# Patient Record
Sex: MALE | Race: BLACK OR AFRICAN AMERICAN | NOT HISPANIC OR LATINO | URBAN - METROPOLITAN AREA
[De-identification: names, ages, dates, MRNs, and addresses within clinical notes are randomized per-mention and may not be internally consistent; named-entity substitution may affect disease eponyms.]

---

## 2017-04-28 ENCOUNTER — INPATIENT (INPATIENT)
Facility: HOSPITAL | Age: 52
LOS: 7 days | Discharge: HOME | End: 2017-05-06
Attending: INTERNAL MEDICINE | Admitting: INTERNAL MEDICINE

## 2017-06-28 DIAGNOSIS — H40.9 UNSPECIFIED GLAUCOMA: ICD-10-CM

## 2017-06-28 DIAGNOSIS — I10 ESSENTIAL (PRIMARY) HYPERTENSION: ICD-10-CM

## 2017-06-28 DIAGNOSIS — E87.8 OTHER DISORDERS OF ELECTROLYTE AND FLUID BALANCE, NOT ELSEWHERE CLASSIFIED: ICD-10-CM

## 2017-06-28 DIAGNOSIS — F02.81 DEMENTIA IN OTHER DISEASES CLASSIFIED ELSEWHERE, UNSPECIFIED SEVERITY, WITH BEHAVIORAL DISTURBANCE: ICD-10-CM

## 2017-06-28 DIAGNOSIS — E87.1 HYPO-OSMOLALITY AND HYPONATREMIA: ICD-10-CM

## 2017-06-28 DIAGNOSIS — G93.41 METABOLIC ENCEPHALOPATHY: ICD-10-CM

## 2017-06-28 DIAGNOSIS — E87.0 HYPEROSMOLALITY AND HYPERNATREMIA: ICD-10-CM

## 2017-06-28 DIAGNOSIS — G40.909 EPILEPSY, UNSPECIFIED, NOT INTRACTABLE, WITHOUT STATUS EPILEPTICUS: ICD-10-CM

## 2017-06-28 DIAGNOSIS — E86.0 DEHYDRATION: ICD-10-CM

## 2017-06-28 DIAGNOSIS — I25.10 ATHEROSCLEROTIC HEART DISEASE OF NATIVE CORONARY ARTERY WITHOUT ANGINA PECTORIS: ICD-10-CM

## 2017-06-28 DIAGNOSIS — I69.354 HEMIPLEGIA AND HEMIPARESIS FOLLOWING CEREBRAL INFARCTION AFFECTING LEFT NON-DOMINANT SIDE: ICD-10-CM

## 2017-06-28 DIAGNOSIS — E87.6 HYPOKALEMIA: ICD-10-CM

## 2017-06-28 DIAGNOSIS — R53.81 OTHER MALAISE: ICD-10-CM

## 2017-06-28 DIAGNOSIS — T73.0XXA STARVATION, INITIAL ENCOUNTER: ICD-10-CM

## 2017-06-28 DIAGNOSIS — E11.65 TYPE 2 DIABETES MELLITUS WITH HYPERGLYCEMIA: ICD-10-CM

## 2017-06-28 DIAGNOSIS — L89.152 PRESSURE ULCER OF SACRAL REGION, STAGE 2: ICD-10-CM

## 2017-06-28 DIAGNOSIS — Z94.7 CORNEAL TRANSPLANT STATUS: ICD-10-CM

## 2017-06-28 DIAGNOSIS — E88.89 OTHER SPECIFIED METABOLIC DISORDERS: ICD-10-CM
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
## 2017-06-28 DIAGNOSIS — R62.7 ADULT FAILURE TO THRIVE: ICD-10-CM

## 2017-06-28 DIAGNOSIS — K59.09 OTHER CONSTIPATION: ICD-10-CM

## 2017-06-28 DIAGNOSIS — Z78.1 PHYSICAL RESTRAINT STATUS: ICD-10-CM

## 2017-06-28 DIAGNOSIS — G30.9 ALZHEIMER'S DISEASE, UNSPECIFIED: ICD-10-CM

## 2017-06-28 DIAGNOSIS — E78.5 HYPERLIPIDEMIA, UNSPECIFIED: ICD-10-CM
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
## 2017-09-08 ENCOUNTER — OUTPATIENT (OUTPATIENT)
Dept: OUTPATIENT SERVICES | Facility: HOSPITAL | Age: 52
LOS: 1 days | Discharge: HOME | End: 2017-09-08

## 2017-09-08 DIAGNOSIS — E87.0 HYPEROSMOLALITY AND HYPERNATREMIA: ICD-10-CM

## 2017-10-27 ENCOUNTER — OUTPATIENT (OUTPATIENT)
Dept: OUTPATIENT SERVICES | Facility: HOSPITAL | Age: 52
LOS: 1 days | Discharge: HOME | End: 2017-10-27

## 2017-10-27 DIAGNOSIS — K02.62 DENTAL CARIES ON SMOOTH SURFACE PENETRATING INTO DENTIN: ICD-10-CM

## 2017-10-27 DIAGNOSIS — E87.0 HYPEROSMOLALITY AND HYPERNATREMIA: ICD-10-CM

## 2017-11-03 ENCOUNTER — OUTPATIENT (OUTPATIENT)
Dept: OUTPATIENT SERVICES | Facility: HOSPITAL | Age: 52
LOS: 1 days | Discharge: HOME | End: 2017-11-03

## 2017-11-03 DIAGNOSIS — E87.0 HYPEROSMOLALITY AND HYPERNATREMIA: ICD-10-CM

## 2017-11-06 ENCOUNTER — OUTPATIENT (OUTPATIENT)
Dept: OUTPATIENT SERVICES | Facility: HOSPITAL | Age: 52
LOS: 1 days | Discharge: HOME | End: 2017-11-06

## 2017-11-06 DIAGNOSIS — E87.0 HYPEROSMOLALITY AND HYPERNATREMIA: ICD-10-CM

## 2017-11-22 ENCOUNTER — EMERGENCY (EMERGENCY)
Facility: HOSPITAL | Age: 52
LOS: 0 days | Discharge: HOME | End: 2017-11-22

## 2017-11-22 DIAGNOSIS — I10 ESSENTIAL (PRIMARY) HYPERTENSION: ICD-10-CM

## 2017-11-22 DIAGNOSIS — Z94.7 CORNEAL TRANSPLANT STATUS: ICD-10-CM

## 2017-11-22 DIAGNOSIS — Z79.82 LONG TERM (CURRENT) USE OF ASPIRIN: ICD-10-CM

## 2017-11-22 DIAGNOSIS — K59.00 CONSTIPATION, UNSPECIFIED: ICD-10-CM

## 2017-11-22 DIAGNOSIS — M62.81 MUSCLE WEAKNESS (GENERALIZED): ICD-10-CM

## 2017-11-22 DIAGNOSIS — E78.00 PURE HYPERCHOLESTEROLEMIA, UNSPECIFIED: ICD-10-CM

## 2017-11-22 DIAGNOSIS — E87.0 HYPEROSMOLALITY AND HYPERNATREMIA: ICD-10-CM

## 2017-11-22 DIAGNOSIS — G82.20 PARAPLEGIA, UNSPECIFIED: ICD-10-CM

## 2017-11-22 DIAGNOSIS — Z79.899 OTHER LONG TERM (CURRENT) DRUG THERAPY: ICD-10-CM

## 2017-12-07 ENCOUNTER — OUTPATIENT (OUTPATIENT)
Dept: OUTPATIENT SERVICES | Facility: HOSPITAL | Age: 52
LOS: 1 days | Discharge: HOME | End: 2017-12-07

## 2017-12-07 ENCOUNTER — APPOINTMENT (OUTPATIENT)
Dept: INTERNAL MEDICINE | Facility: CLINIC | Age: 52
End: 2017-12-07

## 2017-12-07 VITALS
SYSTOLIC BLOOD PRESSURE: 155 MMHG | TEMPERATURE: 98.1 F | HEIGHT: 73 IN | DIASTOLIC BLOOD PRESSURE: 54 MMHG | HEART RATE: 88 BPM

## 2017-12-07 DIAGNOSIS — E87.0 HYPEROSMOLALITY AND HYPERNATREMIA: ICD-10-CM

## 2017-12-07 DIAGNOSIS — Z87.891 PERSONAL HISTORY OF NICOTINE DEPENDENCE: ICD-10-CM

## 2017-12-07 DIAGNOSIS — L89.309 PRESSURE ULCER OF UNSPECIFIED BUTTOCK, UNSPECIFIED STAGE: ICD-10-CM

## 2017-12-08 DIAGNOSIS — E10.51 TYPE 1 DIABETES MELLITUS WITH DIABETIC PERIPHERAL ANGIOPATHY WITHOUT GANGRENE: ICD-10-CM

## 2017-12-08 DIAGNOSIS — R56.9 UNSPECIFIED CONVULSIONS: ICD-10-CM

## 2017-12-08 DIAGNOSIS — I63.9 CEREBRAL INFARCTION, UNSPECIFIED: ICD-10-CM

## 2017-12-08 DIAGNOSIS — K59.00 CONSTIPATION, UNSPECIFIED: ICD-10-CM

## 2017-12-08 LAB
ALBUMIN SERPL-MCNC: 4.3 G/DL
ALBUMIN/GLOB SERPL: 1.34
ALP SERPL-CCNC: 83 IU/L
ALT SERPL-CCNC: 16 IU/L
ANION GAP SERPL CALC-SCNC: 8 MEQ/L
AST SERPL-CCNC: 17 IU/L
BILIRUB SERPL-MCNC: 0.5 MG/DL
BUN SERPL-MCNC: 9 MG/DL
BUN/CREAT SERPL: 9.4 %
CALCIUM SERPL-MCNC: 9.8 MG/DL
CHLORIDE SERPL-SCNC: 107 MEQ/L
CHOLEST SERPL-MCNC: 138 MG/DL
CO2 SERPL-SCNC: 23 MEQ/L
CREAT SERPL-MCNC: 0.96 MG/DL
ESTIMATED AVERGAGE GLUCOSE (NORTH): 137 MG/DL
GFR SERPL CREATININE-BSD FRML MDRD: 100
GLUCOSE SERPL-MCNC: 103 MG/DL
HBA1C MFR BLD: 6.4 %
HDLC SERPL-MCNC: 38 MG/DL
HDLC SERPL: 3.63
LDLC SERPL DIRECT ASSAY-MCNC: 75 MG/DL
POTASSIUM SERPL-SCNC: 4 MMOL/L
PROT SERPL-MCNC: 7.5 G/DL
SODIUM SERPL-SCNC: 138 MEQ/L
TRIGL SERPL-MCNC: 103 MG/DL
VLDLC SERPL-MCNC: 20 MG/DL

## 2017-12-12 ENCOUNTER — OUTPATIENT (OUTPATIENT)
Dept: OUTPATIENT SERVICES | Facility: HOSPITAL | Age: 52
LOS: 1 days | Discharge: HOME | End: 2017-12-12

## 2017-12-12 ENCOUNTER — APPOINTMENT (OUTPATIENT)
Dept: NEUROLOGY | Facility: CLINIC | Age: 52
End: 2017-12-12

## 2017-12-12 ENCOUNTER — RX RENEWAL (OUTPATIENT)
Age: 52
End: 2017-12-12

## 2017-12-12 VITALS — SYSTOLIC BLOOD PRESSURE: 156 MMHG | HEIGHT: 73 IN | HEART RATE: 81 BPM | DIASTOLIC BLOOD PRESSURE: 92 MMHG

## 2017-12-12 DIAGNOSIS — E87.0 HYPEROSMOLALITY AND HYPERNATREMIA: ICD-10-CM

## 2017-12-12 RX ORDER — CLONAZEPAM 0.5 MG/1
0.5 TABLET ORAL
Refills: 0 | Status: DISCONTINUED | COMMUNITY
End: 2017-12-12

## 2017-12-12 RX ORDER — HALOPERIDOL 1 MG/1
1 TABLET ORAL
Refills: 0 | Status: DISCONTINUED | COMMUNITY
End: 2017-12-12

## 2017-12-12 RX ORDER — INSULIN GLARGINE 100 [IU]/ML
100 INJECTION, SOLUTION SUBCUTANEOUS
Qty: 1 | Refills: 3 | Status: DISCONTINUED | COMMUNITY
Start: 2017-12-07 | End: 2017-12-12

## 2017-12-20 RX ORDER — FENOFIBRATE 160 MG/1
160 TABLET ORAL DAILY
Qty: 30 | Refills: 11 | Status: DISCONTINUED | COMMUNITY
End: 2017-12-20

## 2017-12-22 ENCOUNTER — OUTPATIENT (OUTPATIENT)
Dept: OUTPATIENT SERVICES | Facility: HOSPITAL | Age: 52
LOS: 1 days | Discharge: HOME | End: 2017-12-22

## 2017-12-22 ENCOUNTER — APPOINTMENT (OUTPATIENT)
Dept: ENDOCRINOLOGY | Facility: CLINIC | Age: 52
End: 2017-12-22

## 2017-12-22 VITALS — DIASTOLIC BLOOD PRESSURE: 90 MMHG | HEIGHT: 73 IN | SYSTOLIC BLOOD PRESSURE: 150 MMHG

## 2017-12-22 DIAGNOSIS — E87.0 HYPEROSMOLALITY AND HYPERNATREMIA: ICD-10-CM

## 2017-12-26 ENCOUNTER — RX RENEWAL (OUTPATIENT)
Age: 52
End: 2017-12-26

## 2017-12-28 ENCOUNTER — RX RENEWAL (OUTPATIENT)
Age: 52
End: 2017-12-28

## 2017-12-30 ENCOUNTER — EMERGENCY (EMERGENCY)
Facility: HOSPITAL | Age: 52
LOS: 0 days | Discharge: HOME | End: 2017-12-30
Admitting: INTERNAL MEDICINE

## 2017-12-30 DIAGNOSIS — I10 ESSENTIAL (PRIMARY) HYPERTENSION: ICD-10-CM

## 2017-12-30 DIAGNOSIS — R09.89 OTHER SPECIFIED SYMPTOMS AND SIGNS INVOLVING THE CIRCULATORY AND RESPIRATORY SYSTEMS: ICD-10-CM

## 2017-12-30 DIAGNOSIS — E87.0 HYPEROSMOLALITY AND HYPERNATREMIA: ICD-10-CM

## 2017-12-30 DIAGNOSIS — Z79.2 LONG TERM (CURRENT) USE OF ANTIBIOTICS: ICD-10-CM

## 2017-12-30 DIAGNOSIS — Z94.7 CORNEAL TRANSPLANT STATUS: ICD-10-CM

## 2017-12-30 DIAGNOSIS — E78.00 PURE HYPERCHOLESTEROLEMIA, UNSPECIFIED: ICD-10-CM

## 2017-12-30 DIAGNOSIS — Z79.01 LONG TERM (CURRENT) USE OF ANTICOAGULANTS: ICD-10-CM

## 2017-12-30 DIAGNOSIS — Z87.891 PERSONAL HISTORY OF NICOTINE DEPENDENCE: ICD-10-CM

## 2017-12-30 DIAGNOSIS — F03.90 UNSPECIFIED DEMENTIA WITHOUT BEHAVIORAL DISTURBANCE: ICD-10-CM

## 2017-12-30 DIAGNOSIS — R47.01 APHASIA: ICD-10-CM

## 2017-12-30 DIAGNOSIS — Z79.82 LONG TERM (CURRENT) USE OF ASPIRIN: ICD-10-CM

## 2017-12-30 DIAGNOSIS — Z79.899 OTHER LONG TERM (CURRENT) DRUG THERAPY: ICD-10-CM

## 2017-12-30 DIAGNOSIS — I25.10 ATHEROSCLEROTIC HEART DISEASE OF NATIVE CORONARY ARTERY WITHOUT ANGINA PECTORIS: ICD-10-CM

## 2018-02-13 ENCOUNTER — OUTPATIENT (OUTPATIENT)
Dept: OUTPATIENT SERVICES | Facility: HOSPITAL | Age: 53
LOS: 1 days | Discharge: HOME | End: 2018-02-13

## 2018-02-13 ENCOUNTER — APPOINTMENT (OUTPATIENT)
Age: 53
End: 2018-02-13

## 2018-02-13 DIAGNOSIS — L89.302 PRESSURE ULCER OF UNSPECIFIED BUTTOCK, STAGE 2: ICD-10-CM

## 2018-02-20 DIAGNOSIS — L89.152 PRESSURE ULCER OF SACRAL REGION, STAGE 2: ICD-10-CM

## 2018-02-20 DIAGNOSIS — L89.322 PRESSURE ULCER OF LEFT BUTTOCK, STAGE 2: ICD-10-CM

## 2018-02-20 DIAGNOSIS — L89.312 PRESSURE ULCER OF RIGHT BUTTOCK, STAGE 2: ICD-10-CM

## 2018-03-02 ENCOUNTER — APPOINTMENT (OUTPATIENT)
Dept: GASTROENTEROLOGY | Facility: CLINIC | Age: 53
End: 2018-03-02

## 2018-03-02 ENCOUNTER — OUTPATIENT (OUTPATIENT)
Dept: OUTPATIENT SERVICES | Facility: HOSPITAL | Age: 53
LOS: 1 days | Discharge: HOME | End: 2018-03-02

## 2018-03-02 VITALS — HEART RATE: 81 BPM | DIASTOLIC BLOOD PRESSURE: 87 MMHG | SYSTOLIC BLOOD PRESSURE: 138 MMHG

## 2018-03-02 DIAGNOSIS — R19.7 DIARRHEA, UNSPECIFIED: ICD-10-CM

## 2018-03-05 ENCOUNTER — RX RENEWAL (OUTPATIENT)
Age: 53
End: 2018-03-05

## 2018-03-06 ENCOUNTER — RX RENEWAL (OUTPATIENT)
Age: 53
End: 2018-03-06

## 2018-03-07 ENCOUNTER — RX RENEWAL (OUTPATIENT)
Age: 53
End: 2018-03-07

## 2018-04-02 ENCOUNTER — RX RENEWAL (OUTPATIENT)
Age: 53
End: 2018-04-02

## 2018-04-06 ENCOUNTER — APPOINTMENT (OUTPATIENT)
Dept: INTERNAL MEDICINE | Facility: CLINIC | Age: 53
End: 2018-04-06

## 2018-04-06 ENCOUNTER — OUTPATIENT (OUTPATIENT)
Dept: OUTPATIENT SERVICES | Facility: HOSPITAL | Age: 53
LOS: 1 days | Discharge: HOME | End: 2018-04-06

## 2018-04-06 VITALS — SYSTOLIC BLOOD PRESSURE: 146 MMHG | HEART RATE: 87 BPM | DIASTOLIC BLOOD PRESSURE: 94 MMHG

## 2018-04-06 DIAGNOSIS — L30.9 DERMATITIS, UNSPECIFIED: ICD-10-CM

## 2018-04-07 LAB
C DIFF TOX GENS STL QL NAA+PROBE: NORMAL
CDIFF BY PCR: NEGATIVE

## 2018-04-09 DIAGNOSIS — R56.9 UNSPECIFIED CONVULSIONS: ICD-10-CM

## 2018-04-09 DIAGNOSIS — F41.9 ANXIETY DISORDER, UNSPECIFIED: ICD-10-CM

## 2018-04-09 DIAGNOSIS — I63.9 CEREBRAL INFARCTION, UNSPECIFIED: ICD-10-CM

## 2018-04-09 LAB — DEPRECATED O AND P PREP STL: NORMAL

## 2018-04-30 ENCOUNTER — APPOINTMENT (OUTPATIENT)
Dept: NUTRITION | Facility: CLINIC | Age: 53
End: 2018-04-30

## 2018-05-14 ENCOUNTER — RX RENEWAL (OUTPATIENT)
Age: 53
End: 2018-05-14

## 2018-05-21 ENCOUNTER — RX RENEWAL (OUTPATIENT)
Age: 53
End: 2018-05-21

## 2018-06-13 ENCOUNTER — RX RENEWAL (OUTPATIENT)
Age: 53
End: 2018-06-13

## 2018-06-20 ENCOUNTER — RX RENEWAL (OUTPATIENT)
Age: 53
End: 2018-06-20

## 2018-06-26 ENCOUNTER — OUTPATIENT (OUTPATIENT)
Dept: OUTPATIENT SERVICES | Facility: HOSPITAL | Age: 53
LOS: 1 days | Discharge: HOME | End: 2018-06-26

## 2018-06-26 ENCOUNTER — APPOINTMENT (OUTPATIENT)
Dept: DERMATOLOGY | Facility: CLINIC | Age: 53
End: 2018-06-26

## 2018-06-26 DIAGNOSIS — L21.9 SEBORRHEIC DERMATITIS, UNSPECIFIED: ICD-10-CM

## 2018-07-10 ENCOUNTER — RX RENEWAL (OUTPATIENT)
Age: 53
End: 2018-07-10

## 2018-08-13 ENCOUNTER — RX RENEWAL (OUTPATIENT)
Age: 53
End: 2018-08-13

## 2018-10-03 ENCOUNTER — LABORATORY RESULT (OUTPATIENT)
Age: 53
End: 2018-10-03

## 2018-10-03 LAB
CHOLEST SERPL-MCNC: 120 MG/DL
CHOLEST/HDLC SERPL: 3 RATIO
HDLC SERPL-MCNC: 40 MG/DL
LDLC SERPL CALC-MCNC: 67 MG/DL
TRIGL SERPL-MCNC: 170 MG/DL

## 2018-10-05 ENCOUNTER — APPOINTMENT (OUTPATIENT)
Dept: INTERNAL MEDICINE | Facility: CLINIC | Age: 53
End: 2018-10-05

## 2018-10-05 ENCOUNTER — OUTPATIENT (OUTPATIENT)
Dept: OUTPATIENT SERVICES | Facility: HOSPITAL | Age: 53
LOS: 1 days | Discharge: HOME | End: 2018-10-05

## 2018-10-05 VITALS — TEMPERATURE: 97 F | HEART RATE: 79 BPM | SYSTOLIC BLOOD PRESSURE: 170 MMHG | DIASTOLIC BLOOD PRESSURE: 94 MMHG

## 2018-10-05 LAB
BASOPHILS # BLD AUTO: 0.04 K/UL
BASOPHILS NFR BLD AUTO: 0.4 %
EOSINOPHIL # BLD AUTO: 0.26 K/UL
EOSINOPHIL NFR BLD AUTO: 2.4 %
HCT VFR BLD CALC: 41.4 %
HGB BLD-MCNC: 13.1 G/DL
IMM GRANULOCYTES NFR BLD AUTO: 0.4 %
LYMPHOCYTES # BLD AUTO: 2.46 K/UL
LYMPHOCYTES NFR BLD AUTO: 22.4 %
MAN DIFF?: NORMAL
MCHC RBC-ENTMCNC: 30.3 PG
MCHC RBC-ENTMCNC: 31.6 G/DL
MCV RBC AUTO: 95.6 FL
MONOCYTES # BLD AUTO: 0.47 K/UL
MONOCYTES NFR BLD AUTO: 4.3 %
NEUTROPHILS # BLD AUTO: 7.73 K/UL
NEUTROPHILS NFR BLD AUTO: 70.1 %
PLATELET # BLD AUTO: 303 K/UL
RBC # BLD: 4.33 M/UL
RBC # FLD: 12.7 %
TSH SERPL-ACNC: 2.29 UIU/ML
WBC # FLD AUTO: 11 K/UL

## 2018-10-05 NOTE — HISTORY OF PRESENT ILLNESS
[FreeTextEntry1] : 54 yo M with PMHx of CVA (1980s with residual left sided weakness and aphasia), CAD, Right retinal detachment s/p right corneal transplant secondary to perforation in 04/13/17, Seizure disorder (no seizures in 20 ysr year), HTN, HLD, DMII on Insulin and sacral excoriation . Pt is mute and does not follow command at baseline and is wheel chair bound. Pt's sister was present and assisted w/ the interview. As per sister, no active medical issues at this time. \par Today pt is here for follow up ,need all of his medications and supplies .\par No other new medical  issues.\par Has not followed up with GI,Neuro ,podiatry and ophthalmology.\par as per sister,she is happy to take care of him but it takes some time to follow up with all appointments .\par

## 2018-10-05 NOTE — DISCUSSION/SUMMARY
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 c/w CBZ and buspar\par - will give referral to psych\par \par #Hx of Seizure D/O and CVA w/ residual left sided weakness\par - as per neuro, c/w CBZ and buspar\par - f/u neuro\par \par #T2DM w/ a1c of 6.4 on (12/17)\par - c/w insulin\par - f/u endo\par - will reorder labs\par - will give referral to Nutriton[facility]\par \par #HTN/HLD - stable\par -Continue medications\par \par #Loose stools 2/2 laxative use for constipation - resolved and unlikely infectious etiology\par - hold laxative for now \par - C. diff rule out\par - f/u GI\par \par #HCM\par - F/u GI for routine colonoscopy\par - refused flu shot\par - will order routine labs\par .. rto in6 months

## 2018-10-05 NOTE — ASSESSMENT
[FreeTextEntry1] : 52 yo M with PMHx of CVA (1980s with residual left sided weakness and aphasia), CAD, Right retinal detachment s/p right corneal transplant secondary to perforation in 04/13/17, Seizure disorder (no seizures in 20 ysr year), HTN, HLD, DMII on Insulin and sacral excoriation . Pt is mute and does not follow command at baseline and is wheel chair bound. Pt's sister was present and assisted w/ the interview. As per sister, no active medical issues at this time. \par \par #D.M;\par hgA1c 5.2 \par will decrease Lantus to  20 \par follow up with Endocrinology\par need to follow up with pod,ophthalmology\par \par #HTN/CAD/DLD;\par will cw current care\par \par #CVA,dyskinesia of tounge and mouth and hx of seizures;\par cw carbamazepine and BuSpar\par cw aspirin and statin\par have to follow up with Neuro\par not on haloperidol any more \par \par #diarrhoea; \par resolved\par \par #sacral excoriation;\par will prescribe Silver sulphazidine \par will refer to Burn too\par \par #HMC;\par need to go to GI for colonoscopy\par will give flu shot today\par will give PNA vaccine in next visit\par  on board for all needed supplies \par folwo up in 6 mnths or PRN.\par

## 2018-10-05 NOTE — PHYSICAL EXAM
[No Acute Distress] : no acute distress [Well-Appearing] : well-appearing [Normal Sclera/Conjunctiva] : normal sclera/conjunctiva [No JVD] : no jugular venous distention [No Respiratory Distress] : no respiratory distress  [Clear to Auscultation] : lungs were clear to auscultation bilaterally [No Accessory Muscle Use] : no accessory muscle use [Normal Rate] : normal rate  [Regular Rhythm] : with a regular rhythm [Normal S1, S2] : normal S1 and S2 [No Edema] : there was no peripheral edema [Soft] : abdomen soft [Non Tender] : non-tender [No HSM] : no HSM [Normal Bowel Sounds] : normal bowel sounds [Declined Rectal Exam] : declined rectal exam [No CVA Tenderness] : no CVA  tenderness [de-identified] : residual weakness on Left side ,aphasic

## 2018-10-08 ENCOUNTER — RX CHANGE (OUTPATIENT)
Age: 53
End: 2018-10-08

## 2018-10-08 RX ORDER — INSULIN GLARGINE 100 [IU]/ML
100 INJECTION, SOLUTION SUBCUTANEOUS
Qty: 1 | Refills: 3 | Status: DISCONTINUED | COMMUNITY
Start: 2017-12-12 | End: 2018-10-08

## 2018-10-08 RX ORDER — CALCIUM CARB/VITAMIN D3/VIT K1 500-100-40
31G X 5/16" TABLET,CHEWABLE ORAL
Qty: 1 | Refills: 2 | Status: DISCONTINUED | COMMUNITY
Start: 2017-12-12 | End: 2018-10-08

## 2018-10-22 ENCOUNTER — RX RENEWAL (OUTPATIENT)
Age: 53
End: 2018-10-22

## 2018-10-30 ENCOUNTER — OUTPATIENT (OUTPATIENT)
Dept: OUTPATIENT SERVICES | Facility: HOSPITAL | Age: 53
LOS: 1 days | Discharge: HOME | End: 2018-10-30

## 2018-10-30 ENCOUNTER — APPOINTMENT (OUTPATIENT)
Dept: DERMATOLOGY | Facility: CLINIC | Age: 53
End: 2018-10-30

## 2018-11-13 ENCOUNTER — OUTPATIENT (OUTPATIENT)
Dept: OUTPATIENT SERVICES | Facility: HOSPITAL | Age: 53
LOS: 1 days | Discharge: HOME | End: 2018-11-13

## 2018-11-13 DIAGNOSIS — I63.9 CEREBRAL INFARCTION, UNSPECIFIED: ICD-10-CM

## 2019-01-04 ENCOUNTER — APPOINTMENT (OUTPATIENT)
Dept: ENDOCRINOLOGY | Facility: CLINIC | Age: 54
End: 2019-01-04

## 2019-01-16 ENCOUNTER — OUTPATIENT (OUTPATIENT)
Dept: OUTPATIENT SERVICES | Facility: HOSPITAL | Age: 54
LOS: 1 days | Discharge: HOME | End: 2019-01-16

## 2019-01-17 ENCOUNTER — APPOINTMENT (OUTPATIENT)
Dept: INTERNAL MEDICINE | Facility: CLINIC | Age: 54
End: 2019-01-17

## 2019-01-17 DIAGNOSIS — F41.9 ANXIETY DISORDER, UNSPECIFIED: ICD-10-CM

## 2019-03-08 ENCOUNTER — OUTPATIENT (OUTPATIENT)
Dept: OUTPATIENT SERVICES | Facility: HOSPITAL | Age: 54
LOS: 1 days | Discharge: HOME | End: 2019-03-08

## 2019-03-08 ENCOUNTER — APPOINTMENT (OUTPATIENT)
Dept: GASTROENTEROLOGY | Facility: CLINIC | Age: 54
End: 2019-03-08

## 2019-03-08 VITALS — HEIGHT: 73 IN

## 2019-03-08 VITALS — SYSTOLIC BLOOD PRESSURE: 142 MMHG | DIASTOLIC BLOOD PRESSURE: 88 MMHG

## 2019-03-08 NOTE — HISTORY OF PRESENT ILLNESS
[None] : no changes [de-identified] : 51 yo M with PMHx of CVA (1980s with residual left sided weakness and aphasia), CAD, Right retinal detachment s/p right corneal transplant secondary to perforation in 04/13/17, Seizure disorder (last known seizure over 20 years ago as per sister, Nicole), HTN, HLD, DMII presents from home living with sister (Nicole). His sister gives the history as the patient is non commutative. Patient was last seen in 03/18 for diarrhea, stool O/P, C Diff were sent and were negative.  He was to follow up two weeks after that appointment did was unable to do so.  He presents today for screening colonoscopy. He is unable to produce complaints as he is relatively aphasic, though he follows commands minimally. He currently does not have diarrhea, constipation, and has not been vomiting.  He has has bowel movements 3 times a week and the stool is not hard.

## 2019-03-08 NOTE — PHYSICAL EXAM
[General Appearance - Alert] : alert [General Appearance - In No Acute Distress] : in no acute distress [Sclera] : the sclera and conjunctiva were normal [PERRL With Normal Accommodation] : pupils were equal in size, round, and reactive to light [Extraocular Movements] : extraocular movements were intact [Outer Ear] : the ears and nose were normal in appearance [Oropharynx] : the oropharynx was normal [Neck Appearance] : the appearance of the neck was normal [Neck Cervical Mass (___cm)] : no neck mass was observed [Jugular Venous Distention Increased] : there was no jugular-venous distention [Thyroid Diffuse Enlargement] : the thyroid was not enlarged [Thyroid Nodule] : there were no palpable thyroid nodules [Auscultation Breath Sounds / Voice Sounds] : lungs were clear to auscultation bilaterally [Heart Rate And Rhythm] : heart rate was normal and rhythm regular [Heart Sounds] : normal S1 and S2 [Heart Sounds Gallop] : no gallops [Murmurs] : no murmurs [Heart Sounds Pericardial Friction Rub] : no pericardial rub [Bowel Sounds] : normal bowel sounds [Abdomen Soft] : soft [Abdomen Tenderness] : non-tender [Abdomen Mass (___ Cm)] : no abdominal mass palpated [Nail Clubbing] : no clubbing  or cyanosis of the fingernails [Musculoskeletal - Swelling] : no joint swelling seen [Skin Color & Pigmentation] : normal skin color and pigmentation [Skin Turgor] : normal skin turgor [] : no rash [Deep Tendon Reflexes (DTR)] : deep tendon reflexes were 2+ and symmetric [Sensation] : the sensory exam was normal to light touch and pinprick [FreeTextEntry1] : Left Sided weakness from previous CVA (Upper and Lower extremities), Expressive Aphasia

## 2019-03-08 NOTE — ASSESSMENT
[FreeTextEntry1] : 54 yo M with PMHx of CVA (1980s with residual left sided weakness and aphasia), CAD, Right retinal detachment s/p right corneal transplant secondary to perforation in 04/13/17, Seizure disorder (last known seizure over 20 years ago as per sister, Nicole), HTN, HLD, DMII  presenting for screening colonoscopy.\par \par #) Screening Colonoscopy \par -Stool studies were negative after last visit \par -No longer has diarrhea \par -GoLYTELY, Dulcolax\par  \par #) Dysphagia 2/2 to CVA \par c/w nectar thick diet.

## 2019-03-19 ENCOUNTER — OUTPATIENT (OUTPATIENT)
Dept: OUTPATIENT SERVICES | Facility: HOSPITAL | Age: 54
LOS: 1 days | Discharge: HOME | End: 2019-03-19

## 2019-03-19 ENCOUNTER — APPOINTMENT (OUTPATIENT)
Dept: DERMATOLOGY | Facility: CLINIC | Age: 54
End: 2019-03-19

## 2019-03-19 NOTE — ASSESSMENT
[FreeTextEntry1] : #Seborrheic Dermatitis\par - continue with triamcinolone cream 0.25% \par - will add ketoconazole shampoo to wash beard\par - Follow up in 4 months

## 2019-03-19 NOTE — PHYSICAL EXAM
[Alert] : alert [Well Nourished] : well nourished [FreeTextEntry3] : Scaling on nasolabial folds and over nasal bridge, associated with mild hypopigmentation \par \par No scaling over scalp or forehead

## 2019-03-19 NOTE — HISTORY OF PRESENT ILLNESS
[FreeTextEntry1] : Seborrheic Dermatitis  [de-identified] : 54 y/o M with severe MR, wheelchair bound, non-verbal presents for follow-up of seborrheic dermatitis. Patient was seen in July, 2018 for initial evaluation. He had hypopigmentation, itchiness, and redness affecting the entire head, including scalp, eyebrows, nasal folds, ears, and beard. Was initially treated with hydrocortisone 1% cream and clotrimazole cream, and then improved after adding triamcinolone 0.25%. On last visit, hydrocortisone and clotrimazole cream were discontinued and he was just maintained on triamcinolone. \stephanie Continues to have scaling around nasolabial fold, nasal bridge, and she reports scaling on his beard

## 2019-03-28 ENCOUNTER — APPOINTMENT (OUTPATIENT)
Dept: INTERNAL MEDICINE | Facility: CLINIC | Age: 54
End: 2019-03-28

## 2019-03-28 ENCOUNTER — OUTPATIENT (OUTPATIENT)
Dept: OUTPATIENT SERVICES | Facility: HOSPITAL | Age: 54
LOS: 1 days | Discharge: HOME | End: 2019-03-28

## 2019-03-28 VITALS
SYSTOLIC BLOOD PRESSURE: 154 MMHG | HEART RATE: 80 BPM | HEIGHT: 73 IN | TEMPERATURE: 96.7 F | DIASTOLIC BLOOD PRESSURE: 86 MMHG | BODY MASS INDEX: 25.18 KG/M2 | WEIGHT: 190 LBS

## 2019-03-28 DIAGNOSIS — L21.9 SEBORRHEIC DERMATITIS, UNSPECIFIED: ICD-10-CM

## 2019-03-28 DIAGNOSIS — M26.32: ICD-10-CM

## 2019-03-28 DIAGNOSIS — K59.09 OTHER CONSTIPATION: ICD-10-CM

## 2019-03-28 DIAGNOSIS — Z86.59 PERSONAL HISTORY OF OTHER MENTAL AND BEHAVIORAL DISORDERS: ICD-10-CM

## 2019-03-28 DIAGNOSIS — Z87.898 PERSONAL HISTORY OF OTHER SPECIFIED CONDITIONS: ICD-10-CM

## 2019-03-28 DIAGNOSIS — Z94.7 CORNEAL TRANSPLANT STATUS: ICD-10-CM

## 2019-03-28 DIAGNOSIS — I63.9 CEREBRAL INFARCTION, UNSPECIFIED: ICD-10-CM

## 2019-03-28 DIAGNOSIS — E11.9 TYPE 2 DIABETES MELLITUS WITHOUT COMPLICATIONS: ICD-10-CM

## 2019-03-28 RX ORDER — CARBAMAZEPINE 100 MG/1
100 TABLET, CHEWABLE ORAL
Qty: 120 | Refills: 5 | Status: DISCONTINUED | COMMUNITY
Start: 2018-01-02 | End: 2019-03-28

## 2019-03-29 NOTE — HISTORY OF PRESENT ILLNESS
[FreeTextEntry1] : 53 year old male came here for follow up. [de-identified] : 55 yo M with PMHx of CVA (1980s with residual left sided weakness and aphasia), CAD, Right retinal detachment s/p right corneal transplant secondary to perforation in 04/13/17, Seizure disorder (no seizures in 20 ysr year), HTN, HLD, DMII on Insulin and sacral excoriation. Last seen in Oct 2018. accompanied by sister care giver she c/o involuntary movements of tongue, mouth newly started on hands and legs. Denies any fever, chills, N/v/Abd pain.

## 2019-03-29 NOTE — ASSESSMENT
[FreeTextEntry1] : 54 yo M with PMHx of CVA (1980s with residual left sided weakness and aphasia), CAD, Right retinal detachment s/p right corneal transplant secondary to perforation in 04/13/17, Seizure disorder (no seizures in 20 ysr year), HTN, HLD, DMII on Insulin and sacral excoriation. \par \par #)DM controlled\par HbA1c 5.2 in 2017\par c/w insulin as needed\par Sister doesn't want to take him for podiatry, opthal, endo because of controlled sugar.\par will check HbA1c\par \par #)HTN/CAD/DLD;\par c/w Lisinopril, aspirin, simvastatin\par \par #)h/o CVA,dyskinesia of tounge and mouth and hx of seizures;\par c/w carbamazepine and BuSpar\par c/w aspirin and statin\par Has an appointment with neurology next month\par haloperidol has been stopped\par \par #)sacral excoriation;\par c/w Silver sulphazidine \par wound care referral\par \par #)Chronic constipation\par c/w stool softeners as needed\par \par #)HCM\par Colonoscopy scheduled followed up with GI in June 5th.\par Flu received in oct 2018\par PPSV 23 vaccination administered today\par \par #)Need hospital bed: An extended hospital bed is recommended as pt's height exceeded the length of his current standard bed. This causes his legs to hang over the foot board portion, Pt has been referred to wound care due to a decubitus ulcer sustained as a result of pt's inability to move with adequate space.\par \par Follow up in 6 months and PRN

## 2019-03-29 NOTE — PHYSICAL EXAM
[No Acute Distress] : no acute distress [Normal Sclera/Conjunctiva] : normal sclera/conjunctiva [Normal Outer Ear/Nose] : the outer ears and nose were normal in appearance [Supple] : supple [No Respiratory Distress] : no respiratory distress  [Clear to Auscultation] : lungs were clear to auscultation bilaterally [No Accessory Muscle Use] : no accessory muscle use [Normal Rate] : normal rate  [Normal S1, S2] : normal S1 and S2 [No Edema] : there was no peripheral edema [Soft] : abdomen soft [Non Tender] : non-tender [Non-distended] : non-distended [No CVA Tenderness] : no CVA  tenderness [de-identified] : wheel chair bound [de-identified] : unable to perform complete neurologic examination, he can able to follow simple commands

## 2019-04-30 ENCOUNTER — OUTPATIENT (OUTPATIENT)
Dept: OUTPATIENT SERVICES | Facility: HOSPITAL | Age: 54
LOS: 1 days | Discharge: HOME | End: 2019-04-30

## 2019-04-30 ENCOUNTER — APPOINTMENT (OUTPATIENT)
Dept: NEUROLOGY | Facility: CLINIC | Age: 54
End: 2019-04-30

## 2019-04-30 ENCOUNTER — RX RENEWAL (OUTPATIENT)
Age: 54
End: 2019-04-30

## 2019-04-30 VITALS — DIASTOLIC BLOOD PRESSURE: 97 MMHG | SYSTOLIC BLOOD PRESSURE: 151 MMHG | TEMPERATURE: 97.3 F | HEART RATE: 86 BPM

## 2019-04-30 DIAGNOSIS — G40.909 EPILEPSY, UNSPECIFIED, NOT INTRACTABLE, WITHOUT STATUS EPILEPTICUS: ICD-10-CM

## 2019-04-30 DIAGNOSIS — G24.9 DYSTONIA, UNSPECIFIED: ICD-10-CM

## 2019-04-30 DIAGNOSIS — I63.9 CEREBRAL INFARCTION, UNSPECIFIED: ICD-10-CM

## 2019-04-30 NOTE — ASSESSMENT
[FreeTextEntry1] : #Dyskinesia\par R/o extrapyramidal symptoms ( less likely as risperidone stopped 6 months ago with no improvement) \par R/o structural abnormality: repeat head CT\par R/o metabolic abnormalities: CMP, TSH ordered\par Rehab medicine referral \par \par #Seizure disorder\par Seizure free\par Check carbamazepine level and renew for now

## 2019-04-30 NOTE — END OF VISIT
[] : Resident [FreeTextEntry3] : Patient may have Tardive dyskinesia from antipsychotics.  According to sister hasn't been on them for 6 months,\par yet symptoms persist.  They do not appear to be disabling.  F/u imaging is suggested with head CT and labs.\par Physical therapy is recommended.  Return in 3 months.\par

## 2019-04-30 NOTE — HISTORY OF PRESENT ILLNESS
[FreeTextEntry1] : 55 yo M with PMHx of CVA (1980s with residual left sided weakness and aphasia), CAD, Right retinal detachment s/p right corneal transplant secondary to perforation in 04/13/17, Seizure disorder (no seizures in 20  years), Behavior disorder? ( on risperidone and Buspirone), HTN, HLD, DMII on Insulin.\par \par Patient is having involuntary movements of tongue, mouth, hands and legs since around 6 months. He is smacking his mouth with licking tongue continuously. His arms are legs are stiff and sometimes he goes into locked positions and holding into things very tightly.\par Patient was on haldol while in nursing home and was switched to buspiron and risperidone after discharge one year ago. Sister however stopped both drugs 6 months ago when these symptoms started. He has no behavioral issues as per sister.\par Patient has been on carbamazepine and seizure free since years.  \par Latest Head CT ( 2017)showing  Mild periventricular and subcortical white matter chronic small vessel\par ischemic changes and Heterogeneous density in the right globe with preseptal soft tissue\par swelling.

## 2019-04-30 NOTE — PHYSICAL EXAM
[General Appearance - In No Acute Distress] : in no acute distress [General Appearance - Well Developed] : well developed [Cranial Nerves Facial (VII)] : face symmetrical [Cranial Nerves Vestibulocochlear (VIII)] : hearing was intact bilaterally [Cranial Nerves Glossopharyngeal (IX)] : tongue and palate midline [Cranial Nerves Accessory (XI - Cranial And Spinal)] : head turning and shoulder shrug symmetric [Cranial Nerves Hypoglossal (XII)] : there was no tongue deviation with protrusion [FreeTextEntry1] : Patient avoiding eye contact oriented to place and person but not time [FreeTextEntry6] : Stiffness appreciated in left upper extremity and bilateral lower extremity

## 2019-05-01 LAB
ALBUMIN SERPL ELPH-MCNC: 4.2 G/DL
ALP BLD-CCNC: 129 U/L
ALT SERPL-CCNC: 13 U/L
ANION GAP SERPL CALC-SCNC: 11 MMOL/L
AST SERPL-CCNC: 14 U/L
BASOPHILS # BLD AUTO: 0.04 K/UL
BASOPHILS NFR BLD AUTO: 0.4 %
BILIRUB SERPL-MCNC: 0.3 MG/DL
BUN SERPL-MCNC: 19 MG/DL
CALCIUM SERPL-MCNC: 9.4 MG/DL
CHLORIDE SERPL-SCNC: 105 MMOL/L
CHOLEST SERPL-MCNC: 131 MG/DL
CHOLEST/HDLC SERPL: 2.7 RATIO
CO2 SERPL-SCNC: 27 MMOL/L
CREAT SERPL-MCNC: 0.9 MG/DL
EOSINOPHIL # BLD AUTO: 0.06 K/UL
EOSINOPHIL NFR BLD AUTO: 0.7 %
ESTIMATED AVERAGE GLUCOSE: 91 MG/DL
GLUCOSE SERPL-MCNC: 85 MG/DL
HBA1C MFR BLD HPLC: 4.8 %
HCT VFR BLD CALC: 39.8 %
HDLC SERPL-MCNC: 49 MG/DL
HGB BLD-MCNC: 12.1 G/DL
IMM GRANULOCYTES NFR BLD AUTO: 0.2 %
LDLC SERPL CALC-MCNC: 72 MG/DL
LYMPHOCYTES # BLD AUTO: 2.35 K/UL
LYMPHOCYTES NFR BLD AUTO: 26 %
MAN DIFF?: NORMAL
MCHC RBC-ENTMCNC: 27.7 PG
MCHC RBC-ENTMCNC: 30.4 G/DL
MCV RBC AUTO: 91.1 FL
MONOCYTES # BLD AUTO: 0.38 K/UL
MONOCYTES NFR BLD AUTO: 4.2 %
NEUTROPHILS # BLD AUTO: 6.19 K/UL
NEUTROPHILS NFR BLD AUTO: 68.5 %
PLATELET # BLD AUTO: 249 K/UL
POTASSIUM SERPL-SCNC: 4.5 MMOL/L
PROT SERPL-MCNC: 7.4 G/DL
RBC # BLD: 4.37 M/UL
RBC # FLD: 16.4 %
SODIUM SERPL-SCNC: 143 MMOL/L
TRIGL SERPL-MCNC: 55 MG/DL
TSH SERPL-ACNC: 1.81 UIU/ML
WBC # FLD AUTO: 9.04 K/UL

## 2019-05-10 ENCOUNTER — APPOINTMENT (OUTPATIENT)
Dept: INTERNAL MEDICINE | Facility: CLINIC | Age: 54
End: 2019-05-10

## 2019-05-10 ENCOUNTER — OUTPATIENT (OUTPATIENT)
Dept: OUTPATIENT SERVICES | Facility: HOSPITAL | Age: 54
LOS: 1 days | Discharge: HOME | End: 2019-05-10

## 2019-05-10 VITALS
DIASTOLIC BLOOD PRESSURE: 98 MMHG | HEIGHT: 73 IN | WEIGHT: 195 LBS | BODY MASS INDEX: 25.84 KG/M2 | SYSTOLIC BLOOD PRESSURE: 155 MMHG | HEART RATE: 80 BPM | TEMPERATURE: 96.9 F

## 2019-05-10 NOTE — HISTORY OF PRESENT ILLNESS
[FreeTextEntry1] : 53 year old male came here for follow up. [de-identified] : 55 yo M with PMHx of CVA (1980s with residual left sided weakness and aphasia), CAD, Right retinal detachment s/p right corneal transplant secondary to perforation in 04/13/17, Seizure disorder (no seizures in 20 ysr year), tardive diakinesis, HTN, HLD, DMII on Insulin and sacral excoriation. Last seen in March 2019 for tardive dyskinesia. accompanied by sister care giver she c/o swelling in the right groin which comes and goes. She denied any hx of constipation, diarrhea, changes in skin color and tenderness. He also has increased salivation after his medications were changed recently. He was seen by neuro for tardive dyskinesia and they d/cinthia Respirdone and recommended CT scan of the brain, he is waiting for the insurance to approve CT scan.

## 2019-05-10 NOTE — PHYSICAL EXAM
[No Acute Distress] : no acute distress [Normal Outer Ear/Nose] : the outer ears and nose were normal in appearance [Normal Oropharynx] : the oropharynx was normal [No JVD] : no jugular venous distention [Supple] : supple [No Respiratory Distress] : no respiratory distress  [Clear to Auscultation] : lungs were clear to auscultation bilaterally [No Accessory Muscle Use] : no accessory muscle use [Normal Rate] : normal rate  [Regular Rhythm] : with a regular rhythm [No Murmur] : no murmur heard [Normal S1, S2] : normal S1 and S2 [No CVA Tenderness] : no CVA  tenderness [No Spinal Tenderness] : no spinal tenderness [No Joint Swelling] : no joint swelling [de-identified] : unable to follow with right eye. [de-identified] : mass in the groin, soft nonternder, increases in size with straining. [de-identified] : aphasia with left sided weakness, lip smaking and abnormal movements of face

## 2019-05-10 NOTE — ASSESSMENT
[FreeTextEntry1] : 55 yo M with PMHx of CVA (1980s with residual left sided weakness and aphasia), CAD, Right retinal detachment s/p right corneal transplant secondary to perforation in 04/13/17, Seizure disorder (no seizures in 20 ysr year), tardive diakinesis, HTN, HLD, DMII on Insulin and sacral excoriation. Last seen in March 2019 for tardive dyskinesia. accompanied by sister care giver she c/o swelling in the right groin which comes and goes\par \par #) Inguinal hernia right sided\par - refer to surgery.\par \par #) prediabetes \par latest HbA1c  4.5 in March 2019 \par d/c insulin\par \par #)HTN - uncontrolled\par increase lisinopril to 40 mg q 24\par \par CAD/DLD;\par c/w aspirin, simvastatin\par \par #)h/o CVA,dyskinesia of tounge and mouth and hx of seizures;\par c/w carbamazepine and BuSpar\par c/w aspirin and statin\par f/u with neuro after CT scan of head\par haloperidol has been stopped\par \par #)sacral excoriation;\par c/w Silver sulphazidine \par wound care referral\par \par #)Chronic constipation\par c/w stool softeners as needed\par \par #)HCM\par Colonoscopy scheduled followed up with GI in June 5th.\par PPSV 23 vaccination administered march 28, 2019 second shot due in sep 2019.\par Follow up in 3 months and PRN.

## 2019-05-14 DIAGNOSIS — E11.9 TYPE 2 DIABETES MELLITUS WITHOUT COMPLICATIONS: ICD-10-CM

## 2019-05-14 DIAGNOSIS — I63.9 CEREBRAL INFARCTION, UNSPECIFIED: ICD-10-CM

## 2019-05-14 DIAGNOSIS — K46.9 UNSPECIFIED ABDOMINAL HERNIA WITHOUT OBSTRUCTION OR GANGRENE: ICD-10-CM

## 2019-06-12 ENCOUNTER — APPOINTMENT (OUTPATIENT)
Dept: SURGERY | Facility: CLINIC | Age: 54
End: 2019-06-12
Payer: MEDICAID

## 2019-06-12 VITALS
WEIGHT: 195 LBS | SYSTOLIC BLOOD PRESSURE: 140 MMHG | BODY MASS INDEX: 25.84 KG/M2 | DIASTOLIC BLOOD PRESSURE: 82 MMHG | HEIGHT: 73 IN

## 2019-06-12 DIAGNOSIS — K46.9 UNSPECIFIED ABDOMINAL HERNIA W/OUT OBSTRUCTION OR GANGRENE: ICD-10-CM

## 2019-06-12 PROBLEM — I25.10 ATHEROSCLEROTIC HEART DISEASE OF NATIVE CORONARY ARTERY WITHOUT ANGINA PECTORIS: Chronic | Status: ACTIVE | Noted: 2019-06-05

## 2019-06-12 PROBLEM — E78.00 PURE HYPERCHOLESTEROLEMIA, UNSPECIFIED: Chronic | Status: ACTIVE | Noted: 2019-06-05

## 2019-06-12 PROBLEM — F41.9 ANXIETY DISORDER, UNSPECIFIED: Chronic | Status: ACTIVE | Noted: 2019-06-05

## 2019-06-12 PROBLEM — I10 ESSENTIAL (PRIMARY) HYPERTENSION: Chronic | Status: ACTIVE | Noted: 2019-06-05

## 2019-06-12 PROBLEM — H33.20 SEROUS RETINAL DETACHMENT, UNSPECIFIED EYE: Chronic | Status: ACTIVE | Noted: 2019-06-05

## 2019-06-12 PROBLEM — R56.9 UNSPECIFIED CONVULSIONS: Chronic | Status: ACTIVE | Noted: 2019-06-05

## 2019-06-12 PROBLEM — E11.9 TYPE 2 DIABETES MELLITUS WITHOUT COMPLICATIONS: Chronic | Status: ACTIVE | Noted: 2019-06-05

## 2019-06-12 PROCEDURE — 99203 OFFICE O/P NEW LOW 30 MIN: CPT

## 2019-06-12 NOTE — PHYSICAL EXAM
[JVD] : no jugular venous distention  [No Rash or Lesion] : No rash or lesion [Respiratory Effort] : normal respiratory effort [Alert] : alert [Oriented to Person] : disoriented to person [Oriented to Place] : disoriented to place [Oriented to Time] : disoriented to time [de-identified] : alina in chair [Calm] : calm [de-identified] : normal with glasses on  [de-identified] : Soft non tender , no palpable masses

## 2019-06-12 NOTE — ASSESSMENT
[FreeTextEntry1] : Felipe is a 54  year old man who presents to the office. He had a stroke when he was 22 . Since then his sister is taking care of him. He has had multiple medical problems.\par His sister recently noted a swelling in the right groin. It comes out and it goes back in. \par There is no change in bladder or bowel habits. \par He does not have any symptoms from it. \par He is averbal so symptoms are difficult to elicit. \par \par Inspersion : Asymptomatic inguinal hernia. \par \par We explained in great detail the pathophysiology of the disease process. We discussed the workup for diagnosis and management. The Options explained to the patient. The Risk , benefit and alternatives were discussed. We discussed recovery and possible complications. The Post operative care was explained to the patient. He was counselled on diet , exercise and wound care.\par \par We discussed the  surgery with him and her . \par \par We explained in great detail the pathophysiology of the disease process. We discussed the workup for diagnosis and management. The Post operative care was explained to the patient. He was counselled on diet , exercise and wound care. The Procedure was explained to the patient. The Risk , benefit and alternatives were discussed. We discussed recovery and possible complications. We discussed recurrence rate and complications including chronic abdominal pain. We also discussed hernia, surgery and  pain in relation to his Job.\par \par We discussed about anesthesia and its complications\par We also discussed about Dr. Murguia who would be able to do it , without general anesthesia. \par \par She will follow the hernia for now. \par She will come back to see me in a few weeks. \par \par \par

## 2019-06-12 NOTE — CONSULT LETTER
[Dear  ___] : Dear  [unfilled], [Please see my note below.] : Please see my note below. [Referral Letter:] : I am referring [unfilled] to you for further evaluation.  My most recent evaluation follows. [Referral Closing:] : Thank you very much for seeing this patient.  If you have any questions, please do not hesitate to contact me. [Sincerely,] : Sincerely, [FreeTextEntry1] : Felipe is 54 year old man, who has multiple comorbidities had a stroke at a young age. His sister noticed a right inguinal hernia, which spontaneously reduced. \par I discussed various aspects of surgery. The sister decided on watchful waiting. She is unsure about anesthesia and wanted to know if this could be done without general anesthesia. \par  [FreeTextEntry3] : Rama Cooney MD\par Minimally Invasive Surgery\par Foregut and Hhlhtn-Fqtonazjd-Pqymtra Surgery\par Associate  of [facility]par [facility]\par [address] [place] [address] , [address] [place] [address]\par Phone: [phone] Fax: [phone]\par

## 2019-06-12 NOTE — HISTORY OF PRESENT ILLNESS
[de-identified] : Felipe is a 54  year old man who presents to the office. He had a stroke when he was 22 . Since then his sister is taking care of him. He has had multiple medical problems.\par His sister recently noted a swelling in the right groin. It comes out and it goes back in. \par There is no change in bladder or bowel habits. \par He does not have any symptoms from it. \par He is averbal so symptoms are difficult to elicit.

## 2019-07-30 ENCOUNTER — APPOINTMENT (OUTPATIENT)
Dept: DERMATOLOGY | Facility: CLINIC | Age: 54
End: 2019-07-30

## 2019-07-31 ENCOUNTER — FORM ENCOUNTER (OUTPATIENT)
Age: 54
End: 2019-07-31

## 2019-08-01 ENCOUNTER — OUTPATIENT (OUTPATIENT)
Dept: OUTPATIENT SERVICES | Facility: HOSPITAL | Age: 54
LOS: 1 days | Discharge: HOME | End: 2019-08-01
Payer: MEDICAID

## 2019-08-01 DIAGNOSIS — G24.9 DYSTONIA, UNSPECIFIED: ICD-10-CM

## 2019-08-01 DIAGNOSIS — Z94.7 CORNEAL TRANSPLANT STATUS: Chronic | ICD-10-CM

## 2019-08-01 PROCEDURE — 70450 CT HEAD/BRAIN W/O DYE: CPT | Mod: 26

## 2019-08-21 ENCOUNTER — APPOINTMENT (OUTPATIENT)
Dept: INTERNAL MEDICINE | Facility: CLINIC | Age: 54
End: 2019-08-21

## 2019-08-21 ENCOUNTER — OUTPATIENT (OUTPATIENT)
Dept: OUTPATIENT SERVICES | Facility: HOSPITAL | Age: 54
LOS: 1 days | Discharge: HOME | End: 2019-08-21

## 2019-08-21 VITALS — HEART RATE: 64 BPM | DIASTOLIC BLOOD PRESSURE: 84 MMHG | SYSTOLIC BLOOD PRESSURE: 135 MMHG

## 2019-08-21 DIAGNOSIS — Z94.7 CORNEAL TRANSPLANT STATUS: Chronic | ICD-10-CM

## 2019-08-21 RX ORDER — POLYETHYLENE GLYCOL 3350 17 G/17G
17 POWDER, FOR SOLUTION ORAL TWICE DAILY
Qty: 1 | Refills: 3 | Status: DISCONTINUED | COMMUNITY
Start: 2017-12-20 | End: 2019-08-21

## 2019-08-21 RX ORDER — POLYETHYLENE GLYCOL 3350 AND ELECTROLYTES WITH LEMON FLAVOR 236; 22.74; 6.74; 5.86; 2.97 G/4L; G/4L; G/4L; G/4L; G/4L
236 POWDER, FOR SOLUTION ORAL
Qty: 1 | Refills: 0 | Status: DISCONTINUED | COMMUNITY
Start: 2018-03-02 | End: 2019-08-21

## 2019-08-21 RX ORDER — HYDROCORTISONE 10 MG/G
1 CREAM TOPICAL TWICE DAILY
Qty: 45 | Refills: 5 | Status: DISCONTINUED | COMMUNITY
Start: 2018-06-26 | End: 2019-08-21

## 2019-08-21 RX ORDER — DOCUSATE SODIUM 100 MG
100 TABLET ORAL DAILY
Qty: 60 | Refills: 5 | Status: DISCONTINUED | COMMUNITY
Start: 2017-12-20 | End: 2019-08-21

## 2019-08-21 RX ORDER — POLYETHYLENE GLYCOL 3350, SODIUM SULFATE ANHYDROUS, SODIUM BICARBONATE, SODIUM CHLORIDE, POTASSIUM CHLORIDE 227.1; 21.5; 6.36; 5.53; .754 G/L; G/L; G/L; G/L; G/L
227.1 POWDER, FOR SOLUTION ORAL
Qty: 1 | Refills: 0 | Status: DISCONTINUED | COMMUNITY
Start: 2019-03-08 | End: 2019-08-21

## 2019-08-21 NOTE — PHYSICAL EXAM
[Normal Sclera/Conjunctiva] : normal sclera/conjunctiva [PERRL] : pupils equal round and reactive to light [Normal Outer Ear/Nose] : the outer ears and nose were normal in appearance [No JVD] : no jugular venous distention [No Edema] : there was no peripheral edema [Normal] : no rash [de-identified] : LE with some limited range of motion on passive extension [de-identified] : Non-verbal, unable to follow commands [de-identified] : Sacral ulcer healing, not open (per sister) [de-identified] : N

## 2019-08-21 NOTE — ASSESSMENT
[FreeTextEntry1] : 53 yo M with PMHx of CVA (1980s with residual left sided weakness and aphasia), CAD, Right retinal detachment s/p right corneal transplant secondary to perforation in 04/13/17, Seizure disorder (no seizures in 20 ysr year), tardive diakinesis, HTN, HLD, DMII on Insulin and sacral excoriation here today for follow-up\par \par # Inguinal hernia right sided\par - Seen and evaluated by surgery 06/12/2019\par - Not candidate for surgery at this time per surgery. Discussed monitoring for emergency symptoms\par \par # Pre-diabetes \par - Last HbA1c 4.5% in March 2019 \par - Taking Lantus 1x/week per sister advised to f/u with endocrine \par - Home BG readings ~90 in AM and ~120 in PM usually\par \par # HTN - uncontrolled\par - BP today 135/84\par - Lisinopril was increased to 40 mg qD last visit\par \par # CAD/DLD;\par - C/w aspirin\par - C/w simvastatin 10 mg\par \par # H/o CVA, dyskinesia of tongue and mouth and hx of seizures;\par - c/w carbamazepine and BuSpar\par - c/w aspirin and statin\par - CTH 08/2019 revealed no acute intracranial abnormality and stable findings in relation to prior CTH in 2017\par - F/u with neuro now that CT head is complete \par - Haloperidol has been stopped per neuro\par \par # Sacral excoriation\par - c/w Silver sulphazidine \par - Wound care consult not yet completed. To be done\par - Sacral ulcer healing per sister\par \par # Chronic constipation\par - Not taking stool softeners at this time\par - Not a problem at this time per sister fit test\par \par # HCM\par - Colonoscopy scheduled followed up with GI in June 5th. Not diagnostic as noted in HPI\par - PPSV 23 vaccination administered March 28\par - Follow up in 3 months and PRN.

## 2019-08-21 NOTE — HISTORY OF PRESENT ILLNESS
[de-identified] : 55 yo M with PMHx of CVA (1980s with residual left sided weakness and aphasia), CAD, Right retinal detachment s/p right corneal transplant secondary to perforation in 04/13/17, Seizure disorder (no seizures in 20 years), tardive diskinesia, HTN, HLD, T2DM no longer on Insulin, and sacral excoriation. Last seen in May 2019 for tardive dyskinesia follow-up as well as inguinal groin pain. Accompanied by sister who is care-giver. Groin hernia still noticeable but not painful.  He also still has increased salivation after his medications were changed recently. He was seen by neuro for tardive dyskinesia and they d/cinthia Haldol and recommended CT scan of the brain, which he recently had done. Colonoscopy that was planned three months ago was non-diagnostic as bowel prep was not adequate.

## 2019-08-22 DIAGNOSIS — I10 ESSENTIAL (PRIMARY) HYPERTENSION: ICD-10-CM

## 2019-08-22 DIAGNOSIS — K40.90 UNILATERAL INGUINAL HERNIA, WITHOUT OBSTRUCTION OR GANGRENE, NOT SPECIFIED AS RECURRENT: ICD-10-CM

## 2019-08-22 DIAGNOSIS — I63.9 CEREBRAL INFARCTION, UNSPECIFIED: ICD-10-CM

## 2019-08-22 DIAGNOSIS — G40.909 EPILEPSY, UNSPECIFIED, NOT INTRACTABLE, WITHOUT STATUS EPILEPTICUS: ICD-10-CM

## 2019-08-22 DIAGNOSIS — E78.5 HYPERLIPIDEMIA, UNSPECIFIED: ICD-10-CM

## 2019-08-22 DIAGNOSIS — G24.9 DYSTONIA, UNSPECIFIED: ICD-10-CM

## 2019-08-22 DIAGNOSIS — L89.152 PRESSURE ULCER OF SACRAL REGION, STAGE 2: ICD-10-CM

## 2019-08-22 DIAGNOSIS — E11.9 TYPE 2 DIABETES MELLITUS WITHOUT COMPLICATIONS: ICD-10-CM

## 2019-08-22 DIAGNOSIS — I25.10 ATHEROSCLEROTIC HEART DISEASE OF NATIVE CORONARY ARTERY WITHOUT ANGINA PECTORIS: ICD-10-CM

## 2019-09-13 ENCOUNTER — APPOINTMENT (OUTPATIENT)
Dept: ENDOCRINOLOGY | Facility: CLINIC | Age: 54
End: 2019-09-13

## 2019-09-18 ENCOUNTER — APPOINTMENT (OUTPATIENT)
Dept: SURGERY | Facility: CLINIC | Age: 54
End: 2019-09-18
Payer: MEDICAID

## 2019-09-18 VITALS
HEIGHT: 73 IN | DIASTOLIC BLOOD PRESSURE: 76 MMHG | WEIGHT: 195 LBS | BODY MASS INDEX: 25.84 KG/M2 | SYSTOLIC BLOOD PRESSURE: 120 MMHG

## 2019-09-18 PROCEDURE — 99214 OFFICE O/P EST MOD 30 MIN: CPT

## 2019-09-18 NOTE — PHYSICAL EXAM
[JVD] : no jugular venous distention  [Respiratory Effort] : normal respiratory effort [No Rash or Lesion] : No rash or lesion [Oriented to Person] : disoriented to person [Alert] : alert [Calm] : calm [Oriented to Place] : disoriented to place [Oriented to Time] : disoriented to time [de-identified] : alina in chair [de-identified] : normal with glasses on  [de-identified] : Soft non tender , no palpable masses

## 2019-09-18 NOTE — ASSESSMENT
[FreeTextEntry1] : Felipe is a 54  year old man who presents to the office. He had a stroke when he was 22 . Since then his sister is taking care of him. He has had multiple medical problems.\par His sister recently noted a swelling in the right groin. It comes out and it goes back in. \par There is no change in bladder or bowel habits. \par He does not have any symptoms from it. \par He is averbal so symptoms are difficult to elicit. \par 9/18: He comes for a follow up visit. Nothing has changed since.\par \par Inspersion : Asymptomatic inguinal hernia. \par \par We explained in great detail the pathophysiology of the disease process. We discussed the workup for diagnosis and management. The Options explained to the patient. The Risk , benefit and alternatives were discussed. We discussed recovery and possible complications. The Post operative care was explained to the patient. He was counselled on diet , exercise and wound care.\par \par We discussed the  surgery with him and her . \par \par We explained in great detail the pathophysiology of the disease process. We discussed the workup for diagnosis and management. The Post operative care was explained to the patient. He was counselled on diet , exercise and wound care. The Procedure was explained to the patient. The Risk , benefit and alternatives were discussed. We discussed recovery and possible complications. We discussed recurrence rate and complications including chronic abdominal pain. We also discussed hernia, surgery and  pain in relation to his Job.\par \par We discussed about anesthesia and its complications.\par \par She will follow the hernia for now. \par She will come back to see me in a 6- 12 months. \par \par \par

## 2019-09-18 NOTE — CONSULT LETTER
[Referral Letter:] : I am referring [unfilled] to you for further evaluation.  My most recent evaluation follows. [Dear  ___] : Dear  [unfilled], [Please see my note below.] : Please see my note below. [Referral Closing:] : Thank you very much for seeing this patient.  If you have any questions, please do not hesitate to contact me. [Sincerely,] : Sincerely, [FreeTextEntry3] : Rama Cooney MD\par Minimally Invasive Surgery\par Foregut and Lsugzs-Uecwqeddc-Xjxkowd Surgery\par Associate  of [facility].\par [facility]\par [address] [place] [address] , [address] [place] [address]\par Phone: [phone] Fax: [phone]\par

## 2019-09-18 NOTE — HISTORY OF PRESENT ILLNESS
[de-identified] : Felipe is a 54  year old man who presents to the office. He had a stroke when he was 22 . Since then his sister is taking care of him. He has had multiple medical problems.\par His sister recently noted a swelling in the right groin. It comes out and it goes back in. \par There is no change in bladder or bowel habits. \par He does not have any symptoms from it. \par He is averbal so symptoms are difficult to elicit. \par 9/18: He comes for a follow up visit. Nothing has changed since.

## 2019-10-21 LAB — BACTERIA STL CULT: NORMAL

## 2019-12-18 ENCOUNTER — OUTPATIENT (OUTPATIENT)
Dept: OUTPATIENT SERVICES | Facility: HOSPITAL | Age: 54
LOS: 1 days | Discharge: HOME | End: 2019-12-18

## 2019-12-18 DIAGNOSIS — I63.9 CEREBRAL INFARCTION, UNSPECIFIED: ICD-10-CM

## 2019-12-18 DIAGNOSIS — Z94.7 CORNEAL TRANSPLANT STATUS: Chronic | ICD-10-CM

## 2020-01-01 PROCEDURE — G9001: CPT

## 2020-01-22 ENCOUNTER — OUTPATIENT (OUTPATIENT)
Dept: OUTPATIENT SERVICES | Facility: HOSPITAL | Age: 55
LOS: 1 days | Discharge: HOME | End: 2020-01-22

## 2020-01-22 ENCOUNTER — INPATIENT (INPATIENT)
Facility: HOSPITAL | Age: 55
LOS: 2 days | Discharge: HOME | End: 2020-01-25
Attending: HOSPITALIST | Admitting: HOSPITALIST
Payer: MEDICAID

## 2020-01-22 ENCOUNTER — APPOINTMENT (OUTPATIENT)
Dept: INTERNAL MEDICINE | Facility: CLINIC | Age: 55
End: 2020-01-22
Payer: MEDICAID

## 2020-01-22 VITALS
RESPIRATION RATE: 20 BRPM | HEART RATE: 86 BPM | OXYGEN SATURATION: 100 % | TEMPERATURE: 99 F | SYSTOLIC BLOOD PRESSURE: 126 MMHG | DIASTOLIC BLOOD PRESSURE: 73 MMHG

## 2020-01-22 DIAGNOSIS — Z94.7 CORNEAL TRANSPLANT STATUS: Chronic | ICD-10-CM

## 2020-01-22 LAB
ALBUMIN SERPL ELPH-MCNC: 4 G/DL — SIGNIFICANT CHANGE UP (ref 3.5–5.2)
ALP SERPL-CCNC: 118 U/L — HIGH (ref 30–115)
ALT FLD-CCNC: 17 U/L — SIGNIFICANT CHANGE UP (ref 0–41)
ANION GAP SERPL CALC-SCNC: 11 MMOL/L — SIGNIFICANT CHANGE UP (ref 7–14)
APTT BLD: 30.4 SEC — SIGNIFICANT CHANGE UP (ref 27–39.2)
AST SERPL-CCNC: 15 U/L — SIGNIFICANT CHANGE UP (ref 0–41)
BASOPHILS # BLD AUTO: 0.03 K/UL — SIGNIFICANT CHANGE UP (ref 0–0.2)
BASOPHILS NFR BLD AUTO: 0.3 % — SIGNIFICANT CHANGE UP (ref 0–1)
BILIRUB SERPL-MCNC: 0.2 MG/DL — SIGNIFICANT CHANGE UP (ref 0.2–1.2)
BUN SERPL-MCNC: 18 MG/DL — SIGNIFICANT CHANGE UP (ref 10–20)
CALCIUM SERPL-MCNC: 9.4 MG/DL — SIGNIFICANT CHANGE UP (ref 8.5–10.1)
CARBAMAZEPINE SERPL-MCNC: 6.9 UG/ML — SIGNIFICANT CHANGE UP (ref 4–12)
CHLORIDE SERPL-SCNC: 100 MMOL/L — SIGNIFICANT CHANGE UP (ref 98–110)
CO2 SERPL-SCNC: 25 MMOL/L — SIGNIFICANT CHANGE UP (ref 17–32)
CREAT SERPL-MCNC: 0.7 MG/DL — SIGNIFICANT CHANGE UP (ref 0.7–1.5)
EOSINOPHIL # BLD AUTO: 0.04 K/UL — SIGNIFICANT CHANGE UP (ref 0–0.7)
EOSINOPHIL NFR BLD AUTO: 0.4 % — SIGNIFICANT CHANGE UP (ref 0–8)
GLUCOSE BLDC GLUCOMTR-MCNC: 128 MG/DL — HIGH (ref 70–99)
GLUCOSE SERPL-MCNC: 110 MG/DL — HIGH (ref 70–99)
HCT VFR BLD CALC: 34.1 % — LOW (ref 42–52)
HGB BLD-MCNC: 11.3 G/DL — LOW (ref 14–18)
IMM GRANULOCYTES NFR BLD AUTO: 0.4 % — HIGH (ref 0.1–0.3)
INR BLD: 1.2 RATIO — SIGNIFICANT CHANGE UP (ref 0.65–1.3)
LYMPHOCYTES # BLD AUTO: 0.89 K/UL — LOW (ref 1.2–3.4)
LYMPHOCYTES # BLD AUTO: 9.4 % — LOW (ref 20.5–51.1)
MAGNESIUM SERPL-MCNC: 2.2 MG/DL — SIGNIFICANT CHANGE UP (ref 1.8–2.4)
MCHC RBC-ENTMCNC: 32.3 PG — HIGH (ref 27–31)
MCHC RBC-ENTMCNC: 33.1 G/DL — SIGNIFICANT CHANGE UP (ref 32–37)
MCV RBC AUTO: 97.4 FL — HIGH (ref 80–94)
MONOCYTES # BLD AUTO: 0.39 K/UL — SIGNIFICANT CHANGE UP (ref 0.1–0.6)
MONOCYTES NFR BLD AUTO: 4.1 % — SIGNIFICANT CHANGE UP (ref 1.7–9.3)
NEUTROPHILS # BLD AUTO: 8.04 K/UL — HIGH (ref 1.4–6.5)
NEUTROPHILS NFR BLD AUTO: 85.4 % — HIGH (ref 42.2–75.2)
NRBC # BLD: 0 /100 WBCS — SIGNIFICANT CHANGE UP (ref 0–0)
PLATELET # BLD AUTO: 212 K/UL — SIGNIFICANT CHANGE UP (ref 130–400)
POTASSIUM SERPL-MCNC: 4.8 MMOL/L — SIGNIFICANT CHANGE UP (ref 3.5–5)
POTASSIUM SERPL-SCNC: 4.8 MMOL/L — SIGNIFICANT CHANGE UP (ref 3.5–5)
PROT SERPL-MCNC: 7.1 G/DL — SIGNIFICANT CHANGE UP (ref 6–8)
PROTHROM AB SERPL-ACNC: 13.8 SEC — HIGH (ref 9.95–12.87)
RBC # BLD: 3.5 M/UL — LOW (ref 4.7–6.1)
RBC # FLD: 12.9 % — SIGNIFICANT CHANGE UP (ref 11.5–14.5)
SODIUM SERPL-SCNC: 136 MMOL/L — SIGNIFICANT CHANGE UP (ref 135–146)
TROPONIN T SERPL-MCNC: <0.01 NG/ML — SIGNIFICANT CHANGE UP
TROPONIN T SERPL-MCNC: <0.01 NG/ML — SIGNIFICANT CHANGE UP
WBC # BLD: 9.43 K/UL — SIGNIFICANT CHANGE UP (ref 4.8–10.8)
WBC # FLD AUTO: 9.43 K/UL — SIGNIFICANT CHANGE UP (ref 4.8–10.8)

## 2020-01-22 PROCEDURE — 99213 OFFICE O/P EST LOW 20 MIN: CPT | Mod: GC

## 2020-01-22 PROCEDURE — 99285 EMERGENCY DEPT VISIT HI MDM: CPT

## 2020-01-22 PROCEDURE — 70450 CT HEAD/BRAIN W/O DYE: CPT | Mod: 26

## 2020-01-22 PROCEDURE — 71045 X-RAY EXAM CHEST 1 VIEW: CPT | Mod: 26

## 2020-01-22 PROCEDURE — 93010 ELECTROCARDIOGRAM REPORT: CPT

## 2020-01-22 RX ORDER — ASPIRIN/CALCIUM CARB/MAGNESIUM 324 MG
81 TABLET ORAL DAILY
Refills: 0 | Status: DISCONTINUED | OUTPATIENT
Start: 2020-01-22 | End: 2020-01-25

## 2020-01-22 RX ORDER — ENOXAPARIN SODIUM 100 MG/ML
40 INJECTION SUBCUTANEOUS DAILY
Refills: 0 | Status: DISCONTINUED | OUTPATIENT
Start: 2020-01-22 | End: 2020-01-25

## 2020-01-22 RX ORDER — RISPERIDONE 4 MG/1
1 TABLET ORAL
Refills: 0 | Status: DISCONTINUED | OUTPATIENT
Start: 2020-01-22 | End: 2020-01-25

## 2020-01-22 RX ORDER — CARBAMAZEPINE 200 MG
200 TABLET ORAL
Refills: 0 | Status: DISCONTINUED | OUTPATIENT
Start: 2020-01-22 | End: 2020-01-22

## 2020-01-22 RX ORDER — MAGNESIUM SULFATE 500 MG/ML
1 VIAL (ML) INJECTION ONCE
Refills: 0 | Status: COMPLETED | OUTPATIENT
Start: 2020-01-22 | End: 2020-01-22

## 2020-01-22 RX ORDER — SIMVASTATIN 20 MG/1
10 TABLET, FILM COATED ORAL AT BEDTIME
Refills: 0 | Status: DISCONTINUED | OUTPATIENT
Start: 2020-01-22 | End: 2020-01-25

## 2020-01-22 RX ORDER — CARBAMAZEPINE 200 MG
100 TABLET ORAL EVERY 6 HOURS
Refills: 0 | Status: DISCONTINUED | OUTPATIENT
Start: 2020-01-22 | End: 2020-01-23

## 2020-01-22 RX ADMIN — SIMVASTATIN 10 MILLIGRAM(S): 20 TABLET, FILM COATED ORAL at 23:02

## 2020-01-22 RX ADMIN — Medication 50 GRAM(S): at 23:18

## 2020-01-22 NOTE — ED ADULT TRIAGE NOTE - SPO2 (%)
Aranza Grimes DO, saw and evaluated the patient  I have discussed the patient with the resident/non-physician practitioner and agree with the resident's/non-physician practitioner's findings, Plan of Care, and MDM as documented in the resident's/non-physician practitioner's note, except where noted  All available labs and Radiology studies were reviewed  At this point I agree with the current assessment done in the Emergency Department  I have conducted an independent evaluation of this patient a history and physical is as follows:    Patient complains of sudden left elbow pain, like something snapped, which occurred when he was reaching with his right arm this evening  His left own elbow was in a sling and was not under any exertion  He has had multiple surgeries on that elbow for fractures and nonunion plus broken hardware  He is scheduled for removal of all of the hardware and refixation next week at [facility]  He was told by his [facility] surgeon to come here for XRays so he can review them tomorrow  He takes 15 mg of OxyContin t i d  without relief of the pain since this started  No recent travel or sick contacts  ROS: No associated fever, LH/dizziness, CP, SOB, n/v/d  Denies other injury or complaint  PE: NAD, appears mildly uncomfortable, alert; PERRL, EOMI; MMM; HRR, no murmur; lungs CTA w/o w/r/r, POx 98% on RA (nl); significant LUE edema (patient states this is chronic and unchanged), limited ROM of LUE due to unstable fracture/hardware and swelling, intact distal sensation and finger movement; skin p/w/d  DDx:  Left elbow pain - tendon rupture, hardware failure, movement of fracture fragments, doubt DVT  A/P: Will check XRays, treat symptoms, reevaluate for further work up and disposition      Critical Care Time  CritCare Time    Procedures
100

## 2020-01-22 NOTE — ED ADULT NURSE NOTE - NSIMPLEMENTINTERV_GEN_ALL_ED
Implemented All Fall with Harm Risk Interventions:  [place] to call system. Call bell, personal items and telephone within reach. Instruct patient to call for assistance. Room bathroom lighting operational. Non-slip footwear when patient is off stretcher. Physically safe environment: no spills, clutter or unnecessary equipment. Stretcher in lowest position, wheels locked, appropriate side rails in place. Provide visual cue, wrist band, yellow gown, etc. Monitor gait and stability. Monitor for mental status changes and reorient to person, place, and time. Review medications for side effects contributing to fall risk. Reinforce activity limits and safety measures with patient and family. Provide visual clues: red socks.

## 2020-01-22 NOTE — H&P ADULT - ASSESSMENT
55 Y/O M with PMHx of CVA (1980s with residual left sided weakness and aphasia), hx of seizure disorder (no seizure in last 20 yrs), CAD, Right retinal detachment s/p corneal transplant, tardive diskinesia, HTN, HLD, DM type II no on insulin, sacral excoriations was sent to ER from [facility] clinic today s/p episode of unresponsiveness.    # Episode of unresponsiveness syncope vs seizure  - likely seizure as it lasted for 8 minutes, f/u REEG  - CT Head negative, f/u neuro consult  - f/u carbamazepine levels possible VEEG to adjust medications if pt has another episode  - c/w telemonitoring, pt has sinus arrythmia on EKG,  - monitor electrolytes keep Mag >2.5, f/u TSH, B12 levels  - f/u 2d echo, will get carotid duplex.   - f/u UA and urine Cx.     # Hx of HTN  - hold meds as pt is currently hypotensive    # Hx of DM type II  - off insulin, f/u A1c    # Hx of CVA  - c/w aspirin and statins    # Hx of CAD  - no ischemic EKG changes, f/u repeat EKG and CE  - doubt cardiac etiology for the episode  - c/w aspirin and statins     DVT PPx: Lovenox 40 mg s/c  GI PPX: not indicated  Diet: DASH/Carb consistent mechanical soft diet   Activity: Increase as tolerated  Dispo: from home, sister is health aide, they don't want Rehab. 55 Y/O M with PMHx of CVA (1980s with residual left sided weakness and aphasia), hx of seizure disorder (no seizure in last 20 yrs), CAD, Right retinal detachment s/p corneal transplant, tardive diskinesia, HTN, HLD, DM type II no on insulin, sacral excoriations was sent to ER from [facility] clinic today s/p episode of unresponsiveness.    # Episode of unresponsiveness syncope vs seizure  - likely seizure as it lasted for 8 minutes, f/u REEG  - CT Head negative, f/u neuro consult  - f/u carbamazepine levels possible VEEG to adjust medications if pt has another episode  - c/w telemonitoring, pt has sinus arrythmia on EKG,  - monitor electrolytes keep Mag >2.5, f/u TSH, B12 levels  - f/u 2d echo, will get carotid duplex.   - f/u UA and urine Cx.     # Hx of HTN  - hold meds as pt is currently hypotensive    # Hx of DM type II  - off insulin, f/u A1c    # Hx of CVA  - c/w aspirin and statins    # Hx of CAD  - no ischemic EKG changes, f/u repeat EKG and CE  - doubt cardiac etiology for the episode  - c/w aspirin and statins     # Hx of mood disorder/anxiety  - c/w resperidone 1mg BID, buspirone 5mg qhs, monitor QTc  - psych consult to adjust meds if w/up negative for unresponsiveness.     DVT PPx: Lovenox 40 mg s/c  GI PPX: not indicated  Diet: DASH/Carb consistent mechanical soft diet   Activity: Increase as tolerated  Dispo: from home, sister is health aide, they don't want Rehab.

## 2020-01-22 NOTE — PATIENT PROFILE ADULT - NSPROPTRIGHTNOTIFY_GEN_A_NUR
declines Alar Island Pedicle Flap Text: The defect edges were debeveled with a #15 scalpel blade.  Given the location of the defect, shape of the defect and the proximity to the alar rim an island pedicle advancement flap was deemed most appropriate.  Using a sterile surgical marker, an appropriate advancement flap was drawn incorporating the defect, outlining the appropriate donor tissue and placing the expected incisions within the nasal ala running parallel to the alar rim. The area thus outlined was incised with a #15 scalpel blade.  The skin margins were undermined minimally to an appropriate distance in all directions around the primary defect and laterally outward around the island pedicle utilizing iris scissors.  There was minimal undermining beneath the pedicle flap.

## 2020-01-22 NOTE — H&P ADULT - NSHPPHYSICALEXAM_GEN_ALL_CORE
PHYSICAL EXAM:  GENERAL: NAD, lying in bed comfortably  HEAD:  Atraumatic, Normocephalic  EYES: s/p right eye surgery, left eye reactive to light   ENT: Moist mucous membranes  NECK: Supple, No JVD  CHEST/LUNG: Clear to auscultation bilaterally; No rales, rhonchi, wheezing, or rubs. Unlabored respirations  HEART: Regular rate and rhythm; No murmurs, rubs, or gallops  ABDOMEN: Bowel sounds present; Soft, Nontender, Nondistended. No hepatomegaly  EXTREMITIES:  positive capillary refill. No clubbing, cyanosis, or edema. left upper and lower extremity contracted.   NERVOUS SYSTEM:  Alert & Oriented X1, slurred speech clear. Left sided weakness with UE and LE power 2/5, right sided UE and LE power 3/5. Left sided slight facial droop, weak shoulder shrug left side.   SKIN: sacral excoriations

## 2020-01-22 NOTE — H&P ADULT - HISTORY OF PRESENT ILLNESS
53 Y/O M with PMHx of CVA (1980s with residual left sided weakness and aphasia), hx of seizure disorder (no seizure in last 20 yrs), CAD, Right retinal detatchment s/p corneal transplant, tardive diskinesia, HTN, HLD, DM type II no on insulin, sacral excoriations was sent to ER from [facility] today s/p episode of unresponsiveness. As per sister at bedside who takes care of him at home, pt was in his usual state of health and they came to see his PMD for routine visit, pt suddenly became unresponsive and began to drool from left side of face, [facility] clinic doctors were called right away and on the assessment pt had low SBP in 60s, HR in 70s, and pt was unresponsive. His fingerstick was 118. 911 called and pt was sent to ER. Per sister his episode lasted for about 5-8 mins. He became to his baseline after that, no hx of urinary/fecal incontinence, no jerky movements, no hx of fever. Pt has aphasia but tries to communicate at baseline, while interviewing he has slurred speech and knows that he is in hospital but not oriented in time or person. Per sister the last time he had a seizure was 20 yrs ago and that was generalized tonic clonic. Per sister pt never had similar episodes and when she asked the pt he does not remember anything, he says that he was sleeping. Denied hx of pain. Could not get ROS in detail.     On arrival to ED pt was already back  to baseline with  mm/73 mm Hg, HR 86 /min, RR 20/min,  T max 99.1 Degrees F, SPO2 100 % on room air. His stat CT Head did not show any new lesions.

## 2020-01-22 NOTE — ED ADULT NURSE NOTE - PMH
Anxiety    Aphasia    CAD (coronary artery disease)    CVA (cerebral vascular accident)  1980's  Diabetes    Diarrhea    Eczema    High cholesterol    Hypertension    Hypertension    Retinal detachment    Seizure    Weakness  left side

## 2020-01-22 NOTE — HISTORY OF PRESENT ILLNESS
[de-identified] : 55 yo M with PMHx of CVA (1980s with residual left sided weakness and aphasia), CAD, Right retinal detachment s/p right corneal transplant secondary to perforation in 04/13/17, Seizure disorder (no seizures in 20 years), tardive diskinesia, HTN, HLD, T2DM no longer on Insulin, and sacral excoriation. Last seen in May 2019 for tardive dyskinesia follow-up. Was minimally responsive here, Blood pressure SBP 60s. Fingerstick 120. 911 called to send to him to hospital

## 2020-01-22 NOTE — ASSESSMENT
[FreeTextEntry1] : 53 yo M with PMHx of CVA (1980s with residual left sided weakness and aphasia), CAD, Right retinal detachment s/p right corneal transplant secondary to perforation in 04/13/17, Seizure disorder (no seizures in 20 years), tardive diskinesia, HTN, HLD, T2DM no longer on Insulin, and sacral excoriation. Last seen in May 2019 for tardive dyskinesia follow-up. \par Was minimally responsive here, Blood pressure SBP 60s, radial pulse not palpable, Carotid pulse 70s. Fingerstick 120. 911 called stat to send to ED. Will need fluids and rule out cause of hypotension

## 2020-01-22 NOTE — ED PROVIDER NOTE - PHYSICAL EXAMINATION
CONST: chronically ill appearing, contracted.   EYES: Sclera and conjunctiva clear.  NECK: Non-tender, no meningeal signs, supple  CARD: Normal S1 S2; Normal rate and rhythm  RESP: Equal BS B/L, No wheezes, rhonchi or rales. No distress  GI: Soft, soft right inguinal hernia which is easily reducible, non-tender, non-distended.  MS: contracted LUE, no edema of lower extremities.   SKIN: Warm, dry  NEURO: non verbal, nods to name, acting at baseline per family

## 2020-01-22 NOTE — ED ADULT NURSE NOTE - CHIEF COMPLAINT QUOTE
pt was at clinic. started to drool and was not responding. pt is currently at baseline. hx of weakness and aphasia and weakness from prior cva. cleared from crit by dr negrete.

## 2020-01-22 NOTE — H&P ADULT - NSICDXPASTMEDICALHX_GEN_ALL_CORE_FT
PAST MEDICAL HISTORY:  Anxiety     Aphasia     CAD (coronary artery disease)     CVA (cerebral vascular accident) 1980's    Diabetes     Diarrhea     Eczema     High cholesterol     Hypertension     Hypertension     Retinal detachment     Seizure     Weakness left side

## 2020-01-22 NOTE — ED ADULT NURSE NOTE - OBJECTIVE STATEMENT
Pt presents to the ED with sister who states pt was at his pmd office pta and went unresponsive and drooling in the chair for about 7 minutes. Sister states pt is at his baseline now.

## 2020-01-22 NOTE — H&P ADULT - NSHPLABSRESULTS_GEN_ALL_CORE
11.3   9.43  )-----------( 212      ( 22 Jan 2020 13:50 )             34.1       01-22    136  |  100  |  18  ----------------------------<  110<H>  4.8   |  25  |  0.7    Ca    9.4      22 Jan 2020 13:50  Mg     2.2     01-22    TPro  7.1  /  Alb  4.0  /  TBili  0.2  /  DBili  x   /  AST  15  /  ALT  17  /  AlkPhos  118<H>  01-22                  PT/INR - ( 22 Jan 2020 13:50 )   PT: 13.80 sec;   INR: 1.20 ratio         PTT - ( 22 Jan 2020 13:50 )  PTT:30.4 sec    Lactate Trend      CARDIAC MARKERS ( 22 Jan 2020 13:50 )  x     / <0.01 ng/mL / x     / x     / x            CAPILLARY BLOOD GLUCOSE      POCT Blood Glucose.: 128 mg/dL (22 Jan 2020 11:03)      < from: CT Head No Cont (01.22.20 @ 16:17) >      IMPRESSION:     1. No CT evidence for acute intracranial pathology.    2. Stable chronic findings as above.      < end of copied text >    < from: Xray Chest 1 View AP/PA (01.22.20 @ 14:42) >    IMPRESSION:     No radiographic evidence of acute cardiopulmonary disease.    < end of copied text >

## 2020-01-22 NOTE — ED PROVIDER NOTE - OBJECTIVE STATEMENT
54 y.o male w/ hx of ESRD on HD, CVA s/p L hemiparesis/ aphasia, CAD, DM, HTN, HLD presents to the ED for evaluation of weakness.  Per family was at medicine clinic, had episode that lasted < 1 minute in which pt became unresponsive, began drooling and then quickly returned to baseline.  No  tonic/clonic movements, preceding sxs. acting at baseline per family at this time.  NOn verbal at baseline.  I am unable to obtain a comprehensive history, review of systems, past medical history, and/or physical exam due to constraints imposed by the urgency of the patient's clinical condition and/or mental status.

## 2020-01-22 NOTE — ED ADULT TRIAGE NOTE - CHIEF COMPLAINT QUOTE
pt was at clinic. started to drool and was not responding. hx of weakness and aphasia and weakness from prior cva pt was at clinic. started to drool and was not responding. pt is currently at baseline. hx of weakness and aphasia and weakness from prior cva. cleared from crit by dr negrete.

## 2020-01-22 NOTE — ED PROVIDER NOTE - CLINICAL SUMMARY MEDICAL DECISION MAKING FREE TEXT BOX
Patient presented s/p episode of unresponsiveness of unclear etiology - possibly seizure vs TIA vs syncope. Otherwise on arrival afebrile, HD stable, at baseline mental status, neurovascularly intact. Obtained EKG which was non-ischemic without evidence of STEMI. Also with normal intervals, no brugada morphology or delta waves. Chest xray negative for pneumothorax, pneumonia, widened mediastinum, evidence of rib fractures, enlarged cardiac silhouette or any other emergent pathologies. CT head negative for hemorrhage, evidence of CVA, or mass. Obtained labs which were grossly unremarkable including no significant leukocytosis, anemia, signs of dehydration/JAVY, transaminitis or significant electrolyte abnormalities. Troponin negative. Patient monitored in ED without recurrence. Unclear etiology but given the above, will require admission for further monitoring or work up. HD stable at time of admission.

## 2020-01-22 NOTE — ED PROVIDER NOTE - ATTENDING CONTRIBUTION TO CARE
54 year old male, pmhx as documented above, residual left arm contraction from prior CVA, presenting s/p episode of unresponsiveness. Episode was witnessed by sister at bedside who said patient was sitting, then leaned his head forward and was difficult to arouse. (+) drooling was present. Episode lasted approximately 1 minute and then resolved. No known seizure activity witnessed, no incontinence, no tongue biting, no period of confusion afterward per sister. Patient currently at his baseline but is poor historian and cannot verbalize what happened. At this time denies active complaints including fevers, chest pain, dyspnea, palpitations, abdominal pain, headache, worsening subjective neuro symptoms from baseline or any other complaints.    Vital Signs: I have reviewed the initial vital signs.  Constitutional: NAD, well-nourished, appears stated age, no acute distress.  HEENT: Airway patent, moist MM, no erythema/swelling/deformity of oral structures. EOMI, PERRLA.  CV: regular rate, regular rhythm, well-perfused extremities, 2+ b/l DP and radial pulses equal.  Lungs: BCTA, no increased WOB.  ABD: NTND, no guarding or rebound, no pulsatile mass, no hernias.   MSK: Neck supple, nontender, nl ROM, no stepoff. Chest nontender. Back nontender in TLS spine or to b/l bony structures or flanks. Ext nontender, nl rom, no deformity.   INTEG: Skin warm, dry, no rash.  NEURO: A&Ox3 (at baseline mental status per sister), 4/5 strength in the LUE and LLE (baseline), 5/5 in RUE and RLE, nl sensation throughout, (+) slurred speech (baseline per sister)  PSYCH: Calm, cooperative, normal affect and interaction.    Questionable syncope vs seizure vs TIA. Will obtain labs, EKG/trop, CT head, re-eval. 54 year old male, pmhx as documented above, residual left arm contraction from prior CVA, presenting s/p episode of unresponsiveness. Episode was witnessed by sister at bedside who said patient was sitting, then leaned his head forward and was difficult to arouse. (+) drooling was present. Episode lasted approximately 1 minute and then resolved. No known seizure activity witnessed, no incontinence, no tongue biting, no period of confusion afterward per sister. Patient currently at his baseline but is poor historian and cannot verbalize what happened. At this time denies active complaints including fevers, chest pain, dyspnea, palpitations, abdominal pain, headache, worsening subjective neuro symptoms from baseline or any other complaints.    Vital Signs: I have reviewed the initial vital signs.  Constitutional: NAD, well-nourished, appears stated age, no acute distress.  HEENT: Airway patent, moist MM, no erythema/swelling/deformity of oral structures. EOMI, Blind in right eye, left pupil round and reactive  CV: regular rate, regular rhythm, well-perfused extremities, 2+ b/l DP and radial pulses equal.  Lungs: BCTA, no increased WOB.  ABD: NTND, no guarding or rebound, no pulsatile mass, no hernias.   MSK: Neck supple, nontender, nl ROM, no stepoff. Chest nontender. Back nontender in TLS spine or to b/l bony structures or flanks. Ext nontender, nl rom, no deformity.   INTEG: Skin warm, dry, no rash.  NEURO: A&Ox3 (at baseline mental status per sister), 4/5 strength in the LUE and LLE (baseline), 5/5 in RUE and RLE, nl sensation throughout, (+) slurred speech (baseline per sister)  PSYCH: Calm, cooperative, normal affect and interaction.    Questionable syncope vs seizure vs TIA. Will obtain labs, EKG/trop, CT head, re-eval.

## 2020-01-23 DIAGNOSIS — Z02.9 ENCOUNTER FOR ADMINISTRATIVE EXAMINATIONS, UNSPECIFIED: ICD-10-CM

## 2020-01-23 LAB
ALBUMIN SERPL ELPH-MCNC: 3.6 G/DL — SIGNIFICANT CHANGE UP (ref 3.5–5.2)
ALP SERPL-CCNC: 102 U/L — SIGNIFICANT CHANGE UP (ref 30–115)
ALT FLD-CCNC: 15 U/L — SIGNIFICANT CHANGE UP (ref 0–41)
ANION GAP SERPL CALC-SCNC: 11 MMOL/L — SIGNIFICANT CHANGE UP (ref 7–14)
AST SERPL-CCNC: 12 U/L — SIGNIFICANT CHANGE UP (ref 0–41)
BASOPHILS # BLD AUTO: 0.02 K/UL — SIGNIFICANT CHANGE UP (ref 0–0.2)
BASOPHILS NFR BLD AUTO: 0.2 % — SIGNIFICANT CHANGE UP (ref 0–1)
BILIRUB SERPL-MCNC: 0.3 MG/DL — SIGNIFICANT CHANGE UP (ref 0.2–1.2)
BUN SERPL-MCNC: 18 MG/DL — SIGNIFICANT CHANGE UP (ref 10–20)
CALCIUM SERPL-MCNC: 8.7 MG/DL — SIGNIFICANT CHANGE UP (ref 8.5–10.1)
CARBAMAZEPINE SERPL-MCNC: 4.3 UG/ML
CHLORIDE SERPL-SCNC: 106 MMOL/L — SIGNIFICANT CHANGE UP (ref 98–110)
CO2 SERPL-SCNC: 24 MMOL/L — SIGNIFICANT CHANGE UP (ref 17–32)
CREAT SERPL-MCNC: 0.7 MG/DL — SIGNIFICANT CHANGE UP (ref 0.7–1.5)
EOSINOPHIL # BLD AUTO: 0.04 K/UL — SIGNIFICANT CHANGE UP (ref 0–0.7)
EOSINOPHIL NFR BLD AUTO: 0.5 % — SIGNIFICANT CHANGE UP (ref 0–8)
GLUCOSE SERPL-MCNC: 95 MG/DL — SIGNIFICANT CHANGE UP (ref 70–99)
HCT VFR BLD CALC: 29.8 % — LOW (ref 42–52)
HCV AB S/CO SERPL IA: 0.11 S/CO — SIGNIFICANT CHANGE UP (ref 0–0.99)
HCV AB SERPL-IMP: SIGNIFICANT CHANGE UP
HGB BLD-MCNC: 10 G/DL — LOW (ref 14–18)
IMM GRANULOCYTES NFR BLD AUTO: 0.2 % — SIGNIFICANT CHANGE UP (ref 0.1–0.3)
LYMPHOCYTES # BLD AUTO: 2.15 K/UL — SIGNIFICANT CHANGE UP (ref 1.2–3.4)
LYMPHOCYTES # BLD AUTO: 24.2 % — SIGNIFICANT CHANGE UP (ref 20.5–51.1)
MAGNESIUM SERPL-MCNC: 2.3 MG/DL — SIGNIFICANT CHANGE UP (ref 1.8–2.4)
MCHC RBC-ENTMCNC: 32.5 PG — HIGH (ref 27–31)
MCHC RBC-ENTMCNC: 33.6 G/DL — SIGNIFICANT CHANGE UP (ref 32–37)
MCV RBC AUTO: 96.8 FL — HIGH (ref 80–94)
MONOCYTES # BLD AUTO: 0.49 K/UL — SIGNIFICANT CHANGE UP (ref 0.1–0.6)
MONOCYTES NFR BLD AUTO: 5.5 % — SIGNIFICANT CHANGE UP (ref 1.7–9.3)
NEUTROPHILS # BLD AUTO: 6.16 K/UL — SIGNIFICANT CHANGE UP (ref 1.4–6.5)
NEUTROPHILS NFR BLD AUTO: 69.4 % — SIGNIFICANT CHANGE UP (ref 42.2–75.2)
NRBC # BLD: 0 /100 WBCS — SIGNIFICANT CHANGE UP (ref 0–0)
PHOSPHATE SERPL-MCNC: 3.3 MG/DL — SIGNIFICANT CHANGE UP (ref 2.1–4.9)
PLATELET # BLD AUTO: 204 K/UL — SIGNIFICANT CHANGE UP (ref 130–400)
POTASSIUM SERPL-MCNC: 4.2 MMOL/L — SIGNIFICANT CHANGE UP (ref 3.5–5)
POTASSIUM SERPL-SCNC: 4.2 MMOL/L — SIGNIFICANT CHANGE UP (ref 3.5–5)
PROT SERPL-MCNC: 6.1 G/DL — SIGNIFICANT CHANGE UP (ref 6–8)
RBC # BLD: 3.08 M/UL — LOW (ref 4.7–6.1)
RBC # FLD: 13.2 % — SIGNIFICANT CHANGE UP (ref 11.5–14.5)
SODIUM SERPL-SCNC: 141 MMOL/L — SIGNIFICANT CHANGE UP (ref 135–146)
TROPONIN T SERPL-MCNC: 0.01 NG/ML — SIGNIFICANT CHANGE UP
TSH SERPL-MCNC: 1.94 UIU/ML — SIGNIFICANT CHANGE UP (ref 0.27–4.2)
VIT B12 SERPL-MCNC: 821 PG/ML — SIGNIFICANT CHANGE UP (ref 232–1245)
WBC # BLD: 8.88 K/UL — SIGNIFICANT CHANGE UP (ref 4.8–10.8)
WBC # FLD AUTO: 8.88 K/UL — SIGNIFICANT CHANGE UP (ref 4.8–10.8)

## 2020-01-23 PROCEDURE — 99254 IP/OBS CNSLTJ NEW/EST MOD 60: CPT

## 2020-01-23 PROCEDURE — 99223 1ST HOSP IP/OBS HIGH 75: CPT

## 2020-01-23 RX ORDER — CHLORHEXIDINE GLUCONATE 213 G/1000ML
1 SOLUTION TOPICAL
Refills: 0 | Status: DISCONTINUED | OUTPATIENT
Start: 2020-01-23 | End: 2020-01-25

## 2020-01-23 RX ORDER — CARBAMAZEPINE 200 MG
100 TABLET ORAL EVERY 6 HOURS
Refills: 0 | Status: DISCONTINUED | OUTPATIENT
Start: 2020-01-23 | End: 2020-01-24

## 2020-01-23 RX ORDER — CARBAMAZEPINE 200 MG
100 TABLET ORAL EVERY 6 HOURS
Refills: 0 | Status: DISCONTINUED | OUTPATIENT
Start: 2020-01-23 | End: 2020-01-23

## 2020-01-23 RX ADMIN — Medication 100 MILLIGRAM(S): at 00:30

## 2020-01-23 RX ADMIN — Medication 81 MILLIGRAM(S): at 11:10

## 2020-01-23 RX ADMIN — CHLORHEXIDINE GLUCONATE 1 APPLICATION(S): 213 SOLUTION TOPICAL at 11:12

## 2020-01-23 RX ADMIN — SIMVASTATIN 10 MILLIGRAM(S): 20 TABLET, FILM COATED ORAL at 21:47

## 2020-01-23 RX ADMIN — ENOXAPARIN SODIUM 40 MILLIGRAM(S): 100 INJECTION SUBCUTANEOUS at 11:12

## 2020-01-23 RX ADMIN — Medication 100 MILLIGRAM(S): at 06:10

## 2020-01-23 RX ADMIN — Medication 100 MILLIGRAM(S): at 13:27

## 2020-01-23 RX ADMIN — RISPERIDONE 1 MILLIGRAM(S): 4 TABLET ORAL at 21:46

## 2020-01-23 RX ADMIN — RISPERIDONE 1 MILLIGRAM(S): 4 TABLET ORAL at 06:10

## 2020-01-23 RX ADMIN — Medication 100 MILLIGRAM(S): at 21:46

## 2020-01-23 RX ADMIN — Medication 5 MILLIGRAM(S): at 21:46

## 2020-01-23 NOTE — CONSULT NOTE ADULT - ATTENDING COMMENTS
I have personally seen and examined this patient.  I have fully participated in the care of this patient.  I have reviewed all pertinent clinical information, including history, physical exam, plan and note.   I have reviewed all pertinent clinical information and reviewed all relevant imaging and diagnostic studies personally.  55 yo M w/ h/o prior CVA w/ residual LHP/aphasia w/ remote seizures compliant with CBZ currently p/w 2 episodes of witnessed seizure-like activity.  Recommendations as above.  Agree with above assessment except as noted.

## 2020-01-23 NOTE — SWALLOW BEDSIDE ASSESSMENT ADULT - NS SPL SWALLOW CLINIC TRIAL FT
+toleration for puree, soft solids, honey and nectar thick liquids w/o overt s/s penetration/aspiration.

## 2020-01-23 NOTE — CONSULT NOTE ADULT - SUBJECTIVE AND OBJECTIVE BOX
CC: Unresponsiveness      HPI:   55 Y/O M with PMHx of CVA (1980s with residual left sided weakness and aphasia), hx of seizure disorder (no seizure in last 20 yrs), CAD, Right retinal detachment s/p corneal transplant, tardive diskinesia, HTN, HLD, DM type II, sacral excoriations was sent to ER from [facility] yesterday  s/p episode of unresponsiveness. As per sister at bedside who takes care of him at home, pt was in his usual state of health and they came to see his PMD for routine visit, pt suddenly became unresponsive and began to drool from left side of face, [facility] doctors were called right away and on the assessment pt had low SBP in 60s, HR in 70s, and pt was unresponsive. His fingerstick was 118. 911 called and pt was sent to ER. Per sister his episode lasted for about 5-8 mins. He was baseline after that, no hx of urinary/fecal incontinence, no jerky movements, no hx of fever. Pt has aphasia but tries to communicate at baseline, while interviewing he has slurred speech and knows that he is in hospital but not oriented in time or person. Per sister the last time he had a seizure was 20 yrs ago and that was generalized tonic clonic. Per sister pt never had similar episodes and when she asked the pt he does not remember anything, he says that he was sleeping.     Interval history  Overnight patient had another episode of decreased responsiveness lasting 10 minute duration after which patient gradually returned back to baseline. During my exam patient was following few commands and mumbling few words.      Social history  Unable to obtain history    Family history  Unable to obtain info      Neuro Exam:  Orientation: Awake , mumbles follows simple commands  Cranial Nerves:  aphasiac (baseline since cva)  Motor: UE/LE spastic tone, Left upper extremity is contracted. Noted mvmts of upper extremity at random, was able to squeeze with right hand to command.  Sensory exam: No response to pain in the lower extremities                         grimaces to pain in the uppers  Coordination:. patient does not follow commands  Deep tendon reflexes: hyperreflexia throughout except for the RUE  Gait : deferred         NIHSS:     Allergies    No Known Allergies    Intolerances      MEDICATIONS  (STANDING):  aspirin enteric coated 81 milliGRAM(s) Oral daily  busPIRone 5 milliGRAM(s) Oral <User Schedule>  carBAMazepine Suspension 100 milliGRAM(s) Oral every 6 hours  enoxaparin Injectable 40 milliGRAM(s) SubCutaneous daily  risperiDONE   Tablet 1 milliGRAM(s) Oral two times a day  simvastatin 10 milliGRAM(s) Oral at bedtime    MEDICATIONS  (PRN):      LABS:                        11.3   9.43  )-----------( 212      ( 22 Jan 2020 13:50 )             34.1     01-22    136  |  100  |  18  ----------------------------<  110<H>  4.8   |  25  |  0.7    Ca    9.4      22 Jan 2020 13:50  Mg     2.2     01-22    TPro  7.1  /  Alb  4.0  /  TBili  0.2  /  DBili  x   /  AST  15  /  ALT  17  /  AlkPhos  118<H>  01-22    PT/INR - ( 22 Jan 2020 13:50 )   PT: 13.80 sec;   INR: 1.20 ratio         PTT - ( 22 Jan 2020 13:50 )  PTT:30.4 sec        Neuro Imaging:  < from: CT Head No Cont (01.22.20 @ 16:17) >  FINDINGS:    Stable mild ventricular prominence.    There is no intracranial mass lesion or focal mass effect.    Density of the cerebral white matter is decreased compatible with microvascular ischemic change. Stable chronic infarct in the left basal ganglia. There is no acute demarcated territorial infarct.    No acute intraparenchymal or subarachnoid hemorrhage is present.  There is no extraaxial collection.     The visualized paranasal sinuses are well aerated.   The mastoid air cellsare well aerated.  The visualized orbits are unremarkable.  There is no calvarial fracture.      IMPRESSION:     1. No CT evidence for acute intracranial pathology.    2. Stable chronic findings as above.      < end of copied text >    EEG:     Echo:   Carotid Doppler: N/A  Telemetry:

## 2020-01-23 NOTE — SWALLOW BEDSIDE ASSESSMENT ADULT - SLP GENERAL OBSERVATIONS
Pt received sitting upright in bed, alert and oriented to self, no c/o pain. +dysarthria. +room air. spoke w/ pt's sister via phone to gather baseline diet information.

## 2020-01-23 NOTE — PROGRESS NOTE ADULT - ASSESSMENT
55 Y/O M with PMHx of CVA (1980s with residual left sided weakness and aphasia), hx of seizure disorder (no seizure in last 20 yrs), CAD, Right retinal detachment s/p corneal transplant, tardive diskinesia, HTN, HLD, DM type II no on insulin, sacral excoriations was sent to ER from [facility] today s/p episode of unresponsiveness.    #Episode of unresponsiveness syncope vs seizure  - likely seizure as it lasted for 8 minutes, f/u REEG  - CT Head negative  - Needs video EEG per neurology  - [place]  - carbamazepine level 6.9 (on the lower side)  - On carbamezepine 100mg q6, consider increasing to 200mg q6 post EEG  - monitor electrolytes keep Mag >2.5, f/u TSH, B12 levels  - f/u 2d echo, will get carotid duplex.   - f/u UA and urine Cx.     # Hx of HTN  - hold meds as pt is currently hypotensive    # Hx of DM type II  - off insulin, f/u A1c    # Hx of CVA  - c/w aspirin and statins    # Hx of CAD  - no ischemic EKG changes, no events on telemetry  - discontinue telemetry monitoring   - doubt cardiac etiology for the episode  - c/w aspirin and statins     # Hx of mood disorder/anxiety  - c/w Risperidone 1mg BID, buspirone 5mg qhs, monitor QTc    #DVT PPx: Lovenox 40 mg s/c  #GI PPX: not indicated  #Diet: DASH/Carb consistent mechanical soft diet   #Activity: Increase as tolerated  #Dispo: from home, sister is health aide, they don't want Rehab. Transfer to [facility] for Video EEG 55 Y/O M with PMHx of CVA (1980s with residual left sided weakness and aphasia), hx of seizure disorder (no seizure in last 20 yrs), CAD, Right retinal detachment s/p corneal transplant, tardive diskinesia, HTN, HLD, DM type II no on insulin, sacral excoriations was sent to ER from [facility] today s/p episode of unresponsiveness.    #Episode of unresponsiveness syncope vs seizure  - likely seizure as it lasted for 8 minutes, f/u REEG  - CT Head negative  - Needs video EEG per neurology  - [place]  - carbamazepine level 6.9 (on the lower side)  - On carbamezepine 100mg q6, but sister was crushing the ER pills (?not getting well absorbed)  - consider increasing to 200mg q6 post EEG  - monitor electrolytes keep Mag >2.5, f/u TSH, B12 levels  - f/u 2d echo, will get carotid duplex.   - f/u UA and urine Cx.     # Hx of HTN  - hold meds as pt is currently hypotensive    # Hx of DM type II  - off insulin, f/u A1c    # Hx of CVA  - c/w aspirin and statins    # Hx of CAD  - no ischemic EKG changes, no events on telemetry  - discontinue telemetry monitoring   - doubt cardiac etiology for the episode  - c/w aspirin and statins     # Hx of mood disorder/anxiety  - c/w Risperidone 1mg BID, buspirone 5mg qhs, monitor QTc    #DVT PPx: Lovenox 40 mg s/c  #GI PPX: not indicated  #Diet: DASH/Carb consistent mechanical soft diet   #Activity: Increase as tolerated  #Dispo: from home, sister is health aide, they don't want Rehab. Transfer to [facility] for Video EEG

## 2020-01-23 NOTE — SWALLOW BEDSIDE ASSESSMENT ADULT - SLP PERTINENT HISTORY OF CURRENT PROBLEM
Pt presented [facility] from [facility] s/p episode of unresponsiveness and drooling on L side of face (episode lasted for ~5-8 mins, per pt's sister). PMHx: CVA (1980s w/ residual L side weakness and aphasia), seizure d/o (last seizure 20 yrs ago), tardive dyskinesia. Pt known to acute service w/ previous reccs for puree w/ honey thick liquids by tsp, 1:1 feed.

## 2020-01-23 NOTE — PROGRESS NOTE ADULT - SUBJECTIVE AND OBJECTIVE BOX
SUBJECTIVE:    Patient is a 54y old Male who presents with a chief complaint of Unresponsiveness (23 Jan 2020 01:40)    Currently admitted to medicine with the primary diagnosis of Syncope     Today is hospital day 1d. Overnight patient had another episode, where he became unresponsive, lasted around 10 minutes, patient was unarousable. Hemodynamically stable throughout no events on telemetry monitor.     PAST MEDICAL & SURGICAL HISTORY  Aphasia  Seizure  Weakness: left side  Diarrhea  Anxiety  Eczema  Diabetes  High cholesterol  Hypertension  Hypertension  Retinal detachment  CAD (coronary artery disease)  CVA (cerebral vascular accident): 1980&#x27;s  History of corneal transplant: right   4/13/17    SOCIAL HISTORY:  Negative for smoking/alcohol/drug use.     ALLERGIES:  No Known Allergies    MEDICATIONS:  STANDING MEDICATIONS  aspirin enteric coated 81 milliGRAM(s) Oral daily  busPIRone 5 milliGRAM(s) Oral <User Schedule>  carBAMazepine Suspension 100 milliGRAM(s) Oral every 6 hours  chlorhexidine 4% Liquid 1 Application(s) Topical <User Schedule>  enoxaparin Injectable 40 milliGRAM(s) SubCutaneous daily  risperiDONE   Tablet 1 milliGRAM(s) Oral two times a day  simvastatin 10 milliGRAM(s) Oral at bedtime    PRN MEDICATIONS    VITALS:   T(F): 96.9  HR: 87  BP: 132/66  RR: 18  SpO2: 99%    LABS:                        10.0   8.88  )-----------( 204      ( 23 Jan 2020 04:30 )             29.8     01-23    141  |  106  |  18  ----------------------------<  95  4.2   |  24  |  0.7    Ca    8.7      23 Jan 2020 04:30  Phos  3.3     01-23  Mg     2.3     01-23    TPro  6.1  /  Alb  3.6  /  TBili  0.3  /  DBili  x   /  AST  12  /  ALT  15  /  AlkPhos  102  01-23    PT/INR - ( 22 Jan 2020 13:50 )   PT: 13.80 sec;   INR: 1.20 ratio         PTT - ( 22 Jan 2020 13:50 )  PTT:30.4 sec      Troponin T, Serum: 0.01 ng/mL (01-23-20 @ 01:19)  Troponin T, Serum: <0.01 ng/mL (01-22-20 @ 20:11)      CARDIAC MARKERS ( 23 Jan 2020 01:19 )  x     / 0.01 ng/mL / x     / x     / x      CARDIAC MARKERS ( 22 Jan 2020 20:11 )  x     / <0.01 ng/mL / x     / x     / x      CARDIAC MARKERS ( 22 Jan 2020 13:50 )  x     / <0.01 ng/mL / x     / x     / x          RADIOLOGY:    PHYSICAL EXAM:  GEN: No acute distress  LUNGS: Clear to auscultation bilaterally   HEART: S1/S2 present. RRR.   ABD: Soft, non-tender, non-distended. Bowel sounds present  EXT: NC/NC/NE/2+PP/RIOJAS  NEURO: AAOX2, left sided weakness, responds to his name

## 2020-01-23 NOTE — CONSULT NOTE ADULT - ASSESSMENT
53 Y/O M with PMHx of CVA (1980s with residual left sided weakness and aphasia), hx of seizure disorder (no seizure in last 20 yrs), CAD, Right retinal detachment s/p corneal transplant, tardive diskinesia, HTN, HLD, DM type II, sacral excoriations was sent to ER from [facility] yesterday  s/p episode of unresponsiveness. Per sister the last time he had a seizure was 20 yrs ago and that was generalized tonic clonic and pt never had similar episodes in the past.  Overnight patient had another episode of decreased responsiveness lasting 10 minute duration after which patient gradually returned back to baseline. During my exam patient was following few commands and mumbling few words.      Plan  Seizure precautions  keep mg level >2 and <2.5  Continue tegretol 100mg q 6 hrs  Check tegretol levels  check UA urine culture  Neuro attending note will follow 53 Y/O M with PMHx of CVA (1980s with residual left sided weakness and aphasia), hx of seizure disorder (no seizure in last 20 yrs), CAD, Right retinal detachment s/p corneal transplant, tardive diskinesia, HTN, HLD, DM type II, sacral excoriations was sent to ER from [facility] yesterday  s/p episode of unresponsiveness. Per sister the last time he had a seizure was 20 yrs ago and that was generalized tonic clonic and pt never had similar episodes in the past.  Overnight patient had another episode of decreased responsiveness lasting 10 minute duration after which patient gradually returned back to baseline. During my exam patient was following few commands and mumbling few words.      Plan  REEG  Seizure precautions  keep mg level >2 and <2.5  Continue tegretol 100mg q 6 hrs  Check tegretol levels  check UA urine culture  Neuro attending note will follow 53 Y/O M with PMHx of CVA (1980s with residual left sided weakness and aphasia), hx of seizure disorder (no seizure in last 20 yrs), CAD, Right retinal detachment s/p corneal transplant, tardive diskinesia, HTN, HLD, DM type II, sacral excoriations was sent to ER from [facility] yesterday  s/p episode of unresponsiveness. Per sister the last time he had a seizure was 20 yrs ago and that was generalized tonic clonic and pt never had similar episodes in the past.  Overnight patient had another episode of decreased responsiveness lasting 10 minute duration after which patient gradually returned back to baseline. During my exam patient was following few commands and mumbling few words.      Plan  Seizure precautions  keep mg level >2 and <2.5  Continue tegretol 100mg q 6 hrs  Check tegretol levels  check UA urine culture  [facility]

## 2020-01-24 ENCOUNTER — OUTPATIENT (OUTPATIENT)
Dept: OUTPATIENT SERVICES | Facility: HOSPITAL | Age: 55
LOS: 1 days | End: 2020-01-24
Payer: MEDICAID

## 2020-01-24 DIAGNOSIS — Z71.89 OTHER SPECIFIED COUNSELING: ICD-10-CM

## 2020-01-24 DIAGNOSIS — Z94.7 CORNEAL TRANSPLANT STATUS: Chronic | ICD-10-CM

## 2020-01-24 LAB
ANION GAP SERPL CALC-SCNC: 12 MMOL/L — SIGNIFICANT CHANGE UP (ref 7–14)
BASOPHILS # BLD AUTO: 0.03 K/UL — SIGNIFICANT CHANGE UP (ref 0–0.2)
BASOPHILS NFR BLD AUTO: 0.3 % — SIGNIFICANT CHANGE UP (ref 0–1)
BUN SERPL-MCNC: 12 MG/DL — SIGNIFICANT CHANGE UP (ref 10–20)
CALCIUM SERPL-MCNC: 8.6 MG/DL — SIGNIFICANT CHANGE UP (ref 8.5–10.1)
CARBAMAZEPINE SERPL-MCNC: 4.7 UG/ML — SIGNIFICANT CHANGE UP (ref 4–12)
CHLORIDE SERPL-SCNC: 101 MMOL/L — SIGNIFICANT CHANGE UP (ref 98–110)
CO2 SERPL-SCNC: 24 MMOL/L — SIGNIFICANT CHANGE UP (ref 17–32)
CREAT SERPL-MCNC: 0.7 MG/DL — SIGNIFICANT CHANGE UP (ref 0.7–1.5)
EOSINOPHIL # BLD AUTO: 0.09 K/UL — SIGNIFICANT CHANGE UP (ref 0–0.7)
EOSINOPHIL NFR BLD AUTO: 1 % — SIGNIFICANT CHANGE UP (ref 0–8)
GLUCOSE SERPL-MCNC: 138 MG/DL — HIGH (ref 70–99)
HCT VFR BLD CALC: 32 % — LOW (ref 42–52)
HGB BLD-MCNC: 10.3 G/DL — LOW (ref 14–18)
IMM GRANULOCYTES NFR BLD AUTO: 0.3 % — SIGNIFICANT CHANGE UP (ref 0.1–0.3)
LYMPHOCYTES # BLD AUTO: 2.76 K/UL — SIGNIFICANT CHANGE UP (ref 1.2–3.4)
LYMPHOCYTES # BLD AUTO: 30.4 % — SIGNIFICANT CHANGE UP (ref 20.5–51.1)
MAGNESIUM SERPL-MCNC: 2.1 MG/DL — SIGNIFICANT CHANGE UP (ref 1.8–2.4)
MCHC RBC-ENTMCNC: 31.5 PG — HIGH (ref 27–31)
MCHC RBC-ENTMCNC: 32.2 G/DL — SIGNIFICANT CHANGE UP (ref 32–37)
MCV RBC AUTO: 97.9 FL — HIGH (ref 80–94)
MONOCYTES # BLD AUTO: 0.51 K/UL — SIGNIFICANT CHANGE UP (ref 0.1–0.6)
MONOCYTES NFR BLD AUTO: 5.6 % — SIGNIFICANT CHANGE UP (ref 1.7–9.3)
NEUTROPHILS # BLD AUTO: 5.65 K/UL — SIGNIFICANT CHANGE UP (ref 1.4–6.5)
NEUTROPHILS NFR BLD AUTO: 62.4 % — SIGNIFICANT CHANGE UP (ref 42.2–75.2)
NRBC # BLD: 0 /100 WBCS — SIGNIFICANT CHANGE UP (ref 0–0)
PLATELET # BLD AUTO: 195 K/UL — SIGNIFICANT CHANGE UP (ref 130–400)
POTASSIUM SERPL-MCNC: 4.2 MMOL/L — SIGNIFICANT CHANGE UP (ref 3.5–5)
POTASSIUM SERPL-SCNC: 4.2 MMOL/L — SIGNIFICANT CHANGE UP (ref 3.5–5)
RBC # BLD: 3.27 M/UL — LOW (ref 4.7–6.1)
RBC # FLD: 13.2 % — SIGNIFICANT CHANGE UP (ref 11.5–14.5)
SODIUM SERPL-SCNC: 137 MMOL/L — SIGNIFICANT CHANGE UP (ref 135–146)
WBC # BLD: 9.07 K/UL — SIGNIFICANT CHANGE UP (ref 4.8–10.8)
WBC # FLD AUTO: 9.07 K/UL — SIGNIFICANT CHANGE UP (ref 4.8–10.8)

## 2020-01-24 PROCEDURE — 99233 SBSQ HOSP IP/OBS HIGH 50: CPT

## 2020-01-24 RX ORDER — CARBAMAZEPINE 200 MG
200 TABLET ORAL
Refills: 0 | Status: DISCONTINUED | OUTPATIENT
Start: 2020-01-24 | End: 2020-01-25

## 2020-01-24 RX ORDER — CARBAMAZEPINE 200 MG
10 TABLET ORAL
Qty: 600 | Refills: 3
Start: 2020-01-24 | End: 2020-05-22

## 2020-01-24 RX ADMIN — Medication 81 MILLIGRAM(S): at 12:43

## 2020-01-24 RX ADMIN — Medication 100 MILLIGRAM(S): at 12:43

## 2020-01-24 RX ADMIN — SIMVASTATIN 10 MILLIGRAM(S): 20 TABLET, FILM COATED ORAL at 21:12

## 2020-01-24 RX ADMIN — RISPERIDONE 1 MILLIGRAM(S): 4 TABLET ORAL at 05:38

## 2020-01-24 RX ADMIN — Medication 100 MILLIGRAM(S): at 05:38

## 2020-01-24 RX ADMIN — Medication 200 MILLIGRAM(S): at 17:16

## 2020-01-24 RX ADMIN — RISPERIDONE 1 MILLIGRAM(S): 4 TABLET ORAL at 17:16

## 2020-01-24 RX ADMIN — Medication 100 MILLIGRAM(S): at 00:00

## 2020-01-24 RX ADMIN — Medication 5 MILLIGRAM(S): at 21:11

## 2020-01-24 RX ADMIN — ENOXAPARIN SODIUM 40 MILLIGRAM(S): 100 INJECTION SUBCUTANEOUS at 12:43

## 2020-01-24 NOTE — PROGRESS NOTE ADULT - ASSESSMENT
53 Y/O M with PMHx of CVA (1980s with residual left sided weakness and aphasia), hx of seizure disorder (no seizure in last 20 yrs), CAD, Right retinal detachment s/p corneal transplant, tardive diskinesia, HTN, HLD, DM type II no on insulin, sacral excoriations was sent to ER from [facility] today s/p episode of unresponsiveness.    #Episode of unresponsiveness syncope vs seizure - transferred from [place]  - routine EEG done - no seizure events identified; as per neurology NP - pt refused VEEG  - CT Head negative  - carbamazepine level 6.9 (on the lower side)  - will continue Tegretol 200mg q12hrs upon d/c  - monitor electrolytes keep Mag >2.5, f/u TSH, B12 levels  - ECHO - normal LVF; diastolic dysfunction   - f/u UA and urine Cx.     # Hx of HTN  - hold Lisinopril due to hypotension     # Hx of DM type II  - off insulin, f/u A1c    # Hx of CVA  - c/w aspirin and statin    # Hx of CAD  - no ischemic EKG changes, no events on telemetry at [place]  - off tele  - c/w aspirin and statins     # Hx of mood disorder/anxiety  - c/w Risperidone 1mg BID, buspirone 5mg qhs, monitor QTc      Progress Note Handoff  Pending Consults: none  Pending Tests: none  Pending Results: none  Family Discussion: anticipate d/c in am - discussed with pt's sister in detail   Disposition: Home__x___/SNF______/Other_____/Unknown at this time_____    Please call me with any questions at extension [phone]

## 2020-01-24 NOTE — PROGRESS NOTE ADULT - SUBJECTIVE AND OBJECTIVE BOX
SILVANO CALIXTO  54y  Male      Patient is a 54y old Male who presents with a chief complaint of Unresponsiveness (24 Jan 2020 13:14)      INTERVAL HPI/OVERNIGHT EVENTS:  Patient seen and examined earlier this morning.  Lying comfortably in bed.  On EEG.   Discussed plan of care with his sister and care taker = [phone]  Nicole  [phone]      REVIEW OF SYSTEMS:  unable to assess due to mental status         T(C): 36.2 (01-24-20 @ 05:00), Max: 36.8 (01-23-20 @ 19:47)  HR: 65 (01-24-20 @ 05:00) (65 - 70)  BP: 153/75 (01-24-20 @ 05:00) (130/66 - 153/75)  RR: 16 (01-24-20 @ 05:00) (16 - 18)  SpO2: --    PHYSICAL EXAM:  GENERAL: NAD, well-groomed, well-developed  HEAD:  Atraumatic, Normocephalic  EYES: EOMI, PERRLA, conjunctiva and sclera clear  ENMT: No tonsillar erythema, exudates, or enlargement; Moist mucous membranes, Good dentition, No lesions  NECK: Supple, No JVD, Normal thyroid  NERVOUS SYSTEM:  awake; minimally verbal; left sided weakness  CHEST/LUNG: decreased BS at bases  HEART: Regular rate and rhythm; No murmurs, rubs, or gallops  ABDOMEN: Soft, Nontender, Nondistended; Bowel sounds present  EXTREMITIES:  2+ Peripheral Pulses, No clubbing, cyanosis, or edema  LYMPH: No lymphadenopathy noted  SKIN: No rashes or lesions    Consultant(s) Notes Reviewed:  [x ] YES  [ ] NO  Care Discussed with Consultants/Other Providers [ x] YES  [ ] NO    LAB:                        10.3   9.07  )-----------( 195      ( 24 Jan 2020 06:41 )             32.0       01-24    137  |  101  |  12  ----------------------------<  138<H>  4.2   |  24  |  0.7    Ca    8.6      24 Jan 2020 06:41  Phos  3.3     01-23  Mg     2.1     01-24    TPro  6.1  /  Alb  3.6  /  TBili  0.3  /  DBili  x   /  AST  12  /  ALT  15  /  AlkPhos  102  01-23    LIVER FUNCTIONS - ( 23 Jan 2020 04:30 )  Alb: 3.6 g/dL / Pro: 6.1 g/dL / ALK PHOS: 102 U/L / ALT: 15 U/L / AST: 12 U/L / GGT: x           CARDIAC MARKERS ( 23 Jan 2020 01:19 )  x     / 0.01 ng/mL / x     / x     / x      CARDIAC MARKERS ( 22 Jan 2020 20:11 )  x     / <0.01 ng/mL / x     / x     / x            Drug Dosing Weight  Height (cm): 172.7 (23 Jan 2020 19:47)  Weight (kg): 66.9 (23 Jan 2020 19:47)  BMI (kg/m2): 22.4 (23 Jan 2020 19:47)  BSA (m2): 1.8 (23 Jan 2020 19:47)        CAPILLARY BLOOD GLUCOSE  POCT Blood Glucose.: 128 mg/dL (23 Jan 2020 16:29)        I&O's Summary    23 Jan 2020 07:01  -  24 Jan 2020 07:00  --------------------------------------------------------  IN: 600 mL / OUT: 1 mL / NET: 599 mL          RADIOLOGY & ADDITIONAL TESTS:  Imaging Personally Reviewed:  [x] YES  [ ] NO    HEALTH ISSUES - PROBLEM Dx:          MEDS:  aspirin enteric coated 81 milliGRAM(s) Oral daily  busPIRone 5 milliGRAM(s) Oral <User Schedule>  carBAMazepine Suspension 200 milliGRAM(s) Oral two times a day  chlorhexidine 4% Liquid 1 Application(s) Topical <User Schedule>  enoxaparin Injectable 40 milliGRAM(s) SubCutaneous daily  LORazepam   Injectable 2 milliGRAM(s) IV Push three times a day PRN  risperiDONE   Tablet 1 milliGRAM(s) Oral two times a day  simvastatin 10 milliGRAM(s) Oral at bedtime

## 2020-01-24 NOTE — CHART NOTE - NSCHARTNOTEFT_GEN_A_CORE
Epilepsy NP Note:    Carbamazepine Level, Serum: 4.7 ug/mL (01.24.20 @ 13:20) - Trough level  Patient is clear for discharge from neurology standpoint.    Follow up appointment is scheduled for patient at [facility]  for February 25, 2020 at 1;30 pm.    [address] [place] [address]  [phone]    Continue pre-admission dose of Tegretol 200mg q12hrs. Rx sent to the pharmacy.    Patient was also given Rx for CBC, CMP, Vitamin D level, Tegretol levels trough to be done prior to follow up.    Detailed written and verbal instructions regarding seizure precautions and follow up plan are given to the patient's sister Nicole, all questions answered.     Discussed with hospitalist.

## 2020-01-24 NOTE — PROGRESS NOTE ADULT - SUBJECTIVE AND OBJECTIVE BOX
Neurology/Epilepsy NP:  Neurology/Epilepsy Follow up:    SILVANO CALIXTO 54y Male  MRN-[id number]    HPI:  53 Y/O M with PMHx of CVA (1980s with residual left sided weakness and aphasia), hx of seizure disorder (no seizure in last 20 yrs), CAD, Right retinal detatchment s/p corneal transplant, tardive diskinesia, HTN, HLD, DM type II no on insulin, sacral excoriations was sent to ER from [facility] today s/p episode of unresponsiveness. As per sister at bedside who takes care of him at home, pt was in his usual state of health and they came to see his PMD for routine visit, pt suddenly became unresponsive and began to drool from left side of face, [facility] doctors were called right away and on the assessment pt had low SBP in 60s, HR in 70s, and pt was unresponsive. His fingerstick was 118. 911 called and pt was sent to ER. Per sister his episode lasted for about 5-8 mins. He became to his baseline after that, no hx of urinary/fecal incontinence, no jerky movements, no hx of fever. Pt has aphasia but tries to communicate at baseline, while interviewing he has slurred speech and knows that he is in hospital but not oriented in time or person. Per sister the last time he had a seizure was 20 yrs ago and that was generalized tonic clonic. Per sister pt never had similar episodes and when she asked the pt he does not remember anything, he says that he was sleeping. Denied hx of pain. Could not get ROS in detail.     On arrival to ED pt was already back  to baseline with  mm/73 mm Hg, HR 86 /min, RR 20/min,  T max 99.1 Degrees F, SPO2 100 % on room air. His stat CT Head did not show any new lesions. (2020 18:28)      Interval History:    Patient was transferred to EMU from ED [facility] last night. Patient refused to have 24 hr EEG, but agreed to have routine EEG that was done this morning (see report below).  Additional history obtained from patient's sister Nicole who is his caregiver. Patient is being followed at [facility], last seen by neuro in 2019. At home takes carbamazepine chewable 100mg 2tabs every 12hrs. Patient unable to swallow pills, and sister crushes them. No missed doses. His last known seizure was over 20 years ago.      Vitals:  T(F): 97.1 (20 @ 05:00), Max: 98.3 (20 @ 21:45)  HR: 65 (20 @ 05:00) (65 - 87)  BP: 153/75 (20 @ 05:00) (130/66 - 153/75)  RR: 16 (20 @ 05:00) (16 - 18)  SpO2: --      Medications  MEDICATIONS  (STANDING):  aspirin enteric coated 81 milliGRAM(s) Oral daily  busPIRone 5 milliGRAM(s) Oral <User Schedule>  carBAMazepine Suspension 100 milliGRAM(s) Oral every 6 hours  chlorhexidine 4% Liquid 1 Application(s) Topical <User Schedule>  enoxaparin Injectable 40 milliGRAM(s) SubCutaneous daily  risperiDONE   Tablet 1 milliGRAM(s) Oral two times a day  simvastatin 10 milliGRAM(s) Oral at bedtime    MEDICATIONS  (PRN):  LORazepam   Injectable 2 milliGRAM(s) IV Push three times a day PRN generalized tonic-clonic seizure  lasting longer than 2 minutes      Lab      137  |  101  |  12  ----------------------------<  138<H>  4.2   |  24  |  0.7    Ca    8.6      2020 06:41  Phos  3.3       Mg     2.1         TPro  6.1  /  Alb  3.6  /  TBili  0.3  /  DBili  x   /  AST  12  /  ALT  15  /  AlkPhos  102                            10.3   9.07  )-----------( 195      ( 2020 06:41 )             32.0     LIVER FUNCTIONS - ( 2020 04:30 )  Alb: 3.6 g/dL / Pro: 6.1 g/dL / ALK PHOS: 102 U/L / ALT: 15 U/L / AST: 12 U/L / GGT: x               Carbamazepine Level, Serum: 6.9 ug/mL [4.0 - 12.0] (20 @ 20:11) - done on admission        Radiology:  CT Head No Cont:  EXAM:  CT BRAIN            PROCEDURE DATE:  2020        INTERPRETATION:  CLINICAL INDICATION: unresponsive episode    TECHNIQUE: CT images of the head were obtained without the administration of contrast.    COMPARISON: Prior CT dated 2019    FINDINGS:    Stable mild ventricular prominence.    There is no intracranial mass lesion or focal mass effect.    Density of the cerebral white matter is decreased compatible with microvascular ischemic change. Stable chronic infarct in the left basal ganglia. There is no acute demarcated territorial infarct.    No acute intraparenchymal or subarachnoid hemorrhage is present.  There is no extraaxial collection.     The visualized paranasal sinuses are well aerated.   The mastoid air cellsare well aerated.  The visualized orbits are unremarkable.  There is no calvarial fracture.      IMPRESSION:     1. No CT evidence for acute intracranial pathology.    2. Stable chronic findings as above.    MONTY BATES M.D., ATTENDING RADIOLOGIST  This document has been electronically signed. 2020  4:55PM   (20 @ 16:17)        REEG:  < from: EEG Awake or Drowsy (20 @ 12:29) >  EXAM:  EEG-AWAKE AND DROWSY        PROCEDURE DATE:  2020        INTERPRETATION:      [address] [place] [address], [id number]    ROUTINE EEG INTERPRETATION    Last Name: MARILY     First Name: SILVANO       Date: 2020  Start Time:  11:50:01 AM           End Date:___/___/_____  End Time: 12:21:31 PM  Study Duration 20.2    Age: 54 y   : 1965    Gender: Male       Referring Physician: -      MR#: [id number]    Account #: [id number]      Exam Performed on: 16 Channel Digital Routine EEG done on [employer] recording system.    History  Seizures    Medications    Medication  Dosage  Date  Comment  No Data.    State  Awake    Symmetry  Symmetric    Organization  Well organized     PDR   -  Background: small amount of low ampitude theta and  a PDR reaching8-9 hz    Generalized Slowing  Yes  borderline - mild    Focal Slowing  No    Breach Artifact: No  -      Activation Procedure  Hyper Ventilation  No    Photic Stimulation  No    Epileptiform Activity  No    Frequency:    Side:  -    Type:    Area of Activity:  -    Events:  No    Impression  Abnormal due to the presence of: generalized slowing as above    Clinical Correlation & Recommendations   Consistent with diffuse cerebral electrophysiological dysfunction.  Secondary to none specific cause.      < end of copied text >    Assessment: 54y Male with history of stroke, seizures,, CAD, retinal detachment, DLD, mood disorder, admitted for episodes of decreased responsiveness. Per [facility] records the episode was associated with significant hypotension with SBP<70. Patient refused VEEG.      Discussed with Dr. Sanchez    Plan:  - Tegretol level sent, result pending  - Continue carbamazepine at home dose 200mg q12hrs for now  - May use liquid version  - Seizure precautions  - Ativan 2mg IV PRN for generalized tonic-cloonic seizure lasting longer than 2  minutes, or two consecutive seizures without return to baseline in-between  - Keep Mg above 2.0    Plan discussed with patient's sister, all questions answered.

## 2020-01-25 ENCOUNTER — TRANSCRIPTION ENCOUNTER (OUTPATIENT)
Age: 55
End: 2020-01-25

## 2020-01-25 VITALS
DIASTOLIC BLOOD PRESSURE: 79 MMHG | HEART RATE: 65 BPM | SYSTOLIC BLOOD PRESSURE: 170 MMHG | RESPIRATION RATE: 16 BRPM | TEMPERATURE: 97 F

## 2020-01-25 LAB
GLUCOSE BLDC GLUCOMTR-MCNC: 109 MG/DL — HIGH (ref 70–99)
GLUCOSE BLDC GLUCOMTR-MCNC: 69 MG/DL — LOW (ref 70–99)
GLUCOSE BLDC GLUCOMTR-MCNC: 87 MG/DL — SIGNIFICANT CHANGE UP (ref 70–99)

## 2020-01-25 PROCEDURE — 99239 HOSP IP/OBS DSCHRG MGMT >30: CPT

## 2020-01-25 PROCEDURE — 95819 EEG AWAKE AND ASLEEP: CPT | Mod: 26

## 2020-01-25 RX ORDER — LISINOPRIL 2.5 MG/1
1 TABLET ORAL
Qty: 30 | Refills: 0
Start: 2020-01-25

## 2020-01-25 RX ORDER — LISINOPRIL 2.5 MG/1
1 TABLET ORAL
Qty: 0 | Refills: 0 | DISCHARGE

## 2020-01-25 RX ADMIN — ENOXAPARIN SODIUM 40 MILLIGRAM(S): 100 INJECTION SUBCUTANEOUS at 12:01

## 2020-01-25 RX ADMIN — RISPERIDONE 1 MILLIGRAM(S): 4 TABLET ORAL at 05:15

## 2020-01-25 RX ADMIN — Medication 81 MILLIGRAM(S): at 12:01

## 2020-01-25 RX ADMIN — Medication 200 MILLIGRAM(S): at 05:15

## 2020-01-25 NOTE — DISCHARGE NOTE PROVIDER - CARE PROVIDER_API CALL
Armando Ramos)  Internal Medicine  [address], [address]  [place] [address]  Phone: [phone]  Fax: [phone]  Follow Up Time: 1 week

## 2020-01-25 NOTE — DISCHARGE NOTE PROVIDER - NSDCMRMEDTOKEN_GEN_ALL_CORE_FT
Aspir 81 oral delayed release tablet: 1 tab(s) orally once a day  busPIRone 5 mg oral tablet: 1 tab(s) orally once a day (at bedtime)  carBAMazepine 100 mg/5 mL oral suspension: 10 milliliter(s) orally every 12 hours   Hydrocort 1% topical cream: Apply topically to affected area 3 times a day  lisinopril 10 mg oral tablet: 1 tab(s) orally once a day   risperiDONE 1 mg oral tablet: 1 tab(s) orally 2 times a day  simvastatin 10 mg oral tablet: 1 tab(s) orally once a day (at bedtime)

## 2020-01-25 NOTE — DISCHARGE NOTE PROVIDER - NSDCCPCAREPLAN_GEN_ALL_CORE_FT
PRINCIPAL DISCHARGE DIAGNOSIS  Diagnosis: Syncope  Assessment and Plan of Treatment: due to low blood pressure  please take lisinopril 10mg daily instead of 20mg  follow up with PMD and neurology

## 2020-01-25 NOTE — DISCHARGE NOTE NURSING/CASE MANAGEMENT/SOCIAL WORK - PATIENT PORTAL LINK FT
You can access the FollowMyHealth Patient Portal offered by [facility] by registering at the following website: [email or web address]. By joining [facility]’s FollowMyHealth portal, you will also be able to view your health information using other applications (apps) compatible with our system.

## 2020-01-25 NOTE — PROGRESS NOTE ADULT - SUBJECTIVE AND OBJECTIVE BOX
SILVANO CALIXTO  54y  Male      Patient is a 54y old Male who presents with a chief complaint of Unresponsiveness (24 Jan 2020 13:14)      INTERVAL HPI/OVERNIGHT EVENTS:  Patient seen and examined earlier this morning.  Lying comfortably in bed.  s/p eeg  Discussed plan of care with his sister and care taker = [phone]  Nicole  [phone] - she is aware he is coming home.      REVIEW OF SYSTEMS:  unable to assess due to mental status       Vital Signs Last 24 Hrs  T(C): 35.9 (25 Jan 2020 05:00), Max: 36.2 (24 Jan 2020 21:52)  T(F): 96.7 (25 Jan 2020 05:00), Max: 97.1 (24 Jan 2020 21:52)  HR: 65 (25 Jan 2020 05:00) (64 - 65)  BP: 170/79 (25 Jan 2020 05:00) (136/76 - 170/79)  BP(mean): --  RR: 16 (25 Jan 2020 05:00) (16 - 16)  SpO2: --      PHYSICAL EXAM:  GENERAL: NAD, well-groomed, well-developed  HEAD:  Atraumatic, Normocephalic  EYES: EOMI, PERRLA, conjunctiva and sclera clear  ENMT: No tonsillar erythema, exudates, or enlargement; Moist mucous membranes, Good dentition, No lesions  NECK: Supple, No JVD, Normal thyroid  NERVOUS SYSTEM:  awake; minimally verbal; left sided weakness  CHEST/LUNG: decreased BS at bases  HEART: Regular rate and rhythm; No murmurs, rubs, or gallops  ABDOMEN: Soft, Nontender, Nondistended; Bowel sounds present  EXTREMITIES:  2+ Peripheral Pulses, No clubbing, cyanosis, or edema  LYMPH: No lymphadenopathy noted  SKIN: No rashes or lesions      Consultant(s) Notes Reviewed:  [x ] YES  [ ] NO  Care Discussed with Consultants/Other Providers [ x] YES  [ ] NO    LAB:                        10.3   9.07  )-----------( 195      ( 24 Jan 2020 06:41 )             32.0       01-24    137  |  101  |  12  ----------------------------<  138<H>  4.2   |  24  |  0.7    Ca    8.6      24 Jan 2020 06:41  Phos  3.3     01-23  Mg     2.1     01-24    TPro  6.1  /  Alb  3.6  /  TBili  0.3  /  DBili  x   /  AST  12  /  ALT  15  /  AlkPhos  102  01-23    LIVER FUNCTIONS - ( 23 Jan 2020 04:30 )  Alb: 3.6 g/dL / Pro: 6.1 g/dL / ALK PHOS: 102 U/L / ALT: 15 U/L / AST: 12 U/L / GGT: x           CARDIAC MARKERS ( 23 Jan 2020 01:19 )  x     / 0.01 ng/mL / x     / x     / x      CARDIAC MARKERS ( 22 Jan 2020 20:11 )  x     / <0.01 ng/mL / x     / x     / x            Drug Dosing Weight  Height (cm): 172.7 (23 Jan 2020 19:47)  Weight (kg): 66.9 (23 Jan 2020 19:47)  BMI (kg/m2): 22.4 (23 Jan 2020 19:47)  BSA (m2): 1.8 (23 Jan 2020 19:47)      CAPILLARY BLOOD GLUCOSE  POCT Blood Glucose.: 109 mg/dL (25 Jan 2020 08:23)  POCT Blood Glucose.: 69 mg/dL (25 Jan 2020 07:36)            RADIOLOGY & ADDITIONAL TESTS:  Imaging Personally Reviewed:  [x] YES  [ ] NO    HEALTH ISSUES - PROBLEM Dx:        MEDICATIONS  (STANDING):  aspirin enteric coated 81 milliGRAM(s) Oral daily  busPIRone 5 milliGRAM(s) Oral <User Schedule>  carBAMazepine Suspension 200 milliGRAM(s) Oral two times a day  chlorhexidine 4% Liquid 1 Application(s) Topical <User Schedule>  enoxaparin Injectable 40 milliGRAM(s) SubCutaneous daily  risperiDONE   Tablet 1 milliGRAM(s) Oral two times a day  simvastatin 10 milliGRAM(s) Oral at bedtime    MEDICATIONS  (PRN):  LORazepam   Injectable 2 milliGRAM(s) IV Push three times a day PRN generalized tonic-clonic seizure  lasting longer than 2 minutes

## 2020-01-25 NOTE — PROGRESS NOTE ADULT - ASSESSMENT
53 Y/O M with PMHx of CVA (1980s with residual left sided weakness and aphasia), hx of seizure disorder (no seizure in last 20 yrs), CAD, Right retinal detachment s/p corneal transplant, tardive diskinesia, HTN, HLD, DM type II no on insulin, sacral excoriations was sent to ER from [facility] today s/p episode of unresponsiveness.    #Episode of unresponsiveness syncope vs seizure - transferred from [place]  - routine EEG done - no seizure events identified; as per neurology NP - pt refused VEEG  - CT Head negative  - carbamazepine level 6.9 (on the lower side)  - will continue Tegretol 200mg q12hrs upon d/c  - ECHO - normal LVF; diastolic dysfunction     # Hx of HTN  -restart lisinopril at 10mg    # Hx of DM type II  - off insulin, f/u A1c    # Hx of CVA  - c/w aspirin and statin    # Hx of CAD  - no ischemic EKG changes, no events on telemetry at [place]  - off tele  - c/w aspirin and statins     # Hx of mood disorder/anxiety  - c/w Risperidone 1mg BID, buspirone 5mg qhs, monitor QTc      Progress Note Handoff  Pending Consults: none  Pending Tests: none  Pending Results: none  Family Discussion: d/c home today - discussed with pt's sister in detail   Disposition: Home__x___/SNF______/Other_____/Unknown at this time_____    Please call me with any questions at extension [phone]

## 2020-01-25 NOTE — DISCHARGE NOTE PROVIDER - HOSPITAL COURSE
55 Y/O M with PMHx of CVA (1980s with residual left sided weakness and aphasia), hx of seizure disorder (no seizure in last 20 yrs), CAD, Right retinal detatchment s/p corneal transplant, tardive diskinesia, HTN, HLD, DM type II no on insulin, sacral excoriations was sent to ER from [facility] today s/p episode of unresponsiveness. As per sister at bedside who takes care of him at home, pt was in his usual state of health and they came to see his PMD for routine visit, pt suddenly became unresponsive and began to drool from left side of face, [facility] clinic doctors were called right away and on the assessment pt had low SBP in 60s, HR in 70s, and pt was unresponsive. His fingerstick was 118. 911 called and pt was sent to ER. Per sister his episode lasted for about 5-8 mins. He became to his baseline after that, no hx of urinary/fecal incontinence, no jerky movements, no hx of fever. Pt has aphasia but tries to communicate at baseline, while interviewing he has slurred speech and knows that he is in hospital but not oriented in time or person. Per sister the last time he had a seizure was 20 yrs ago and that was generalized tonic clonic. Per sister pt never had similar episodes and when she asked the pt he does not remember anything, he says that he was sleeping. Denied hx of pain. Could not get ROS in detail.         On arrival to ED pt was already back  to baseline with  mm/73 mm Hg, HR 86 /min, RR 20/min,  T max 99.1 Degrees F, SPO2 100 % on room air. His stat CT Head did not show any new lesions.         Patient admitted to [place] to tele.  No event identified on the tele monitor.    He was sent to [facility] for VEEG but refused a VEEG and had a routine EEG that did not reveal any seizure episodes.    Patient is no long hypotensive and is back to baseline.         Discussed plan with his sister who is in agreement with d/c plan to home today.    d/c planning took over 55 min    d/c papers done by me

## 2020-01-30 DIAGNOSIS — Z79.82 LONG TERM (CURRENT) USE OF ASPIRIN: ICD-10-CM

## 2020-01-30 DIAGNOSIS — I69.354 HEMIPLEGIA AND HEMIPARESIS FOLLOWING CEREBRAL INFARCTION AFFECTING LEFT NON-DOMINANT SIDE: ICD-10-CM

## 2020-01-30 DIAGNOSIS — T46.4X5A ADVERSE EFFECT OF ANGIOTENSIN-CONVERTING-ENZYME INHIBITORS, INITIAL ENCOUNTER: ICD-10-CM

## 2020-01-30 DIAGNOSIS — F41.9 ANXIETY DISORDER, UNSPECIFIED: ICD-10-CM

## 2020-01-30 DIAGNOSIS — L89.151 PRESSURE ULCER OF SACRAL REGION, STAGE 1: ICD-10-CM

## 2020-01-30 DIAGNOSIS — E11.22 TYPE 2 DIABETES MELLITUS WITH DIABETIC CHRONIC KIDNEY DISEASE: ICD-10-CM

## 2020-01-30 DIAGNOSIS — I69.320 APHASIA FOLLOWING CEREBRAL INFARCTION: ICD-10-CM

## 2020-01-30 DIAGNOSIS — I95.9 HYPOTENSION, UNSPECIFIED: ICD-10-CM

## 2020-01-30 DIAGNOSIS — Z94.7 CORNEAL TRANSPLANT STATUS: ICD-10-CM

## 2020-01-30 DIAGNOSIS — Z99.2 DEPENDENCE ON RENAL DIALYSIS: ICD-10-CM

## 2020-01-30 DIAGNOSIS — N18.6 END STAGE RENAL DISEASE: ICD-10-CM

## 2020-01-30 DIAGNOSIS — G40.909 EPILEPSY, UNSPECIFIED, NOT INTRACTABLE, WITHOUT STATUS EPILEPTICUS: ICD-10-CM

## 2020-01-30 DIAGNOSIS — I25.10 ATHEROSCLEROTIC HEART DISEASE OF NATIVE CORONARY ARTERY WITHOUT ANGINA PECTORIS: ICD-10-CM

## 2020-01-30 DIAGNOSIS — F39 UNSPECIFIED MOOD [AFFECTIVE] DISORDER: ICD-10-CM

## 2020-01-30 DIAGNOSIS — E78.5 HYPERLIPIDEMIA, UNSPECIFIED: ICD-10-CM

## 2020-01-30 DIAGNOSIS — I12.0 HYPERTENSIVE CHRONIC KIDNEY DISEASE WITH STAGE 5 CHRONIC KIDNEY DISEASE OR END STAGE RENAL DISEASE: ICD-10-CM

## 2020-01-30 DIAGNOSIS — R55 SYNCOPE AND COLLAPSE: ICD-10-CM

## 2020-01-30 DIAGNOSIS — R53.1 WEAKNESS: ICD-10-CM

## 2020-02-07 ENCOUNTER — APPOINTMENT (OUTPATIENT)
Dept: INTERNAL MEDICINE | Facility: CLINIC | Age: 55
End: 2020-02-07
Payer: MEDICAID

## 2020-02-07 ENCOUNTER — OUTPATIENT (OUTPATIENT)
Dept: OUTPATIENT SERVICES | Facility: HOSPITAL | Age: 55
LOS: 1 days | Discharge: HOME | End: 2020-02-07

## 2020-02-07 VITALS — DIASTOLIC BLOOD PRESSURE: 82 MMHG | SYSTOLIC BLOOD PRESSURE: 124 MMHG | HEART RATE: 66 BPM

## 2020-02-07 DIAGNOSIS — K40.90 UNILATERAL INGUINAL HERNIA, W/OUT OBSTRUCTION OR GANGRENE, NOT SPECIFIED AS RECURRENT: ICD-10-CM

## 2020-02-07 DIAGNOSIS — R41.89 OTHER SYMPTOMS AND SIGNS INVOLVING COGNITIVE FUNCTIONS AND AWARENESS: ICD-10-CM

## 2020-02-07 DIAGNOSIS — L89.152 PRESSURE ULCER OF SACRAL REGION, STAGE 2: ICD-10-CM

## 2020-02-07 DIAGNOSIS — G40.909 EPILEPSY, UNSPECIFIED, NOT INTRACTABLE, W/OUT STATUS EPILEPTICUS: ICD-10-CM

## 2020-02-07 DIAGNOSIS — Z94.7 CORNEAL TRANSPLANT STATUS: Chronic | ICD-10-CM

## 2020-02-07 PROCEDURE — 99213 OFFICE O/P EST LOW 20 MIN: CPT | Mod: GC

## 2020-02-07 RX ORDER — CARBAMAZEPINE 100 MG/1
100 TABLET, CHEWABLE ORAL
Qty: 120 | Refills: 5 | Status: COMPLETED | COMMUNITY
End: 2020-02-07

## 2020-02-07 RX ORDER — LISINOPRIL 40 MG/1
40 TABLET ORAL DAILY
Qty: 90 | Refills: 1 | Status: COMPLETED | COMMUNITY
Start: 2019-05-10 | End: 2020-02-07

## 2020-02-07 NOTE — PHYSICAL EXAM
[No Acute Distress] : no acute distress [Normal Outer Ear/Nose] : the outer ears and nose were normal in appearance [Normal Oropharynx] : the oropharynx was normal [No JVD] : no jugular venous distention [No Lymphadenopathy] : no lymphadenopathy [Supple] : supple [Thyroid Normal, No Nodules] : the thyroid was normal and there were no nodules present [No Accessory Muscle Use] : no accessory muscle use [No Respiratory Distress] : no respiratory distress  [Clear to Auscultation] : lungs were clear to auscultation bilaterally [Normal Rate] : normal rate  [Regular Rhythm] : with a regular rhythm [Normal S1, S2] : normal S1 and S2 [No Abdominal Bruit] : a ~M bruit was not heard ~T in the abdomen [No Murmur] : no murmur heard [No Carotid Bruits] : no carotid bruits [Pedal Pulses Present] : the pedal pulses are present [No Varicosities] : no varicosities [No Edema] : there was no peripheral edema [No Palpable Aorta] : no palpable aorta [Soft] : abdomen soft [Non Tender] : non-tender [No HSM] : no HSM [Normal Bowel Sounds] : normal bowel sounds [Normal Posterior Cervical Nodes] : no posterior cervical lymphadenopathy [Normal Anterior Cervical Nodes] : no anterior cervical lymphadenopathy [No CVA Tenderness] : no CVA  tenderness [No Joint Swelling] : no joint swelling [Grossly Normal Strength/Tone] : grossly normal strength/tone [de-identified] : left sided inguinal hernia [de-identified] : right eye s/p enucleation [de-identified] : right sided weakness with contractures and dysarthric speech.  [de-identified] : sacral ulcers excoriations

## 2020-02-07 NOTE — ASSESSMENT
[FreeTextEntry1] : 55 Y/O M with PMHx of CVA (1980s with residual left sided weakness and aphasia), hx of seizure disorder (no seizure in last 20 yrs), CAD, Right retinal detachment s/p corneal transplant, tardive dyskinesia, HTN, HLD, DM type II no on insulin, sacral excoriations was seen last month in [facility] clinic where he had an episode of unresponsiveness.\par \par # Episode of unresponsiveness syncope vs seizure\par - likely seizure as it lasted for 8 minutes, REEG was negative for seizure but did show generalized slowing\par - CT Head negative, \par - Carbamazepine levels were therapeutic, dose increased to 125mg BID\par - Telemonitoring, and EKG on showed sinus arrhythmia\par - TSH, B12 levels pending\par - 2d echo showed EF 55-60 % and grade 1 diastolic dysfunction\par - pt was referred to [facility] for VEEG but he refused to undergo VEEG\par - neuro f/u\par \par # Hx of HTN\par - lisinopril dropped to 10 mg given hypotension hx\par \par # Hx of DM type II\par - off insulin, f/u A1c\par \par # Hx of CVA\par - c/w aspirin and statins\par \par # Hx of CAD\par - c/w aspirin and statins\par \par # Hx of mood disorder/anxiety\par - c/w resperidone 1mg BID, buspirone 5mg qhs, monitor QTc\par - psych consult to adjust meds if w/up negative for unresponsiveness.\par \par # Sacral excoriation \par - c/w Silver sulphazidine \par - Wound care \par - Sacral ulcer healing per sister\par \par # HCM\par - Colonoscopy scheduled followed up with GI in June 5th 2019. Not diagnostic as noted in HPI\par - PPSV 23 vaccination administered March 28 2019\par - Follow up in 3 months and PRN.

## 2020-02-07 NOTE — HISTORY OF PRESENT ILLNESS
[FreeTextEntry1] : post discharge follow up [de-identified] : 55 Y/O M with PMHx of CVA (1980s with residual left sided weakness and aphasia), hx of seizure disorder (no seizure in last 20 yrs), CAD, Right retinal detachment s/p corneal transplant, tardive dyskinesia, HTN, HLD, DM type II no on insulin, sacral excoriations was seen last month in [facility] where he had an episode of unresponsiveness. Pt suddenly became unresponsive and began to drool from left\par side of face, pt had low SBP in 60s, HR in 70s, and pt was unresponsive. His fingerstick was 118. 911 called and pt was sent to ER. Per sister his episode lasted for about 5-8 mins. He was sent to ED where he was admitted and evaluated for possible seizures but pt refused VEEG, today he is here for follow up. No further episodes of unresponsiveness.

## 2020-02-10 DIAGNOSIS — E11.9 TYPE 2 DIABETES MELLITUS WITHOUT COMPLICATIONS: ICD-10-CM

## 2020-02-10 DIAGNOSIS — G24.9 DYSTONIA, UNSPECIFIED: ICD-10-CM

## 2020-02-10 DIAGNOSIS — I63.9 CEREBRAL INFARCTION, UNSPECIFIED: ICD-10-CM

## 2020-02-10 DIAGNOSIS — I10 ESSENTIAL (PRIMARY) HYPERTENSION: ICD-10-CM

## 2020-02-10 DIAGNOSIS — E78.5 HYPERLIPIDEMIA, UNSPECIFIED: ICD-10-CM

## 2020-02-10 DIAGNOSIS — I25.10 ATHEROSCLEROTIC HEART DISEASE OF NATIVE CORONARY ARTERY WITHOUT ANGINA PECTORIS: ICD-10-CM

## 2020-02-10 DIAGNOSIS — L89.152 PRESSURE ULCER OF SACRAL REGION, STAGE 2: ICD-10-CM

## 2020-02-10 DIAGNOSIS — K40.90 UNILATERAL INGUINAL HERNIA, WITHOUT OBSTRUCTION OR GANGRENE, NOT SPECIFIED AS RECURRENT: ICD-10-CM

## 2020-02-10 DIAGNOSIS — Z00.00 ENCOUNTER FOR GENERAL ADULT MEDICAL EXAMINATION WITHOUT ABNORMAL FINDINGS: ICD-10-CM

## 2020-02-10 LAB
25(OH)D3 SERPL-MCNC: 18 NG/ML
ALBUMIN SERPL ELPH-MCNC: 4 G/DL
ALP BLD-CCNC: 109 U/L
ALT SERPL-CCNC: 14 U/L
ANION GAP SERPL CALC-SCNC: 13 MMOL/L
AST SERPL-CCNC: 11 U/L
BASOPHILS # BLD AUTO: 0.04 K/UL
BASOPHILS NFR BLD AUTO: 0.5 %
BILIRUB SERPL-MCNC: <0.2 MG/DL
BUN SERPL-MCNC: 17 MG/DL
CALCIUM SERPL-MCNC: 9.2 MG/DL
CARBAMAZEPINE SERPL-MCNC: 3.8 UG/ML
CHLORIDE SERPL-SCNC: 103 MMOL/L
CHOLEST SERPL-MCNC: 124 MG/DL
CHOLEST/HDLC SERPL: 2.1 RATIO
CO2 SERPL-SCNC: 25 MMOL/L
CREAT SERPL-MCNC: 0.7 MG/DL
EOSINOPHIL # BLD AUTO: 0.06 K/UL
EOSINOPHIL NFR BLD AUTO: 0.7 %
ESTIMATED AVERAGE GLUCOSE: 100 MG/DL
GLUCOSE SERPL-MCNC: 95 MG/DL
HBA1C MFR BLD HPLC: 5.1 %
HCT VFR BLD CALC: 33 %
HDLC SERPL-MCNC: 59 MG/DL
HGB BLD-MCNC: 10.4 G/DL
IMM GRANULOCYTES NFR BLD AUTO: 0.2 %
LDLC SERPL CALC-MCNC: 68 MG/DL
LYMPHOCYTES # BLD AUTO: 1.99 K/UL
LYMPHOCYTES NFR BLD AUTO: 24.8 %
MAGNESIUM SERPL-MCNC: 2.2 MG/DL
MAN DIFF?: NORMAL
MCHC RBC-ENTMCNC: 30.9 PG
MCHC RBC-ENTMCNC: 31.5 G/DL
MCV RBC AUTO: 97.9 FL
MONOCYTES # BLD AUTO: 0.39 K/UL
MONOCYTES NFR BLD AUTO: 4.9 %
NEUTROPHILS # BLD AUTO: 5.52 K/UL
NEUTROPHILS NFR BLD AUTO: 68.9 %
PLATELET # BLD AUTO: 281 K/UL
POTASSIUM SERPL-SCNC: 4.2 MMOL/L
PROT SERPL-MCNC: 7.2 G/DL
RBC # BLD: 3.37 M/UL
RBC # FLD: 13.7 %
SODIUM SERPL-SCNC: 141 MMOL/L
TRIGL SERPL-MCNC: 38 MG/DL
TSH SERPL-ACNC: 2.17 UIU/ML
VIT B12 SERPL-MCNC: 786 PG/ML
WBC # FLD AUTO: 8.02 K/UL

## 2020-02-25 ENCOUNTER — OUTPATIENT (OUTPATIENT)
Dept: OUTPATIENT SERVICES | Facility: HOSPITAL | Age: 55
LOS: 1 days | Discharge: HOME | End: 2020-02-25

## 2020-02-25 ENCOUNTER — APPOINTMENT (OUTPATIENT)
Dept: NEUROLOGY | Facility: CLINIC | Age: 55
End: 2020-02-25
Payer: MEDICAID

## 2020-02-25 VITALS — SYSTOLIC BLOOD PRESSURE: 155 MMHG | DIASTOLIC BLOOD PRESSURE: 90 MMHG | HEART RATE: 73 BPM

## 2020-02-25 DIAGNOSIS — G81.10 SPASTIC HEMIPLEGIA AFFECTING UNSPECIFIED SIDE: ICD-10-CM

## 2020-02-25 DIAGNOSIS — Z94.7 CORNEAL TRANSPLANT STATUS: Chronic | ICD-10-CM

## 2020-02-25 PROCEDURE — 99215 OFFICE O/P EST HI 40 MIN: CPT | Mod: GC

## 2020-02-25 NOTE — PHYSICAL EXAM
[Person] : oriented to person [Place] : disoriented to place [Span Intact] : the attention span was decreased [Naming Objects] : difficulty naming common objects [Time] : disoriented to time [Repeating Phrases] : difficulty repeating a phrase [Fluency] : fluency not intact [Past History] : inadequate knowledge of personal past history [Current Events] : inadequate knowledge of current events [Comprehension] : comprehension not intact [Cranial Nerves Optic (II)] : visual acuity intact bilaterally,  visual fields full to confrontation, pupils equal round and reactive to light [Vocabulary] : inadequate range of vocabulary [I: Normal Smell] : smell intact bilaterally [Cranial Nerves Oculomotor (III)] : extraocular motion intact [Cranial Nerves Trigeminal (V)] : facial sensation intact symmetrically [Cranial Nerves Vestibulocochlear (VIII)] : hearing was intact bilaterally [Cranial Nerves Glossopharyngeal (IX)] : tongue and palate midline [Cranial Nerves Accessory (XI - Cranial And Spinal)] : head turning and shoulder shrug symmetric [Cranial Nerves Hypoglossal (XII)] : there was no tongue deviation with protrusion [Hemiparesis Of Left Side] : hemiparesis was present on the left [Sensation Tactile Decrease] : light touch was intact [Sensation Pain / Temperature Decrease] : pain and temperature was intact [Sensation Vibration Decrease] : vibration was intact [Proprioception] : proprioception was intact [FreeTextEntry5] : L NLF [3+] : Ankle jerk left 3+ [FreeTextEntry6] : spastic L side [FreeTextEntry1] : wheelchair bound

## 2020-02-25 NOTE — HISTORY OF PRESENT ILLNESS
[FreeTextEntry1] : 55 Y/O M with PMHx of CVA (1980s with residual left sided weakness and aphasia), hx of seizure disorder (no seizure in last 20 yrs), CAD, Right retinal detachment s/p corneal transplant, tardive diskinesia, HTN, HLD, DM type II, sacral excoriations was sent to ER from [facility] clinic yesterday  s/p episode of unresponsiveness witnessed by family and PMD found with severe hypotension with  SBP in 60s.  Was sent to [facility] and evaluated there for seizures.  Workup essentially negative and BP normalized spontaneously.  Since discharge from the hospital, family denies any recurrent episodes of lightheadedness or LOC.  No seizures witnessed since the 1980s.  Has been wheelchair bound with spastic RHP and aphasic since the 1980s.  Has had slight increase in spasticity since discharge and some drooling but appetite remains well.  Was prescribed Home PT in the past but was never set up.

## 2020-02-25 NOTE — ASSESSMENT
[FreeTextEntry1] : 53 Y/O M with PMHx of CVA (1980s with residual left sided weakness and aphasia), hx of seizure disorder (no seizure in last 20 yrs), CAD, Right retinal detachment s/p corneal transplant, tardive diskinesia, HTN, HLD, DM type II w/ recent LOC found with severe hypotension with negative neurologic w/u.  Pt currently w/ spastic LHP.  Will start Baclofen and continue Carbamazepine for now.  \par \par Plan:\par - continue carbamazepine 100 mg/5cc, 10 cc BID\par - continue secondary stroke prevention\par - start Baclofen 10 mg BID\par - PT eval and treat\par - RTC in 4 - 6 months

## 2020-05-18 ENCOUNTER — APPOINTMENT (OUTPATIENT)
Dept: INTERNAL MEDICINE | Facility: CLINIC | Age: 55
End: 2020-05-18
Payer: MEDICAID

## 2020-05-18 ENCOUNTER — OUTPATIENT (OUTPATIENT)
Dept: OUTPATIENT SERVICES | Facility: HOSPITAL | Age: 55
LOS: 1 days | Discharge: HOME | End: 2020-05-18

## 2020-05-18 VITALS
HEIGHT: 73 IN | TEMPERATURE: 96.3 F | HEART RATE: 60 BPM | DIASTOLIC BLOOD PRESSURE: 82 MMHG | SYSTOLIC BLOOD PRESSURE: 126 MMHG

## 2020-05-18 DIAGNOSIS — I25.10 ATHEROSCLEROTIC HEART DISEASE OF NATIVE CORONARY ARTERY W/OUT ANGINA PECTORIS: ICD-10-CM

## 2020-05-18 DIAGNOSIS — F39 UNSPECIFIED MOOD [AFFECTIVE] DISORDER: ICD-10-CM

## 2020-05-18 DIAGNOSIS — Z94.7 CORNEAL TRANSPLANT STATUS: Chronic | ICD-10-CM

## 2020-05-18 DIAGNOSIS — E11.9 TYPE 2 DIABETES MELLITUS W/OUT COMPLICATIONS: ICD-10-CM

## 2020-05-18 PROCEDURE — 99213 OFFICE O/P EST LOW 20 MIN: CPT | Mod: GC

## 2020-05-18 RX ORDER — INSULIN GLARGINE 100 [IU]/ML
100 INJECTION, SOLUTION SUBCUTANEOUS
Qty: 2 | Refills: 5 | Status: DISCONTINUED | COMMUNITY
Start: 2018-10-08 | End: 2020-05-18

## 2020-05-18 NOTE — HISTORY OF PRESENT ILLNESS
[FreeTextEntry1] : follow up [de-identified] : 53 Y/O M with PMHx of CVA (1980s with residual left sided weakness and aphasia), hx of seizure disorder, CAD, Right retinal detachment s/p corneal transplant, tardive dyskinesia, HTN, HLD, DM type II not on meds, sacral excoriations here for follow up.\par Currently no new complaints. History taken from the sister at the bedside.

## 2020-05-18 NOTE — ASSESSMENT
[FreeTextEntry1] : 53 Y/O M with PMHx of CVA (1980s with residual left sided weakness and aphasia), hx of seizure disorder, CAD, Right retinal detachment s/p corneal transplant, tardive dyskinesia, HTN, HLD, DM type II not on meds, sacral excoriations here for follow up.\par Currently no new complaints. History taken from the sister at the bedside.\par \par # Hx of HTN\par - BP today 126/82\par - cont lisinopril 10 mg\par \par # Hx of DM type II\par - last HbA1c was 5.1% in Feb 2020\par - off insulin, f/u A1c\par \par # Hx of CVA with spastic left hemiparesis/ hx of seizure disorder\par - c/w aspirin and statins\par - cont baclofen and carbamazepine\par - f/u neuro\par \par # Hx of CAD\par - c/w aspirin and statins\par \par # Hx of mood disorder/anxiety\par - c/w resperidone 1mg BID, buspirone 5mg qhs, monitor QTc\par - psych consult to adjust meds if w/up negative for unresponsiveness.\par \par # HCM\par - Sister not interested in repeat colonoscopy (last one was poor prep as per sister), will order FIT testing\par - PPSV 23 vaccination administered March 28 2019\par - Follow up in 3 months and PRN. \par - labs before next visit

## 2020-05-18 NOTE — PHYSICAL EXAM
[No Acute Distress] : no acute distress [No Respiratory Distress] : no respiratory distress  [Normal] : normal rate, regular rhythm, normal S1 and S2 and no murmur heard [No Edema] : there was no peripheral edema [de-identified] : spastic LHP, in a wheelchair

## 2020-05-19 LAB
25(OH)D3 SERPL-MCNC: 16 NG/ML
ALBUMIN SERPL ELPH-MCNC: 3.8 G/DL
ALP BLD-CCNC: 124 U/L
ALT SERPL-CCNC: 16 U/L
ANION GAP SERPL CALC-SCNC: 15 MMOL/L
AST SERPL-CCNC: 16 U/L
BASOPHILS # BLD AUTO: 0.04 K/UL
BASOPHILS NFR BLD AUTO: 0.5 %
BILIRUB SERPL-MCNC: 0.2 MG/DL
BUN SERPL-MCNC: 21 MG/DL
CALCIUM SERPL-MCNC: 9 MG/DL
CHLORIDE SERPL-SCNC: 105 MMOL/L
CHOLEST SERPL-MCNC: 113 MG/DL
CHOLEST/HDLC SERPL: 2.1 RATIO
CO2 SERPL-SCNC: 22 MMOL/L
CREAT SERPL-MCNC: 0.5 MG/DL
EOSINOPHIL # BLD AUTO: 0.06 K/UL
EOSINOPHIL NFR BLD AUTO: 0.8 %
GLUCOSE SERPL-MCNC: 76 MG/DL
HCT VFR BLD CALC: 33 %
HDLC SERPL-MCNC: 54 MG/DL
HGB BLD-MCNC: 10.6 G/DL
IMM GRANULOCYTES NFR BLD AUTO: 0.3 %
LDLC SERPL CALC-MCNC: 55 MG/DL
LYMPHOCYTES # BLD AUTO: 1.4 K/UL
LYMPHOCYTES NFR BLD AUTO: 17.6 %
MAN DIFF?: NORMAL
MCHC RBC-ENTMCNC: 31.6 PG
MCHC RBC-ENTMCNC: 32.1 G/DL
MCV RBC AUTO: 98.5 FL
MONOCYTES # BLD AUTO: 0.43 K/UL
MONOCYTES NFR BLD AUTO: 5.4 %
NEUTROPHILS # BLD AUTO: 6.02 K/UL
NEUTROPHILS NFR BLD AUTO: 75.4 %
PLATELET # BLD AUTO: 208 K/UL
POTASSIUM SERPL-SCNC: 4.5 MMOL/L
PROT SERPL-MCNC: 6.8 G/DL
RBC # BLD: 3.35 M/UL
RBC # FLD: 13.1 %
SODIUM SERPL-SCNC: 142 MMOL/L
TRIGL SERPL-MCNC: 45 MG/DL
TSH SERPL-ACNC: 1.5 UIU/ML
WBC # FLD AUTO: 7.97 K/UL

## 2020-05-27 DIAGNOSIS — I25.10 ATHEROSCLEROTIC HEART DISEASE OF NATIVE CORONARY ARTERY WITHOUT ANGINA PECTORIS: ICD-10-CM

## 2020-05-27 DIAGNOSIS — I63.9 CEREBRAL INFARCTION, UNSPECIFIED: ICD-10-CM

## 2020-05-27 DIAGNOSIS — E11.9 TYPE 2 DIABETES MELLITUS WITHOUT COMPLICATIONS: ICD-10-CM

## 2020-05-27 DIAGNOSIS — I10 ESSENTIAL (PRIMARY) HYPERTENSION: ICD-10-CM

## 2020-08-06 ENCOUNTER — OUTPATIENT (OUTPATIENT)
Dept: OUTPATIENT SERVICES | Facility: HOSPITAL | Age: 55
LOS: 1 days | Discharge: HOME | End: 2020-08-06

## 2020-08-06 DIAGNOSIS — R53.81 OTHER MALAISE: ICD-10-CM

## 2020-08-06 DIAGNOSIS — I69.00 UNSPECIFIED SEQUELAE OF NONTRAUMATIC SUBARACHNOID HEMORRHAGE: ICD-10-CM

## 2020-08-06 DIAGNOSIS — R26.89 OTHER ABNORMALITIES OF GAIT AND MOBILITY: ICD-10-CM

## 2020-08-06 DIAGNOSIS — Z94.7 CORNEAL TRANSPLANT STATUS: Chronic | ICD-10-CM

## 2020-08-07 ENCOUNTER — OUTPATIENT (OUTPATIENT)
Dept: OUTPATIENT SERVICES | Facility: HOSPITAL | Age: 55
LOS: 1 days | Discharge: HOME | End: 2020-08-07

## 2020-08-07 DIAGNOSIS — R53.81 OTHER MALAISE: ICD-10-CM

## 2020-08-07 DIAGNOSIS — I69.354 HEMIPLEGIA AND HEMIPARESIS FOLLOWING CEREBRAL INFARCTION AFFECTING LEFT NON-DOMINANT SIDE: ICD-10-CM

## 2020-08-07 DIAGNOSIS — Z94.7 CORNEAL TRANSPLANT STATUS: Chronic | ICD-10-CM

## 2020-08-07 DIAGNOSIS — I69.00 UNSPECIFIED SEQUELAE OF NONTRAUMATIC SUBARACHNOID HEMORRHAGE: ICD-10-CM

## 2020-08-13 ENCOUNTER — OUTPATIENT (OUTPATIENT)
Dept: OUTPATIENT SERVICES | Facility: HOSPITAL | Age: 55
LOS: 1 days | Discharge: HOME | End: 2020-08-13

## 2020-08-13 DIAGNOSIS — Z94.7 CORNEAL TRANSPLANT STATUS: Chronic | ICD-10-CM

## 2020-08-14 DIAGNOSIS — F03.90 UNSPECIFIED DEMENTIA WITHOUT BEHAVIORAL DISTURBANCE: ICD-10-CM

## 2020-08-30 ENCOUNTER — RESULT CHARGE (OUTPATIENT)
Age: 55
End: 2020-08-30

## 2020-08-31 ENCOUNTER — OUTPATIENT (OUTPATIENT)
Dept: OUTPATIENT SERVICES | Facility: HOSPITAL | Age: 55
LOS: 1 days | Discharge: HOME | End: 2020-08-31

## 2020-08-31 ENCOUNTER — APPOINTMENT (OUTPATIENT)
Dept: INTERNAL MEDICINE | Facility: CLINIC | Age: 55
End: 2020-08-31
Payer: MEDICAID

## 2020-08-31 VITALS
DIASTOLIC BLOOD PRESSURE: 82 MMHG | HEIGHT: 73 IN | TEMPERATURE: 96.9 F | HEART RATE: 59 BPM | SYSTOLIC BLOOD PRESSURE: 134 MMHG

## 2020-08-31 DIAGNOSIS — Z94.7 CORNEAL TRANSPLANT STATUS: Chronic | ICD-10-CM

## 2020-08-31 DIAGNOSIS — E46 UNSPECIFIED PROTEIN-CALORIE MALNUTRITION: ICD-10-CM

## 2020-08-31 PROCEDURE — 99213 OFFICE O/P EST LOW 20 MIN: CPT | Mod: GC

## 2020-08-31 RX ORDER — TRIAMCINOLONE ACETONIDE 0.25 MG/G
0.03 OINTMENT TOPICAL TWICE DAILY
Qty: 80 | Refills: 3 | Status: DISCONTINUED | COMMUNITY
Start: 2018-10-30 | End: 2020-08-31

## 2020-08-31 RX ORDER — BLOOD-GLUCOSE METER
KIT MISCELLANEOUS 3 TIMES DAILY
Qty: 1 | Refills: 11 | Status: ACTIVE | COMMUNITY
Start: 2017-12-28 | End: 1900-01-01

## 2020-08-31 RX ORDER — KETOCONAZOLE 20.5 MG/ML
2 SHAMPOO, SUSPENSION TOPICAL
Qty: 1 | Refills: 0 | Status: DISCONTINUED | COMMUNITY
Start: 2019-03-19 | End: 2020-08-31

## 2020-08-31 RX ORDER — SILVER SULFADIAZINE 10 MG/G
1 CREAM TOPICAL TWICE DAILY
Qty: 1 | Refills: 3 | Status: DISCONTINUED | COMMUNITY
Start: 2018-10-05 | End: 2020-08-31

## 2020-08-31 RX ORDER — CLOTRIMAZOLE 10 MG/G
1 CREAM TOPICAL TWICE DAILY
Qty: 1 | Refills: 5 | Status: DISCONTINUED | COMMUNITY
Start: 2018-06-26 | End: 2020-08-31

## 2020-08-31 RX ORDER — LATANOPROST/PF 0.005 %
0.01 DROPS OPHTHALMIC (EYE) DAILY
Qty: 10 | Refills: 6 | Status: ACTIVE | COMMUNITY
Start: 2017-12-20

## 2020-08-31 RX ORDER — PEN NEEDLE, DIABETIC 31 GX5/16"
31G X 8 MM NEEDLE, DISPOSABLE MISCELLANEOUS
Qty: 100 | Refills: 2 | Status: ACTIVE | COMMUNITY
Start: 2017-12-12 | End: 1900-01-01

## 2020-08-31 NOTE — PLAN
[FreeTextEntry1] : # Hx of HTN\par - BP today 134/82\par - cont lisinopril 10 mg\par \par # sacral Ulcer almost completely healed \par - continue  wound care \par \par Recent weight  Loss \par  190 to 156 lbs over past 6 months \par - Decreased PO intake \par - dietitian referral \par \par # Hx of DM type II\par - last HbA1c was 5.1% in Feb 2020\par - off insulin, f/u A1c\par - still checks  FS\par \par # Hx of CVA with spastic left hemiparesis/ hx of seizure disorder\par - last walked  1998\par - c/w aspirin and statins\par - cont baclofen and carbamazepine\par - following  neuro\par \par # Hx of CAD\par - c/w aspirin and statins\par \par # Hx of mood disorder/anxiety\par - c/w resperidone 1mg BID, buspirone 5mg qhs, monitor QTc\par - Still working with O/P Psych  awaiting reply for continue care \par \par # HCM\par - Sister not interested in repeat colonoscopy (last one was poor prep as per sister), encourage patient sister to complete  FIT testing, \par - former smoker quit  7 years ago   >30 pk/years recommend low dose CT chest \par - PPSV 23 vaccination administered March 28 2019\par - Follow up in 3 months and PRN. \par - labs after  next visit.

## 2020-08-31 NOTE — PHYSICAL EXAM
[Normal] : normal rate, regular rhythm, normal S1 and S2 and no murmur heard [de-identified] : Right  damaged  chronic blindness, Left eye  [de-identified] : Chronic Spastic paresis in lower extremities

## 2020-08-31 NOTE — ASSESSMENT
[FreeTextEntry1] : 53 Y/O M with PMHx of CVA (1980s with residual left sided weakness and aphasia), hx of seizure disorder, CAD, Right retinal detachment s/p corneal transplant, tardive dyskinesia, HTN, HLD, DM type II not on meds, sacral Ulcer here for follow up.

## 2020-08-31 NOTE — HISTORY OF PRESENT ILLNESS
[de-identified] : 55 Y/O M with PMHx of CVA (1980s with residual left sided weakness and aphasia), hx of seizure disorder, CAD, Right retinal detachment s/p corneal transplant, tardive dyskinesia, HTN, HLD, DM type II not on meds, sacral excoriations here for follow up.

## 2020-09-03 DIAGNOSIS — E46 UNSPECIFIED PROTEIN-CALORIE MALNUTRITION: ICD-10-CM

## 2020-09-24 ENCOUNTER — NON-APPOINTMENT (OUTPATIENT)
Age: 55
End: 2020-09-24

## 2020-10-20 ENCOUNTER — APPOINTMENT (OUTPATIENT)
Dept: NEUROLOGY | Facility: CLINIC | Age: 55
End: 2020-10-20

## 2020-12-22 ENCOUNTER — NON-APPOINTMENT (OUTPATIENT)
Age: 55
End: 2020-12-22

## 2020-12-23 ENCOUNTER — NON-APPOINTMENT (OUTPATIENT)
Age: 55
End: 2020-12-23

## 2021-01-01 ENCOUNTER — OUTPATIENT (OUTPATIENT)
Dept: OUTPATIENT SERVICES | Facility: HOSPITAL | Age: 56
LOS: 1 days | Discharge: HOME | End: 2021-01-01

## 2021-01-01 ENCOUNTER — NON-APPOINTMENT (OUTPATIENT)
Age: 56
End: 2021-01-01

## 2021-01-01 ENCOUNTER — EMERGENCY (EMERGENCY)
Facility: HOSPITAL | Age: 56
LOS: 0 days | Discharge: HOME | End: 2021-08-30
Attending: STUDENT IN AN ORGANIZED HEALTH CARE EDUCATION/TRAINING PROGRAM | Admitting: STUDENT IN AN ORGANIZED HEALTH CARE EDUCATION/TRAINING PROGRAM
Payer: MEDICAID

## 2021-01-01 ENCOUNTER — APPOINTMENT (OUTPATIENT)
Dept: INTERNAL MEDICINE | Facility: CLINIC | Age: 56
End: 2021-01-01
Payer: SELF-PAY

## 2021-01-01 ENCOUNTER — APPOINTMENT (OUTPATIENT)
Dept: INTERNAL MEDICINE | Facility: CLINIC | Age: 56
End: 2021-01-01
Payer: MEDICAID

## 2021-01-01 ENCOUNTER — APPOINTMENT (OUTPATIENT)
Dept: INTERNAL MEDICINE | Facility: CLINIC | Age: 56
End: 2021-01-01

## 2021-01-01 VITALS
SYSTOLIC BLOOD PRESSURE: 128 MMHG | TEMPERATURE: 97.6 F | OXYGEN SATURATION: 98 % | HEIGHT: 73 IN | BODY MASS INDEX: 19.88 KG/M2 | DIASTOLIC BLOOD PRESSURE: 88 MMHG | HEART RATE: 88 BPM | WEIGHT: 150 LBS

## 2021-01-01 VITALS
HEART RATE: 76 BPM | DIASTOLIC BLOOD PRESSURE: 79 MMHG | OXYGEN SATURATION: 100 % | SYSTOLIC BLOOD PRESSURE: 169 MMHG | RESPIRATION RATE: 18 BRPM | TEMPERATURE: 97 F

## 2021-01-01 VITALS
BODY MASS INDEX: 19.88 KG/M2 | OXYGEN SATURATION: 99 % | TEMPERATURE: 97.6 F | WEIGHT: 150 LBS | SYSTOLIC BLOOD PRESSURE: 128 MMHG | HEIGHT: 73 IN | DIASTOLIC BLOOD PRESSURE: 88 MMHG | HEART RATE: 88 BPM

## 2021-01-01 VITALS
DIASTOLIC BLOOD PRESSURE: 88 MMHG | TEMPERATURE: 97.6 F | HEART RATE: 88 BPM | OXYGEN SATURATION: 98 % | SYSTOLIC BLOOD PRESSURE: 128 MMHG

## 2021-01-01 VITALS
HEIGHT: 68 IN | HEART RATE: 76 BPM | OXYGEN SATURATION: 99 % | RESPIRATION RATE: 18 BRPM | TEMPERATURE: 97 F | DIASTOLIC BLOOD PRESSURE: 88 MMHG | SYSTOLIC BLOOD PRESSURE: 149 MMHG | WEIGHT: 149.91 LBS

## 2021-01-01 VITALS
HEART RATE: 88 BPM | HEIGHT: 73 IN | WEIGHT: 173 LBS | BODY MASS INDEX: 22.93 KG/M2 | SYSTOLIC BLOOD PRESSURE: 128 MMHG | OXYGEN SATURATION: 98 % | DIASTOLIC BLOOD PRESSURE: 88 MMHG | TEMPERATURE: 97.6 F

## 2021-01-01 VITALS
DIASTOLIC BLOOD PRESSURE: 79 MMHG | HEART RATE: 59 BPM | HEIGHT: 73 IN | SYSTOLIC BLOOD PRESSURE: 106 MMHG | OXYGEN SATURATION: 95 %

## 2021-01-01 DIAGNOSIS — E78.5 HYPERLIPIDEMIA, UNSPECIFIED: ICD-10-CM

## 2021-01-01 DIAGNOSIS — I10 ESSENTIAL (PRIMARY) HYPERTENSION: ICD-10-CM

## 2021-01-01 DIAGNOSIS — I25.10 ATHEROSCLEROTIC HEART DISEASE OF NATIVE CORONARY ARTERY WITHOUT ANGINA PECTORIS: ICD-10-CM

## 2021-01-01 DIAGNOSIS — G24.9 DYSTONIA, UNSPECIFIED: ICD-10-CM

## 2021-01-01 DIAGNOSIS — Z79.82 LONG TERM (CURRENT) USE OF ASPIRIN: ICD-10-CM

## 2021-01-01 DIAGNOSIS — R19.7 DIARRHEA, UNSPECIFIED: ICD-10-CM

## 2021-01-01 DIAGNOSIS — E11.9 TYPE 2 DIABETES MELLITUS WITHOUT COMPLICATIONS: ICD-10-CM

## 2021-01-01 DIAGNOSIS — Z94.7 CORNEAL TRANSPLANT STATUS: Chronic | ICD-10-CM

## 2021-01-01 DIAGNOSIS — Z09 ENCOUNTER FOR FOLLOW-UP EXAMINATION AFTER COMPLETED TREATMENT FOR CONDITIONS OTHER THAN MALIGNANT NEOPLASM: ICD-10-CM

## 2021-01-01 DIAGNOSIS — Z79.02 LONG TERM (CURRENT) USE OF ANTITHROMBOTICS/ANTIPLATELETS: ICD-10-CM

## 2021-01-01 DIAGNOSIS — Z86.69 PERSONAL HISTORY OF OTHER DISEASES OF THE NERVOUS SYSTEM AND SENSE ORGANS: ICD-10-CM

## 2021-01-01 DIAGNOSIS — I63.9 CEREBRAL INFARCTION, UNSPECIFIED: ICD-10-CM

## 2021-01-01 DIAGNOSIS — Z87.2 PERSONAL HISTORY OF DISEASES OF THE SKIN AND SUBCUTANEOUS TISSUE: ICD-10-CM

## 2021-01-01 DIAGNOSIS — E78.00 PURE HYPERCHOLESTEROLEMIA, UNSPECIFIED: ICD-10-CM

## 2021-01-01 DIAGNOSIS — Z87.448 PERSONAL HISTORY OF OTHER DISEASES OF URINARY SYSTEM: ICD-10-CM

## 2021-01-01 DIAGNOSIS — Z00.00 ENCOUNTER FOR GENERAL ADULT MEDICAL EXAMINATION W/OUT ABNORMAL FINDINGS: ICD-10-CM

## 2021-01-01 DIAGNOSIS — Z86.73 PERSONAL HISTORY OF TRANSIENT ISCHEMIC ATTACK (TIA), AND CEREBRAL INFARCTION WITHOUT RESIDUAL DEFICITS: ICD-10-CM

## 2021-01-01 DIAGNOSIS — Z86.19 PERSONAL HISTORY OF OTHER INFECTIOUS AND PARASITIC DISEASES: ICD-10-CM

## 2021-01-01 DIAGNOSIS — Z79.899 OTHER LONG TERM (CURRENT) DRUG THERAPY: ICD-10-CM

## 2021-01-01 DIAGNOSIS — K59.00 CONSTIPATION, UNSPECIFIED: ICD-10-CM

## 2021-01-01 DIAGNOSIS — Z20.822 CONTACT WITH AND (SUSPECTED) EXPOSURE TO COVID-19: ICD-10-CM

## 2021-01-01 LAB
25(OH)D3 SERPL-MCNC: 20 NG/ML
ALBUMIN SERPL ELPH-MCNC: 3.7 G/DL
ALBUMIN SERPL ELPH-MCNC: 4.5 G/DL — SIGNIFICANT CHANGE UP (ref 3.5–5.2)
ALP BLD-CCNC: 122 U/L
ALP SERPL-CCNC: 150 U/L — HIGH (ref 30–115)
ALT FLD-CCNC: 18 U/L — SIGNIFICANT CHANGE UP (ref 0–41)
ALT SERPL-CCNC: 20 U/L
ANION GAP SERPL CALC-SCNC: 12 MMOL/L — SIGNIFICANT CHANGE UP (ref 7–14)
ANION GAP SERPL CALC-SCNC: 13 MMOL/L
AST SERPL-CCNC: 16 U/L — SIGNIFICANT CHANGE UP (ref 0–41)
AST SERPL-CCNC: 22 U/L
BASOPHILS # BLD AUTO: 0.04 K/UL — SIGNIFICANT CHANGE UP (ref 0–0.2)
BASOPHILS # BLD AUTO: 0.08 K/UL
BASOPHILS NFR BLD AUTO: 0.4 % — SIGNIFICANT CHANGE UP (ref 0–1)
BASOPHILS NFR BLD AUTO: 0.6 %
BILIRUB SERPL-MCNC: 0.2 MG/DL
BILIRUB SERPL-MCNC: 0.3 MG/DL — SIGNIFICANT CHANGE UP (ref 0.2–1.2)
BUN SERPL-MCNC: 15 MG/DL — SIGNIFICANT CHANGE UP (ref 10–20)
BUN SERPL-MCNC: 18 MG/DL
CALCIUM SERPL-MCNC: 8.7 MG/DL
CALCIUM SERPL-MCNC: 9.8 MG/DL — SIGNIFICANT CHANGE UP (ref 8.5–10.1)
CHLORIDE SERPL-SCNC: 101 MMOL/L
CHLORIDE SERPL-SCNC: 99 MMOL/L — SIGNIFICANT CHANGE UP (ref 98–110)
CHOLEST SERPL-MCNC: 138 MG/DL
CO2 SERPL-SCNC: 22 MMOL/L
CO2 SERPL-SCNC: 27 MMOL/L — SIGNIFICANT CHANGE UP (ref 17–32)
CREAT SERPL-MCNC: 0.5 MG/DL
CREAT SERPL-MCNC: 0.6 MG/DL — LOW (ref 0.7–1.5)
EOSINOPHIL # BLD AUTO: 0.06 K/UL
EOSINOPHIL # BLD AUTO: 0.18 K/UL — SIGNIFICANT CHANGE UP (ref 0–0.7)
EOSINOPHIL NFR BLD AUTO: 0.5 %
EOSINOPHIL NFR BLD AUTO: 1.8 % — SIGNIFICANT CHANGE UP (ref 0–8)
ESTIMATED AVERAGE GLUCOSE: 82 MG/DL
GLUCOSE SERPL-MCNC: 102 MG/DL — HIGH (ref 70–99)
GLUCOSE SERPL-MCNC: 97 MG/DL
HBA1C MFR BLD HPLC: 4.5 %
HCT VFR BLD CALC: 31.7 %
HCT VFR BLD CALC: 40.1 % — LOW (ref 42–52)
HDLC SERPL-MCNC: 45 MG/DL
HGB BLD-MCNC: 13.1 G/DL — LOW (ref 14–18)
HGB BLD-MCNC: 9.9 G/DL
IMM GRANULOCYTES NFR BLD AUTO: 0.3 % — SIGNIFICANT CHANGE UP (ref 0.1–0.3)
IMM GRANULOCYTES NFR BLD AUTO: 0.6 %
LACTATE SERPL-SCNC: 1.3 MMOL/L — SIGNIFICANT CHANGE UP (ref 0.7–2)
LDLC SERPL CALC-MCNC: 78 MG/DL
LIDOCAIN IGE QN: 45 U/L — SIGNIFICANT CHANGE UP (ref 7–60)
LYMPHOCYTES # BLD AUTO: 1.51 K/UL — SIGNIFICANT CHANGE UP (ref 1.2–3.4)
LYMPHOCYTES # BLD AUTO: 1.74 K/UL
LYMPHOCYTES # BLD AUTO: 15.5 % — LOW (ref 20.5–51.1)
LYMPHOCYTES NFR BLD AUTO: 13.8 %
MAGNESIUM SERPL-MCNC: 2.4 MG/DL — SIGNIFICANT CHANGE UP (ref 1.8–2.4)
MAN DIFF?: NORMAL
MCHC RBC-ENTMCNC: 29.7 PG
MCHC RBC-ENTMCNC: 30.7 PG — SIGNIFICANT CHANGE UP (ref 27–31)
MCHC RBC-ENTMCNC: 31.2 G/DL
MCHC RBC-ENTMCNC: 32.7 G/DL — SIGNIFICANT CHANGE UP (ref 32–37)
MCV RBC AUTO: 93.9 FL — SIGNIFICANT CHANGE UP (ref 80–94)
MCV RBC AUTO: 95.2 FL
MONOCYTES # BLD AUTO: 0.51 K/UL — SIGNIFICANT CHANGE UP (ref 0.1–0.6)
MONOCYTES # BLD AUTO: 0.84 K/UL
MONOCYTES NFR BLD AUTO: 5.2 % — SIGNIFICANT CHANGE UP (ref 1.7–9.3)
MONOCYTES NFR BLD AUTO: 6.7 %
NEUTROPHILS # BLD AUTO: 7.49 K/UL — HIGH (ref 1.4–6.5)
NEUTROPHILS # BLD AUTO: 9.81 K/UL
NEUTROPHILS NFR BLD AUTO: 76.8 % — HIGH (ref 42.2–75.2)
NEUTROPHILS NFR BLD AUTO: 77.8 %
NONHDLC SERPL-MCNC: 93 MG/DL
NRBC # BLD: 0 /100 WBCS — SIGNIFICANT CHANGE UP (ref 0–0)
PLATELET # BLD AUTO: 284 K/UL — SIGNIFICANT CHANGE UP (ref 130–400)
PLATELET # BLD AUTO: 554 K/UL
POTASSIUM SERPL-MCNC: 4.2 MMOL/L — SIGNIFICANT CHANGE UP (ref 3.5–5)
POTASSIUM SERPL-SCNC: 4.2 MMOL/L — SIGNIFICANT CHANGE UP (ref 3.5–5)
POTASSIUM SERPL-SCNC: 5.1 MMOL/L
PROT SERPL-MCNC: 7.1 G/DL
PROT SERPL-MCNC: 8.3 G/DL — HIGH (ref 6–8)
RBC # BLD: 3.33 M/UL
RBC # BLD: 4.27 M/UL — LOW (ref 4.7–6.1)
RBC # FLD: 13.5 % — SIGNIFICANT CHANGE UP (ref 11.5–14.5)
RBC # FLD: 16.8 %
SARS-COV-2 RNA SPEC QL NAA+PROBE: SIGNIFICANT CHANGE UP
SODIUM SERPL-SCNC: 136 MMOL/L
SODIUM SERPL-SCNC: 138 MMOL/L — SIGNIFICANT CHANGE UP (ref 135–146)
TRIGL SERPL-MCNC: 64 MG/DL
TSH SERPL-ACNC: 1.86 UIU/ML
WBC # BLD: 9.76 K/UL — SIGNIFICANT CHANGE UP (ref 4.8–10.8)
WBC # FLD AUTO: 12.61 K/UL
WBC # FLD AUTO: 9.76 K/UL — SIGNIFICANT CHANGE UP (ref 4.8–10.8)

## 2021-01-01 PROCEDURE — ZZZZZ: CPT

## 2021-01-01 PROCEDURE — 74177 CT ABD & PELVIS W/CONTRAST: CPT | Mod: 26,MA

## 2021-01-01 PROCEDURE — 99285 EMERGENCY DEPT VISIT HI MDM: CPT

## 2021-01-01 PROCEDURE — 99441: CPT

## 2021-01-01 PROCEDURE — 99053 MED SERV 10PM-8AM 24 HR FAC: CPT

## 2021-01-01 PROCEDURE — 99213 OFFICE O/P EST LOW 20 MIN: CPT | Mod: GC

## 2021-01-01 RX ORDER — ASPIRIN ENTERIC COATED TABLETS 81 MG 81 MG/1
81 TABLET, DELAYED RELEASE ORAL
Qty: 30 | Refills: 5 | Status: ACTIVE | COMMUNITY
Start: 2017-12-27 | End: 1900-01-01

## 2021-01-01 RX ORDER — SIMVASTATIN 10 MG/1
10 TABLET, FILM COATED ORAL
Qty: 30 | Refills: 0 | Status: ACTIVE | COMMUNITY
Start: 2018-03-05 | End: 1900-01-01

## 2021-01-01 RX ORDER — SODIUM CHLORIDE 9 MG/ML
1000 INJECTION INTRAMUSCULAR; INTRAVENOUS; SUBCUTANEOUS ONCE
Refills: 0 | Status: COMPLETED | OUTPATIENT
Start: 2021-01-01 | End: 2021-01-01

## 2021-01-01 RX ORDER — BACLOFEN 10 MG/1
10 TABLET ORAL TWICE DAILY
Qty: 60 | Refills: 5 | Status: ACTIVE | COMMUNITY
Start: 2020-02-25 | End: 1900-01-01

## 2021-01-01 RX ORDER — POLYETHYLENE GLYCOL 3350 17 G/17G
17 POWDER, FOR SOLUTION ORAL
Qty: 119 | Refills: 0
Start: 2021-01-01 | End: 2021-01-01

## 2021-01-01 RX ORDER — BENZONATATE 100 MG/1
100 CAPSULE ORAL TWICE DAILY
Qty: 30 | Refills: 1 | Status: ACTIVE | COMMUNITY
Start: 2021-01-01 | End: 1900-01-01

## 2021-01-01 RX ADMIN — SODIUM CHLORIDE 1000 MILLILITER(S): 9 INJECTION INTRAMUSCULAR; INTRAVENOUS; SUBCUTANEOUS at 07:26

## 2021-01-04 ENCOUNTER — NON-APPOINTMENT (OUTPATIENT)
Age: 56
End: 2021-01-04

## 2021-01-05 ENCOUNTER — APPOINTMENT (OUTPATIENT)
Dept: NEUROLOGY | Facility: CLINIC | Age: 56
End: 2021-01-05
Payer: MEDICAID

## 2021-01-05 ENCOUNTER — TRANSCRIPTION ENCOUNTER (OUTPATIENT)
Age: 56
End: 2021-01-05

## 2021-01-05 ENCOUNTER — OUTPATIENT (OUTPATIENT)
Dept: OUTPATIENT SERVICES | Facility: HOSPITAL | Age: 56
LOS: 1 days | Discharge: HOME | End: 2021-01-05

## 2021-01-05 DIAGNOSIS — Z94.7 CORNEAL TRANSPLANT STATUS: Chronic | ICD-10-CM

## 2021-01-05 PROCEDURE — 99214 OFFICE O/P EST MOD 30 MIN: CPT | Mod: GC

## 2021-01-05 NOTE — REASON FOR VISIT
[Home] : at home, [unfilled] , at the time of the visit. [Medical Office: ([facility])___] : at the medical office located in  [Family Member] : family member [FreeTextEntry3] : Sister

## 2021-01-05 NOTE — HISTORY OF PRESENT ILLNESS
[FreeTextEntry1] : Felipe is being seen for follow up of his stroke.  He has had b/l strokes first in the 80s and was left with aphasia, dysphagia and spastic hemiplegia no left side.  Since last visit his spasticity has gotten worse on left leg and he is now scissoring and his foot is down and in more.  Its hard to move the foot without pain.  He is taking the CBZ liquid with a thickner and no issues.\par She was unable to do the therapy because of covid and noone wanting to come to the house.

## 2021-01-05 NOTE — PHYSICAL EXAM
[FreeTextEntry1] : alert speech difficult to understand and paucity of it\par Left facial\par Right eye blind\par Left spastic hemiplegia with contractures in legs > arms\par

## 2021-01-05 NOTE — DISCUSSION/SUMMARY
[FreeTextEntry1] : Felipe is a 56yo man with b/l strokes leaving him with aphasia and spastic left hemiplegia with some weakness on right side.\par 1. Home PT/OT\par 2. Botox evaluation mostly for LE to prevent further contractures and subsequent complications fomr keeping legs cross \par 3. Continue baclofen, carbamazepine aspirin\par 4. f/u in 6 months

## 2021-01-06 DIAGNOSIS — I63.9 CEREBRAL INFARCTION, UNSPECIFIED: ICD-10-CM

## 2021-01-12 ENCOUNTER — APPOINTMENT (OUTPATIENT)
Dept: OPHTHALMOLOGY | Facility: CLINIC | Age: 56
End: 2021-01-12

## 2021-01-12 ENCOUNTER — EMERGENCY (EMERGENCY)
Facility: HOSPITAL | Age: 56
LOS: 0 days | Discharge: HOME | End: 2021-01-12
Attending: EMERGENCY MEDICINE | Admitting: EMERGENCY MEDICINE
Payer: MEDICAID

## 2021-01-12 VITALS
TEMPERATURE: 98 F | SYSTOLIC BLOOD PRESSURE: 185 MMHG | OXYGEN SATURATION: 100 % | RESPIRATION RATE: 17 BRPM | DIASTOLIC BLOOD PRESSURE: 90 MMHG | HEIGHT: 68 IN | HEART RATE: 77 BPM

## 2021-01-12 DIAGNOSIS — E78.00 PURE HYPERCHOLESTEROLEMIA, UNSPECIFIED: ICD-10-CM

## 2021-01-12 DIAGNOSIS — R55 SYNCOPE AND COLLAPSE: ICD-10-CM

## 2021-01-12 DIAGNOSIS — Z20.822 CONTACT WITH AND (SUSPECTED) EXPOSURE TO COVID-19: ICD-10-CM

## 2021-01-12 DIAGNOSIS — Z94.7 CORNEAL TRANSPLANT STATUS: Chronic | ICD-10-CM

## 2021-01-12 DIAGNOSIS — Z79.899 OTHER LONG TERM (CURRENT) DRUG THERAPY: ICD-10-CM

## 2021-01-12 DIAGNOSIS — I95.9 HYPOTENSION, UNSPECIFIED: ICD-10-CM

## 2021-01-12 DIAGNOSIS — I10 ESSENTIAL (PRIMARY) HYPERTENSION: ICD-10-CM

## 2021-01-12 LAB
ALBUMIN SERPL ELPH-MCNC: 3.7 G/DL — SIGNIFICANT CHANGE UP (ref 3.5–5.2)
ALP SERPL-CCNC: 139 U/L — HIGH (ref 30–115)
ALT FLD-CCNC: 14 U/L — SIGNIFICANT CHANGE UP (ref 0–41)
ANION GAP SERPL CALC-SCNC: 8 MMOL/L — SIGNIFICANT CHANGE UP (ref 7–14)
APPEARANCE UR: CLEAR — SIGNIFICANT CHANGE UP
AST SERPL-CCNC: 14 U/L — SIGNIFICANT CHANGE UP (ref 0–41)
BASE EXCESS BLDV CALC-SCNC: 1.8 MMOL/L — SIGNIFICANT CHANGE UP (ref -2–2)
BASOPHILS # BLD AUTO: 0.04 K/UL — SIGNIFICANT CHANGE UP (ref 0–0.2)
BASOPHILS NFR BLD AUTO: 0.3 % — SIGNIFICANT CHANGE UP (ref 0–1)
BILIRUB SERPL-MCNC: <0.2 MG/DL — SIGNIFICANT CHANGE UP (ref 0.2–1.2)
BILIRUB UR-MCNC: NEGATIVE — SIGNIFICANT CHANGE UP
BUN SERPL-MCNC: 20 MG/DL — SIGNIFICANT CHANGE UP (ref 10–20)
CA-I SERPL-SCNC: 1.17 MMOL/L — SIGNIFICANT CHANGE UP (ref 1.12–1.3)
CALCIUM SERPL-MCNC: 8.8 MG/DL — SIGNIFICANT CHANGE UP (ref 8.5–10.1)
CARBAMAZEPINE SERPL-MCNC: 7.6 UG/ML — SIGNIFICANT CHANGE UP (ref 4–12)
CHLORIDE SERPL-SCNC: 103 MMOL/L — SIGNIFICANT CHANGE UP (ref 98–110)
CO2 SERPL-SCNC: 26 MMOL/L — SIGNIFICANT CHANGE UP (ref 17–32)
COLOR SPEC: COLORLESS — SIGNIFICANT CHANGE UP
CREAT SERPL-MCNC: 0.8 MG/DL — SIGNIFICANT CHANGE UP (ref 0.7–1.5)
DIFF PNL FLD: NEGATIVE — SIGNIFICANT CHANGE UP
EOSINOPHIL # BLD AUTO: 0.07 K/UL — SIGNIFICANT CHANGE UP (ref 0–0.7)
EOSINOPHIL NFR BLD AUTO: 0.5 % — SIGNIFICANT CHANGE UP (ref 0–8)
GAS PNL BLDV: 137 MMOL/L — SIGNIFICANT CHANGE UP (ref 136–145)
GAS PNL BLDV: SIGNIFICANT CHANGE UP
GLUCOSE SERPL-MCNC: 108 MG/DL — HIGH (ref 70–99)
GLUCOSE UR QL: NEGATIVE — SIGNIFICANT CHANGE UP
HCO3 BLDV-SCNC: 28 MMOL/L — SIGNIFICANT CHANGE UP (ref 22–29)
HCT VFR BLD CALC: 34.8 % — LOW (ref 42–52)
HCT VFR BLDA CALC: 36.4 % — SIGNIFICANT CHANGE UP (ref 34–44)
HGB BLD CALC-MCNC: 11.9 G/DL — LOW (ref 14–18)
HGB BLD-MCNC: 11 G/DL — LOW (ref 14–18)
IMM GRANULOCYTES NFR BLD AUTO: 0.6 % — HIGH (ref 0.1–0.3)
KETONES UR-MCNC: NEGATIVE — SIGNIFICANT CHANGE UP
LACTATE BLDV-MCNC: 1.9 MMOL/L — HIGH (ref 0.5–1.6)
LEUKOCYTE ESTERASE UR-ACNC: NEGATIVE — SIGNIFICANT CHANGE UP
LYMPHOCYTES # BLD AUTO: 1.22 K/UL — SIGNIFICANT CHANGE UP (ref 1.2–3.4)
LYMPHOCYTES # BLD AUTO: 8.9 % — LOW (ref 20.5–51.1)
MCHC RBC-ENTMCNC: 30.7 PG — SIGNIFICANT CHANGE UP (ref 27–31)
MCHC RBC-ENTMCNC: 31.6 G/DL — LOW (ref 32–37)
MCV RBC AUTO: 97.2 FL — HIGH (ref 80–94)
MONOCYTES # BLD AUTO: 0.6 K/UL — SIGNIFICANT CHANGE UP (ref 0.1–0.6)
MONOCYTES NFR BLD AUTO: 4.4 % — SIGNIFICANT CHANGE UP (ref 1.7–9.3)
NEUTROPHILS # BLD AUTO: 11.72 K/UL — HIGH (ref 1.4–6.5)
NEUTROPHILS NFR BLD AUTO: 85.3 % — HIGH (ref 42.2–75.2)
NITRITE UR-MCNC: NEGATIVE — SIGNIFICANT CHANGE UP
NRBC # BLD: 0 /100 WBCS — SIGNIFICANT CHANGE UP (ref 0–0)
PCO2 BLDV: 51 MMHG — SIGNIFICANT CHANGE UP (ref 41–51)
PH BLDV: 7.35 — SIGNIFICANT CHANGE UP (ref 7.26–7.43)
PH UR: 7 — SIGNIFICANT CHANGE UP (ref 5–8)
PLATELET # BLD AUTO: 221 K/UL — SIGNIFICANT CHANGE UP (ref 130–400)
PO2 BLDV: 30 MMHG — SIGNIFICANT CHANGE UP (ref 20–40)
POTASSIUM BLDV-SCNC: 4.2 MMOL/L — SIGNIFICANT CHANGE UP (ref 3.3–5.6)
POTASSIUM SERPL-MCNC: 4.2 MMOL/L — SIGNIFICANT CHANGE UP (ref 3.5–5)
POTASSIUM SERPL-SCNC: 4.2 MMOL/L — SIGNIFICANT CHANGE UP (ref 3.5–5)
PROT SERPL-MCNC: 7 G/DL — SIGNIFICANT CHANGE UP (ref 6–8)
PROT UR-MCNC: NEGATIVE — SIGNIFICANT CHANGE UP
RBC # BLD: 3.58 M/UL — LOW (ref 4.7–6.1)
RBC # FLD: 12.7 % — SIGNIFICANT CHANGE UP (ref 11.5–14.5)
SAO2 % BLDV: 44 % — SIGNIFICANT CHANGE UP
SODIUM SERPL-SCNC: 137 MMOL/L — SIGNIFICANT CHANGE UP (ref 135–146)
SP GR SPEC: 1.01 — SIGNIFICANT CHANGE UP (ref 1.01–1.03)
TROPONIN T SERPL-MCNC: <0.01 NG/ML — SIGNIFICANT CHANGE UP
UROBILINOGEN FLD QL: SIGNIFICANT CHANGE UP
WBC # BLD: 13.73 K/UL — HIGH (ref 4.8–10.8)
WBC # FLD AUTO: 13.73 K/UL — HIGH (ref 4.8–10.8)

## 2021-01-12 PROCEDURE — 71045 X-RAY EXAM CHEST 1 VIEW: CPT | Mod: 26

## 2021-01-12 PROCEDURE — 99285 EMERGENCY DEPT VISIT HI MDM: CPT

## 2021-01-12 PROCEDURE — 93010 ELECTROCARDIOGRAM REPORT: CPT

## 2021-01-12 PROCEDURE — 70450 CT HEAD/BRAIN W/O DYE: CPT | Mod: 26

## 2021-01-12 RX ORDER — SODIUM CHLORIDE 9 MG/ML
2000 INJECTION, SOLUTION INTRAVENOUS ONCE
Refills: 0 | Status: COMPLETED | OUTPATIENT
Start: 2021-01-12 | End: 2021-01-12

## 2021-01-12 RX ADMIN — SODIUM CHLORIDE 2000 MILLILITER(S): 9 INJECTION, SOLUTION INTRAVENOUS at 14:49

## 2021-01-12 RX ADMIN — SODIUM CHLORIDE 2000 MILLILITER(S): 9 INJECTION, SOLUTION INTRAVENOUS at 10:45

## 2021-01-12 NOTE — ED PROVIDER NOTE - CARE PROVIDERS DIRECT ADDRESSES
[email or web address]

## 2021-01-12 NOTE — ED PROVIDER NOTE - OBJECTIVE STATEMENT
53yo M with PMH of CVA (1980s with residual left sided weakness and aphasia), hx of seizure disorder (on Tegretol), CAD, right retinal detachment/perforation s/p corneal transplant, tardive dyskinesia, HTN, HLD, DM type II, cachectic/contractures, and sacral excoriations presenting to ED s/p unresponsive episode that occurred at the clinic today, witnessed by patient's sister who is also patient's caretaker. Patient was being seen at the [facility] clinic and was "shaking" per sister, then had low BP of 50s/30s, was given fluid bolus of about 550cc by EMS PTA. , back to baseline in ED. Patient was admitted about 1 yr ago for similar episode, was admitted to [facility], had routine EEG that did not show any sz-like activity. No vomiting, recent sickness, cough, respiratory difficulty, abdominal pain/distention, bloody stool, or hematuria. 55yo M with PMH of CVA (1980s with residual left sided weakness and aphasia), hx of seizure disorder (on Tegretol), CAD, right retinal detachment/perforation s/p corneal transplant, tardive dyskinesia, HTN, HLD, DM type II, cachectic/contractures, and sacral excoriations presenting to ED s/p brief unresponsive episode that occurred at the clinic today, witnessed by patient's sister who is also patient's caretaker. Patient was being seen at the [facility] clinic and was "shaking"/became unconscious per sister, then had low BP of 50s/30s, was given fluid bolus of about 550cc by EMS PTA, then returned to baseline. , back to baseline in ED. Patient was admitted about 1 yr ago for similar episode, was admitted to [facility], had routine EEG that did not show any sz-like activity. No vomiting, recent sickness, cough, respiratory difficulty, abdominal pain/distention, bloody stool, or hematuria.

## 2021-01-12 NOTE — ED ADULT NURSE NOTE - NSIMPLEMENTINTERV_GEN_ALL_ED
Implemented All Fall with Harm Risk Interventions:  [place] to call system. Call bell, personal items and telephone within reach. Instruct patient to call for assistance. Room bathroom lighting operational. Non-slip footwear when patient is off stretcher. Physically safe environment: no spills, clutter or unnecessary equipment. Stretcher in lowest position, wheels locked, appropriate side rails in place. Provide visual cue, wrist band, yellow gown, etc. Monitor gait and stability. Monitor for mental status changes and reorient to person, place, and time. Review medications for side effects contributing to fall risk. Reinforce activity limits and safety measures with patient and family. Provide visual clues: red socks.

## 2021-01-12 NOTE — ED PROVIDER NOTE - NSFOLLOWUPINSTRUCTIONS_ED_ALL_ED_FT
Please follow-up as soon as possible with the cardiologist provided.    Syncope    Syncope is when you temporarily lose consciousness, also called fainting or passing out. It is caused by a sudden decrease in blood flow to the brain. Even though most causes of syncope are not dangerous, syncope can possibly be a sign of a serious medical problem. Signs that you may be about to faint include feeling dizzy, lightheaded, nausea, visual changes, or cold/clammy skin. Do not drive, operate heavy machinery, or play sports until your health care provider says it is okay.    SEEK IMMEDIATE MEDICAL CARE IF YOU HAVE ANY OF THE FOLLOWING SYMPTOMS: severe headache, pain in your chest/abdomen/back, bleeding from your mouth or rectum, palpitations, shortness of breath, pain with breathing, seizure, confusion, or trouble walking..

## 2021-01-12 NOTE — ED PROVIDER NOTE - PROGRESS NOTE DETAILS
ATTENDING NOTE: 54 y/o male with hx of CVA, aphasic, seizure d/o, sustained syncopal episode today, witnessed by sister who is his care taker. Was in his wheelchair waiting to be seen at the medical clinic when he became unresponsive. BP was found to be 50 systolic. No seizure activity. Lasted approximately 30 seconds then subsided. No seizure activity. Had a similar episode once before.  O/E: Constitutional: Non-toxic in appearance. Eyes: right eye blind. Left pupil normal. No pallor, no jaundice. Neck: Neck supple, no meningeal signs. Respiratory: Lungs CTA and equal b/l. Cardio: S1 S2 regular, no murmur. ABD: ABD soft, no tenderness. Extremities: No pedal edema, chronic contractures of left arm and leg. Skin: No skin rash. Neuro: Alert. Aphasic. Left hemiparesis. (chronic).  Imp: Syncope.  A/P: Head CT, EKG, labs, cardiac enzymes. Will re-assess. Gissel- Updated pt's sister, Nicole on all labs/imaging result. Will give cards f/u. Patient has multiple specialists he follows with. He was admitted for similar presentation last yr, had eeg done which did not show any sz activity, and has been hemodynamically stable during ED course. Has excellent care at home with his sister. Sister prefers to take him home. Swabbed for covid. Strict return precaution signs/sxs discussed. Full DC instructions and precaution signs and symptoms discussed. Proper follow up ensured.  Medications administered and prescribed/OTC home meds discussed.  All questions and concerns from patient addressed.  Understanding of instructions verbalized.

## 2021-01-12 NOTE — ED PROVIDER NOTE - PATIENT PORTAL LINK FT
You can access the FollowMyHealth Patient Portal offered by [facility] by registering at the following website: [email or web address]. By joining [facility]’s FollowMyHealth portal, you will also be able to view your health information using other applications (apps) compatible with our system.

## 2021-01-12 NOTE — ED PROVIDER NOTE - CARE PROVIDER_API CALL
Juancho Perla (MD)  Cardiovascular Disease; Internal Medicine; Interventional Cardiology  [address] [place] [address]  Phone: [phone]  Fax: [phone]  Follow Up Time:

## 2021-01-12 NOTE — ED ADULT NURSE NOTE - OBJECTIVE STATEMENT
54 y/o male was found to be unresponsive at opthalmology clinic, hypotensive on arrival at clinic from ems 500 cc bolus given. Patient now back to baseline, bp hypertensive.  patient recently had change in BP medications.

## 2021-01-12 NOTE — ED ADULT NURSE NOTE - CHPI ED NUR SYMPTOMS NEG
no chills/no decreased eating/drinking/no dizziness/no fever/no nausea/no pain/no tingling/no vomiting

## 2021-01-12 NOTE — ED ADULT TRIAGE NOTE - CHIEF COMPLAINT QUOTE
patient was found to be unresponsive at opthalmology clinic, hypotensive on arrival at clinic from ems 500 cc bolus given. Patient now back to baseline, bp hypertensive.  patient recently had change in BP medications.

## 2021-01-12 NOTE — ED PROVIDER NOTE - PHYSICAL EXAMINATION
PHYSICAL EXAM: I have reviewed current vital signs.  GENERAL: NAD.  HEAD:  Normocephalic, atraumatic.  EYES: +Blind in R eye w/ scarring. L eye- spontaneously opens/tracking.  ENT: MMM, no dental/tongue trauma.  NECK: Supple, no rigidity.  CHEST/LUNG: Clear to auscultation bilaterally; no wheezes, rales, or rhonchi.  HEART: Regular rate and rhythm, normal S1 and S2; no murmurs, rubs, or gallops.  ABDOMEN: Soft, nontender, nondistended.  EXTREMITIES:  2+ peripheral pulses; +chronic contractures.  NEUROLOGY: Minimally verbal/chronically debilitated, A&Ox 2 (oriented to person/place, knows name and that he is in the hospital).  SKIN: Warm and dry.

## 2021-01-12 NOTE — ED PROVIDER NOTE - NS ED ROS FT
Limited 2/2 pt's cachexia/minimally verbal, mostly per sister  Constitutional:  No fevers  Eyes:  No eye discharge.  Neck:  No neck stiffness.  Cardiac: No cyanosis  Resp:  No cough or respiratory difficulty.  GI:  No vomiting, diarrhea, or abdominal pain.  Neuro:  +Unresponsive episode.

## 2021-01-13 LAB
CULTURE RESULTS: NO GROWTH — SIGNIFICANT CHANGE UP
SARS-COV-2 RNA SPEC QL NAA+PROBE: SIGNIFICANT CHANGE UP
SPECIMEN SOURCE: SIGNIFICANT CHANGE UP

## 2021-01-14 ENCOUNTER — LABORATORY RESULT (OUTPATIENT)
Age: 56
End: 2021-01-14

## 2021-01-14 ENCOUNTER — NON-APPOINTMENT (OUTPATIENT)
Age: 56
End: 2021-01-14

## 2021-01-14 ENCOUNTER — OUTPATIENT (OUTPATIENT)
Dept: OUTPATIENT SERVICES | Facility: HOSPITAL | Age: 56
LOS: 1 days | Discharge: HOME | End: 2021-01-14

## 2021-01-14 ENCOUNTER — APPOINTMENT (OUTPATIENT)
Dept: NUTRITION | Facility: CLINIC | Age: 56
End: 2021-01-14

## 2021-01-14 DIAGNOSIS — Z94.7 CORNEAL TRANSPLANT STATUS: Chronic | ICD-10-CM

## 2021-01-15 ENCOUNTER — NON-APPOINTMENT (OUTPATIENT)
Age: 56
End: 2021-01-15

## 2021-01-15 RX ORDER — CANE TIPS
EACH MISCELLANEOUS
Qty: 1 | Refills: 0 | Status: ACTIVE | COMMUNITY
Start: 2021-01-14 | End: 1900-01-01

## 2021-01-20 ENCOUNTER — NON-APPOINTMENT (OUTPATIENT)
Age: 56
End: 2021-01-20

## 2021-02-16 ENCOUNTER — APPOINTMENT (OUTPATIENT)
Dept: OPHTHALMOLOGY | Facility: CLINIC | Age: 56
End: 2021-02-16

## 2021-02-16 ENCOUNTER — OUTPATIENT (OUTPATIENT)
Dept: OUTPATIENT SERVICES | Facility: HOSPITAL | Age: 56
LOS: 1 days | Discharge: HOME | End: 2021-02-16
Payer: MEDICAID

## 2021-02-16 DIAGNOSIS — Z94.7 CORNEAL TRANSPLANT STATUS: Chronic | ICD-10-CM

## 2021-02-16 PROCEDURE — 92004 COMPRE OPH EXAM NEW PT 1/>: CPT

## 2021-02-18 ENCOUNTER — APPOINTMENT (OUTPATIENT)
Dept: NUTRITION | Facility: CLINIC | Age: 56
End: 2021-02-18

## 2021-02-18 ENCOUNTER — OUTPATIENT (OUTPATIENT)
Dept: OUTPATIENT SERVICES | Facility: HOSPITAL | Age: 56
LOS: 1 days | Discharge: HOME | End: 2021-02-18

## 2021-02-18 DIAGNOSIS — Z94.7 CORNEAL TRANSPLANT STATUS: Chronic | ICD-10-CM

## 2021-03-18 ENCOUNTER — APPOINTMENT (OUTPATIENT)
Dept: OPHTHALMOLOGY | Facility: CLINIC | Age: 56
End: 2021-03-18

## 2021-03-24 ENCOUNTER — NON-APPOINTMENT (OUTPATIENT)
Age: 56
End: 2021-03-24

## 2021-04-01 ENCOUNTER — APPOINTMENT (OUTPATIENT)
Dept: INTERNAL MEDICINE | Facility: CLINIC | Age: 56
End: 2021-04-01

## 2021-04-02 ENCOUNTER — NON-APPOINTMENT (OUTPATIENT)
Age: 56
End: 2021-04-02

## 2021-04-02 ENCOUNTER — APPOINTMENT (OUTPATIENT)
Dept: NUTRITION | Facility: CLINIC | Age: 56
End: 2021-04-02

## 2021-04-02 ENCOUNTER — OUTPATIENT (OUTPATIENT)
Dept: OUTPATIENT SERVICES | Facility: HOSPITAL | Age: 56
LOS: 1 days | Discharge: HOME | End: 2021-04-02

## 2021-04-02 DIAGNOSIS — Z94.7 CORNEAL TRANSPLANT STATUS: Chronic | ICD-10-CM

## 2021-04-05 ENCOUNTER — APPOINTMENT (OUTPATIENT)
Dept: NEUROLOGY | Facility: CLINIC | Age: 56
End: 2021-04-05

## 2021-04-29 ENCOUNTER — APPOINTMENT (OUTPATIENT)
Dept: NEUROLOGY | Facility: CLINIC | Age: 56
End: 2021-04-29

## 2021-05-03 RX ORDER — RISPERIDONE 1 MG/1
1 TABLET, FILM COATED ORAL
Qty: 60 | Refills: 3 | Status: ACTIVE | COMMUNITY
Start: 2017-12-12 | End: 1900-01-01

## 2021-05-03 RX ORDER — BUSPIRONE HYDROCHLORIDE 5 MG/1
5 TABLET ORAL
Qty: 30 | Refills: 5 | Status: ACTIVE | COMMUNITY
Start: 2017-12-12 | End: 1900-01-01

## 2021-05-07 ENCOUNTER — NON-APPOINTMENT (OUTPATIENT)
Age: 56
End: 2021-05-07

## 2021-05-10 ENCOUNTER — NON-APPOINTMENT (OUTPATIENT)
Age: 56
End: 2021-05-10

## 2021-05-24 ENCOUNTER — RX RENEWAL (OUTPATIENT)
Age: 56
End: 2021-05-24

## 2021-05-26 ENCOUNTER — NON-APPOINTMENT (OUTPATIENT)
Age: 56
End: 2021-05-26

## 2021-06-28 RX ORDER — CARBAMAZEPINE 100 MG/5ML
100 SUSPENSION ORAL TWICE DAILY
Qty: 600 | Refills: 5 | Status: ACTIVE | COMMUNITY
Start: 2020-02-07 | End: 1900-01-01

## 2021-07-08 ENCOUNTER — APPOINTMENT (OUTPATIENT)
Dept: NEUROLOGY | Facility: CLINIC | Age: 56
End: 2021-07-08

## 2021-07-08 ENCOUNTER — NON-APPOINTMENT (OUTPATIENT)
Age: 56
End: 2021-07-08

## 2021-08-30 NOTE — ED PROVIDER NOTE - NSFOLLOWUPINSTRUCTIONS_ED_ALL_ED_FT
Follow up with your primary medical doctor in 1-2 days as well as with the GI doctor    Constipation    WHAT YOU NEED TO KNOW:    Constipation is when you have hard, dry bowel movements, or you go longer than usual between bowel movements.     DISCHARGE INSTRUCTIONS:    Call your doctor if:     You have blood in your bowel movements.      You have a fever and abdominal pain with the constipation.      Your constipation gets worse.       You start to vomit.      You have questions or concerns about your condition or care.    Medicines:     Medicine such as a laxative may help relax and loosen your intestines to help you have a bowel movement. Your provider may recommend you only use laxatives for a short time. Long-term use may make your bowels dependent on the medicine.      Take your medicine as directed. Contact your healthcare provider if you think your medicine is not helping or if you have side effects. Tell him of her if you are allergic to any medicine. Keep a list of the medicines, vitamins, and herbs you take. Include the amounts, and when and why you take them. Bring the list or the pill bottles to follow-up visits. Carry your medicine list with you in case of an emergency.    Relieve constipation:     A suppository may be used to help soften your bowel movements. This may make them easier to pass. A suppository is guided into your rectum through your anus.Suppository for Constipation           An enema is liquid medicine used to clear bowel movement from your rectum. The medicine is put into your rectum through your anus.Enemas         Prevent constipation:     Drink liquids as directed. You may need to drink extra liquids to help soften and move your bowels. Ask how much liquid to drink each day and which liquids are best for you.       Eat high-fiber foods. This may help decrease constipation by adding bulk to your bowel movements. High-fiber foods include fruit, vegetables, whole-grain breads and cereals, and beans. Your healthcare provider or dietitian can help you create a high-fiber meal plan. Your provider may also recommend a fiber supplement if you cannot get enough fiber from food.            Exercise regularly. Regular physical activity can help stimulate your intestines. Walking is a good exercise to prevent or relieve constipation. Ask which exercises are best for you.Walking for Exercise           Schedule a time each day to have a bowel movement. This may help train your body to have regular bowel movements. Bend forward while you are on the toilet to help move the bowel movement out. Sit on the toilet for at least 10 minutes, even if you do not have a bowel movement.       Talk to your healthcare provider about your medicines. Certain medicines, such as opioids, can cause constipation. Your provider may be able to make medicine changes. For example, he or she may change the kind of medicine, or change when you take it.    Follow up with your healthcare provider as directed: Write down your questions so you remember to ask them during your visits.        © Copyright [employer] 2019 All illustrations and images included in CareNotes are the copyrighted property of [employer].A.M., Inc. or [employer].

## 2021-08-30 NOTE — ED PROVIDER NOTE - CLINICAL SUMMARY MEDICAL DECISION MAKING FREE TEXT BOX
56 y.o. M with PMH of CVA with residual aphasia and L sided hemiplegia Sz, HLD< CAD pw with loose stools. Per daughter was concerned with a hernia under umbilicus which self reduced. Noticed the stools were darker brown, no black stools no recent travel. I am unable to obtain a comprehensive history, review of systems, past medical history, and/or physical exam due to constraints imposed by the urgency of the patient's clinical condition and/or mental status.    Aphasic, NAD, non toxic. NCAT PERRLA EOMI neck supple non tender normal wob cta bl rrr abdomen s nt nd no rebound no guarding [id number] neuro non focal (residual weakness), negative rectal exam.    Results reviewed and discussed with pt and printed for patient. Anticipatory guidance given including close outpatient followup. Strict return precautions given. Pt verbalizes understanding of and agrees with plan.

## 2021-08-30 NOTE — ED PROVIDER NOTE - CARE PROVIDER_API CALL
Eliecer Aguirre  GASTROENTEROLOGY  [address] [place] [address]  Phone: [phone]  Fax: [phone]  Follow Up Time: 1-3 Days

## 2021-08-30 NOTE — ED ADULT TRIAGE NOTE - CHIEF COMPLAINT QUOTE
pt's sister sts the patient had an abdominal hernia which she can't feel it anymore and the color of the BM is brown " so I think his hernia bursted". pt denies any abdominal pain, no nausea/vomiting/diarrhea

## 2021-08-30 NOTE — ED PROVIDER NOTE - PATIENT PORTAL LINK FT
You can access the FollowMyHealth Patient Portal offered by [facility] by registering at the following website: [email or web address]. By joining [employer]s FollowMyHealth portal, you will also be able to view your health information using other applications (apps) compatible with our system.

## 2021-08-30 NOTE — ED ADULT NURSE NOTE - DOES PATIENT HAVE ADVANCE DIRECTIVE
Outpatient Physical Therapy Progress Note     Patient: Jaquan Schwab  : 1975    Beginning/End Dates of Reporting Period:  2018 to 2018    Referring Provider: Bharati Lay CNP    Therapy Diagnosis: Low back pain radiating into L LE     Client Self Report: [id number] steps starts to put him over the edge.  Standing and walking more upright is painful but has to do it.  Sleeping on his left side the last three nights.  Reports stairs and driving are still challenging.  Has the neurosurgeon reviewing his health records.    Objective Measurements:  Objective Measure: Nikolay  Details: As of 10/30/18: 3/6 Medium  Objective Measure: CATHERINE  Details: As of 10/30/18: 60%  Objective Measure: Palpation  Details: Increased L side lumbar paraspinal, QL, and hip muscle tone.  Radiating symptoms to L foot.  Objective Measure: Observation  Details: Slight improvement with posture, but still forward bent and L hip pain. Occasional radiating L leg pain.  Difficulty getting out of chair.     Goals:  Goal Identifier Pain   Goal Description Pt will report <2/10 pain after working all day in order to demonstrate decresaed pain and limitations with mobility.   Target Date 18   Date Met   (End of the day 7-8/10, 1.5-2 hour to calm down)   Progress:     Goal Identifier ROM   Goal Description Pt will demonstrate full lumbar and L LE ROM in order to decrease stiffness and neural tension in LE's.    Target Date 18   Date Met   (Not having full extension, still bent forward)   Progress:     Goal Identifier Strength   Goal Description Pt will demonstrate 5/5 B hip strength in order to demonstrate improved gait and stability.    Target Date 18   Date Met   (Leg is giving out at times.)   Progress:     Goal Identifier Ambulation   Goal Description Pt will be able to ambulate for 5minutes continuously with appropriate posture and speed to demonstrate decreased pain and limitation.   Target Date 18   Date Met   09/04/18 (Less then 5 minutes this past week)   Progress:     Progress Toward Goals:   Progress this reporting period: Pt was progressing well towards therapy goals, however about 3 weeks ago he had a flare up of symptoms.  He was unable to stand upright and had increasing L leg pain.  Pt is demonstrating improvement with improved posture, however still forward bent.  Improved walking, however only able to go 0013-2550 steps before symptoms increase.  Improved leg symptoms, able to sleep on L side, but still having leg symptoms.  Pt will benefit from continued skilled PT services to improve strength, mobility, symptoms, and functional abilities.  He is struggling with driving, walking ,sleeping, and standing.    Plan:  Continue therapy per current plan of care.    Discharge:  No    Thank you for your referral.    Lucia Long, PT, DPT  [facility]  [phone]     No

## 2021-08-30 NOTE — ED ADULT NURSE NOTE - OBJECTIVE STATEMENT
pt presents with sister who states pt has hx of abd hernia and fears it "ruptured" due to coffee ground stool. pt cannot verbalize pain level but sister states there have been no s/s pain or discomfort

## 2021-08-30 NOTE — ED PROVIDER NOTE - PHYSICAL EXAMINATION
Physical Exam    Vital Signs: I have reviewed the initial vital signs.  Constitutional: well-nourished, appears stated age, no acute distress  Eyes: Conjunctiva pink, Sclera clear, PERRLA, EOMI.  Cardiovascular: S1 and S2, regular rate, regular rhythm, well-perfused extremities, radial pulses equal and 2+  Respiratory: unlabored respiratory effort, clear to auscultation bilaterally no wheezing, rales and rhonchi  Gastrointestinal: soft, non-tender abdomen, no pulsatile mass, normal bowl sounds  Rectal: no BRBPR, no black stools noted, no hemorrhoids and no fissures   Musculoskeletal: supple neck, no lower extremity edema, no midline tenderness  Integumentary: warm, dry, no rash  Neurologic: awake, alert, cranial nerves II-XII grossly intact, extremities’ motor and sensory functions grossly intact  Psychiatric: appropriate mood, appropriate affect

## 2021-08-30 NOTE — ED PROVIDER NOTE - OBJECTIVE STATEMENT
Pt is a 56 year old male with PMH CVA (with residual aphasia and L sided hemiplegia), Sz, HLD, CAD presents to ED with complaints of loose stools. As per HHA, was concerned because pt has hernia located near umbilicus, which " has disappeared". Pt states since noticing event, she also feels pt has been having increased stools frequency and loose in nature. Denies hematochezia however states stool is "more brown", denies black stools or aide colored stools. No recent travel, unable to obtain ROS from pt, as pt is aphasics

## 2021-09-14 PROBLEM — E78.5 HLD (HYPERLIPIDEMIA): Status: ACTIVE | Noted: 2017-12-07

## 2021-09-14 PROBLEM — I10 HTN (HYPERTENSION): Status: ACTIVE | Noted: 2017-12-07

## 2021-09-14 PROBLEM — I63.9 CVA (CEREBRAL VASCULAR ACCIDENT): Status: ACTIVE | Noted: 2017-12-07

## 2021-09-14 PROBLEM — G24.9 DYSKINESIA: Status: ACTIVE | Noted: 2019-04-30

## 2021-09-14 NOTE — ASSESSMENT
[FreeTextEntry1] : 53 Y/O M with PMHx of CVA (1980s with residual left sided weakness and aphasia), hx of seizure disorder, CAD, Right retinal detachment s/p corneal transplant, tardive dyskinesia, HTN, HLD, DM type II not on meds, sacral excoriations here for follow up.\par \par # Hx of HTN\par - cont lisinopril 10 mg\par \par # Hx of CVA with spastic left hemiparesis/ hx of seizure disorder\par - c/w aspirin and statins\par - cont baclofen and carbamazepine\par - f/u neuro\par \par # Hx of CAD\par - c/w aspirin and statins\par \par # Hx of mood disorder/anxiety\par - c/w resperidone 1mg BID, buspirone 5mg qhs, monitor QTc\par \par # HCM\par - Sister not interested in repeat colonoscopy (last one was poor prep as per sister), will order FIT testing\par - PPSV 23 vaccination administered March 28 2019\par - Flu vaccine administered today\par - Follow up in 6 months and PRN. \par - labs before next visit

## 2021-09-14 NOTE — PHYSICAL EXAM
[No Acute Distress] : no acute distress [Well Nourished] : well nourished [Normal Sclera/Conjunctiva] : normal sclera/conjunctiva [Normal Outer Ear/Nose] : the outer ears and nose were normal in appearance [No JVD] : no jugular venous distention [No Lymphadenopathy] : no lymphadenopathy [No Respiratory Distress] : no respiratory distress  [Normal Rate] : normal rate  [Regular Rhythm] : with a regular rhythm [Normal S1, S2] : normal S1 and S2 [No Carotid Bruits] : no carotid bruits [Soft] : abdomen soft [Non Tender] : non-tender [Normal Anterior Cervical Nodes] : no anterior cervical lymphadenopathy [No CVA Tenderness] : no CVA  tenderness [No Joint Swelling] : no joint swelling [Normal] : normal rate, regular rhythm, normal S1 and S2 and no murmur heard [No Edema] : there was no peripheral edema [de-identified] : Chronic Spastic paresis in lower extremities . \par  Chronic Spastic paresis in lower extremities . \par   [de-identified] : spastic LHP, in a wheelchair

## 2021-09-14 NOTE — HISTORY OF PRESENT ILLNESS
[de-identified] : 56 year old male patient presented for followup.\par \par Known to have a history of ischemic CVA (1980s with residual left sided weakness and aphasia), hx of seizure disorder, CAD, Right retinal detachment s/p corneal transplant, tardive dyskinesia, HTN, HLD, sacral excoriations here for follow up.\par \par Currently no new complaints. History taken from the sister at the bedside.

## 2021-09-14 NOTE — REVIEW OF SYSTEMS
[Negative] : Gastrointestinal [Fever] : no fever [Chills] : no chills [Night Sweats] : no night sweats [Earache] : no earache [Nasal Discharge] : no nasal discharge [Chest Pain] : no chest pain [Palpitations] : no palpitations [Orthopena] : no orthopnea [Shortness Of Breath] : no shortness of breath [Abdominal Pain] : no abdominal pain [Nausea] : no nausea [Vomiting] : no vomiting [Dysuria] : no dysuria [Hematuria] : no hematuria [Joint Pain] : no joint pain [Skin Rash] : no skin rash [Headache] : no headache [de-identified] : spastic LHP

## 2021-11-26 PROBLEM — Z09 HOSPITAL DISCHARGE FOLLOW-UP: Status: ACTIVE | Noted: 2021-01-01

## 2021-11-26 NOTE — HISTORY OF PRESENT ILLNESS
[Home] : at home, [unfilled] , at the time of the visit. [Medical Office: ([facility])___] : at the medical office located in  [Family Member] : family member [FreeTextEntry3] : Nicole (Sister) [de-identified] : Pt's sister called as pt was recently admitted in [facility] for aspiration PNA. Pt was d/c today about 45 minutes ago. He stayed in hospital for 10 days. He is better as per sister but still having some cough. No other complaints as per sister.

## 2021-11-26 NOTE — ASSESSMENT
[FreeTextEntry1] : Pt is better after d/c from hospital. Counseling done about cough. Pt's sister was very upset as EMS took her brother to [facility]. She wanted him to come to [facility]. I advised her to f/up with PCP in office if no improvement in cough in 1 week or any worsening of symptoms. She verbalized understanding. \par Meds reviewed with sister on phone.

## 2021-11-29 PROBLEM — Z00.00 ENCOUNTER FOR PREVENTIVE HEALTH EXAMINATION: Status: ACTIVE | Noted: 2017-11-30

## 2021-11-29 NOTE — PHYSICAL EXAM
[No Acute Distress] : no acute distress [Normal] : soft, non-tender, non-distended, no masses palpated, no HSM and normal bowel sounds [de-identified] : right detachment

## 2021-11-29 NOTE — HISTORY OF PRESENT ILLNESS
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 \par He was hospitalized there for 10 days and completed a course of abx. \par SINCE THE DC HE WAS IMPOVING, however he has been having a cough. \par No fever, no chills, or anyother complains as per his sistern

## 2021-11-29 NOTE — ASSESSMENT
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 left sided weakness and aphasia), hx of seizure disorder, CAD, Right retinal detachment s/p corneal transplant, tardive dyskinesia, HTN, HLD, is here for follow up \par after a a hospitalization.\par \par  #cough \par benzylbenzoate\par cxr in 2 weeks \par s&s\par \par # Hx of HTN\par - cont lisinopril 10 mg\par \par # Hx of CVA with spastic left hemiparesis/ hx of seizure disorder\par - c/w aspirin and statins\par - cont baclofen and carbamazepine\par \par \par # Hx of CAD\par - c/w aspirin and statins\par \par # Hx of mood disorder/anxiety\par - c/w resperidone 1mg BID, buspirone 5mg qhs, monitor QTc\par \par # HCM\par s&s\par f/u in 2 months and prn\par \par

## 2022-01-01 ENCOUNTER — INPATIENT (INPATIENT)
Facility: HOSPITAL | Age: 57
LOS: 20 days | Discharge: HOSPICE | End: 2022-08-12
Attending: HOSPITALIST | Admitting: HOSPITALIST

## 2022-01-01 ENCOUNTER — OUTPATIENT (OUTPATIENT)
Dept: OUTPATIENT SERVICES | Facility: HOSPITAL | Age: 57
LOS: 1 days | Discharge: HOME | End: 2022-01-01

## 2022-01-01 ENCOUNTER — TRANSCRIPTION ENCOUNTER (OUTPATIENT)
Age: 57
End: 2022-01-01

## 2022-01-01 ENCOUNTER — NON-APPOINTMENT (OUTPATIENT)
Age: 57
End: 2022-01-01

## 2022-01-01 ENCOUNTER — APPOINTMENT (OUTPATIENT)
Dept: BURN CARE | Facility: CLINIC | Age: 57
End: 2022-01-01
Payer: MEDICAID

## 2022-01-01 ENCOUNTER — RX RENEWAL (OUTPATIENT)
Age: 57
End: 2022-01-01

## 2022-01-01 ENCOUNTER — APPOINTMENT (OUTPATIENT)
Dept: NEUROLOGY | Facility: CLINIC | Age: 57
End: 2022-01-01

## 2022-01-01 ENCOUNTER — APPOINTMENT (OUTPATIENT)
Dept: BURN CARE | Facility: CLINIC | Age: 57
End: 2022-01-01

## 2022-01-01 ENCOUNTER — APPOINTMENT (OUTPATIENT)
Dept: SURGERY | Facility: CLINIC | Age: 57
End: 2022-01-01

## 2022-01-01 ENCOUNTER — APPOINTMENT (OUTPATIENT)
Dept: INTERNAL MEDICINE | Facility: CLINIC | Age: 57
End: 2022-01-01
Payer: MEDICAID

## 2022-01-01 ENCOUNTER — APPOINTMENT (OUTPATIENT)
Dept: INTERNAL MEDICINE | Facility: CLINIC | Age: 57
End: 2022-01-01

## 2022-01-01 ENCOUNTER — INPATIENT (INPATIENT)
Facility: HOSPITAL | Age: 57
LOS: 10 days | Discharge: HOME | End: 2022-02-08
Attending: INTERNAL MEDICINE | Admitting: INTERNAL MEDICINE
Payer: MEDICAID

## 2022-01-01 ENCOUNTER — INPATIENT (INPATIENT)
Facility: HOSPITAL | Age: 57
LOS: 0 days | End: 2022-08-18
Attending: HOSPITALIST | Admitting: HOSPITALIST

## 2022-01-01 ENCOUNTER — INPATIENT (INPATIENT)
Facility: HOSPITAL | Age: 57
LOS: 6 days | Discharge: HOME | End: 2022-03-21
Attending: INTERNAL MEDICINE | Admitting: INTERNAL MEDICINE
Payer: MEDICAID

## 2022-01-01 VITALS — DIASTOLIC BLOOD PRESSURE: 71 MMHG | HEART RATE: 63 BPM | RESPIRATION RATE: 18 BRPM | SYSTOLIC BLOOD PRESSURE: 136 MMHG

## 2022-01-01 VITALS
HEART RATE: 131 BPM | WEIGHT: 150 LBS | DIASTOLIC BLOOD PRESSURE: 109 MMHG | HEART RATE: 89 BPM | RESPIRATION RATE: 16 BRPM | TEMPERATURE: 98 F | OXYGEN SATURATION: 100 % | BODY MASS INDEX: 19.88 KG/M2 | DIASTOLIC BLOOD PRESSURE: 68 MMHG | SYSTOLIC BLOOD PRESSURE: 102 MMHG | SYSTOLIC BLOOD PRESSURE: 144 MMHG | HEIGHT: 73 IN | HEIGHT: 74 IN | TEMPERATURE: 97.8 F

## 2022-01-01 VITALS
RESPIRATION RATE: 20 BRPM | HEART RATE: 70 BPM | SYSTOLIC BLOOD PRESSURE: 143 MMHG | DIASTOLIC BLOOD PRESSURE: 67 MMHG | OXYGEN SATURATION: 98 %

## 2022-01-01 VITALS
SYSTOLIC BLOOD PRESSURE: 130 MMHG | TEMPERATURE: 98 F | RESPIRATION RATE: 18 BRPM | DIASTOLIC BLOOD PRESSURE: 65 MMHG | HEART RATE: 71 BPM

## 2022-01-01 VITALS
HEART RATE: 78 BPM | OXYGEN SATURATION: 99 % | TEMPERATURE: 96 F | SYSTOLIC BLOOD PRESSURE: 94 MMHG | DIASTOLIC BLOOD PRESSURE: 53 MMHG | RESPIRATION RATE: 16 BRPM

## 2022-01-01 VITALS
HEIGHT: 74 IN | SYSTOLIC BLOOD PRESSURE: 131 MMHG | RESPIRATION RATE: 18 BRPM | DIASTOLIC BLOOD PRESSURE: 88 MMHG | HEART RATE: 84 BPM | OXYGEN SATURATION: 100 % | TEMPERATURE: 98 F

## 2022-01-01 VITALS — OXYGEN SATURATION: 98 %

## 2022-01-01 VITALS — WEIGHT: 151.9 LBS | HEIGHT: 68 IN

## 2022-01-01 DIAGNOSIS — Z94.7 CORNEAL TRANSPLANT STATUS: Chronic | ICD-10-CM

## 2022-01-01 DIAGNOSIS — L89.312 PRESSURE ULCER OF RIGHT BUTTOCK, STAGE 2: ICD-10-CM

## 2022-01-01 DIAGNOSIS — I10 ESSENTIAL (PRIMARY) HYPERTENSION: ICD-10-CM

## 2022-01-01 DIAGNOSIS — E86.0 DEHYDRATION: ICD-10-CM

## 2022-01-01 DIAGNOSIS — A41.9 SEPSIS, UNSPECIFIED ORGANISM: ICD-10-CM

## 2022-01-01 DIAGNOSIS — Z00.00 ENCOUNTER FOR GENERAL ADULT MEDICAL EXAMINATION WITHOUT ABNORMAL FINDINGS: ICD-10-CM

## 2022-01-01 DIAGNOSIS — R06.00 DYSPNEA, UNSPECIFIED: ICD-10-CM

## 2022-01-01 DIAGNOSIS — E87.2 ACIDOSIS: ICD-10-CM

## 2022-01-01 DIAGNOSIS — R09.89 OTHER SPECIFIED SYMPTOMS AND SIGNS INVOLVING THE CIRCULATORY AND RESPIRATORY SYSTEMS: ICD-10-CM

## 2022-01-01 DIAGNOSIS — J18.9 PNEUMONIA, UNSPECIFIED ORGANISM: ICD-10-CM

## 2022-01-01 DIAGNOSIS — I69.354 HEMIPLEGIA AND HEMIPARESIS FOLLOWING CEREBRAL INFARCTION AFFECTING LEFT NON-DOMINANT SIDE: ICD-10-CM

## 2022-01-01 DIAGNOSIS — K56.41 FECAL IMPACTION: ICD-10-CM

## 2022-01-01 DIAGNOSIS — L89.153 PRESSURE ULCER OF SACRAL REGION, STAGE 3: ICD-10-CM

## 2022-01-01 DIAGNOSIS — J69.0 PNEUMONITIS DUE TO INHALATION OF FOOD AND VOMIT: ICD-10-CM

## 2022-01-01 DIAGNOSIS — L89.899 PRESSURE ULCER OF OTHER SITE, UNSPECIFIED STAGE: ICD-10-CM

## 2022-01-01 DIAGNOSIS — R53.2 FUNCTIONAL QUADRIPLEGIA: ICD-10-CM

## 2022-01-01 DIAGNOSIS — Z51.5 ENCOUNTER FOR PALLIATIVE CARE: ICD-10-CM

## 2022-01-01 DIAGNOSIS — R45.1 RESTLESSNESS AND AGITATION: ICD-10-CM

## 2022-01-01 DIAGNOSIS — R56.9 UNSPECIFIED CONVULSIONS: ICD-10-CM

## 2022-01-01 DIAGNOSIS — K59.81 OGILVIE SYNDROME: ICD-10-CM

## 2022-01-01 DIAGNOSIS — E87.6 HYPOKALEMIA: ICD-10-CM

## 2022-01-01 DIAGNOSIS — E11.9 TYPE 2 DIABETES MELLITUS WITHOUT COMPLICATIONS: ICD-10-CM

## 2022-01-01 DIAGNOSIS — J96.10 CHRONIC RESPIRATORY FAILURE, UNSPECIFIED WHETHER WITH HYPOXIA OR HYPERCAPNIA: ICD-10-CM

## 2022-01-01 DIAGNOSIS — E87.1 HYPO-OSMOLALITY AND HYPONATREMIA: ICD-10-CM

## 2022-01-01 DIAGNOSIS — Z02.9 ENCOUNTER FOR ADMINISTRATIVE EXAMINATIONS, UNSPECIFIED: ICD-10-CM

## 2022-01-01 DIAGNOSIS — L89.152 PRESSURE ULCER OF SACRAL REGION, STAGE 2: ICD-10-CM

## 2022-01-01 DIAGNOSIS — R14.0 ABDOMINAL DISTENSION (GASEOUS): ICD-10-CM

## 2022-01-01 DIAGNOSIS — I25.10 ATHEROSCLEROTIC HEART DISEASE OF NATIVE CORONARY ARTERY WITHOUT ANGINA PECTORIS: ICD-10-CM

## 2022-01-01 DIAGNOSIS — T17.928A FOOD IN RESPIRATORY TRACT, PART UNSPECIFIED CAUSING OTHER INJURY, INITIAL ENCOUNTER: ICD-10-CM

## 2022-01-01 DIAGNOSIS — R55 SYNCOPE AND COLLAPSE: ICD-10-CM

## 2022-01-01 DIAGNOSIS — E43 UNSPECIFIED SEVERE PROTEIN-CALORIE MALNUTRITION: ICD-10-CM

## 2022-01-01 DIAGNOSIS — L89.319 PRESSURE ULCER OF RIGHT BUTTOCK, UNSPECIFIED STAGE: ICD-10-CM

## 2022-01-01 DIAGNOSIS — D64.9 ANEMIA, UNSPECIFIED: ICD-10-CM

## 2022-01-01 DIAGNOSIS — Z87.19 PERSONAL HISTORY OF OTHER DISEASES OF THE DIGESTIVE SYSTEM: ICD-10-CM

## 2022-01-01 DIAGNOSIS — K56.7 ILEUS, UNSPECIFIED: ICD-10-CM

## 2022-01-01 DIAGNOSIS — R65.21 SEVERE SEPSIS WITH SEPTIC SHOCK: ICD-10-CM

## 2022-01-01 DIAGNOSIS — Z99.3 DEPENDENCE ON WHEELCHAIR: ICD-10-CM

## 2022-01-01 DIAGNOSIS — G40.909 EPILEPSY, UNSPECIFIED, NOT INTRACTABLE, WITHOUT STATUS EPILEPTICUS: ICD-10-CM

## 2022-01-01 DIAGNOSIS — M86.60 OTHER CHRONIC OSTEOMYELITIS, UNSPECIFIED SITE: ICD-10-CM

## 2022-01-01 DIAGNOSIS — R13.10 DYSPHAGIA, UNSPECIFIED: ICD-10-CM

## 2022-01-01 DIAGNOSIS — J96.11 CHRONIC RESPIRATORY FAILURE WITH HYPOXIA: ICD-10-CM

## 2022-01-01 DIAGNOSIS — G40.409 OTHER GENERALIZED EPILEPSY AND EPILEPTIC SYNDROMES, NOT INTRACTABLE, WITHOUT STATUS EPILEPTICUS: ICD-10-CM

## 2022-01-01 DIAGNOSIS — I69.998 OTHER SEQUELAE FOLLOWING UNSPECIFIED CEREBROVASCULAR DISEASE: ICD-10-CM

## 2022-01-01 DIAGNOSIS — F41.9 ANXIETY DISORDER, UNSPECIFIED: ICD-10-CM

## 2022-01-01 DIAGNOSIS — I69.920 APHASIA FOLLOWING UNSPECIFIED CEREBROVASCULAR DISEASE: ICD-10-CM

## 2022-01-01 DIAGNOSIS — Z94.7 CORNEAL TRANSPLANT STATUS: ICD-10-CM

## 2022-01-01 DIAGNOSIS — K59.89 OTHER SPECIFIED FUNCTIONAL INTESTINAL DISORDERS: ICD-10-CM

## 2022-01-01 DIAGNOSIS — G93.41 METABOLIC ENCEPHALOPATHY: ICD-10-CM

## 2022-01-01 DIAGNOSIS — Z79.82 LONG TERM (CURRENT) USE OF ASPIRIN: ICD-10-CM

## 2022-01-01 DIAGNOSIS — L89.159 PRESSURE ULCER OF SACRAL REGION, UNSPECIFIED STAGE: ICD-10-CM

## 2022-01-01 DIAGNOSIS — J96.00 ACUTE RESPIRATORY FAILURE, UNSPECIFIED WHETHER WITH HYPOXIA OR HYPERCAPNIA: ICD-10-CM

## 2022-01-01 DIAGNOSIS — K59.00 CONSTIPATION, UNSPECIFIED: ICD-10-CM

## 2022-01-01 LAB
-  AMIKACIN: SIGNIFICANT CHANGE UP
-  AMIKACIN: SIGNIFICANT CHANGE UP
-  AMPICILLIN: SIGNIFICANT CHANGE UP
-  AZTREONAM: SIGNIFICANT CHANGE UP
-  AZTREONAM: SIGNIFICANT CHANGE UP
-  CEFEPIME: SIGNIFICANT CHANGE UP
-  CEFEPIME: SIGNIFICANT CHANGE UP
-  CEFTAZIDIME/AVIBACTAM: SIGNIFICANT CHANGE UP
-  CEFTAZIDIME/AVIBACTAM: SIGNIFICANT CHANGE UP
-  CEFTAZIDIME: SIGNIFICANT CHANGE UP
-  CEFTAZIDIME: SIGNIFICANT CHANGE UP
-  CEFTOLOZANE/TAZOBACTAM: SIGNIFICANT CHANGE UP
-  CEFTOLOZANE/TAZOBACTAM: SIGNIFICANT CHANGE UP
-  CIPROFLOXACIN: SIGNIFICANT CHANGE UP
-  CIPROFLOXACIN: SIGNIFICANT CHANGE UP
-  DAPTOMYCIN: SIGNIFICANT CHANGE UP
-  GENTAMICIN: SIGNIFICANT CHANGE UP
-  GENTAMICIN: SIGNIFICANT CHANGE UP
-  IMIPENEM: SIGNIFICANT CHANGE UP
-  IMIPENEM: SIGNIFICANT CHANGE UP
-  LEVOFLOXACIN: SIGNIFICANT CHANGE UP
-  LINEZOLID: SIGNIFICANT CHANGE UP
-  MEROPENEM: SIGNIFICANT CHANGE UP
-  MEROPENEM: SIGNIFICANT CHANGE UP
-  PIPERACILLIN/TAZOBACTAM: SIGNIFICANT CHANGE UP
-  PIPERACILLIN/TAZOBACTAM: SIGNIFICANT CHANGE UP
-  TETRACYCLINE: SIGNIFICANT CHANGE UP
-  TOBRAMYCIN: SIGNIFICANT CHANGE UP
-  TOBRAMYCIN: SIGNIFICANT CHANGE UP
-  VANCOMYCIN: SIGNIFICANT CHANGE UP
A1C WITH ESTIMATED AVERAGE GLUCOSE RESULT: 5.1 % — SIGNIFICANT CHANGE UP (ref 4–5.6)
A1C WITH ESTIMATED AVERAGE GLUCOSE RESULT: 5.5 % — SIGNIFICANT CHANGE UP (ref 4–5.6)
ALBUMIN SERPL ELPH-MCNC: 1.1 G/DL — LOW (ref 3.5–5.2)
ALBUMIN SERPL ELPH-MCNC: 1.1 G/DL — LOW (ref 3.5–5.2)
ALBUMIN SERPL ELPH-MCNC: 1.4 G/DL — LOW (ref 3.5–5.2)
ALBUMIN SERPL ELPH-MCNC: 1.6 G/DL — LOW (ref 3.5–5.2)
ALBUMIN SERPL ELPH-MCNC: 1.8 G/DL — LOW (ref 3.5–5.2)
ALBUMIN SERPL ELPH-MCNC: 2 G/DL — LOW (ref 3.5–5.2)
ALBUMIN SERPL ELPH-MCNC: 2 G/DL — LOW (ref 3.5–5.2)
ALBUMIN SERPL ELPH-MCNC: 2.1 G/DL — LOW (ref 3.5–5.2)
ALBUMIN SERPL ELPH-MCNC: 2.2 G/DL — LOW (ref 3.5–5.2)
ALBUMIN SERPL ELPH-MCNC: 2.3 G/DL — LOW (ref 3.5–5.2)
ALBUMIN SERPL ELPH-MCNC: 2.3 G/DL — LOW (ref 3.5–5.2)
ALBUMIN SERPL ELPH-MCNC: 2.4 G/DL — LOW (ref 3.5–5.2)
ALBUMIN SERPL ELPH-MCNC: 2.7 G/DL — LOW (ref 3.5–5.2)
ALBUMIN SERPL ELPH-MCNC: 2.9 G/DL — LOW (ref 3.5–5.2)
ALBUMIN SERPL ELPH-MCNC: 2.9 G/DL — LOW (ref 3.5–5.2)
ALBUMIN SERPL ELPH-MCNC: 3 G/DL — LOW (ref 3.5–5.2)
ALBUMIN SERPL ELPH-MCNC: 3 G/DL — LOW (ref 3.5–5.2)
ALBUMIN SERPL ELPH-MCNC: 3.1 G/DL — LOW (ref 3.5–5.2)
ALBUMIN SERPL ELPH-MCNC: 3.1 G/DL — LOW (ref 3.5–5.2)
ALBUMIN SERPL ELPH-MCNC: 3.2 G/DL — LOW (ref 3.5–5.2)
ALBUMIN SERPL ELPH-MCNC: 3.2 G/DL — LOW (ref 3.5–5.2)
ALBUMIN SERPL ELPH-MCNC: 3.3 G/DL — LOW (ref 3.5–5.2)
ALBUMIN SERPL ELPH-MCNC: 3.3 G/DL — LOW (ref 3.5–5.2)
ALBUMIN SERPL ELPH-MCNC: 3.4 G/DL — LOW (ref 3.5–5.2)
ALBUMIN SERPL ELPH-MCNC: 3.8 G/DL — SIGNIFICANT CHANGE UP (ref 3.5–5.2)
ALBUMIN SERPL ELPH-MCNC: 3.8 G/DL — SIGNIFICANT CHANGE UP (ref 3.5–5.2)
ALP SERPL-CCNC: 120 U/L — HIGH (ref 30–115)
ALP SERPL-CCNC: 121 U/L — HIGH (ref 30–115)
ALP SERPL-CCNC: 121 U/L — HIGH (ref 30–115)
ALP SERPL-CCNC: 123 U/L — HIGH (ref 30–115)
ALP SERPL-CCNC: 138 U/L — HIGH (ref 30–115)
ALP SERPL-CCNC: 142 U/L — HIGH (ref 30–115)
ALP SERPL-CCNC: 148 U/L — HIGH (ref 30–115)
ALP SERPL-CCNC: 148 U/L — HIGH (ref 30–115)
ALP SERPL-CCNC: 150 U/L — HIGH (ref 30–115)
ALP SERPL-CCNC: 152 U/L — HIGH (ref 30–115)
ALP SERPL-CCNC: 153 U/L — HIGH (ref 30–115)
ALP SERPL-CCNC: 157 U/L — HIGH (ref 30–115)
ALP SERPL-CCNC: 160 U/L — HIGH (ref 30–115)
ALP SERPL-CCNC: 161 U/L — HIGH (ref 30–115)
ALP SERPL-CCNC: 161 U/L — HIGH (ref 30–115)
ALP SERPL-CCNC: 166 U/L — HIGH (ref 30–115)
ALP SERPL-CCNC: 167 U/L — HIGH (ref 30–115)
ALP SERPL-CCNC: 176 U/L — HIGH (ref 30–115)
ALP SERPL-CCNC: 180 U/L — HIGH (ref 30–115)
ALP SERPL-CCNC: 183 U/L — HIGH (ref 30–115)
ALP SERPL-CCNC: 196 U/L — HIGH (ref 30–115)
ALP SERPL-CCNC: 199 U/L — HIGH (ref 30–115)
ALP SERPL-CCNC: 200 U/L — HIGH (ref 30–115)
ALP SERPL-CCNC: 213 U/L — HIGH (ref 30–115)
ALP SERPL-CCNC: 218 U/L — HIGH (ref 30–115)
ALP SERPL-CCNC: 219 U/L — HIGH (ref 30–115)
ALP SERPL-CCNC: 222 U/L — HIGH (ref 30–115)
ALP SERPL-CCNC: 226 U/L — HIGH (ref 30–115)
ALP SERPL-CCNC: 247 U/L — HIGH (ref 30–115)
ALP SERPL-CCNC: 274 U/L — HIGH (ref 30–115)
ALP SERPL-CCNC: 285 U/L — HIGH (ref 30–115)
ALT FLD-CCNC: 10 U/L — SIGNIFICANT CHANGE UP (ref 0–41)
ALT FLD-CCNC: 12 U/L — SIGNIFICANT CHANGE UP (ref 0–41)
ALT FLD-CCNC: 12 U/L — SIGNIFICANT CHANGE UP (ref 0–41)
ALT FLD-CCNC: 14 U/L — SIGNIFICANT CHANGE UP (ref 0–41)
ALT FLD-CCNC: 15 U/L — SIGNIFICANT CHANGE UP (ref 0–41)
ALT FLD-CCNC: 16 U/L — SIGNIFICANT CHANGE UP (ref 0–41)
ALT FLD-CCNC: 17 U/L — SIGNIFICANT CHANGE UP (ref 0–41)
ALT FLD-CCNC: 17 U/L — SIGNIFICANT CHANGE UP (ref 0–41)
ALT FLD-CCNC: 18 U/L — SIGNIFICANT CHANGE UP (ref 0–41)
ALT FLD-CCNC: 20 U/L — SIGNIFICANT CHANGE UP (ref 0–41)
ALT FLD-CCNC: 21 U/L — SIGNIFICANT CHANGE UP (ref 0–41)
ALT FLD-CCNC: 22 U/L — SIGNIFICANT CHANGE UP (ref 0–41)
ALT FLD-CCNC: 22 U/L — SIGNIFICANT CHANGE UP (ref 0–41)
ALT FLD-CCNC: 23 U/L — SIGNIFICANT CHANGE UP (ref 0–41)
ALT FLD-CCNC: 24 U/L — SIGNIFICANT CHANGE UP (ref 0–41)
ALT FLD-CCNC: 25 U/L — SIGNIFICANT CHANGE UP (ref 0–41)
ALT FLD-CCNC: 25 U/L — SIGNIFICANT CHANGE UP (ref 0–41)
ALT FLD-CCNC: 26 U/L — SIGNIFICANT CHANGE UP (ref 0–41)
ALT FLD-CCNC: 26 U/L — SIGNIFICANT CHANGE UP (ref 0–41)
ALT FLD-CCNC: 28 U/L — SIGNIFICANT CHANGE UP (ref 0–41)
ALT FLD-CCNC: 28 U/L — SIGNIFICANT CHANGE UP (ref 0–41)
ALT FLD-CCNC: 35 U/L — SIGNIFICANT CHANGE UP (ref 0–41)
ALT FLD-CCNC: 38 U/L — SIGNIFICANT CHANGE UP (ref 0–41)
ALT FLD-CCNC: 8 U/L — SIGNIFICANT CHANGE UP (ref 0–41)
ALT FLD-CCNC: 9 U/L — SIGNIFICANT CHANGE UP (ref 0–41)
ANION GAP SERPL CALC-SCNC: 10 MMOL/L — SIGNIFICANT CHANGE UP (ref 7–14)
ANION GAP SERPL CALC-SCNC: 11 MMOL/L — SIGNIFICANT CHANGE UP (ref 7–14)
ANION GAP SERPL CALC-SCNC: 12 MMOL/L — SIGNIFICANT CHANGE UP (ref 7–14)
ANION GAP SERPL CALC-SCNC: 13 MMOL/L — SIGNIFICANT CHANGE UP (ref 7–14)
ANION GAP SERPL CALC-SCNC: 14 MMOL/L — SIGNIFICANT CHANGE UP (ref 7–14)
ANION GAP SERPL CALC-SCNC: 15 MMOL/L — HIGH (ref 7–14)
ANION GAP SERPL CALC-SCNC: 15 MMOL/L — HIGH (ref 7–14)
ANION GAP SERPL CALC-SCNC: 16 MMOL/L — HIGH (ref 7–14)
ANION GAP SERPL CALC-SCNC: 7 MMOL/L — SIGNIFICANT CHANGE UP (ref 7–14)
ANION GAP SERPL CALC-SCNC: 8 MMOL/L — SIGNIFICANT CHANGE UP (ref 7–14)
ANION GAP SERPL CALC-SCNC: 9 MMOL/L — SIGNIFICANT CHANGE UP (ref 7–14)
APPEARANCE UR: ABNORMAL
APPEARANCE UR: ABNORMAL
APTT BLD: 32.2 SEC — SIGNIFICANT CHANGE UP (ref 27–39.2)
APTT BLD: 39.1 SEC — SIGNIFICANT CHANGE UP (ref 27–39.2)
APTT BLD: 39.7 SEC — HIGH (ref 27–39.2)
AST SERPL-CCNC: 10 U/L — SIGNIFICANT CHANGE UP (ref 0–41)
AST SERPL-CCNC: 11 U/L — SIGNIFICANT CHANGE UP (ref 0–41)
AST SERPL-CCNC: 12 U/L — SIGNIFICANT CHANGE UP (ref 0–41)
AST SERPL-CCNC: 12 U/L — SIGNIFICANT CHANGE UP (ref 0–41)
AST SERPL-CCNC: 13 U/L — SIGNIFICANT CHANGE UP (ref 0–41)
AST SERPL-CCNC: 14 U/L — SIGNIFICANT CHANGE UP (ref 0–41)
AST SERPL-CCNC: 14 U/L — SIGNIFICANT CHANGE UP (ref 0–41)
AST SERPL-CCNC: 15 U/L — SIGNIFICANT CHANGE UP (ref 0–41)
AST SERPL-CCNC: 16 U/L — SIGNIFICANT CHANGE UP (ref 0–41)
AST SERPL-CCNC: 16 U/L — SIGNIFICANT CHANGE UP (ref 0–41)
AST SERPL-CCNC: 17 U/L — SIGNIFICANT CHANGE UP (ref 0–41)
AST SERPL-CCNC: 18 U/L — SIGNIFICANT CHANGE UP (ref 0–41)
AST SERPL-CCNC: 19 U/L — SIGNIFICANT CHANGE UP (ref 0–41)
AST SERPL-CCNC: 19 U/L — SIGNIFICANT CHANGE UP (ref 0–41)
AST SERPL-CCNC: 21 U/L — SIGNIFICANT CHANGE UP (ref 0–41)
AST SERPL-CCNC: 22 U/L — SIGNIFICANT CHANGE UP (ref 0–41)
AST SERPL-CCNC: 24 U/L — SIGNIFICANT CHANGE UP (ref 0–41)
AST SERPL-CCNC: 43 U/L — HIGH (ref 0–41)
AST SERPL-CCNC: 44 U/L — HIGH (ref 0–41)
AST SERPL-CCNC: 46 U/L — HIGH (ref 0–41)
BACTERIA # UR AUTO: ABNORMAL
BACTERIA # UR AUTO: NEGATIVE — SIGNIFICANT CHANGE UP
BASE EXCESS BLDV CALC-SCNC: -1.5 MMOL/L — SIGNIFICANT CHANGE UP (ref -2–3)
BASE EXCESS BLDV CALC-SCNC: -1.8 MMOL/L — SIGNIFICANT CHANGE UP (ref -2–3)
BASE EXCESS BLDV CALC-SCNC: -15.4 MMOL/L — LOW (ref -2–3)
BASE EXCESS BLDV CALC-SCNC: -2.8 MMOL/L — LOW (ref -2–3)
BASOPHILS # BLD AUTO: 0.02 K/UL — SIGNIFICANT CHANGE UP (ref 0–0.2)
BASOPHILS # BLD AUTO: 0.03 K/UL — SIGNIFICANT CHANGE UP (ref 0–0.2)
BASOPHILS # BLD AUTO: 0.04 K/UL — SIGNIFICANT CHANGE UP (ref 0–0.2)
BASOPHILS # BLD AUTO: 0.05 K/UL — SIGNIFICANT CHANGE UP (ref 0–0.2)
BASOPHILS # BLD AUTO: 0.05 K/UL — SIGNIFICANT CHANGE UP (ref 0–0.2)
BASOPHILS # BLD AUTO: 0.06 K/UL — SIGNIFICANT CHANGE UP (ref 0–0.2)
BASOPHILS # BLD AUTO: 0.07 K/UL — SIGNIFICANT CHANGE UP (ref 0–0.2)
BASOPHILS NFR BLD AUTO: 0.1 % — SIGNIFICANT CHANGE UP (ref 0–1)
BASOPHILS NFR BLD AUTO: 0.1 % — SIGNIFICANT CHANGE UP (ref 0–1)
BASOPHILS NFR BLD AUTO: 0.2 % — SIGNIFICANT CHANGE UP (ref 0–1)
BASOPHILS NFR BLD AUTO: 0.3 % — SIGNIFICANT CHANGE UP (ref 0–1)
BASOPHILS NFR BLD AUTO: 0.4 % — SIGNIFICANT CHANGE UP (ref 0–1)
BASOPHILS NFR BLD AUTO: 0.4 % — SIGNIFICANT CHANGE UP (ref 0–1)
BASOPHILS NFR BLD AUTO: 0.7 % — SIGNIFICANT CHANGE UP (ref 0–1)
BILIRUB SERPL-MCNC: 0.2 MG/DL — SIGNIFICANT CHANGE UP (ref 0.2–1.2)
BILIRUB SERPL-MCNC: 0.3 MG/DL — SIGNIFICANT CHANGE UP (ref 0.2–1.2)
BILIRUB SERPL-MCNC: 0.4 MG/DL — SIGNIFICANT CHANGE UP (ref 0.2–1.2)
BILIRUB SERPL-MCNC: 0.5 MG/DL — SIGNIFICANT CHANGE UP (ref 0.2–1.2)
BILIRUB SERPL-MCNC: 0.6 MG/DL — SIGNIFICANT CHANGE UP (ref 0.2–1.2)
BILIRUB SERPL-MCNC: 0.6 MG/DL — SIGNIFICANT CHANGE UP (ref 0.2–1.2)
BILIRUB SERPL-MCNC: <0.2 MG/DL — SIGNIFICANT CHANGE UP (ref 0.2–1.2)
BILIRUB UR-MCNC: ABNORMAL
BILIRUB UR-MCNC: ABNORMAL
BLD GP AB SCN SERPL QL: SIGNIFICANT CHANGE UP
BLD GP AB SCN SERPL QL: SIGNIFICANT CHANGE UP
BUN SERPL-MCNC: 10 MG/DL — SIGNIFICANT CHANGE UP (ref 10–20)
BUN SERPL-MCNC: 10 MG/DL — SIGNIFICANT CHANGE UP (ref 10–20)
BUN SERPL-MCNC: 102 MG/DL — CRITICAL HIGH (ref 10–20)
BUN SERPL-MCNC: 12 MG/DL — SIGNIFICANT CHANGE UP (ref 10–20)
BUN SERPL-MCNC: 13 MG/DL — SIGNIFICANT CHANGE UP (ref 10–20)
BUN SERPL-MCNC: 14 MG/DL — SIGNIFICANT CHANGE UP (ref 10–20)
BUN SERPL-MCNC: 15 MG/DL — SIGNIFICANT CHANGE UP (ref 10–20)
BUN SERPL-MCNC: 16 MG/DL — SIGNIFICANT CHANGE UP (ref 10–20)
BUN SERPL-MCNC: 22 MG/DL — HIGH (ref 10–20)
BUN SERPL-MCNC: 22 MG/DL — HIGH (ref 10–20)
BUN SERPL-MCNC: 29 MG/DL — HIGH (ref 10–20)
BUN SERPL-MCNC: 31 MG/DL — HIGH (ref 10–20)
BUN SERPL-MCNC: 32 MG/DL — HIGH (ref 10–20)
BUN SERPL-MCNC: 36 MG/DL — HIGH (ref 10–20)
BUN SERPL-MCNC: 37 MG/DL — HIGH (ref 10–20)
BUN SERPL-MCNC: 44 MG/DL — HIGH (ref 10–20)
BUN SERPL-MCNC: 47 MG/DL — HIGH (ref 10–20)
BUN SERPL-MCNC: 5 MG/DL — LOW (ref 10–20)
BUN SERPL-MCNC: 54 MG/DL — HIGH (ref 10–20)
BUN SERPL-MCNC: 7 MG/DL — LOW (ref 10–20)
BUN SERPL-MCNC: 8 MG/DL — LOW (ref 10–20)
BUN SERPL-MCNC: 9 MG/DL — LOW (ref 10–20)
BUN SERPL-MCNC: 98 MG/DL — CRITICAL HIGH (ref 10–20)
CA-I SERPL-SCNC: 0.73 MMOL/L — LOW (ref 1.15–1.33)
CA-I SERPL-SCNC: 1.15 MMOL/L — SIGNIFICANT CHANGE UP (ref 1.15–1.33)
CA-I SERPL-SCNC: 1.23 MMOL/L — SIGNIFICANT CHANGE UP (ref 1.15–1.33)
CA-I SERPL-SCNC: 1.29 MMOL/L — SIGNIFICANT CHANGE UP (ref 1.15–1.33)
CALCIUM SERPL-MCNC: 6.9 MG/DL — LOW (ref 8.5–10.1)
CALCIUM SERPL-MCNC: 6.9 MG/DL — LOW (ref 8.5–10.1)
CALCIUM SERPL-MCNC: 7 MG/DL — LOW (ref 8.5–10.1)
CALCIUM SERPL-MCNC: 7.1 MG/DL — LOW (ref 8.5–10.1)
CALCIUM SERPL-MCNC: 7.2 MG/DL — LOW (ref 8.5–10.1)
CALCIUM SERPL-MCNC: 7.5 MG/DL — LOW (ref 8.5–10.1)
CALCIUM SERPL-MCNC: 7.5 MG/DL — LOW (ref 8.5–10.1)
CALCIUM SERPL-MCNC: 7.6 MG/DL — LOW (ref 8.5–10.1)
CALCIUM SERPL-MCNC: 7.6 MG/DL — LOW (ref 8.5–10.1)
CALCIUM SERPL-MCNC: 7.7 MG/DL — LOW (ref 8.5–10.1)
CALCIUM SERPL-MCNC: 7.8 MG/DL — LOW (ref 8.5–10.1)
CALCIUM SERPL-MCNC: 7.8 MG/DL — LOW (ref 8.5–10.1)
CALCIUM SERPL-MCNC: 7.9 MG/DL — LOW (ref 8.5–10.1)
CALCIUM SERPL-MCNC: 7.9 MG/DL — LOW (ref 8.5–10.1)
CALCIUM SERPL-MCNC: 8 MG/DL — LOW (ref 8.5–10.1)
CALCIUM SERPL-MCNC: 8 MG/DL — LOW (ref 8.5–10.1)
CALCIUM SERPL-MCNC: 8.1 MG/DL — LOW (ref 8.5–10.1)
CALCIUM SERPL-MCNC: 8.1 MG/DL — LOW (ref 8.5–10.1)
CALCIUM SERPL-MCNC: 8.3 MG/DL — LOW (ref 8.5–10.1)
CALCIUM SERPL-MCNC: 8.4 MG/DL — LOW (ref 8.5–10.1)
CALCIUM SERPL-MCNC: 8.5 MG/DL — SIGNIFICANT CHANGE UP (ref 8.5–10.1)
CALCIUM SERPL-MCNC: 8.6 MG/DL — SIGNIFICANT CHANGE UP (ref 8.5–10.1)
CALCIUM SERPL-MCNC: 8.7 MG/DL — SIGNIFICANT CHANGE UP (ref 8.5–10.1)
CALCIUM SERPL-MCNC: 8.7 MG/DL — SIGNIFICANT CHANGE UP (ref 8.5–10.1)
CALCIUM SERPL-MCNC: 8.8 MG/DL — SIGNIFICANT CHANGE UP (ref 8.5–10.1)
CALCIUM SERPL-MCNC: 8.9 MG/DL — SIGNIFICANT CHANGE UP (ref 8.5–10.1)
CALCIUM SERPL-MCNC: 8.9 MG/DL — SIGNIFICANT CHANGE UP (ref 8.5–10.1)
CALCIUM SERPL-MCNC: 9 MG/DL — SIGNIFICANT CHANGE UP (ref 8.5–10.1)
CALCIUM SERPL-MCNC: 9.6 MG/DL — SIGNIFICANT CHANGE UP (ref 8.5–10.1)
CARBAMAZEPINE SERPL-MCNC: 3.2 UG/ML — LOW (ref 4–12)
CARBAMAZEPINE SERPL-MCNC: 6.4 UG/ML — SIGNIFICANT CHANGE UP (ref 4–12)
CARBAMAZEPINE SERPL-MCNC: 7.6 UG/ML — SIGNIFICANT CHANGE UP (ref 4–12)
CARBAMAZEPINE SERPL-MCNC: 7.8 UG/ML — SIGNIFICANT CHANGE UP (ref 4–12)
CHLORIDE SERPL-SCNC: 100 MMOL/L — SIGNIFICANT CHANGE UP (ref 98–110)
CHLORIDE SERPL-SCNC: 101 MMOL/L — SIGNIFICANT CHANGE UP (ref 98–110)
CHLORIDE SERPL-SCNC: 102 MMOL/L — SIGNIFICANT CHANGE UP (ref 98–110)
CHLORIDE SERPL-SCNC: 103 MMOL/L — SIGNIFICANT CHANGE UP (ref 98–110)
CHLORIDE SERPL-SCNC: 103 MMOL/L — SIGNIFICANT CHANGE UP (ref 98–110)
CHLORIDE SERPL-SCNC: 104 MMOL/L — SIGNIFICANT CHANGE UP (ref 98–110)
CHLORIDE SERPL-SCNC: 105 MMOL/L — SIGNIFICANT CHANGE UP (ref 98–110)
CHLORIDE SERPL-SCNC: 106 MMOL/L — SIGNIFICANT CHANGE UP (ref 98–110)
CHLORIDE SERPL-SCNC: 106 MMOL/L — SIGNIFICANT CHANGE UP (ref 98–110)
CHLORIDE SERPL-SCNC: 110 MMOL/L — SIGNIFICANT CHANGE UP (ref 98–110)
CHLORIDE SERPL-SCNC: 111 MMOL/L — HIGH (ref 98–110)
CHLORIDE SERPL-SCNC: 112 MMOL/L — HIGH (ref 98–110)
CHLORIDE SERPL-SCNC: 114 MMOL/L — HIGH (ref 98–110)
CHLORIDE SERPL-SCNC: 118 MMOL/L — HIGH (ref 98–110)
CHLORIDE SERPL-SCNC: 89 MMOL/L — LOW (ref 98–110)
CHLORIDE SERPL-SCNC: 90 MMOL/L — LOW (ref 98–110)
CHLORIDE SERPL-SCNC: 91 MMOL/L — LOW (ref 98–110)
CHLORIDE SERPL-SCNC: 91 MMOL/L — LOW (ref 98–110)
CHLORIDE SERPL-SCNC: 93 MMOL/L — LOW (ref 98–110)
CHLORIDE SERPL-SCNC: 94 MMOL/L — LOW (ref 98–110)
CHLORIDE SERPL-SCNC: 96 MMOL/L — LOW (ref 98–110)
CHLORIDE SERPL-SCNC: 97 MMOL/L — LOW (ref 98–110)
CHLORIDE SERPL-SCNC: 98 MMOL/L — SIGNIFICANT CHANGE UP (ref 98–110)
CHLORIDE UR-SCNC: 40 — SIGNIFICANT CHANGE UP
CHOLEST SERPL-MCNC: 109 MG/DL — SIGNIFICANT CHANGE UP
CK SERPL-CCNC: 118 U/L — SIGNIFICANT CHANGE UP (ref 0–225)
CK SERPL-CCNC: 86 U/L — SIGNIFICANT CHANGE UP (ref 0–225)
CO2 BLDV-SCNC: 22 MMOL/L — SIGNIFICANT CHANGE UP (ref 22–26)
CO2 SERPL-SCNC: 12 MMOL/L — LOW (ref 17–32)
CO2 SERPL-SCNC: 14 MMOL/L — LOW (ref 17–32)
CO2 SERPL-SCNC: 18 MMOL/L — SIGNIFICANT CHANGE UP (ref 17–32)
CO2 SERPL-SCNC: 19 MMOL/L — SIGNIFICANT CHANGE UP (ref 17–32)
CO2 SERPL-SCNC: 19 MMOL/L — SIGNIFICANT CHANGE UP (ref 17–32)
CO2 SERPL-SCNC: 21 MMOL/L — SIGNIFICANT CHANGE UP (ref 17–32)
CO2 SERPL-SCNC: 22 MMOL/L — SIGNIFICANT CHANGE UP (ref 17–32)
CO2 SERPL-SCNC: 23 MMOL/L — SIGNIFICANT CHANGE UP (ref 17–32)
CO2 SERPL-SCNC: 24 MMOL/L — SIGNIFICANT CHANGE UP (ref 17–32)
CO2 SERPL-SCNC: 25 MMOL/L — SIGNIFICANT CHANGE UP (ref 17–32)
CO2 SERPL-SCNC: 26 MMOL/L — SIGNIFICANT CHANGE UP (ref 17–32)
CO2 SERPL-SCNC: 27 MMOL/L — SIGNIFICANT CHANGE UP (ref 17–32)
CO2 SERPL-SCNC: 28 MMOL/L — SIGNIFICANT CHANGE UP (ref 17–32)
COLOR SPEC: ABNORMAL
COLOR SPEC: YELLOW — SIGNIFICANT CHANGE UP
COMMENT - URINE: SIGNIFICANT CHANGE UP
CREAT ?TM UR-MCNC: 75 MG/DL — SIGNIFICANT CHANGE UP
CREAT SERPL-MCNC: 0.5 MG/DL — LOW (ref 0.7–1.5)
CREAT SERPL-MCNC: 0.6 MG/DL — LOW (ref 0.7–1.5)
CREAT SERPL-MCNC: 0.7 MG/DL — SIGNIFICANT CHANGE UP (ref 0.7–1.5)
CREAT SERPL-MCNC: 1.3 MG/DL — SIGNIFICANT CHANGE UP (ref 0.7–1.5)
CREAT SERPL-MCNC: 1.4 MG/DL — SIGNIFICANT CHANGE UP (ref 0.7–1.5)
CREAT SERPL-MCNC: <0.5 MG/DL — LOW (ref 0.7–1.5)
CRP SERPL-MCNC: 117.5 MG/L — HIGH
CRP SERPL-MCNC: 163.1 MG/L — HIGH
CRP SERPL-MCNC: 167.8 MG/L — HIGH
CRP SERPL-MCNC: 98 MG/L — HIGH
CULTURE RESULTS: SIGNIFICANT CHANGE UP
D DIMER BLD IA.RAPID-MCNC: 578 NG/ML DDU — HIGH (ref 0–230)
DIFF PNL FLD: ABNORMAL
DIFF PNL FLD: NEGATIVE — SIGNIFICANT CHANGE UP
EGFR: 113 ML/MIN/1.73M2 — SIGNIFICANT CHANGE UP
EGFR: 120 ML/MIN/1.73M2 — SIGNIFICANT CHANGE UP
EGFR: 128 ML/MIN/1.73M2 — SIGNIFICANT CHANGE UP
EGFR: 139 ML/MIN/1.73M2 — SIGNIFICANT CHANGE UP
EGFR: 140 ML/MIN/1.73M2 — SIGNIFICANT CHANGE UP
EGFR: 157 ML/MIN/1.73M2 — SIGNIFICANT CHANGE UP
EGFR: 59 ML/MIN/1.73M2 — LOW
EGFR: 64 ML/MIN/1.73M2 — SIGNIFICANT CHANGE UP
EOSINOPHIL # BLD AUTO: 0 K/UL — SIGNIFICANT CHANGE UP (ref 0–0.7)
EOSINOPHIL # BLD AUTO: 0 K/UL — SIGNIFICANT CHANGE UP (ref 0–0.7)
EOSINOPHIL # BLD AUTO: 0.01 K/UL — SIGNIFICANT CHANGE UP (ref 0–0.7)
EOSINOPHIL # BLD AUTO: 0.01 K/UL — SIGNIFICANT CHANGE UP (ref 0–0.7)
EOSINOPHIL # BLD AUTO: 0.02 K/UL — SIGNIFICANT CHANGE UP (ref 0–0.7)
EOSINOPHIL # BLD AUTO: 0.02 K/UL — SIGNIFICANT CHANGE UP (ref 0–0.7)
EOSINOPHIL # BLD AUTO: 0.03 K/UL — SIGNIFICANT CHANGE UP (ref 0–0.7)
EOSINOPHIL # BLD AUTO: 0.04 K/UL — SIGNIFICANT CHANGE UP (ref 0–0.7)
EOSINOPHIL # BLD AUTO: 0.05 K/UL — SIGNIFICANT CHANGE UP (ref 0–0.7)
EOSINOPHIL # BLD AUTO: 0.05 K/UL — SIGNIFICANT CHANGE UP (ref 0–0.7)
EOSINOPHIL # BLD AUTO: 0.06 K/UL — SIGNIFICANT CHANGE UP (ref 0–0.7)
EOSINOPHIL # BLD AUTO: 0.07 K/UL — SIGNIFICANT CHANGE UP (ref 0–0.7)
EOSINOPHIL # BLD AUTO: 0.08 K/UL — SIGNIFICANT CHANGE UP (ref 0–0.7)
EOSINOPHIL # BLD AUTO: 0.09 K/UL — SIGNIFICANT CHANGE UP (ref 0–0.7)
EOSINOPHIL # BLD AUTO: 0.1 K/UL — SIGNIFICANT CHANGE UP (ref 0–0.7)
EOSINOPHIL # BLD AUTO: 0.1 K/UL — SIGNIFICANT CHANGE UP (ref 0–0.7)
EOSINOPHIL # BLD AUTO: 0.11 K/UL — SIGNIFICANT CHANGE UP (ref 0–0.7)
EOSINOPHIL # BLD AUTO: 0.14 K/UL — SIGNIFICANT CHANGE UP (ref 0–0.7)
EOSINOPHIL # BLD AUTO: 0.15 K/UL — SIGNIFICANT CHANGE UP (ref 0–0.7)
EOSINOPHIL # BLD AUTO: 0.15 K/UL — SIGNIFICANT CHANGE UP (ref 0–0.7)
EOSINOPHIL NFR BLD AUTO: 0 % — SIGNIFICANT CHANGE UP (ref 0–8)
EOSINOPHIL NFR BLD AUTO: 0 % — SIGNIFICANT CHANGE UP (ref 0–8)
EOSINOPHIL NFR BLD AUTO: 0.1 % — SIGNIFICANT CHANGE UP (ref 0–8)
EOSINOPHIL NFR BLD AUTO: 0.2 % — SIGNIFICANT CHANGE UP (ref 0–8)
EOSINOPHIL NFR BLD AUTO: 0.3 % — SIGNIFICANT CHANGE UP (ref 0–8)
EOSINOPHIL NFR BLD AUTO: 0.4 % — SIGNIFICANT CHANGE UP (ref 0–8)
EOSINOPHIL NFR BLD AUTO: 0.5 % — SIGNIFICANT CHANGE UP (ref 0–8)
EOSINOPHIL NFR BLD AUTO: 0.6 % — SIGNIFICANT CHANGE UP (ref 0–8)
EOSINOPHIL NFR BLD AUTO: 0.7 % — SIGNIFICANT CHANGE UP (ref 0–8)
EOSINOPHIL NFR BLD AUTO: 0.8 % — SIGNIFICANT CHANGE UP (ref 0–8)
EOSINOPHIL NFR BLD AUTO: 0.9 % — SIGNIFICANT CHANGE UP (ref 0–8)
EOSINOPHIL NFR BLD AUTO: 1 % — SIGNIFICANT CHANGE UP (ref 0–8)
EOSINOPHIL NFR BLD AUTO: 1 % — SIGNIFICANT CHANGE UP (ref 0–8)
EOSINOPHIL NFR BLD AUTO: 1.1 % — SIGNIFICANT CHANGE UP (ref 0–8)
EOSINOPHIL NFR BLD AUTO: 1.2 % — SIGNIFICANT CHANGE UP (ref 0–8)
EOSINOPHIL NFR BLD AUTO: 1.3 % — SIGNIFICANT CHANGE UP (ref 0–8)
EPI CELLS # UR: 15 /HPF — HIGH (ref 0–5)
EPI CELLS # UR: 3 /HPF — SIGNIFICANT CHANGE UP (ref 0–5)
ESTIMATED AVERAGE GLUCOSE: 100 MG/DL — SIGNIFICANT CHANGE UP (ref 68–114)
ESTIMATED AVERAGE GLUCOSE: 111 MG/DL — SIGNIFICANT CHANGE UP (ref 68–114)
FERRITIN SERPL-MCNC: 263 NG/ML — SIGNIFICANT CHANGE UP (ref 30–400)
FERRITIN SERPL-MCNC: 845 NG/ML — HIGH (ref 30–400)
FLUAV AG NPH QL: SIGNIFICANT CHANGE UP
FLUBV AG NPH QL: SIGNIFICANT CHANGE UP
FOLATE SERPL-MCNC: 16.3 NG/ML — SIGNIFICANT CHANGE UP
FOLATE SERPL-MCNC: 8.4 NG/ML — SIGNIFICANT CHANGE UP
GAS PNL BLDV: 124 MMOL/L — LOW (ref 136–145)
GAS PNL BLDV: 131 MMOL/L — LOW (ref 136–145)
GAS PNL BLDV: 131 MMOL/L — LOW (ref 136–145)
GAS PNL BLDV: 142 MMOL/L — SIGNIFICANT CHANGE UP (ref 136–145)
GAS PNL BLDV: SIGNIFICANT CHANGE UP
GLUCOSE BLDC GLUCOMTR-MCNC: 100 MG/DL — HIGH (ref 70–99)
GLUCOSE BLDC GLUCOMTR-MCNC: 101 MG/DL — HIGH (ref 70–99)
GLUCOSE BLDC GLUCOMTR-MCNC: 102 MG/DL — HIGH (ref 70–99)
GLUCOSE BLDC GLUCOMTR-MCNC: 102 MG/DL — HIGH (ref 70–99)
GLUCOSE BLDC GLUCOMTR-MCNC: 103 MG/DL — HIGH (ref 70–99)
GLUCOSE BLDC GLUCOMTR-MCNC: 104 MG/DL — HIGH (ref 70–99)
GLUCOSE BLDC GLUCOMTR-MCNC: 105 MG/DL — HIGH (ref 70–99)
GLUCOSE BLDC GLUCOMTR-MCNC: 108 MG/DL — HIGH (ref 70–99)
GLUCOSE BLDC GLUCOMTR-MCNC: 108 MG/DL — HIGH (ref 70–99)
GLUCOSE BLDC GLUCOMTR-MCNC: 109 MG/DL — HIGH (ref 70–99)
GLUCOSE BLDC GLUCOMTR-MCNC: 109 MG/DL — HIGH (ref 70–99)
GLUCOSE BLDC GLUCOMTR-MCNC: 110 MG/DL — HIGH (ref 70–99)
GLUCOSE BLDC GLUCOMTR-MCNC: 112 MG/DL — HIGH (ref 70–99)
GLUCOSE BLDC GLUCOMTR-MCNC: 113 MG/DL — HIGH (ref 70–99)
GLUCOSE BLDC GLUCOMTR-MCNC: 113 MG/DL — HIGH (ref 70–99)
GLUCOSE BLDC GLUCOMTR-MCNC: 114 MG/DL — HIGH (ref 70–99)
GLUCOSE BLDC GLUCOMTR-MCNC: 115 MG/DL — HIGH (ref 70–99)
GLUCOSE BLDC GLUCOMTR-MCNC: 117 MG/DL — HIGH (ref 70–99)
GLUCOSE BLDC GLUCOMTR-MCNC: 120 MG/DL — HIGH (ref 70–99)
GLUCOSE BLDC GLUCOMTR-MCNC: 124 MG/DL — HIGH (ref 70–99)
GLUCOSE BLDC GLUCOMTR-MCNC: 127 MG/DL — HIGH (ref 70–99)
GLUCOSE BLDC GLUCOMTR-MCNC: 133 MG/DL — HIGH (ref 70–99)
GLUCOSE BLDC GLUCOMTR-MCNC: 136 MG/DL — HIGH (ref 70–99)
GLUCOSE BLDC GLUCOMTR-MCNC: 136 MG/DL — HIGH (ref 70–99)
GLUCOSE BLDC GLUCOMTR-MCNC: 142 MG/DL — HIGH (ref 70–99)
GLUCOSE BLDC GLUCOMTR-MCNC: 146 MG/DL — HIGH (ref 70–99)
GLUCOSE BLDC GLUCOMTR-MCNC: 148 MG/DL — HIGH (ref 70–99)
GLUCOSE BLDC GLUCOMTR-MCNC: 148 MG/DL — HIGH (ref 70–99)
GLUCOSE BLDC GLUCOMTR-MCNC: 156 MG/DL — HIGH (ref 70–99)
GLUCOSE BLDC GLUCOMTR-MCNC: 162 MG/DL — HIGH (ref 70–99)
GLUCOSE BLDC GLUCOMTR-MCNC: 165 MG/DL — HIGH (ref 70–99)
GLUCOSE BLDC GLUCOMTR-MCNC: 165 MG/DL — HIGH (ref 70–99)
GLUCOSE BLDC GLUCOMTR-MCNC: 168 MG/DL — HIGH (ref 70–99)
GLUCOSE BLDC GLUCOMTR-MCNC: 188 MG/DL — HIGH (ref 70–99)
GLUCOSE BLDC GLUCOMTR-MCNC: 194 MG/DL — HIGH (ref 70–99)
GLUCOSE BLDC GLUCOMTR-MCNC: 199 MG/DL — HIGH (ref 70–99)
GLUCOSE BLDC GLUCOMTR-MCNC: 207 MG/DL — HIGH (ref 70–99)
GLUCOSE BLDC GLUCOMTR-MCNC: 253 MG/DL — HIGH (ref 70–99)
GLUCOSE BLDC GLUCOMTR-MCNC: 65 MG/DL — LOW (ref 70–99)
GLUCOSE BLDC GLUCOMTR-MCNC: 67 MG/DL — LOW (ref 70–99)
GLUCOSE BLDC GLUCOMTR-MCNC: 70 MG/DL — SIGNIFICANT CHANGE UP (ref 70–99)
GLUCOSE BLDC GLUCOMTR-MCNC: 70 MG/DL — SIGNIFICANT CHANGE UP (ref 70–99)
GLUCOSE BLDC GLUCOMTR-MCNC: 73 MG/DL — SIGNIFICANT CHANGE UP (ref 70–99)
GLUCOSE BLDC GLUCOMTR-MCNC: 75 MG/DL — SIGNIFICANT CHANGE UP (ref 70–99)
GLUCOSE BLDC GLUCOMTR-MCNC: 78 MG/DL — SIGNIFICANT CHANGE UP (ref 70–99)
GLUCOSE BLDC GLUCOMTR-MCNC: 78 MG/DL — SIGNIFICANT CHANGE UP (ref 70–99)
GLUCOSE BLDC GLUCOMTR-MCNC: 79 MG/DL — SIGNIFICANT CHANGE UP (ref 70–99)
GLUCOSE BLDC GLUCOMTR-MCNC: 79 MG/DL — SIGNIFICANT CHANGE UP (ref 70–99)
GLUCOSE BLDC GLUCOMTR-MCNC: 81 MG/DL — SIGNIFICANT CHANGE UP (ref 70–99)
GLUCOSE BLDC GLUCOMTR-MCNC: 82 MG/DL — SIGNIFICANT CHANGE UP (ref 70–99)
GLUCOSE BLDC GLUCOMTR-MCNC: 82 MG/DL — SIGNIFICANT CHANGE UP (ref 70–99)
GLUCOSE BLDC GLUCOMTR-MCNC: 83 MG/DL — SIGNIFICANT CHANGE UP (ref 70–99)
GLUCOSE BLDC GLUCOMTR-MCNC: 84 MG/DL — SIGNIFICANT CHANGE UP (ref 70–99)
GLUCOSE BLDC GLUCOMTR-MCNC: 84 MG/DL — SIGNIFICANT CHANGE UP (ref 70–99)
GLUCOSE BLDC GLUCOMTR-MCNC: 86 MG/DL — SIGNIFICANT CHANGE UP (ref 70–99)
GLUCOSE BLDC GLUCOMTR-MCNC: 86 MG/DL — SIGNIFICANT CHANGE UP (ref 70–99)
GLUCOSE BLDC GLUCOMTR-MCNC: 89 MG/DL — SIGNIFICANT CHANGE UP (ref 70–99)
GLUCOSE BLDC GLUCOMTR-MCNC: 90 MG/DL — SIGNIFICANT CHANGE UP (ref 70–99)
GLUCOSE BLDC GLUCOMTR-MCNC: 91 MG/DL — SIGNIFICANT CHANGE UP (ref 70–99)
GLUCOSE BLDC GLUCOMTR-MCNC: 91 MG/DL — SIGNIFICANT CHANGE UP (ref 70–99)
GLUCOSE BLDC GLUCOMTR-MCNC: 92 MG/DL — SIGNIFICANT CHANGE UP (ref 70–99)
GLUCOSE BLDC GLUCOMTR-MCNC: 93 MG/DL — SIGNIFICANT CHANGE UP (ref 70–99)
GLUCOSE BLDC GLUCOMTR-MCNC: 93 MG/DL — SIGNIFICANT CHANGE UP (ref 70–99)
GLUCOSE BLDC GLUCOMTR-MCNC: 94 MG/DL — SIGNIFICANT CHANGE UP (ref 70–99)
GLUCOSE BLDC GLUCOMTR-MCNC: 94 MG/DL — SIGNIFICANT CHANGE UP (ref 70–99)
GLUCOSE BLDC GLUCOMTR-MCNC: 95 MG/DL — SIGNIFICANT CHANGE UP (ref 70–99)
GLUCOSE BLDC GLUCOMTR-MCNC: 97 MG/DL — SIGNIFICANT CHANGE UP (ref 70–99)
GLUCOSE BLDC GLUCOMTR-MCNC: 98 MG/DL — SIGNIFICANT CHANGE UP (ref 70–99)
GLUCOSE BLDC GLUCOMTR-MCNC: 99 MG/DL — SIGNIFICANT CHANGE UP (ref 70–99)
GLUCOSE SERPL-MCNC: 104 MG/DL — HIGH (ref 70–99)
GLUCOSE SERPL-MCNC: 105 MG/DL — HIGH (ref 70–99)
GLUCOSE SERPL-MCNC: 105 MG/DL — HIGH (ref 70–99)
GLUCOSE SERPL-MCNC: 106 MG/DL — HIGH (ref 70–99)
GLUCOSE SERPL-MCNC: 108 MG/DL — HIGH (ref 70–99)
GLUCOSE SERPL-MCNC: 121 MG/DL — HIGH (ref 70–99)
GLUCOSE SERPL-MCNC: 124 MG/DL — HIGH (ref 70–99)
GLUCOSE SERPL-MCNC: 127 MG/DL — HIGH (ref 70–99)
GLUCOSE SERPL-MCNC: 144 MG/DL — HIGH (ref 70–99)
GLUCOSE SERPL-MCNC: 148 MG/DL — HIGH (ref 70–99)
GLUCOSE SERPL-MCNC: 149 MG/DL — HIGH (ref 70–99)
GLUCOSE SERPL-MCNC: 154 MG/DL — HIGH (ref 70–99)
GLUCOSE SERPL-MCNC: 156 MG/DL — HIGH (ref 70–99)
GLUCOSE SERPL-MCNC: 206 MG/DL — HIGH (ref 70–99)
GLUCOSE SERPL-MCNC: 66 MG/DL — LOW (ref 70–99)
GLUCOSE SERPL-MCNC: 67 MG/DL — LOW (ref 70–99)
GLUCOSE SERPL-MCNC: 74 MG/DL — SIGNIFICANT CHANGE UP (ref 70–99)
GLUCOSE SERPL-MCNC: 75 MG/DL — SIGNIFICANT CHANGE UP (ref 70–99)
GLUCOSE SERPL-MCNC: 75 MG/DL — SIGNIFICANT CHANGE UP (ref 70–99)
GLUCOSE SERPL-MCNC: 76 MG/DL — SIGNIFICANT CHANGE UP (ref 70–99)
GLUCOSE SERPL-MCNC: 80 MG/DL — SIGNIFICANT CHANGE UP (ref 70–99)
GLUCOSE SERPL-MCNC: 81 MG/DL — SIGNIFICANT CHANGE UP (ref 70–99)
GLUCOSE SERPL-MCNC: 84 MG/DL — SIGNIFICANT CHANGE UP (ref 70–99)
GLUCOSE SERPL-MCNC: 85 MG/DL — SIGNIFICANT CHANGE UP (ref 70–99)
GLUCOSE SERPL-MCNC: 85 MG/DL — SIGNIFICANT CHANGE UP (ref 70–99)
GLUCOSE SERPL-MCNC: 86 MG/DL — SIGNIFICANT CHANGE UP (ref 70–99)
GLUCOSE SERPL-MCNC: 89 MG/DL — SIGNIFICANT CHANGE UP (ref 70–99)
GLUCOSE SERPL-MCNC: 89 MG/DL — SIGNIFICANT CHANGE UP (ref 70–99)
GLUCOSE SERPL-MCNC: 91 MG/DL — SIGNIFICANT CHANGE UP (ref 70–99)
GLUCOSE SERPL-MCNC: 91 MG/DL — SIGNIFICANT CHANGE UP (ref 70–99)
GLUCOSE SERPL-MCNC: 92 MG/DL — SIGNIFICANT CHANGE UP (ref 70–99)
GLUCOSE SERPL-MCNC: 93 MG/DL — SIGNIFICANT CHANGE UP (ref 70–99)
GLUCOSE SERPL-MCNC: 96 MG/DL — SIGNIFICANT CHANGE UP (ref 70–99)
GLUCOSE SERPL-MCNC: 96 MG/DL — SIGNIFICANT CHANGE UP (ref 70–99)
GLUCOSE SERPL-MCNC: 97 MG/DL — SIGNIFICANT CHANGE UP (ref 70–99)
GLUCOSE UR QL: NEGATIVE — SIGNIFICANT CHANGE UP
GLUCOSE UR QL: NEGATIVE — SIGNIFICANT CHANGE UP
GRAM STN FLD: SIGNIFICANT CHANGE UP
GRAN CASTS # UR COMP ASSIST: 3 /LPF — HIGH
HCO3 BLDV-SCNC: 10 MMOL/L — CRITICAL LOW (ref 22–29)
HCO3 BLDV-SCNC: 21 MMOL/L — LOW (ref 22–29)
HCO3 BLDV-SCNC: 25 MMOL/L — SIGNIFICANT CHANGE UP (ref 22–29)
HCO3 BLDV-SCNC: 26 MMOL/L — SIGNIFICANT CHANGE UP (ref 22–29)
HCT VFR BLD CALC: 16.7 % — LOW (ref 42–52)
HCT VFR BLD CALC: 21.9 % — LOW (ref 42–52)
HCT VFR BLD CALC: 23.5 % — LOW (ref 42–52)
HCT VFR BLD CALC: 23.9 % — LOW (ref 42–52)
HCT VFR BLD CALC: 24.2 % — LOW (ref 42–52)
HCT VFR BLD CALC: 24.7 % — LOW (ref 42–52)
HCT VFR BLD CALC: 24.7 % — LOW (ref 42–52)
HCT VFR BLD CALC: 24.8 % — LOW (ref 42–52)
HCT VFR BLD CALC: 25 % — LOW (ref 42–52)
HCT VFR BLD CALC: 25.1 % — LOW (ref 42–52)
HCT VFR BLD CALC: 25.3 % — LOW (ref 42–52)
HCT VFR BLD CALC: 25.5 % — LOW (ref 42–52)
HCT VFR BLD CALC: 25.8 % — LOW (ref 42–52)
HCT VFR BLD CALC: 25.9 % — LOW (ref 42–52)
HCT VFR BLD CALC: 25.9 % — LOW (ref 42–52)
HCT VFR BLD CALC: 26 % — LOW (ref 42–52)
HCT VFR BLD CALC: 26.1 % — LOW (ref 42–52)
HCT VFR BLD CALC: 26.2 % — LOW (ref 42–52)
HCT VFR BLD CALC: 26.4 % — LOW (ref 42–52)
HCT VFR BLD CALC: 27.4 % — LOW (ref 42–52)
HCT VFR BLD CALC: 27.7 % — LOW (ref 42–52)
HCT VFR BLD CALC: 28.1 % — LOW (ref 42–52)
HCT VFR BLD CALC: 28.4 % — LOW (ref 42–52)
HCT VFR BLD CALC: 28.5 % — LOW (ref 42–52)
HCT VFR BLD CALC: 28.5 % — LOW (ref 42–52)
HCT VFR BLD CALC: 28.6 % — LOW (ref 42–52)
HCT VFR BLD CALC: 28.7 % — LOW (ref 42–52)
HCT VFR BLD CALC: 28.9 % — LOW (ref 42–52)
HCT VFR BLD CALC: 29.3 % — LOW (ref 42–52)
HCT VFR BLD CALC: 29.4 % — LOW (ref 42–52)
HCT VFR BLD CALC: 29.6 % — LOW (ref 42–52)
HCT VFR BLD CALC: 29.9 % — LOW (ref 42–52)
HCT VFR BLD CALC: 31.3 % — LOW (ref 42–52)
HCT VFR BLD CALC: 31.4 % — LOW (ref 42–52)
HCT VFR BLD CALC: 32.4 % — LOW (ref 42–52)
HCT VFR BLD CALC: 33.4 % — LOW (ref 42–52)
HCT VFR BLD CALC: 35.1 % — LOW (ref 42–52)
HCT VFR BLDA CALC: 15 % — CRITICAL LOW (ref 39–51)
HCT VFR BLDA CALC: 30 % — LOW (ref 39–51)
HCT VFR BLDA CALC: 43 % — SIGNIFICANT CHANGE UP (ref 39–51)
HCT VFR BLDA CALC: 51 % — SIGNIFICANT CHANGE UP (ref 39–51)
HDLC SERPL-MCNC: 71 MG/DL — SIGNIFICANT CHANGE UP
HGB BLD CALC-MCNC: 10.1 G/DL — LOW (ref 12.6–17.4)
HGB BLD CALC-MCNC: 14.2 G/DL — SIGNIFICANT CHANGE UP (ref 12.6–17.4)
HGB BLD CALC-MCNC: 16.9 G/DL — SIGNIFICANT CHANGE UP (ref 12.6–17.4)
HGB BLD CALC-MCNC: 4.9 G/DL — CRITICAL LOW (ref 12.6–17.4)
HGB BLD-MCNC: 10 G/DL — LOW (ref 14–18)
HGB BLD-MCNC: 10.1 G/DL — LOW (ref 14–18)
HGB BLD-MCNC: 10.2 G/DL — LOW (ref 14–18)
HGB BLD-MCNC: 10.3 G/DL — LOW (ref 14–18)
HGB BLD-MCNC: 10.4 G/DL — LOW (ref 14–18)
HGB BLD-MCNC: 10.7 G/DL — LOW (ref 14–18)
HGB BLD-MCNC: 10.9 G/DL — LOW (ref 14–18)
HGB BLD-MCNC: 11.4 G/DL — LOW (ref 14–18)
HGB BLD-MCNC: 5.4 G/DL — CRITICAL LOW (ref 14–18)
HGB BLD-MCNC: 7.1 G/DL — LOW (ref 14–18)
HGB BLD-MCNC: 7.9 G/DL — LOW (ref 14–18)
HGB BLD-MCNC: 8 G/DL — LOW (ref 14–18)
HGB BLD-MCNC: 8.2 G/DL — LOW (ref 14–18)
HGB BLD-MCNC: 8.2 G/DL — LOW (ref 14–18)
HGB BLD-MCNC: 8.3 G/DL — LOW (ref 14–18)
HGB BLD-MCNC: 8.3 G/DL — LOW (ref 14–18)
HGB BLD-MCNC: 8.4 G/DL — LOW (ref 14–18)
HGB BLD-MCNC: 8.5 G/DL — LOW (ref 14–18)
HGB BLD-MCNC: 8.5 G/DL — LOW (ref 14–18)
HGB BLD-MCNC: 8.6 G/DL — LOW (ref 14–18)
HGB BLD-MCNC: 9 G/DL — LOW (ref 14–18)
HGB BLD-MCNC: 9.1 G/DL — LOW (ref 14–18)
HGB BLD-MCNC: 9.1 G/DL — LOW (ref 14–18)
HGB BLD-MCNC: 9.2 G/DL — LOW (ref 14–18)
HGB BLD-MCNC: 9.2 G/DL — LOW (ref 14–18)
HGB BLD-MCNC: 9.3 G/DL — LOW (ref 14–18)
HGB BLD-MCNC: 9.4 G/DL — LOW (ref 14–18)
HGB BLD-MCNC: 9.4 G/DL — LOW (ref 14–18)
HGB BLD-MCNC: 9.7 G/DL — LOW (ref 14–18)
HGB BLD-MCNC: 9.8 G/DL — LOW (ref 14–18)
HYALINE CASTS # UR AUTO: 18 /LPF — HIGH (ref 0–7)
HYALINE CASTS # UR AUTO: 2 /LPF — SIGNIFICANT CHANGE UP (ref 0–7)
IMM GRANULOCYTES NFR BLD AUTO: 0.1 % — SIGNIFICANT CHANGE UP (ref 0.1–0.3)
IMM GRANULOCYTES NFR BLD AUTO: 0.2 % — SIGNIFICANT CHANGE UP (ref 0.1–0.3)
IMM GRANULOCYTES NFR BLD AUTO: 0.2 % — SIGNIFICANT CHANGE UP (ref 0.1–0.3)
IMM GRANULOCYTES NFR BLD AUTO: 0.3 % — SIGNIFICANT CHANGE UP (ref 0.1–0.3)
IMM GRANULOCYTES NFR BLD AUTO: 0.4 % — HIGH (ref 0.1–0.3)
IMM GRANULOCYTES NFR BLD AUTO: 0.5 % — HIGH (ref 0.1–0.3)
IMM GRANULOCYTES NFR BLD AUTO: 0.6 % — HIGH (ref 0.1–0.3)
IMM GRANULOCYTES NFR BLD AUTO: 0.7 % — HIGH (ref 0.1–0.3)
IMM GRANULOCYTES NFR BLD AUTO: 0.8 % — HIGH (ref 0.1–0.3)
IMM GRANULOCYTES NFR BLD AUTO: 1 % — HIGH (ref 0.1–0.3)
INR BLD: 1.22 RATIO — SIGNIFICANT CHANGE UP (ref 0.65–1.3)
INR BLD: 1.25 RATIO — SIGNIFICANT CHANGE UP (ref 0.65–1.3)
INR BLD: 1.3 RATIO — SIGNIFICANT CHANGE UP (ref 0.65–1.3)
KETONES UR-MCNC: NEGATIVE — SIGNIFICANT CHANGE UP
KETONES UR-MCNC: SIGNIFICANT CHANGE UP
LACTATE BLDV-MCNC: 1.5 MMOL/L — SIGNIFICANT CHANGE UP (ref 0.5–2)
LACTATE BLDV-MCNC: 2.6 MMOL/L — HIGH (ref 0.5–2)
LACTATE BLDV-MCNC: 3.3 MMOL/L — HIGH (ref 0.5–2)
LACTATE BLDV-MCNC: 5.9 MMOL/L — CRITICAL HIGH (ref 0.5–2)
LACTATE SERPL-SCNC: 1.4 MMOL/L — SIGNIFICANT CHANGE UP (ref 0.7–2)
LACTATE SERPL-SCNC: 2.3 MMOL/L — HIGH (ref 0.7–2)
LACTATE SERPL-SCNC: 3.6 MMOL/L — HIGH (ref 0.7–2)
LACTATE SERPL-SCNC: 4 MMOL/L — CRITICAL HIGH (ref 0.7–2)
LEUKOCYTE ESTERASE UR-ACNC: ABNORMAL
LEUKOCYTE ESTERASE UR-ACNC: NEGATIVE — SIGNIFICANT CHANGE UP
LIPID PNL WITH DIRECT LDL SERPL: 32 MG/DL — SIGNIFICANT CHANGE UP
LYMPHOCYTES # BLD AUTO: 0.47 K/UL — LOW (ref 1.2–3.4)
LYMPHOCYTES # BLD AUTO: 0.55 K/UL — LOW (ref 1.2–3.4)
LYMPHOCYTES # BLD AUTO: 0.86 K/UL — LOW (ref 1.2–3.4)
LYMPHOCYTES # BLD AUTO: 0.93 K/UL — LOW (ref 1.2–3.4)
LYMPHOCYTES # BLD AUTO: 1.02 K/UL — LOW (ref 1.2–3.4)
LYMPHOCYTES # BLD AUTO: 1.16 K/UL — LOW (ref 1.2–3.4)
LYMPHOCYTES # BLD AUTO: 1.27 K/UL — SIGNIFICANT CHANGE UP (ref 1.2–3.4)
LYMPHOCYTES # BLD AUTO: 1.31 K/UL — SIGNIFICANT CHANGE UP (ref 1.2–3.4)
LYMPHOCYTES # BLD AUTO: 1.39 K/UL — SIGNIFICANT CHANGE UP (ref 1.2–3.4)
LYMPHOCYTES # BLD AUTO: 1.41 K/UL — SIGNIFICANT CHANGE UP (ref 1.2–3.4)
LYMPHOCYTES # BLD AUTO: 1.42 K/UL — SIGNIFICANT CHANGE UP (ref 1.2–3.4)
LYMPHOCYTES # BLD AUTO: 1.44 K/UL — SIGNIFICANT CHANGE UP (ref 1.2–3.4)
LYMPHOCYTES # BLD AUTO: 1.45 K/UL — SIGNIFICANT CHANGE UP (ref 1.2–3.4)
LYMPHOCYTES # BLD AUTO: 1.5 % — LOW (ref 20.5–51.1)
LYMPHOCYTES # BLD AUTO: 1.53 K/UL — SIGNIFICANT CHANGE UP (ref 1.2–3.4)
LYMPHOCYTES # BLD AUTO: 1.54 K/UL — SIGNIFICANT CHANGE UP (ref 1.2–3.4)
LYMPHOCYTES # BLD AUTO: 1.55 K/UL — SIGNIFICANT CHANGE UP (ref 1.2–3.4)
LYMPHOCYTES # BLD AUTO: 1.57 K/UL — SIGNIFICANT CHANGE UP (ref 1.2–3.4)
LYMPHOCYTES # BLD AUTO: 1.58 K/UL — SIGNIFICANT CHANGE UP (ref 1.2–3.4)
LYMPHOCYTES # BLD AUTO: 1.62 K/UL — SIGNIFICANT CHANGE UP (ref 1.2–3.4)
LYMPHOCYTES # BLD AUTO: 1.67 K/UL — SIGNIFICANT CHANGE UP (ref 1.2–3.4)
LYMPHOCYTES # BLD AUTO: 1.72 K/UL — SIGNIFICANT CHANGE UP (ref 1.2–3.4)
LYMPHOCYTES # BLD AUTO: 1.75 K/UL — SIGNIFICANT CHANGE UP (ref 1.2–3.4)
LYMPHOCYTES # BLD AUTO: 1.77 K/UL — SIGNIFICANT CHANGE UP (ref 1.2–3.4)
LYMPHOCYTES # BLD AUTO: 1.79 K/UL — SIGNIFICANT CHANGE UP (ref 1.2–3.4)
LYMPHOCYTES # BLD AUTO: 1.8 K/UL — SIGNIFICANT CHANGE UP (ref 1.2–3.4)
LYMPHOCYTES # BLD AUTO: 1.81 K/UL — SIGNIFICANT CHANGE UP (ref 1.2–3.4)
LYMPHOCYTES # BLD AUTO: 10 % — LOW (ref 20.5–51.1)
LYMPHOCYTES # BLD AUTO: 10.4 % — LOW (ref 20.5–51.1)
LYMPHOCYTES # BLD AUTO: 11.3 % — LOW (ref 20.5–51.1)
LYMPHOCYTES # BLD AUTO: 11.6 % — LOW (ref 20.5–51.1)
LYMPHOCYTES # BLD AUTO: 12 % — LOW (ref 20.5–51.1)
LYMPHOCYTES # BLD AUTO: 12.2 % — LOW (ref 20.5–51.1)
LYMPHOCYTES # BLD AUTO: 12.2 % — LOW (ref 20.5–51.1)
LYMPHOCYTES # BLD AUTO: 12.7 % — LOW (ref 20.5–51.1)
LYMPHOCYTES # BLD AUTO: 12.7 % — LOW (ref 20.5–51.1)
LYMPHOCYTES # BLD AUTO: 12.8 % — LOW (ref 20.5–51.1)
LYMPHOCYTES # BLD AUTO: 13.3 % — LOW (ref 20.5–51.1)
LYMPHOCYTES # BLD AUTO: 13.7 % — LOW (ref 20.5–51.1)
LYMPHOCYTES # BLD AUTO: 14.1 % — LOW (ref 20.5–51.1)
LYMPHOCYTES # BLD AUTO: 14.7 % — LOW (ref 20.5–51.1)
LYMPHOCYTES # BLD AUTO: 14.7 % — LOW (ref 20.5–51.1)
LYMPHOCYTES # BLD AUTO: 15.5 % — LOW (ref 20.5–51.1)
LYMPHOCYTES # BLD AUTO: 15.6 % — LOW (ref 20.5–51.1)
LYMPHOCYTES # BLD AUTO: 16.8 % — LOW (ref 20.5–51.1)
LYMPHOCYTES # BLD AUTO: 2.12 K/UL — SIGNIFICANT CHANGE UP (ref 1.2–3.4)
LYMPHOCYTES # BLD AUTO: 2.31 K/UL — SIGNIFICANT CHANGE UP (ref 1.2–3.4)
LYMPHOCYTES # BLD AUTO: 20.1 % — LOW (ref 20.5–51.1)
LYMPHOCYTES # BLD AUTO: 20.5 % — SIGNIFICANT CHANGE UP (ref 20.5–51.1)
LYMPHOCYTES # BLD AUTO: 21.7 % — SIGNIFICANT CHANGE UP (ref 20.5–51.1)
LYMPHOCYTES # BLD AUTO: 4.8 % — LOW (ref 20.5–51.1)
LYMPHOCYTES # BLD AUTO: 5 % — LOW (ref 20.5–51.1)
LYMPHOCYTES # BLD AUTO: 5.9 % — LOW (ref 20.5–51.1)
LYMPHOCYTES # BLD AUTO: 6.8 % — LOW (ref 20.5–51.1)
LYMPHOCYTES # BLD AUTO: 7.8 % — LOW (ref 20.5–51.1)
LYMPHOCYTES # BLD AUTO: 8 % — LOW (ref 20.5–51.1)
LYMPHOCYTES # BLD AUTO: 8.6 % — LOW (ref 20.5–51.1)
LYMPHOCYTES # BLD AUTO: 9.7 % — LOW (ref 20.5–51.1)
MAGNESIUM SERPL-MCNC: 1.8 MG/DL — SIGNIFICANT CHANGE UP (ref 1.8–2.4)
MAGNESIUM SERPL-MCNC: 1.9 MG/DL — SIGNIFICANT CHANGE UP (ref 1.8–2.4)
MAGNESIUM SERPL-MCNC: 2 MG/DL — SIGNIFICANT CHANGE UP (ref 1.8–2.4)
MAGNESIUM SERPL-MCNC: 2.1 MG/DL — SIGNIFICANT CHANGE UP (ref 1.8–2.4)
MAGNESIUM SERPL-MCNC: 2.2 MG/DL — SIGNIFICANT CHANGE UP (ref 1.8–2.4)
MAGNESIUM SERPL-MCNC: 2.2 MG/DL — SIGNIFICANT CHANGE UP (ref 1.8–2.4)
MAGNESIUM SERPL-MCNC: 2.3 MG/DL — SIGNIFICANT CHANGE UP (ref 1.8–2.4)
MAGNESIUM SERPL-MCNC: 2.4 MG/DL — SIGNIFICANT CHANGE UP (ref 1.8–2.4)
MAGNESIUM SERPL-MCNC: 2.7 MG/DL — HIGH (ref 1.8–2.4)
MAGNESIUM SERPL-MCNC: 2.9 MG/DL — HIGH (ref 1.8–2.4)
MAGNESIUM SERPL-MCNC: 3.4 MG/DL — CRITICAL HIGH (ref 1.8–2.4)
MANUAL SMEAR VERIFICATION: SIGNIFICANT CHANGE UP
MCHC RBC-ENTMCNC: 28.8 PG — SIGNIFICANT CHANGE UP (ref 27–31)
MCHC RBC-ENTMCNC: 29 PG — SIGNIFICANT CHANGE UP (ref 27–31)
MCHC RBC-ENTMCNC: 29.1 PG — SIGNIFICANT CHANGE UP (ref 27–31)
MCHC RBC-ENTMCNC: 29.2 PG — SIGNIFICANT CHANGE UP (ref 27–31)
MCHC RBC-ENTMCNC: 29.3 PG — SIGNIFICANT CHANGE UP (ref 27–31)
MCHC RBC-ENTMCNC: 29.4 PG — SIGNIFICANT CHANGE UP (ref 27–31)
MCHC RBC-ENTMCNC: 29.5 PG — SIGNIFICANT CHANGE UP (ref 27–31)
MCHC RBC-ENTMCNC: 29.6 PG — SIGNIFICANT CHANGE UP (ref 27–31)
MCHC RBC-ENTMCNC: 29.7 PG — SIGNIFICANT CHANGE UP (ref 27–31)
MCHC RBC-ENTMCNC: 29.8 PG — SIGNIFICANT CHANGE UP (ref 27–31)
MCHC RBC-ENTMCNC: 29.9 PG — SIGNIFICANT CHANGE UP (ref 27–31)
MCHC RBC-ENTMCNC: 30 PG — SIGNIFICANT CHANGE UP (ref 27–31)
MCHC RBC-ENTMCNC: 30.1 PG — SIGNIFICANT CHANGE UP (ref 27–31)
MCHC RBC-ENTMCNC: 30.1 PG — SIGNIFICANT CHANGE UP (ref 27–31)
MCHC RBC-ENTMCNC: 30.2 PG — SIGNIFICANT CHANGE UP (ref 27–31)
MCHC RBC-ENTMCNC: 30.2 PG — SIGNIFICANT CHANGE UP (ref 27–31)
MCHC RBC-ENTMCNC: 30.3 PG — SIGNIFICANT CHANGE UP (ref 27–31)
MCHC RBC-ENTMCNC: 30.4 PG — SIGNIFICANT CHANGE UP (ref 27–31)
MCHC RBC-ENTMCNC: 30.4 PG — SIGNIFICANT CHANGE UP (ref 27–31)
MCHC RBC-ENTMCNC: 30.6 PG — SIGNIFICANT CHANGE UP (ref 27–31)
MCHC RBC-ENTMCNC: 30.9 G/DL — LOW (ref 32–37)
MCHC RBC-ENTMCNC: 31.4 G/DL — LOW (ref 32–37)
MCHC RBC-ENTMCNC: 31.7 G/DL — LOW (ref 32–37)
MCHC RBC-ENTMCNC: 32 G/DL — SIGNIFICANT CHANGE UP (ref 32–37)
MCHC RBC-ENTMCNC: 32.3 G/DL — SIGNIFICANT CHANGE UP (ref 32–37)
MCHC RBC-ENTMCNC: 32.4 G/DL — SIGNIFICANT CHANGE UP (ref 32–37)
MCHC RBC-ENTMCNC: 32.5 G/DL — SIGNIFICANT CHANGE UP (ref 32–37)
MCHC RBC-ENTMCNC: 32.5 G/DL — SIGNIFICANT CHANGE UP (ref 32–37)
MCHC RBC-ENTMCNC: 32.6 G/DL — SIGNIFICANT CHANGE UP (ref 32–37)
MCHC RBC-ENTMCNC: 32.7 G/DL — SIGNIFICANT CHANGE UP (ref 32–37)
MCHC RBC-ENTMCNC: 32.8 G/DL — SIGNIFICANT CHANGE UP (ref 32–37)
MCHC RBC-ENTMCNC: 32.9 G/DL — SIGNIFICANT CHANGE UP (ref 32–37)
MCHC RBC-ENTMCNC: 33 G/DL — SIGNIFICANT CHANGE UP (ref 32–37)
MCHC RBC-ENTMCNC: 33.1 G/DL — SIGNIFICANT CHANGE UP (ref 32–37)
MCHC RBC-ENTMCNC: 33.2 G/DL — SIGNIFICANT CHANGE UP (ref 32–37)
MCHC RBC-ENTMCNC: 33.2 G/DL — SIGNIFICANT CHANGE UP (ref 32–37)
MCHC RBC-ENTMCNC: 33.3 G/DL — SIGNIFICANT CHANGE UP (ref 32–37)
MCHC RBC-ENTMCNC: 33.3 G/DL — SIGNIFICANT CHANGE UP (ref 32–37)
MCHC RBC-ENTMCNC: 33.4 G/DL — SIGNIFICANT CHANGE UP (ref 32–37)
MCHC RBC-ENTMCNC: 33.9 G/DL — SIGNIFICANT CHANGE UP (ref 32–37)
MCHC RBC-ENTMCNC: 34.3 G/DL — SIGNIFICANT CHANGE UP (ref 32–37)
MCHC RBC-ENTMCNC: 34.9 G/DL — SIGNIFICANT CHANGE UP (ref 32–37)
MCHC RBC-ENTMCNC: 35.1 G/DL — SIGNIFICANT CHANGE UP (ref 32–37)
MCHC RBC-ENTMCNC: 35.3 G/DL — SIGNIFICANT CHANGE UP (ref 32–37)
MCHC RBC-ENTMCNC: 35.4 G/DL — SIGNIFICANT CHANGE UP (ref 32–37)
MCV RBC AUTO: 84.6 FL — SIGNIFICANT CHANGE UP (ref 80–94)
MCV RBC AUTO: 84.8 FL — SIGNIFICANT CHANGE UP (ref 80–94)
MCV RBC AUTO: 85 FL — SIGNIFICANT CHANGE UP (ref 80–94)
MCV RBC AUTO: 85.4 FL — SIGNIFICANT CHANGE UP (ref 80–94)
MCV RBC AUTO: 87 FL — SIGNIFICANT CHANGE UP (ref 80–94)
MCV RBC AUTO: 87.8 FL — SIGNIFICANT CHANGE UP (ref 80–94)
MCV RBC AUTO: 87.8 FL — SIGNIFICANT CHANGE UP (ref 80–94)
MCV RBC AUTO: 88.3 FL — SIGNIFICANT CHANGE UP (ref 80–94)
MCV RBC AUTO: 88.6 FL — SIGNIFICANT CHANGE UP (ref 80–94)
MCV RBC AUTO: 88.7 FL — SIGNIFICANT CHANGE UP (ref 80–94)
MCV RBC AUTO: 88.9 FL — SIGNIFICANT CHANGE UP (ref 80–94)
MCV RBC AUTO: 89 FL — SIGNIFICANT CHANGE UP (ref 80–94)
MCV RBC AUTO: 89.2 FL — SIGNIFICANT CHANGE UP (ref 80–94)
MCV RBC AUTO: 89.3 FL — SIGNIFICANT CHANGE UP (ref 80–94)
MCV RBC AUTO: 89.4 FL — SIGNIFICANT CHANGE UP (ref 80–94)
MCV RBC AUTO: 89.6 FL — SIGNIFICANT CHANGE UP (ref 80–94)
MCV RBC AUTO: 89.7 FL — SIGNIFICANT CHANGE UP (ref 80–94)
MCV RBC AUTO: 89.9 FL — SIGNIFICANT CHANGE UP (ref 80–94)
MCV RBC AUTO: 90.1 FL — SIGNIFICANT CHANGE UP (ref 80–94)
MCV RBC AUTO: 90.1 FL — SIGNIFICANT CHANGE UP (ref 80–94)
MCV RBC AUTO: 90.2 FL — SIGNIFICANT CHANGE UP (ref 80–94)
MCV RBC AUTO: 90.2 FL — SIGNIFICANT CHANGE UP (ref 80–94)
MCV RBC AUTO: 90.7 FL — SIGNIFICANT CHANGE UP (ref 80–94)
MCV RBC AUTO: 90.8 FL — SIGNIFICANT CHANGE UP (ref 80–94)
MCV RBC AUTO: 90.9 FL — SIGNIFICANT CHANGE UP (ref 80–94)
MCV RBC AUTO: 91 FL — SIGNIFICANT CHANGE UP (ref 80–94)
MCV RBC AUTO: 91.3 FL — SIGNIFICANT CHANGE UP (ref 80–94)
MCV RBC AUTO: 91.3 FL — SIGNIFICANT CHANGE UP (ref 80–94)
MCV RBC AUTO: 91.9 FL — SIGNIFICANT CHANGE UP (ref 80–94)
MCV RBC AUTO: 92.4 FL — SIGNIFICANT CHANGE UP (ref 80–94)
MCV RBC AUTO: 92.5 FL — SIGNIFICANT CHANGE UP (ref 80–94)
MCV RBC AUTO: 92.9 FL — SIGNIFICANT CHANGE UP (ref 80–94)
MCV RBC AUTO: 93.1 FL — SIGNIFICANT CHANGE UP (ref 80–94)
MCV RBC AUTO: 93.2 FL — SIGNIFICANT CHANGE UP (ref 80–94)
MCV RBC AUTO: 94.2 FL — HIGH (ref 80–94)
MCV RBC AUTO: 94.2 FL — HIGH (ref 80–94)
MCV RBC AUTO: 95.9 FL — HIGH (ref 80–94)
METHOD TYPE: SIGNIFICANT CHANGE UP
MONOCYTES # BLD AUTO: 0.29 K/UL — SIGNIFICANT CHANGE UP (ref 0.1–0.6)
MONOCYTES # BLD AUTO: 0.34 K/UL — SIGNIFICANT CHANGE UP (ref 0.1–0.6)
MONOCYTES # BLD AUTO: 0.4 K/UL — SIGNIFICANT CHANGE UP (ref 0.1–0.6)
MONOCYTES # BLD AUTO: 0.4 K/UL — SIGNIFICANT CHANGE UP (ref 0.1–0.6)
MONOCYTES # BLD AUTO: 0.59 K/UL — SIGNIFICANT CHANGE UP (ref 0.1–0.6)
MONOCYTES # BLD AUTO: 0.6 K/UL — SIGNIFICANT CHANGE UP (ref 0.1–0.6)
MONOCYTES # BLD AUTO: 0.61 K/UL — HIGH (ref 0.1–0.6)
MONOCYTES # BLD AUTO: 0.62 K/UL — HIGH (ref 0.1–0.6)
MONOCYTES # BLD AUTO: 0.64 K/UL — HIGH (ref 0.1–0.6)
MONOCYTES # BLD AUTO: 0.65 K/UL — HIGH (ref 0.1–0.6)
MONOCYTES # BLD AUTO: 0.69 K/UL — HIGH (ref 0.1–0.6)
MONOCYTES # BLD AUTO: 0.72 K/UL — HIGH (ref 0.1–0.6)
MONOCYTES # BLD AUTO: 0.74 K/UL — HIGH (ref 0.1–0.6)
MONOCYTES # BLD AUTO: 0.75 K/UL — HIGH (ref 0.1–0.6)
MONOCYTES # BLD AUTO: 0.77 K/UL — HIGH (ref 0.1–0.6)
MONOCYTES # BLD AUTO: 0.78 K/UL — HIGH (ref 0.1–0.6)
MONOCYTES # BLD AUTO: 0.78 K/UL — HIGH (ref 0.1–0.6)
MONOCYTES # BLD AUTO: 0.79 K/UL — HIGH (ref 0.1–0.6)
MONOCYTES # BLD AUTO: 0.8 K/UL — HIGH (ref 0.1–0.6)
MONOCYTES # BLD AUTO: 0.85 K/UL — HIGH (ref 0.1–0.6)
MONOCYTES # BLD AUTO: 0.88 K/UL — HIGH (ref 0.1–0.6)
MONOCYTES # BLD AUTO: 0.93 K/UL — HIGH (ref 0.1–0.6)
MONOCYTES # BLD AUTO: 0.94 K/UL — HIGH (ref 0.1–0.6)
MONOCYTES # BLD AUTO: 1.1 K/UL — HIGH (ref 0.1–0.6)
MONOCYTES # BLD AUTO: 1.1 K/UL — HIGH (ref 0.1–0.6)
MONOCYTES # BLD AUTO: 1.15 K/UL — HIGH (ref 0.1–0.6)
MONOCYTES # BLD AUTO: 1.22 K/UL — HIGH (ref 0.1–0.6)
MONOCYTES # BLD AUTO: 1.3 K/UL — HIGH (ref 0.1–0.6)
MONOCYTES # BLD AUTO: 1.55 K/UL — HIGH (ref 0.1–0.6)
MONOCYTES # BLD AUTO: 1.92 K/UL — HIGH (ref 0.1–0.6)
MONOCYTES NFR BLD AUTO: 0.9 % — LOW (ref 1.7–9.3)
MONOCYTES NFR BLD AUTO: 10.2 % — HIGH (ref 1.7–9.3)
MONOCYTES NFR BLD AUTO: 10.5 % — HIGH (ref 1.7–9.3)
MONOCYTES NFR BLD AUTO: 10.5 % — HIGH (ref 1.7–9.3)
MONOCYTES NFR BLD AUTO: 12.2 % — HIGH (ref 1.7–9.3)
MONOCYTES NFR BLD AUTO: 3.2 % — SIGNIFICANT CHANGE UP (ref 1.7–9.3)
MONOCYTES NFR BLD AUTO: 3.5 % — SIGNIFICANT CHANGE UP (ref 1.7–9.3)
MONOCYTES NFR BLD AUTO: 4.3 % — SIGNIFICANT CHANGE UP (ref 1.7–9.3)
MONOCYTES NFR BLD AUTO: 4.8 % — SIGNIFICANT CHANGE UP (ref 1.7–9.3)
MONOCYTES NFR BLD AUTO: 4.9 % — SIGNIFICANT CHANGE UP (ref 1.7–9.3)
MONOCYTES NFR BLD AUTO: 4.9 % — SIGNIFICANT CHANGE UP (ref 1.7–9.3)
MONOCYTES NFR BLD AUTO: 5 % — SIGNIFICANT CHANGE UP (ref 1.7–9.3)
MONOCYTES NFR BLD AUTO: 5.3 % — SIGNIFICANT CHANGE UP (ref 1.7–9.3)
MONOCYTES NFR BLD AUTO: 5.5 % — SIGNIFICANT CHANGE UP (ref 1.7–9.3)
MONOCYTES NFR BLD AUTO: 5.6 % — SIGNIFICANT CHANGE UP (ref 1.7–9.3)
MONOCYTES NFR BLD AUTO: 5.8 % — SIGNIFICANT CHANGE UP (ref 1.7–9.3)
MONOCYTES NFR BLD AUTO: 5.9 % — SIGNIFICANT CHANGE UP (ref 1.7–9.3)
MONOCYTES NFR BLD AUTO: 6.3 % — SIGNIFICANT CHANGE UP (ref 1.7–9.3)
MONOCYTES NFR BLD AUTO: 6.5 % — SIGNIFICANT CHANGE UP (ref 1.7–9.3)
MONOCYTES NFR BLD AUTO: 6.5 % — SIGNIFICANT CHANGE UP (ref 1.7–9.3)
MONOCYTES NFR BLD AUTO: 6.6 % — SIGNIFICANT CHANGE UP (ref 1.7–9.3)
MONOCYTES NFR BLD AUTO: 6.8 % — SIGNIFICANT CHANGE UP (ref 1.7–9.3)
MONOCYTES NFR BLD AUTO: 7.1 % — SIGNIFICANT CHANGE UP (ref 1.7–9.3)
MONOCYTES NFR BLD AUTO: 7.3 % — SIGNIFICANT CHANGE UP (ref 1.7–9.3)
MONOCYTES NFR BLD AUTO: 7.5 % — SIGNIFICANT CHANGE UP (ref 1.7–9.3)
MONOCYTES NFR BLD AUTO: 7.6 % — SIGNIFICANT CHANGE UP (ref 1.7–9.3)
MONOCYTES NFR BLD AUTO: 8.2 % — SIGNIFICANT CHANGE UP (ref 1.7–9.3)
MONOCYTES NFR BLD AUTO: 8.6 % — SIGNIFICANT CHANGE UP (ref 1.7–9.3)
MRSA PCR RESULT.: NEGATIVE — SIGNIFICANT CHANGE UP
NEUTROPHILS # BLD AUTO: 10.36 K/UL — HIGH (ref 1.4–6.5)
NEUTROPHILS # BLD AUTO: 10.51 K/UL — HIGH (ref 1.4–6.5)
NEUTROPHILS # BLD AUTO: 10.7 K/UL — HIGH (ref 1.4–6.5)
NEUTROPHILS # BLD AUTO: 10.79 K/UL — HIGH (ref 1.4–6.5)
NEUTROPHILS # BLD AUTO: 11.15 K/UL — HIGH (ref 1.4–6.5)
NEUTROPHILS # BLD AUTO: 11.76 K/UL — HIGH (ref 1.4–6.5)
NEUTROPHILS # BLD AUTO: 12.29 K/UL — HIGH (ref 1.4–6.5)
NEUTROPHILS # BLD AUTO: 12.47 K/UL — HIGH (ref 1.4–6.5)
NEUTROPHILS # BLD AUTO: 12.93 K/UL — HIGH (ref 1.4–6.5)
NEUTROPHILS # BLD AUTO: 13.08 K/UL — HIGH (ref 1.4–6.5)
NEUTROPHILS # BLD AUTO: 13.44 K/UL — HIGH (ref 1.4–6.5)
NEUTROPHILS # BLD AUTO: 14.2 K/UL — HIGH (ref 1.4–6.5)
NEUTROPHILS # BLD AUTO: 14.66 K/UL — HIGH (ref 1.4–6.5)
NEUTROPHILS # BLD AUTO: 15.32 K/UL — HIGH (ref 1.4–6.5)
NEUTROPHILS # BLD AUTO: 26.21 K/UL — HIGH (ref 1.4–6.5)
NEUTROPHILS # BLD AUTO: 29.75 K/UL — HIGH (ref 1.4–6.5)
NEUTROPHILS # BLD AUTO: 5.05 K/UL — SIGNIFICANT CHANGE UP (ref 1.4–6.5)
NEUTROPHILS # BLD AUTO: 5.42 K/UL — SIGNIFICANT CHANGE UP (ref 1.4–6.5)
NEUTROPHILS # BLD AUTO: 6.28 K/UL — SIGNIFICANT CHANGE UP (ref 1.4–6.5)
NEUTROPHILS # BLD AUTO: 7.76 K/UL — HIGH (ref 1.4–6.5)
NEUTROPHILS # BLD AUTO: 7.85 K/UL — HIGH (ref 1.4–6.5)
NEUTROPHILS # BLD AUTO: 7.9 K/UL — HIGH (ref 1.4–6.5)
NEUTROPHILS # BLD AUTO: 7.93 K/UL — HIGH (ref 1.4–6.5)
NEUTROPHILS # BLD AUTO: 8.15 K/UL — HIGH (ref 1.4–6.5)
NEUTROPHILS # BLD AUTO: 8.31 K/UL — HIGH (ref 1.4–6.5)
NEUTROPHILS # BLD AUTO: 8.39 K/UL — HIGH (ref 1.4–6.5)
NEUTROPHILS # BLD AUTO: 8.77 K/UL — HIGH (ref 1.4–6.5)
NEUTROPHILS # BLD AUTO: 8.8 K/UL — HIGH (ref 1.4–6.5)
NEUTROPHILS # BLD AUTO: 8.85 K/UL — HIGH (ref 1.4–6.5)
NEUTROPHILS # BLD AUTO: 9.77 K/UL — HIGH (ref 1.4–6.5)
NEUTROPHILS NFR BLD AUTO: 69.7 % — SIGNIFICANT CHANGE UP (ref 42.2–75.2)
NEUTROPHILS NFR BLD AUTO: 71.9 % — SIGNIFICANT CHANGE UP (ref 42.2–75.2)
NEUTROPHILS NFR BLD AUTO: 72.1 % — SIGNIFICANT CHANGE UP (ref 42.2–75.2)
NEUTROPHILS NFR BLD AUTO: 73.1 % — SIGNIFICANT CHANGE UP (ref 42.2–75.2)
NEUTROPHILS NFR BLD AUTO: 75.2 % — SIGNIFICANT CHANGE UP (ref 42.2–75.2)
NEUTROPHILS NFR BLD AUTO: 75.8 % — HIGH (ref 42.2–75.2)
NEUTROPHILS NFR BLD AUTO: 76 % — HIGH (ref 42.2–75.2)
NEUTROPHILS NFR BLD AUTO: 76 % — HIGH (ref 42.2–75.2)
NEUTROPHILS NFR BLD AUTO: 76.8 % — HIGH (ref 42.2–75.2)
NEUTROPHILS NFR BLD AUTO: 77.1 % — HIGH (ref 42.2–75.2)
NEUTROPHILS NFR BLD AUTO: 78.3 % — HIGH (ref 42.2–75.2)
NEUTROPHILS NFR BLD AUTO: 78.3 % — HIGH (ref 42.2–75.2)
NEUTROPHILS NFR BLD AUTO: 78.4 % — HIGH (ref 42.2–75.2)
NEUTROPHILS NFR BLD AUTO: 78.9 % — HIGH (ref 42.2–75.2)
NEUTROPHILS NFR BLD AUTO: 79.7 % — HIGH (ref 42.2–75.2)
NEUTROPHILS NFR BLD AUTO: 80.2 % — HIGH (ref 42.2–75.2)
NEUTROPHILS NFR BLD AUTO: 80.8 % — HIGH (ref 42.2–75.2)
NEUTROPHILS NFR BLD AUTO: 80.9 % — HIGH (ref 42.2–75.2)
NEUTROPHILS NFR BLD AUTO: 81 % — HIGH (ref 42.2–75.2)
NEUTROPHILS NFR BLD AUTO: 81.1 % — HIGH (ref 42.2–75.2)
NEUTROPHILS NFR BLD AUTO: 81.7 % — HIGH (ref 42.2–75.2)
NEUTROPHILS NFR BLD AUTO: 82 % — HIGH (ref 42.2–75.2)
NEUTROPHILS NFR BLD AUTO: 83.7 % — HIGH (ref 42.2–75.2)
NEUTROPHILS NFR BLD AUTO: 85.6 % — HIGH (ref 42.2–75.2)
NEUTROPHILS NFR BLD AUTO: 86 % — HIGH (ref 42.2–75.2)
NEUTROPHILS NFR BLD AUTO: 87.8 % — HIGH (ref 42.2–75.2)
NEUTROPHILS NFR BLD AUTO: 88.1 % — HIGH (ref 42.2–75.2)
NEUTROPHILS NFR BLD AUTO: 89.1 % — HIGH (ref 42.2–75.2)
NEUTROPHILS NFR BLD AUTO: 90.8 % — HIGH (ref 42.2–75.2)
NEUTROPHILS NFR BLD AUTO: 96 % — HIGH (ref 42.2–75.2)
NEUTS BAND # BLD: 21 % — HIGH (ref 0–6)
NITRITE UR-MCNC: NEGATIVE — SIGNIFICANT CHANGE UP
NITRITE UR-MCNC: POSITIVE
NON HDL CHOLESTEROL: 38 MG/DL — SIGNIFICANT CHANGE UP
NRBC # BLD: 0 /100 WBCS — SIGNIFICANT CHANGE UP (ref 0–0)
NRBC # BLD: 0 /100 — SIGNIFICANT CHANGE UP (ref 0–0)
NRBC # BLD: 2 /100 WBCS — HIGH (ref 0–0)
NT-PROBNP SERPL-SCNC: 309 PG/ML — HIGH (ref 0–300)
ORGANISM # SPEC MICROSCOPIC CNT: SIGNIFICANT CHANGE UP
OSMOLALITY SERPL: 261 MOS/KG — LOW (ref 275–300)
OSMOLALITY UR: 795 MOS/KG — SIGNIFICANT CHANGE UP (ref 50–1200)
OSMOLALITY UR: 826 MOS/KG — SIGNIFICANT CHANGE UP (ref 50–1200)
PCO2 BLDV: 24 MMHG — LOW (ref 42–55)
PCO2 BLDV: 34 MMHG — LOW (ref 42–55)
PCO2 BLDV: 54 MMHG — SIGNIFICANT CHANGE UP (ref 42–55)
PCO2 BLDV: 56 MMHG — HIGH (ref 42–55)
PH BLDV: 7.25 — LOW (ref 7.32–7.43)
PH BLDV: 7.28 — LOW (ref 7.32–7.43)
PH BLDV: 7.28 — LOW (ref 7.32–7.43)
PH BLDV: 7.4 — SIGNIFICANT CHANGE UP (ref 7.32–7.43)
PH UR: 6 — SIGNIFICANT CHANGE UP (ref 5–8)
PH UR: >8.9 — HIGH (ref 5–8)
PHOSPHATE SERPL-MCNC: 2.3 MG/DL — SIGNIFICANT CHANGE UP (ref 2.1–4.9)
PHOSPHATE SERPL-MCNC: 2.6 MG/DL — SIGNIFICANT CHANGE UP (ref 2.1–4.9)
PHOSPHATE SERPL-MCNC: 2.8 MG/DL — SIGNIFICANT CHANGE UP (ref 2.1–4.9)
PHOSPHATE SERPL-MCNC: 2.9 MG/DL — SIGNIFICANT CHANGE UP (ref 2.1–4.9)
PHOSPHATE SERPL-MCNC: 3 MG/DL — SIGNIFICANT CHANGE UP (ref 2.1–4.9)
PHOSPHATE SERPL-MCNC: 3.4 MG/DL — SIGNIFICANT CHANGE UP (ref 2.1–4.9)
PHOSPHATE SERPL-MCNC: 3.8 MG/DL — SIGNIFICANT CHANGE UP (ref 2.1–4.9)
PLAT MORPH BLD: NORMAL — SIGNIFICANT CHANGE UP
PLATELET # BLD AUTO: 142 K/UL — SIGNIFICANT CHANGE UP (ref 130–400)
PLATELET # BLD AUTO: 159 K/UL — SIGNIFICANT CHANGE UP (ref 130–400)
PLATELET # BLD AUTO: 175 K/UL — SIGNIFICANT CHANGE UP (ref 130–400)
PLATELET # BLD AUTO: 184 K/UL — SIGNIFICANT CHANGE UP (ref 130–400)
PLATELET # BLD AUTO: 201 K/UL — SIGNIFICANT CHANGE UP (ref 130–400)
PLATELET # BLD AUTO: 207 K/UL — SIGNIFICANT CHANGE UP (ref 130–400)
PLATELET # BLD AUTO: 213 K/UL — SIGNIFICANT CHANGE UP (ref 130–400)
PLATELET # BLD AUTO: 239 K/UL — SIGNIFICANT CHANGE UP (ref 130–400)
PLATELET # BLD AUTO: 239 K/UL — SIGNIFICANT CHANGE UP (ref 130–400)
PLATELET # BLD AUTO: 241 K/UL — SIGNIFICANT CHANGE UP (ref 130–400)
PLATELET # BLD AUTO: 244 K/UL — SIGNIFICANT CHANGE UP (ref 130–400)
PLATELET # BLD AUTO: 259 K/UL — SIGNIFICANT CHANGE UP (ref 130–400)
PLATELET # BLD AUTO: 276 K/UL — SIGNIFICANT CHANGE UP (ref 130–400)
PLATELET # BLD AUTO: 276 K/UL — SIGNIFICANT CHANGE UP (ref 130–400)
PLATELET # BLD AUTO: 279 K/UL — SIGNIFICANT CHANGE UP (ref 130–400)
PLATELET # BLD AUTO: 280 K/UL — SIGNIFICANT CHANGE UP (ref 130–400)
PLATELET # BLD AUTO: 293 K/UL — SIGNIFICANT CHANGE UP (ref 130–400)
PLATELET # BLD AUTO: 296 K/UL — SIGNIFICANT CHANGE UP (ref 130–400)
PLATELET # BLD AUTO: 310 K/UL — SIGNIFICANT CHANGE UP (ref 130–400)
PLATELET # BLD AUTO: 317 K/UL — SIGNIFICANT CHANGE UP (ref 130–400)
PLATELET # BLD AUTO: 321 K/UL — SIGNIFICANT CHANGE UP (ref 130–400)
PLATELET # BLD AUTO: 324 K/UL — SIGNIFICANT CHANGE UP (ref 130–400)
PLATELET # BLD AUTO: 328 K/UL — SIGNIFICANT CHANGE UP (ref 130–400)
PLATELET # BLD AUTO: 337 K/UL — SIGNIFICANT CHANGE UP (ref 130–400)
PLATELET # BLD AUTO: 345 K/UL — SIGNIFICANT CHANGE UP (ref 130–400)
PLATELET # BLD AUTO: 346 K/UL — SIGNIFICANT CHANGE UP (ref 130–400)
PLATELET # BLD AUTO: 360 K/UL — SIGNIFICANT CHANGE UP (ref 130–400)
PLATELET # BLD AUTO: 364 K/UL — SIGNIFICANT CHANGE UP (ref 130–400)
PLATELET # BLD AUTO: 373 K/UL — SIGNIFICANT CHANGE UP (ref 130–400)
PLATELET # BLD AUTO: 382 K/UL — SIGNIFICANT CHANGE UP (ref 130–400)
PLATELET # BLD AUTO: 385 K/UL — SIGNIFICANT CHANGE UP (ref 130–400)
PLATELET # BLD AUTO: 390 K/UL — SIGNIFICANT CHANGE UP (ref 130–400)
PLATELET # BLD AUTO: 414 K/UL — HIGH (ref 130–400)
PLATELET # BLD AUTO: 418 K/UL — HIGH (ref 130–400)
PLATELET # BLD AUTO: 418 K/UL — HIGH (ref 130–400)
PLATELET # BLD AUTO: 421 K/UL — HIGH (ref 130–400)
PLATELET # BLD AUTO: 426 K/UL — HIGH (ref 130–400)
PO2 BLDV: 241 MMHG — SIGNIFICANT CHANGE UP
PO2 BLDV: 30 MMHG — SIGNIFICANT CHANGE UP
PO2 BLDV: 35 MMHG — SIGNIFICANT CHANGE UP
PO2 BLDV: 47 MMHG — SIGNIFICANT CHANGE UP
POTASSIUM BLDV-SCNC: 1.4 MMOL/L — CRITICAL LOW (ref 3.5–5.1)
POTASSIUM BLDV-SCNC: 3.5 MMOL/L — SIGNIFICANT CHANGE UP (ref 3.5–5.1)
POTASSIUM BLDV-SCNC: 4.1 MMOL/L — SIGNIFICANT CHANGE UP (ref 3.5–5.1)
POTASSIUM BLDV-SCNC: 4.5 MMOL/L — SIGNIFICANT CHANGE UP (ref 3.5–5.1)
POTASSIUM SERPL-MCNC: 3.2 MMOL/L — LOW (ref 3.5–5)
POTASSIUM SERPL-MCNC: 3.3 MMOL/L — LOW (ref 3.5–5)
POTASSIUM SERPL-MCNC: 3.4 MMOL/L — LOW (ref 3.5–5)
POTASSIUM SERPL-MCNC: 3.6 MMOL/L — SIGNIFICANT CHANGE UP (ref 3.5–5)
POTASSIUM SERPL-MCNC: 3.7 MMOL/L — SIGNIFICANT CHANGE UP (ref 3.5–5)
POTASSIUM SERPL-MCNC: 3.8 MMOL/L — SIGNIFICANT CHANGE UP (ref 3.5–5)
POTASSIUM SERPL-MCNC: 3.9 MMOL/L — SIGNIFICANT CHANGE UP (ref 3.5–5)
POTASSIUM SERPL-MCNC: 4 MMOL/L — SIGNIFICANT CHANGE UP (ref 3.5–5)
POTASSIUM SERPL-MCNC: 4.1 MMOL/L — SIGNIFICANT CHANGE UP (ref 3.5–5)
POTASSIUM SERPL-MCNC: 4.2 MMOL/L — SIGNIFICANT CHANGE UP (ref 3.5–5)
POTASSIUM SERPL-MCNC: 4.3 MMOL/L — SIGNIFICANT CHANGE UP (ref 3.5–5)
POTASSIUM SERPL-MCNC: 4.4 MMOL/L — SIGNIFICANT CHANGE UP (ref 3.5–5)
POTASSIUM SERPL-MCNC: 4.5 MMOL/L — SIGNIFICANT CHANGE UP (ref 3.5–5)
POTASSIUM SERPL-MCNC: 4.6 MMOL/L — SIGNIFICANT CHANGE UP (ref 3.5–5)
POTASSIUM SERPL-MCNC: 4.7 MMOL/L — SIGNIFICANT CHANGE UP (ref 3.5–5)
POTASSIUM SERPL-MCNC: 4.7 MMOL/L — SIGNIFICANT CHANGE UP (ref 3.5–5)
POTASSIUM SERPL-MCNC: 4.9 MMOL/L — SIGNIFICANT CHANGE UP (ref 3.5–5)
POTASSIUM SERPL-MCNC: 5 MMOL/L — SIGNIFICANT CHANGE UP (ref 3.5–5)
POTASSIUM SERPL-MCNC: 5.5 MMOL/L — HIGH (ref 3.5–5)
POTASSIUM SERPL-MCNC: 5.6 MMOL/L — HIGH (ref 3.5–5)
POTASSIUM SERPL-SCNC: 3.2 MMOL/L — LOW (ref 3.5–5)
POTASSIUM SERPL-SCNC: 3.3 MMOL/L — LOW (ref 3.5–5)
POTASSIUM SERPL-SCNC: 3.4 MMOL/L — LOW (ref 3.5–5)
POTASSIUM SERPL-SCNC: 3.6 MMOL/L — SIGNIFICANT CHANGE UP (ref 3.5–5)
POTASSIUM SERPL-SCNC: 3.7 MMOL/L — SIGNIFICANT CHANGE UP (ref 3.5–5)
POTASSIUM SERPL-SCNC: 3.8 MMOL/L — SIGNIFICANT CHANGE UP (ref 3.5–5)
POTASSIUM SERPL-SCNC: 3.9 MMOL/L — SIGNIFICANT CHANGE UP (ref 3.5–5)
POTASSIUM SERPL-SCNC: 4 MMOL/L — SIGNIFICANT CHANGE UP (ref 3.5–5)
POTASSIUM SERPL-SCNC: 4.1 MMOL/L — SIGNIFICANT CHANGE UP (ref 3.5–5)
POTASSIUM SERPL-SCNC: 4.2 MMOL/L — SIGNIFICANT CHANGE UP (ref 3.5–5)
POTASSIUM SERPL-SCNC: 4.3 MMOL/L — SIGNIFICANT CHANGE UP (ref 3.5–5)
POTASSIUM SERPL-SCNC: 4.4 MMOL/L — SIGNIFICANT CHANGE UP (ref 3.5–5)
POTASSIUM SERPL-SCNC: 4.5 MMOL/L — SIGNIFICANT CHANGE UP (ref 3.5–5)
POTASSIUM SERPL-SCNC: 4.6 MMOL/L — SIGNIFICANT CHANGE UP (ref 3.5–5)
POTASSIUM SERPL-SCNC: 4.7 MMOL/L — SIGNIFICANT CHANGE UP (ref 3.5–5)
POTASSIUM SERPL-SCNC: 4.7 MMOL/L — SIGNIFICANT CHANGE UP (ref 3.5–5)
POTASSIUM SERPL-SCNC: 4.9 MMOL/L — SIGNIFICANT CHANGE UP (ref 3.5–5)
POTASSIUM SERPL-SCNC: 5 MMOL/L — SIGNIFICANT CHANGE UP (ref 3.5–5)
POTASSIUM SERPL-SCNC: 5.5 MMOL/L — HIGH (ref 3.5–5)
POTASSIUM SERPL-SCNC: 5.6 MMOL/L — HIGH (ref 3.5–5)
POTASSIUM UR-SCNC: 34 MMOL/L — SIGNIFICANT CHANGE UP
PROCALCITONIN SERPL-MCNC: 0.06 NG/ML — SIGNIFICANT CHANGE UP (ref 0.02–0.1)
PROCALCITONIN SERPL-MCNC: 0.06 NG/ML — SIGNIFICANT CHANGE UP (ref 0.02–0.1)
PROCALCITONIN SERPL-MCNC: 0.16 NG/ML — HIGH (ref 0.02–0.1)
PROT ?TM UR-MCNC: 54 MG/DLG/24H — SIGNIFICANT CHANGE UP
PROT SERPL-MCNC: 4.3 G/DL — LOW (ref 6–8)
PROT SERPL-MCNC: 4.6 G/DL — LOW (ref 6–8)
PROT SERPL-MCNC: 4.9 G/DL — LOW (ref 6–8)
PROT SERPL-MCNC: 4.9 G/DL — LOW (ref 6–8)
PROT SERPL-MCNC: 5.3 G/DL — LOW (ref 6–8)
PROT SERPL-MCNC: 5.5 G/DL — LOW (ref 6–8)
PROT SERPL-MCNC: 5.7 G/DL — LOW (ref 6–8)
PROT SERPL-MCNC: 5.8 G/DL — LOW (ref 6–8)
PROT SERPL-MCNC: 5.8 G/DL — LOW (ref 6–8)
PROT SERPL-MCNC: 5.9 G/DL — LOW (ref 6–8)
PROT SERPL-MCNC: 6 G/DL — SIGNIFICANT CHANGE UP (ref 6–8)
PROT SERPL-MCNC: 6.1 G/DL — SIGNIFICANT CHANGE UP (ref 6–8)
PROT SERPL-MCNC: 6.2 G/DL — SIGNIFICANT CHANGE UP (ref 6–8)
PROT SERPL-MCNC: 6.3 G/DL — SIGNIFICANT CHANGE UP (ref 6–8)
PROT SERPL-MCNC: 6.4 G/DL — SIGNIFICANT CHANGE UP (ref 6–8)
PROT SERPL-MCNC: 6.5 G/DL — SIGNIFICANT CHANGE UP (ref 6–8)
PROT SERPL-MCNC: 6.6 G/DL — SIGNIFICANT CHANGE UP (ref 6–8)
PROT SERPL-MCNC: 6.6 G/DL — SIGNIFICANT CHANGE UP (ref 6–8)
PROT SERPL-MCNC: 6.7 G/DL — SIGNIFICANT CHANGE UP (ref 6–8)
PROT SERPL-MCNC: 7 G/DL — SIGNIFICANT CHANGE UP (ref 6–8)
PROT SERPL-MCNC: 7.3 G/DL — SIGNIFICANT CHANGE UP (ref 6–8)
PROT SERPL-MCNC: 7.6 G/DL — SIGNIFICANT CHANGE UP (ref 6–8)
PROT SERPL-MCNC: 8.2 G/DL — HIGH (ref 6–8)
PROT UR-MCNC: ABNORMAL
PROT UR-MCNC: ABNORMAL
PROT/CREAT UR-RTO: 0.7 RATIO — HIGH (ref 0–0.2)
PROTHROM AB SERPL-ACNC: 14 SEC — HIGH (ref 9.95–12.87)
PROTHROM AB SERPL-ACNC: 14.4 SEC — HIGH (ref 9.95–12.87)
PROTHROM AB SERPL-ACNC: 14.9 SEC — HIGH (ref 9.95–12.87)
RBC # BLD: 1.83 M/UL — LOW (ref 4.7–6.1)
RBC # BLD: 2.35 M/UL — LOW (ref 4.7–6.1)
RBC # BLD: 2.6 M/UL — LOW (ref 4.7–6.1)
RBC # BLD: 2.67 M/UL — LOW (ref 4.7–6.1)
RBC # BLD: 2.7 M/UL — LOW (ref 4.7–6.1)
RBC # BLD: 2.72 M/UL — LOW (ref 4.7–6.1)
RBC # BLD: 2.75 M/UL — LOW (ref 4.7–6.1)
RBC # BLD: 2.77 M/UL — LOW (ref 4.7–6.1)
RBC # BLD: 2.78 M/UL — LOW (ref 4.7–6.1)
RBC # BLD: 2.8 M/UL — LOW (ref 4.7–6.1)
RBC # BLD: 2.81 M/UL — LOW (ref 4.7–6.1)
RBC # BLD: 2.81 M/UL — LOW (ref 4.7–6.1)
RBC # BLD: 2.82 M/UL — LOW (ref 4.7–6.1)
RBC # BLD: 2.84 M/UL — LOW (ref 4.7–6.1)
RBC # BLD: 2.85 M/UL — LOW (ref 4.7–6.1)
RBC # BLD: 2.86 M/UL — LOW (ref 4.7–6.1)
RBC # BLD: 2.86 M/UL — LOW (ref 4.7–6.1)
RBC # BLD: 2.89 M/UL — LOW (ref 4.7–6.1)
RBC # BLD: 2.94 M/UL — LOW (ref 4.7–6.1)
RBC # BLD: 3.07 M/UL — LOW (ref 4.7–6.1)
RBC # BLD: 3.09 M/UL — LOW (ref 4.7–6.1)
RBC # BLD: 3.12 M/UL — LOW (ref 4.7–6.1)
RBC # BLD: 3.12 M/UL — LOW (ref 4.7–6.1)
RBC # BLD: 3.17 M/UL — LOW (ref 4.7–6.1)
RBC # BLD: 3.17 M/UL — LOW (ref 4.7–6.1)
RBC # BLD: 3.18 M/UL — LOW (ref 4.7–6.1)
RBC # BLD: 3.23 M/UL — LOW (ref 4.7–6.1)
RBC # BLD: 3.31 M/UL — LOW (ref 4.7–6.1)
RBC # BLD: 3.32 M/UL — LOW (ref 4.7–6.1)
RBC # BLD: 3.32 M/UL — LOW (ref 4.7–6.1)
RBC # BLD: 3.37 M/UL — LOW (ref 4.7–6.1)
RBC # BLD: 3.4 M/UL — LOW (ref 4.7–6.1)
RBC # BLD: 3.48 M/UL — LOW (ref 4.7–6.1)
RBC # BLD: 3.52 M/UL — LOW (ref 4.7–6.1)
RBC # BLD: 3.67 M/UL — LOW (ref 4.7–6.1)
RBC # BLD: 3.74 M/UL — LOW (ref 4.7–6.1)
RBC # BLD: 3.78 M/UL — LOW (ref 4.7–6.1)
RBC # FLD: 15.1 % — HIGH (ref 11.5–14.5)
RBC # FLD: 15.5 % — HIGH (ref 11.5–14.5)
RBC # FLD: 15.6 % — HIGH (ref 11.5–14.5)
RBC # FLD: 15.7 % — HIGH (ref 11.5–14.5)
RBC # FLD: 15.8 % — HIGH (ref 11.5–14.5)
RBC # FLD: 15.9 % — HIGH (ref 11.5–14.5)
RBC # FLD: 15.9 % — HIGH (ref 11.5–14.5)
RBC # FLD: 16.1 % — HIGH (ref 11.5–14.5)
RBC # FLD: 16.2 % — HIGH (ref 11.5–14.5)
RBC # FLD: 16.3 % — HIGH (ref 11.5–14.5)
RBC # FLD: 16.6 % — HIGH (ref 11.5–14.5)
RBC # FLD: 16.7 % — HIGH (ref 11.5–14.5)
RBC # FLD: 16.8 % — HIGH (ref 11.5–14.5)
RBC # FLD: 17 % — HIGH (ref 11.5–14.5)
RBC # FLD: 17.2 % — HIGH (ref 11.5–14.5)
RBC # FLD: 17.2 % — HIGH (ref 11.5–14.5)
RBC # FLD: 17.4 % — HIGH (ref 11.5–14.5)
RBC # FLD: 17.7 % — HIGH (ref 11.5–14.5)
RBC # FLD: 17.8 % — HIGH (ref 11.5–14.5)
RBC # FLD: 18 % — HIGH (ref 11.5–14.5)
RBC # FLD: 18.1 % — HIGH (ref 11.5–14.5)
RBC BLD AUTO: NORMAL — SIGNIFICANT CHANGE UP
RBC CASTS # UR COMP ASSIST: 1 /HPF — SIGNIFICANT CHANGE UP (ref 0–4)
RBC CASTS # UR COMP ASSIST: 9 /HPF — HIGH (ref 0–4)
RSV RNA NPH QL NAA+NON-PROBE: SIGNIFICANT CHANGE UP
SAO2 % BLDV: 43.3 % — SIGNIFICANT CHANGE UP
SAO2 % BLDV: 57.3 % — SIGNIFICANT CHANGE UP
SAO2 % BLDV: 78 % — SIGNIFICANT CHANGE UP
SARS-COV-2 RNA SPEC QL NAA+PROBE: SIGNIFICANT CHANGE UP
SODIUM SERPL-SCNC: 122 MMOL/L — LOW (ref 135–146)
SODIUM SERPL-SCNC: 124 MMOL/L — LOW (ref 135–146)
SODIUM SERPL-SCNC: 124 MMOL/L — LOW (ref 135–146)
SODIUM SERPL-SCNC: 125 MMOL/L — LOW (ref 135–146)
SODIUM SERPL-SCNC: 126 MMOL/L — LOW (ref 135–146)
SODIUM SERPL-SCNC: 126 MMOL/L — LOW (ref 135–146)
SODIUM SERPL-SCNC: 129 MMOL/L — LOW (ref 135–146)
SODIUM SERPL-SCNC: 130 MMOL/L — LOW (ref 135–146)
SODIUM SERPL-SCNC: 131 MMOL/L — LOW (ref 135–146)
SODIUM SERPL-SCNC: 133 MMOL/L — LOW (ref 135–146)
SODIUM SERPL-SCNC: 133 MMOL/L — LOW (ref 135–146)
SODIUM SERPL-SCNC: 134 MMOL/L — LOW (ref 135–146)
SODIUM SERPL-SCNC: 135 MMOL/L — SIGNIFICANT CHANGE UP (ref 135–146)
SODIUM SERPL-SCNC: 136 MMOL/L — SIGNIFICANT CHANGE UP (ref 135–146)
SODIUM SERPL-SCNC: 137 MMOL/L — SIGNIFICANT CHANGE UP (ref 135–146)
SODIUM SERPL-SCNC: 137 MMOL/L — SIGNIFICANT CHANGE UP (ref 135–146)
SODIUM SERPL-SCNC: 138 MMOL/L — SIGNIFICANT CHANGE UP (ref 135–146)
SODIUM SERPL-SCNC: 139 MMOL/L — SIGNIFICANT CHANGE UP (ref 135–146)
SODIUM SERPL-SCNC: 139 MMOL/L — SIGNIFICANT CHANGE UP (ref 135–146)
SODIUM SERPL-SCNC: 140 MMOL/L — SIGNIFICANT CHANGE UP (ref 135–146)
SODIUM SERPL-SCNC: 141 MMOL/L — SIGNIFICANT CHANGE UP (ref 135–146)
SODIUM SERPL-SCNC: 143 MMOL/L — SIGNIFICANT CHANGE UP (ref 135–146)
SODIUM UR-SCNC: 52 MMOL/L — SIGNIFICANT CHANGE UP
SODIUM UR-SCNC: <20 MMOL/L — SIGNIFICANT CHANGE UP
SP GR SPEC: 1.02 — SIGNIFICANT CHANGE UP (ref 1.01–1.03)
SP GR SPEC: 1.03 — SIGNIFICANT CHANGE UP (ref 1.01–1.03)
SPECIMEN SOURCE: SIGNIFICANT CHANGE UP
TRIGL SERPL-MCNC: 35 MG/DL — SIGNIFICANT CHANGE UP
TROPONIN T SERPL-MCNC: 0.01 NG/ML — SIGNIFICANT CHANGE UP
TROPONIN T SERPL-MCNC: 0.02 NG/ML — HIGH
TROPONIN T SERPL-MCNC: 0.03 NG/ML — CRITICAL HIGH
TSH SERPL-MCNC: 1.49 UIU/ML — SIGNIFICANT CHANGE UP (ref 0.27–4.2)
UROBILINOGEN FLD QL: ABNORMAL
UROBILINOGEN FLD QL: SIGNIFICANT CHANGE UP
UUN UR-MCNC: 1375 MG/DL — SIGNIFICANT CHANGE UP
VIT B12 SERPL-MCNC: 1479 PG/ML — HIGH (ref 232–1245)
WBC # BLD: 10.23 K/UL — SIGNIFICANT CHANGE UP (ref 4.8–10.8)
WBC # BLD: 10.27 K/UL — SIGNIFICANT CHANGE UP (ref 4.8–10.8)
WBC # BLD: 10.31 K/UL — SIGNIFICANT CHANGE UP (ref 4.8–10.8)
WBC # BLD: 10.44 K/UL — SIGNIFICANT CHANGE UP (ref 4.8–10.8)
WBC # BLD: 10.57 K/UL — SIGNIFICANT CHANGE UP (ref 4.8–10.8)
WBC # BLD: 10.71 K/UL — SIGNIFICANT CHANGE UP (ref 4.8–10.8)
WBC # BLD: 10.79 K/UL — SIGNIFICANT CHANGE UP (ref 4.8–10.8)
WBC # BLD: 10.98 K/UL — HIGH (ref 4.8–10.8)
WBC # BLD: 11.16 K/UL — HIGH (ref 4.8–10.8)
WBC # BLD: 11.3 K/UL — HIGH (ref 4.8–10.8)
WBC # BLD: 11.41 K/UL — HIGH (ref 4.8–10.8)
WBC # BLD: 11.54 K/UL — HIGH (ref 4.8–10.8)
WBC # BLD: 12.47 K/UL — HIGH (ref 4.8–10.8)
WBC # BLD: 12.55 K/UL — HIGH (ref 4.8–10.8)
WBC # BLD: 13.24 K/UL — HIGH (ref 4.8–10.8)
WBC # BLD: 13.25 K/UL — HIGH (ref 4.8–10.8)
WBC # BLD: 13.31 K/UL — HIGH (ref 4.8–10.8)
WBC # BLD: 14.39 K/UL — HIGH (ref 4.8–10.8)
WBC # BLD: 14.7 K/UL — HIGH (ref 4.8–10.8)
WBC # BLD: 14.81 K/UL — HIGH (ref 4.8–10.8)
WBC # BLD: 14.91 K/UL — HIGH (ref 4.8–10.8)
WBC # BLD: 15.42 K/UL — HIGH (ref 4.8–10.8)
WBC # BLD: 15.65 K/UL — HIGH (ref 4.8–10.8)
WBC # BLD: 15.8 K/UL — HIGH (ref 4.8–10.8)
WBC # BLD: 15.92 K/UL — HIGH (ref 4.8–10.8)
WBC # BLD: 16.57 K/UL — HIGH (ref 4.8–10.8)
WBC # BLD: 17.2 K/UL — HIGH (ref 4.8–10.8)
WBC # BLD: 17.47 K/UL — HIGH (ref 4.8–10.8)
WBC # BLD: 17.88 K/UL — HIGH (ref 4.8–10.8)
WBC # BLD: 28.69 K/UL — HIGH (ref 4.8–10.8)
WBC # BLD: 29.72 K/UL — HIGH (ref 4.8–10.8)
WBC # BLD: 30.98 K/UL — HIGH (ref 4.8–10.8)
WBC # BLD: 7.04 K/UL — SIGNIFICANT CHANGE UP (ref 4.8–10.8)
WBC # BLD: 7.51 K/UL — SIGNIFICANT CHANGE UP (ref 4.8–10.8)
WBC # BLD: 7.92 K/UL — SIGNIFICANT CHANGE UP (ref 4.8–10.8)
WBC # BLD: 9.02 K/UL — SIGNIFICANT CHANGE UP (ref 4.8–10.8)
WBC # BLD: 9.56 K/UL — SIGNIFICANT CHANGE UP (ref 4.8–10.8)
WBC # FLD AUTO: 10.23 K/UL — SIGNIFICANT CHANGE UP (ref 4.8–10.8)
WBC # FLD AUTO: 10.27 K/UL — SIGNIFICANT CHANGE UP (ref 4.8–10.8)
WBC # FLD AUTO: 10.31 K/UL — SIGNIFICANT CHANGE UP (ref 4.8–10.8)
WBC # FLD AUTO: 10.44 K/UL — SIGNIFICANT CHANGE UP (ref 4.8–10.8)
WBC # FLD AUTO: 10.57 K/UL — SIGNIFICANT CHANGE UP (ref 4.8–10.8)
WBC # FLD AUTO: 10.71 K/UL — SIGNIFICANT CHANGE UP (ref 4.8–10.8)
WBC # FLD AUTO: 10.79 K/UL — SIGNIFICANT CHANGE UP (ref 4.8–10.8)
WBC # FLD AUTO: 10.98 K/UL — HIGH (ref 4.8–10.8)
WBC # FLD AUTO: 11.16 K/UL — HIGH (ref 4.8–10.8)
WBC # FLD AUTO: 11.3 K/UL — HIGH (ref 4.8–10.8)
WBC # FLD AUTO: 11.41 K/UL — HIGH (ref 4.8–10.8)
WBC # FLD AUTO: 11.54 K/UL — HIGH (ref 4.8–10.8)
WBC # FLD AUTO: 12.47 K/UL — HIGH (ref 4.8–10.8)
WBC # FLD AUTO: 12.55 K/UL — HIGH (ref 4.8–10.8)
WBC # FLD AUTO: 13.24 K/UL — HIGH (ref 4.8–10.8)
WBC # FLD AUTO: 13.25 K/UL — HIGH (ref 4.8–10.8)
WBC # FLD AUTO: 13.31 K/UL — HIGH (ref 4.8–10.8)
WBC # FLD AUTO: 14.39 K/UL — HIGH (ref 4.8–10.8)
WBC # FLD AUTO: 14.7 K/UL — HIGH (ref 4.8–10.8)
WBC # FLD AUTO: 14.81 K/UL — HIGH (ref 4.8–10.8)
WBC # FLD AUTO: 14.91 K/UL — HIGH (ref 4.8–10.8)
WBC # FLD AUTO: 15.42 K/UL — HIGH (ref 4.8–10.8)
WBC # FLD AUTO: 15.65 K/UL — HIGH (ref 4.8–10.8)
WBC # FLD AUTO: 15.8 K/UL — HIGH (ref 4.8–10.8)
WBC # FLD AUTO: 15.92 K/UL — HIGH (ref 4.8–10.8)
WBC # FLD AUTO: 16.57 K/UL — HIGH (ref 4.8–10.8)
WBC # FLD AUTO: 17.2 K/UL — HIGH (ref 4.8–10.8)
WBC # FLD AUTO: 17.47 K/UL — HIGH (ref 4.8–10.8)
WBC # FLD AUTO: 17.88 K/UL — HIGH (ref 4.8–10.8)
WBC # FLD AUTO: 28.69 K/UL — HIGH (ref 4.8–10.8)
WBC # FLD AUTO: 29.72 K/UL — HIGH (ref 4.8–10.8)
WBC # FLD AUTO: 30.98 K/UL — HIGH (ref 4.8–10.8)
WBC # FLD AUTO: 7.04 K/UL — SIGNIFICANT CHANGE UP (ref 4.8–10.8)
WBC # FLD AUTO: 7.51 K/UL — SIGNIFICANT CHANGE UP (ref 4.8–10.8)
WBC # FLD AUTO: 7.92 K/UL — SIGNIFICANT CHANGE UP (ref 4.8–10.8)
WBC # FLD AUTO: 9.02 K/UL — SIGNIFICANT CHANGE UP (ref 4.8–10.8)
WBC # FLD AUTO: 9.56 K/UL — SIGNIFICANT CHANGE UP (ref 4.8–10.8)
WBC UR QL: 1 /HPF — SIGNIFICANT CHANGE UP (ref 0–5)
WBC UR QL: 36 /HPF — HIGH (ref 0–5)

## 2022-01-01 PROCEDURE — 70551 MRI BRAIN STEM W/O DYE: CPT | Mod: 26

## 2022-01-01 PROCEDURE — 99233 SBSQ HOSP IP/OBS HIGH 50: CPT

## 2022-01-01 PROCEDURE — 99251: CPT

## 2022-01-01 PROCEDURE — 99231 SBSQ HOSP IP/OBS SF/LOW 25: CPT

## 2022-01-01 PROCEDURE — 97597 DBRDMT OPN WND 1ST 20 CM/<: CPT

## 2022-01-01 PROCEDURE — 99285 EMERGENCY DEPT VISIT HI MDM: CPT

## 2022-01-01 PROCEDURE — ZZZZZ: CPT

## 2022-01-01 PROCEDURE — 99232 SBSQ HOSP IP/OBS MODERATE 35: CPT

## 2022-01-01 PROCEDURE — 71045 X-RAY EXAM CHEST 1 VIEW: CPT | Mod: 26

## 2022-01-01 PROCEDURE — 74018 RADEX ABDOMEN 1 VIEW: CPT | Mod: 26

## 2022-01-01 PROCEDURE — 93010 ELECTROCARDIOGRAM REPORT: CPT

## 2022-01-01 PROCEDURE — 99222 1ST HOSP IP/OBS MODERATE 55: CPT

## 2022-01-01 PROCEDURE — 93010 ELECTROCARDIOGRAM REPORT: CPT | Mod: 76

## 2022-01-01 PROCEDURE — 71250 CT THORAX DX C-: CPT | Mod: 26,MA

## 2022-01-01 PROCEDURE — 95819 EEG AWAKE AND ASLEEP: CPT | Mod: 26

## 2022-01-01 PROCEDURE — 74018 RADEX ABDOMEN 1 VIEW: CPT | Mod: 26,76

## 2022-01-01 PROCEDURE — 99223 1ST HOSP IP/OBS HIGH 75: CPT

## 2022-01-01 PROCEDURE — 99239 HOSP IP/OBS DSCHRG MGMT >30: CPT

## 2022-01-01 PROCEDURE — 99221 1ST HOSP IP/OBS SF/LOW 40: CPT

## 2022-01-01 PROCEDURE — 99254 IP/OBS CNSLTJ NEW/EST MOD 60: CPT

## 2022-01-01 PROCEDURE — 70450 CT HEAD/BRAIN W/O DYE: CPT | Mod: 26,MA

## 2022-01-01 PROCEDURE — 11043 DBRDMT MUSC&/FSCA 1ST 20/<: CPT

## 2022-01-01 PROCEDURE — 11046 DBRDMT MUSC&/FSCA EA ADDL: CPT

## 2022-01-01 PROCEDURE — 99252 IP/OBS CONSLTJ NEW/EST SF 35: CPT

## 2022-01-01 PROCEDURE — 74176 CT ABD & PELVIS W/O CONTRAST: CPT | Mod: 26

## 2022-01-01 PROCEDURE — 99253 IP/OBS CNSLTJ NEW/EST LOW 45: CPT

## 2022-01-01 PROCEDURE — 99291 CRITICAL CARE FIRST HOUR: CPT

## 2022-01-01 PROCEDURE — 74177 CT ABD & PELVIS W/CONTRAST: CPT | Mod: 26,MA

## 2022-01-01 PROCEDURE — 99497 ADVNCD CARE PLAN 30 MIN: CPT

## 2022-01-01 PROCEDURE — 99238 HOSP IP/OBS DSCHRG MGMT 30/<: CPT | Mod: 25

## 2022-01-01 PROCEDURE — 93306 TTE W/DOPPLER COMPLETE: CPT | Mod: 26

## 2022-01-01 RX ORDER — SENNA PLUS 8.6 MG/1
2 TABLET ORAL AT BEDTIME
Refills: 0 | Status: DISCONTINUED | OUTPATIENT
Start: 2022-01-01 | End: 2022-01-01

## 2022-01-01 RX ORDER — CARBAMAZEPINE 200 MG
300 TABLET ORAL
Refills: 0 | Status: DISCONTINUED | OUTPATIENT
Start: 2022-01-01 | End: 2022-01-01

## 2022-01-01 RX ORDER — LACTULOSE 10 G/15ML
15 SOLUTION ORAL
Qty: 0 | Refills: 0 | DISCHARGE
Start: 2022-01-01

## 2022-01-01 RX ORDER — ATROPINE SULFATE 1 %
1 DROPS OPHTHALMIC (EYE) EVERY 8 HOURS
Refills: 0 | Status: DISCONTINUED | OUTPATIENT
Start: 2022-01-01 | End: 2022-01-01

## 2022-01-01 RX ORDER — SIMVASTATIN 20 MG/1
10 TABLET, FILM COATED ORAL AT BEDTIME
Refills: 0 | Status: DISCONTINUED | OUTPATIENT
Start: 2022-01-01 | End: 2022-01-01

## 2022-01-01 RX ORDER — SODIUM CHLORIDE 9 MG/ML
1000 INJECTION, SOLUTION INTRAVENOUS ONCE
Refills: 0 | Status: COMPLETED | OUTPATIENT
Start: 2022-01-01 | End: 2022-01-01

## 2022-01-01 RX ORDER — ASCORBIC ACID 60 MG
1 TABLET,CHEWABLE ORAL
Qty: 0 | Refills: 0 | DISCHARGE
Start: 2022-01-01

## 2022-01-01 RX ORDER — FOLIC ACID 0.8 MG
1 TABLET ORAL DAILY
Refills: 0 | Status: DISCONTINUED | OUTPATIENT
Start: 2022-01-01 | End: 2022-01-01

## 2022-01-01 RX ORDER — IOHEXOL 300 MG/ML
30 INJECTION, SOLUTION INTRAVENOUS ONCE
Refills: 0 | Status: DISCONTINUED | OUTPATIENT
Start: 2022-01-01 | End: 2022-01-01

## 2022-01-01 RX ORDER — MINERAL OIL
30 OIL (ML) MISCELLANEOUS DAILY
Refills: 0 | Status: DISCONTINUED | OUTPATIENT
Start: 2022-01-01 | End: 2022-01-01

## 2022-01-01 RX ORDER — ATROPINE SULFATE 1 %
1 DROPS OPHTHALMIC (EYE)
Qty: 42 | Refills: 0
Start: 2022-01-01 | End: 2022-08-25

## 2022-01-01 RX ORDER — SODIUM CHLORIDE 9 MG/ML
1000 INJECTION INTRAMUSCULAR; INTRAVENOUS; SUBCUTANEOUS
Refills: 0 | Status: DISCONTINUED | OUTPATIENT
Start: 2022-01-01 | End: 2022-01-01

## 2022-01-01 RX ORDER — ESOMEPRAZOLE MAGNESIUM 40 MG/1
1 CAPSULE, DELAYED RELEASE ORAL
Qty: 0 | Refills: 0 | DISCHARGE

## 2022-01-01 RX ORDER — AMPICILLIN SODIUM AND SULBACTAM SODIUM 250; 125 MG/ML; MG/ML
3 INJECTION, POWDER, FOR SUSPENSION INTRAMUSCULAR; INTRAVENOUS EVERY 6 HOURS
Refills: 0 | Status: COMPLETED | OUTPATIENT
Start: 2022-01-01 | End: 2022-01-01

## 2022-01-01 RX ORDER — COLLAGENASE CLOSTRIDIUM HIST. 250 UNIT/G
1 OINTMENT (GRAM) TOPICAL
Refills: 0 | Status: DISCONTINUED | OUTPATIENT
Start: 2022-01-01 | End: 2022-01-01

## 2022-01-01 RX ORDER — ZINC SULFATE TAB 220 MG (50 MG ZINC EQUIVALENT) 220 (50 ZN) MG
220 TAB ORAL DAILY
Refills: 0 | Status: COMPLETED | OUTPATIENT
Start: 2022-01-01 | End: 2022-01-01

## 2022-01-01 RX ORDER — SCOPALAMINE 1 MG/3D
1 PATCH, EXTENDED RELEASE TRANSDERMAL
Qty: 0 | Refills: 0 | DISCHARGE
Start: 2022-01-01

## 2022-01-01 RX ORDER — RISPERIDONE 4 MG/1
1 TABLET ORAL
Qty: 0 | Refills: 0 | DISCHARGE

## 2022-01-01 RX ORDER — HYDROMORPHONE HYDROCHLORIDE 2 MG/ML
0.5 INJECTION INTRAMUSCULAR; INTRAVENOUS; SUBCUTANEOUS
Refills: 0 | Status: DISCONTINUED | OUTPATIENT
Start: 2022-01-01 | End: 2022-01-01

## 2022-01-01 RX ORDER — POLYETHYLENE GLYCOL 3350 17 G/17G
17 POWDER, FOR SOLUTION ORAL
Refills: 0 | Status: DISCONTINUED | OUTPATIENT
Start: 2022-01-01 | End: 2022-01-01

## 2022-01-01 RX ORDER — CHLORHEXIDINE GLUCONATE 213 G/1000ML
1 SOLUTION TOPICAL
Refills: 0 | Status: DISCONTINUED | OUTPATIENT
Start: 2022-01-01 | End: 2022-01-01

## 2022-01-01 RX ORDER — AZTREONAM 2 G
2000 VIAL (EA) INJECTION EVERY 8 HOURS
Refills: 0 | Status: DISCONTINUED | OUTPATIENT
Start: 2022-01-01 | End: 2022-01-01

## 2022-01-01 RX ORDER — SODIUM HYPOCHLORITE 0.125 %
1 SOLUTION, NON-ORAL MISCELLANEOUS
Refills: 0 | Status: DISCONTINUED | OUTPATIENT
Start: 2022-01-01 | End: 2022-01-01

## 2022-01-01 RX ORDER — MORPHINE SULFATE 50 MG/1
0.25 CAPSULE, EXTENDED RELEASE ORAL
Qty: 0 | Refills: 0 | DISCHARGE

## 2022-01-01 RX ORDER — DIATRIZOATE MEGLUMINE 180 MG/ML
50 INJECTION, SOLUTION INTRAVESICAL ONCE
Refills: 0 | Status: COMPLETED | OUTPATIENT
Start: 2022-01-01 | End: 2022-01-01

## 2022-01-01 RX ORDER — LACTULOSE 10 G/15ML
10 SOLUTION ORAL
Refills: 0 | Status: DISCONTINUED | OUTPATIENT
Start: 2022-01-01 | End: 2022-01-01

## 2022-01-01 RX ORDER — ERYTHROMYCIN ETHYLSUCCINATE 400 MG
250 TABLET ORAL
Refills: 0 | Status: DISCONTINUED | OUTPATIENT
Start: 2022-01-01 | End: 2022-01-01

## 2022-01-01 RX ORDER — AMPICILLIN SODIUM AND SULBACTAM SODIUM 250; 125 MG/ML; MG/ML
INJECTION, POWDER, FOR SUSPENSION INTRAMUSCULAR; INTRAVENOUS
Refills: 0 | Status: DISCONTINUED | OUTPATIENT
Start: 2022-01-01 | End: 2022-01-01

## 2022-01-01 RX ORDER — SENNA PLUS 8.6 MG/1
2 TABLET ORAL
Qty: 0 | Refills: 0 | DISCHARGE
Start: 2022-01-01

## 2022-01-01 RX ORDER — ACETAMINOPHEN 500 MG
650 TABLET ORAL EVERY 6 HOURS
Refills: 0 | Status: DISCONTINUED | OUTPATIENT
Start: 2022-01-01 | End: 2022-01-01

## 2022-01-01 RX ORDER — SODIUM HYPOCHLORITE 0.125 %
1 SOLUTION, NON-ORAL MISCELLANEOUS THREE TIMES A DAY
Refills: 0 | Status: DISCONTINUED | OUTPATIENT
Start: 2022-01-01 | End: 2022-01-01

## 2022-01-01 RX ORDER — SODIUM CHLORIDE 9 MG/ML
1000 INJECTION, SOLUTION INTRAVENOUS
Refills: 0 | Status: DISCONTINUED | OUTPATIENT
Start: 2022-01-01 | End: 2022-01-01

## 2022-01-01 RX ORDER — CARBAMAZEPINE 200 MG
5 TABLET ORAL
Qty: 0 | Refills: 0 | DISCHARGE

## 2022-01-01 RX ORDER — AZTREONAM 2 G
2000 VIAL (EA) INJECTION ONCE
Refills: 0 | Status: DISCONTINUED | OUTPATIENT
Start: 2022-01-01 | End: 2022-01-01

## 2022-01-01 RX ORDER — MINERAL OIL
30 OIL (ML) MISCELLANEOUS
Qty: 0 | Refills: 0 | DISCHARGE
Start: 2022-01-01

## 2022-01-01 RX ORDER — LANOLIN ALCOHOL/MO/W.PET/CERES
3 CREAM (GRAM) TOPICAL AT BEDTIME
Refills: 0 | Status: DISCONTINUED | OUTPATIENT
Start: 2022-01-01 | End: 2022-01-01

## 2022-01-01 RX ORDER — GLUCAGON INJECTION, SOLUTION 0.5 MG/.1ML
1 INJECTION, SOLUTION SUBCUTANEOUS ONCE
Refills: 0 | Status: DISCONTINUED | OUTPATIENT
Start: 2022-01-01 | End: 2022-01-01

## 2022-01-01 RX ORDER — AMPICILLIN SODIUM AND SULBACTAM SODIUM 250; 125 MG/ML; MG/ML
3 INJECTION, POWDER, FOR SUSPENSION INTRAMUSCULAR; INTRAVENOUS ONCE
Refills: 0 | Status: COMPLETED | OUTPATIENT
Start: 2022-01-01 | End: 2022-01-01

## 2022-01-01 RX ORDER — SODIUM HYPOCHLORITE 0.125 %
1 SOLUTION, NON-ORAL MISCELLANEOUS
Qty: 0 | Refills: 3 | DISCHARGE
Start: 2022-01-01 | End: 2022-01-01

## 2022-01-01 RX ORDER — PIPERACILLIN AND TAZOBACTAM 4; .5 G/20ML; G/20ML
3.75 INJECTION, POWDER, LYOPHILIZED, FOR SOLUTION INTRAVENOUS ONCE
Refills: 0 | Status: COMPLETED | OUTPATIENT
Start: 2022-01-01 | End: 2022-01-01

## 2022-01-01 RX ORDER — ASCORBIC ACID 60 MG
500 TABLET,CHEWABLE ORAL DAILY
Refills: 0 | Status: COMPLETED | OUTPATIENT
Start: 2022-01-01 | End: 2022-01-01

## 2022-01-01 RX ORDER — METRONIDAZOLE 500 MG
500 TABLET ORAL EVERY 8 HOURS
Refills: 0 | Status: DISCONTINUED | OUTPATIENT
Start: 2022-01-01 | End: 2022-01-01

## 2022-01-01 RX ORDER — NEOMYCIN SULFATE 500 MG/1
500 TABLET ORAL
Refills: 0 | Status: COMPLETED | OUTPATIENT
Start: 2022-01-01 | End: 2022-01-01

## 2022-01-01 RX ORDER — POTASSIUM CHLORIDE 20 MEQ
20 PACKET (EA) ORAL ONCE
Refills: 0 | Status: COMPLETED | OUTPATIENT
Start: 2022-01-01 | End: 2022-01-01

## 2022-01-01 RX ORDER — RISPERIDONE 4 MG/1
1 TABLET ORAL DAILY
Refills: 0 | Status: DISCONTINUED | OUTPATIENT
Start: 2022-01-01 | End: 2022-01-01

## 2022-01-01 RX ORDER — DIATRIZOATE MEGLUMINE 180 MG/ML
30 INJECTION, SOLUTION INTRAVESICAL ONCE
Refills: 0 | Status: COMPLETED | OUTPATIENT
Start: 2022-01-01 | End: 2022-01-01

## 2022-01-01 RX ORDER — SODIUM CHLORIDE 9 MG/ML
100 INJECTION INTRAMUSCULAR; INTRAVENOUS; SUBCUTANEOUS ONCE
Refills: 0 | Status: COMPLETED | OUTPATIENT
Start: 2022-01-01 | End: 2022-01-01

## 2022-01-01 RX ORDER — LABETALOL HCL 100 MG
10 TABLET ORAL ONCE
Refills: 0 | Status: COMPLETED | OUTPATIENT
Start: 2022-01-01 | End: 2022-01-01

## 2022-01-01 RX ORDER — SENNA PLUS 8.6 MG/1
2 TABLET ORAL
Qty: 60 | Refills: 0
Start: 2022-01-01 | End: 2022-01-01

## 2022-01-01 RX ORDER — ZINC SULFATE TAB 220 MG (50 MG ZINC EQUIVALENT) 220 (50 ZN) MG
1 TAB ORAL
Qty: 0 | Refills: 0 | DISCHARGE
Start: 2022-01-01

## 2022-01-01 RX ORDER — SUCRALFATE 1 G
1 TABLET ORAL
Refills: 0 | Status: DISCONTINUED | OUTPATIENT
Start: 2022-01-01 | End: 2022-01-01

## 2022-01-01 RX ORDER — MORPHINE SULFATE 50 MG/1
2.5 CAPSULE, EXTENDED RELEASE ORAL
Qty: 0 | Refills: 0 | DISCHARGE

## 2022-01-01 RX ORDER — BACLOFEN 100 %
10 POWDER (GRAM) MISCELLANEOUS DAILY
Refills: 0 | Status: DISCONTINUED | OUTPATIENT
Start: 2022-01-01 | End: 2022-01-01

## 2022-01-01 RX ORDER — LACTULOSE 10 G/15ML
10 SOLUTION ORAL EVERY 4 HOURS
Refills: 0 | Status: DISCONTINUED | OUTPATIENT
Start: 2022-01-01 | End: 2022-01-01

## 2022-01-01 RX ORDER — CARBAMAZEPINE 200 MG
200 TABLET ORAL
Refills: 0 | Status: DISCONTINUED | OUTPATIENT
Start: 2022-01-01 | End: 2022-01-01

## 2022-01-01 RX ORDER — ASPIRIN/CALCIUM CARB/MAGNESIUM 324 MG
81 TABLET ORAL DAILY
Refills: 0 | Status: DISCONTINUED | OUTPATIENT
Start: 2022-01-01 | End: 2022-01-01

## 2022-01-01 RX ORDER — MORPHINE SULFATE 50 MG/1
2 CAPSULE, EXTENDED RELEASE ORAL
Refills: 0 | Status: DISCONTINUED | OUTPATIENT
Start: 2022-01-01 | End: 2022-01-01

## 2022-01-01 RX ORDER — MORPHINE SULFATE 50 MG/1
5 CAPSULE, EXTENDED RELEASE ORAL
Refills: 0 | Status: DISCONTINUED | OUTPATIENT
Start: 2022-01-01 | End: 2022-01-01

## 2022-01-01 RX ORDER — MORPHINE SULFATE 50 MG/1
2 CAPSULE, EXTENDED RELEASE ORAL ONCE
Refills: 0 | Status: DISCONTINUED | OUTPATIENT
Start: 2022-01-01 | End: 2022-01-01

## 2022-01-01 RX ORDER — HYDRALAZINE HCL 50 MG
25 TABLET ORAL ONCE
Refills: 0 | Status: COMPLETED | OUTPATIENT
Start: 2022-01-01 | End: 2022-01-01

## 2022-01-01 RX ORDER — ACETAMINOPHEN 500 MG
650 TABLET ORAL ONCE
Refills: 0 | Status: COMPLETED | OUTPATIENT
Start: 2022-01-01 | End: 2022-01-01

## 2022-01-01 RX ORDER — POLYETHYLENE GLYCOL 3350 17 G/17G
17 POWDER, FOR SOLUTION ORAL
Qty: 1020 | Refills: 0
Start: 2022-01-01 | End: 2022-01-01

## 2022-01-01 RX ORDER — CARBAMAZEPINE 200 MG
100 TABLET ORAL EVERY 12 HOURS
Refills: 0 | Status: DISCONTINUED | OUTPATIENT
Start: 2022-01-01 | End: 2022-01-01

## 2022-01-01 RX ORDER — LEVETIRACETAM 250 MG/1
500 TABLET, FILM COATED ORAL EVERY 12 HOURS
Refills: 0 | Status: DISCONTINUED | OUTPATIENT
Start: 2022-01-01 | End: 2022-01-01

## 2022-01-01 RX ORDER — RISPERIDONE 4 MG/1
1 TABLET ORAL
Qty: 0 | Refills: 0 | DISCHARGE
Start: 2022-01-01

## 2022-01-01 RX ORDER — IOHEXOL 300 MG/ML
30 INJECTION, SOLUTION INTRAVENOUS ONCE
Refills: 0 | Status: COMPLETED | OUTPATIENT
Start: 2022-01-01 | End: 2022-01-01

## 2022-01-01 RX ORDER — VANCOMYCIN HCL 1 G
1000 VIAL (EA) INTRAVENOUS EVERY 12 HOURS
Refills: 0 | Status: DISCONTINUED | OUTPATIENT
Start: 2022-01-01 | End: 2022-01-01

## 2022-01-01 RX ORDER — IOHEXOL 300 MG/ML
50 INJECTION, SOLUTION INTRAVENOUS ONCE
Refills: 0 | Status: DISCONTINUED | OUTPATIENT
Start: 2022-01-01 | End: 2022-01-01

## 2022-01-01 RX ORDER — COLLAGENASE CLOSTRIDIUM HIST. 250 UNIT/G
1 OINTMENT (GRAM) TOPICAL
Qty: 5 | Refills: 3
Start: 2022-01-01 | End: 2022-01-01

## 2022-01-01 RX ORDER — HALOPERIDOL DECANOATE 100 MG/ML
0.25 INJECTION INTRAMUSCULAR
Qty: 0 | Refills: 0 | DISCHARGE
Start: 2022-01-01

## 2022-01-01 RX ORDER — SODIUM CHLORIDE 9 MG/ML
1000 INJECTION, SOLUTION INTRAVENOUS ONCE
Refills: 0 | Status: DISCONTINUED | OUTPATIENT
Start: 2022-01-01 | End: 2022-01-01

## 2022-01-01 RX ORDER — AMLODIPINE BESYLATE 2.5 MG/1
1 TABLET ORAL
Qty: 0 | Refills: 0 | DISCHARGE

## 2022-01-01 RX ORDER — DEXTROSE 50 % IN WATER 50 %
50 SYRINGE (ML) INTRAVENOUS ONCE
Refills: 0 | Status: COMPLETED | OUTPATIENT
Start: 2022-01-01 | End: 2022-01-01

## 2022-01-01 RX ORDER — LISINOPRIL 2.5 MG/1
10 TABLET ORAL DAILY
Refills: 0 | Status: DISCONTINUED | OUTPATIENT
Start: 2022-01-01 | End: 2022-01-01

## 2022-01-01 RX ORDER — DEXTROSE 50 % IN WATER 50 %
15 SYRINGE (ML) INTRAVENOUS ONCE
Refills: 0 | Status: DISCONTINUED | OUTPATIENT
Start: 2022-01-01 | End: 2022-01-01

## 2022-01-01 RX ORDER — LEVETIRACETAM 250 MG/1
500 TABLET, FILM COATED ORAL
Refills: 0 | Status: DISCONTINUED | OUTPATIENT
Start: 2022-01-01 | End: 2022-01-01

## 2022-01-01 RX ORDER — CIPROFLOXACIN LACTATE 400MG/40ML
VIAL (ML) INTRAVENOUS
Refills: 0 | Status: DISCONTINUED | OUTPATIENT
Start: 2022-01-01 | End: 2022-01-01

## 2022-01-01 RX ORDER — SODIUM ZIRCONIUM CYCLOSILICATE 10 G/10G
10 POWDER, FOR SUSPENSION ORAL ONCE
Refills: 0 | Status: COMPLETED | OUTPATIENT
Start: 2022-01-01 | End: 2022-01-01

## 2022-01-01 RX ORDER — DEXTROSE 50 % IN WATER 50 %
25 SYRINGE (ML) INTRAVENOUS ONCE
Refills: 0 | Status: DISCONTINUED | OUTPATIENT
Start: 2022-01-01 | End: 2022-01-01

## 2022-01-01 RX ORDER — SODIUM HYPOCHLORITE 0.125 %
1 SOLUTION, NON-ORAL MISCELLANEOUS
Qty: 10 | Refills: 3
Start: 2022-01-01 | End: 2022-01-01

## 2022-01-01 RX ORDER — FOLIC ACID 0.8 MG
1 TABLET ORAL
Qty: 0 | Refills: 0 | DISCHARGE

## 2022-01-01 RX ORDER — HYDROCORTISONE 1 %
1 OINTMENT (GRAM) TOPICAL
Qty: 0 | Refills: 0 | DISCHARGE

## 2022-01-01 RX ORDER — AZTREONAM 2 G
VIAL (EA) INJECTION
Refills: 0 | Status: DISCONTINUED | OUTPATIENT
Start: 2022-01-01 | End: 2022-01-01

## 2022-01-01 RX ORDER — CEFEPIME 1 G/1
2000 INJECTION, POWDER, FOR SOLUTION INTRAMUSCULAR; INTRAVENOUS ONCE
Refills: 0 | Status: COMPLETED | OUTPATIENT
Start: 2022-01-01 | End: 2022-01-01

## 2022-01-01 RX ORDER — SUCRALFATE 1 G
10 TABLET ORAL
Qty: 0 | Refills: 0 | DISCHARGE

## 2022-01-01 RX ORDER — NOREPINEPHRINE BITARTRATE/D5W 8 MG/250ML
0.05 PLASTIC BAG, INJECTION (ML) INTRAVENOUS
Qty: 8 | Refills: 0 | Status: DISCONTINUED | OUTPATIENT
Start: 2022-01-01 | End: 2022-01-01

## 2022-01-01 RX ORDER — BNT162B2 0.23 MG/2.25ML
0.3 INJECTION, SUSPENSION INTRAMUSCULAR ONCE
Refills: 0 | Status: COMPLETED | OUTPATIENT
Start: 2022-01-01 | End: 2022-01-01

## 2022-01-01 RX ORDER — ONDANSETRON 8 MG/1
4 TABLET, FILM COATED ORAL EVERY 6 HOURS
Refills: 0 | Status: DISCONTINUED | OUTPATIENT
Start: 2022-01-01 | End: 2022-01-01

## 2022-01-01 RX ORDER — LACTULOSE 10 G/15ML
15 SOLUTION ORAL
Qty: 0 | Refills: 0 | DISCHARGE
Start: 2022-01-01 | End: 2022-01-01

## 2022-01-01 RX ORDER — MIDAZOLAM HYDROCHLORIDE 1 MG/ML
1 INJECTION, SOLUTION INTRAMUSCULAR; INTRAVENOUS
Refills: 0 | Status: DISCONTINUED | OUTPATIENT
Start: 2022-01-01 | End: 2022-01-01

## 2022-01-01 RX ORDER — AMLODIPINE BESYLATE 2.5 MG/1
10 TABLET ORAL DAILY
Refills: 0 | Status: DISCONTINUED | OUTPATIENT
Start: 2022-01-01 | End: 2022-01-01

## 2022-01-01 RX ORDER — INSULIN LISPRO 100/ML
VIAL (ML) SUBCUTANEOUS EVERY 6 HOURS
Refills: 0 | Status: DISCONTINUED | OUTPATIENT
Start: 2022-01-01 | End: 2022-01-01

## 2022-01-01 RX ORDER — SCOPALAMINE 1 MG/3D
1 PATCH, EXTENDED RELEASE TRANSDERMAL
Refills: 0 | Status: DISCONTINUED | OUTPATIENT
Start: 2022-01-01 | End: 2022-01-01

## 2022-01-01 RX ORDER — CARBAMAZEPINE 200 MG
15 TABLET ORAL
Qty: 900 | Refills: 0
Start: 2022-01-01 | End: 2022-01-01

## 2022-01-01 RX ORDER — HALOPERIDOL DECANOATE 100 MG/ML
0.5 INJECTION INTRAMUSCULAR EVERY 6 HOURS
Refills: 0 | Status: DISCONTINUED | OUTPATIENT
Start: 2022-01-01 | End: 2022-01-01

## 2022-01-01 RX ORDER — ROBINUL 0.2 MG/ML
0.4 INJECTION INTRAMUSCULAR; INTRAVENOUS
Refills: 0 | Status: DISCONTINUED | OUTPATIENT
Start: 2022-01-01 | End: 2022-01-01

## 2022-01-01 RX ORDER — LISINOPRIL 10 MG/1
10 TABLET ORAL DAILY
Qty: 90 | Refills: 1 | Status: ACTIVE | COMMUNITY
Start: 2020-02-07 | End: 1900-01-01

## 2022-01-01 RX ORDER — CEFTRIAXONE 500 MG/1
1000 INJECTION, POWDER, FOR SOLUTION INTRAMUSCULAR; INTRAVENOUS ONCE
Refills: 0 | Status: COMPLETED | OUTPATIENT
Start: 2022-01-01 | End: 2022-01-01

## 2022-01-01 RX ORDER — INSULIN LISPRO 100/ML
1 VIAL (ML) SUBCUTANEOUS
Refills: 0 | Status: DISCONTINUED | OUTPATIENT
Start: 2022-01-01 | End: 2022-01-01

## 2022-01-01 RX ORDER — PANTOPRAZOLE SODIUM 20 MG/1
40 TABLET, DELAYED RELEASE ORAL
Refills: 0 | Status: DISCONTINUED | OUTPATIENT
Start: 2022-01-01 | End: 2022-01-01

## 2022-01-01 RX ORDER — POTASSIUM CHLORIDE 20 MEQ
20 PACKET (EA) ORAL ONCE
Refills: 0 | Status: DISCONTINUED | OUTPATIENT
Start: 2022-01-01 | End: 2022-01-01

## 2022-01-01 RX ORDER — LISINOPRIL 2.5 MG/1
1 TABLET ORAL
Qty: 0 | Refills: 0 | DISCHARGE

## 2022-01-01 RX ORDER — VANCOMYCIN HCL 1 G
1000 VIAL (EA) INTRAVENOUS ONCE
Refills: 0 | Status: COMPLETED | OUTPATIENT
Start: 2022-01-01 | End: 2022-01-01

## 2022-01-01 RX ORDER — SOD SULF/SODIUM/NAHCO3/KCL/PEG
4000 SOLUTION, RECONSTITUTED, ORAL ORAL ONCE
Refills: 0 | Status: COMPLETED | OUTPATIENT
Start: 2022-01-01 | End: 2022-01-01

## 2022-01-01 RX ORDER — CHLORHEXIDINE GLUCONATE 213 G/1000ML
1 SOLUTION TOPICAL ONCE
Refills: 0 | Status: DISCONTINUED | OUTPATIENT
Start: 2022-01-01 | End: 2022-01-01

## 2022-01-01 RX ORDER — ASPIRIN/CALCIUM CARB/MAGNESIUM 324 MG
1 TABLET ORAL
Qty: 0 | Refills: 0 | DISCHARGE
Start: 2022-01-01

## 2022-01-01 RX ORDER — LACTULOSE 10 G/15ML
10 SOLUTION ORAL THREE TIMES A DAY
Refills: 0 | Status: DISCONTINUED | OUTPATIENT
Start: 2022-01-01 | End: 2022-01-01

## 2022-01-01 RX ORDER — SODIUM CHLORIDE 9 MG/ML
2100 INJECTION, SOLUTION INTRAVENOUS ONCE
Refills: 0 | Status: COMPLETED | OUTPATIENT
Start: 2022-01-01 | End: 2022-01-01

## 2022-01-01 RX ORDER — DEXTROSE 50 % IN WATER 50 %
12.5 SYRINGE (ML) INTRAVENOUS ONCE
Refills: 0 | Status: DISCONTINUED | OUTPATIENT
Start: 2022-01-01 | End: 2022-01-01

## 2022-01-01 RX ORDER — POLYETHYLENE GLYCOL 3350 17 G/17G
17 POWDER, FOR SOLUTION ORAL
Qty: 0 | Refills: 0 | DISCHARGE
Start: 2022-01-01

## 2022-01-01 RX ORDER — MORPHINE SULFATE 50 MG/1
2 CAPSULE, EXTENDED RELEASE ORAL EVERY 4 HOURS
Refills: 0 | Status: DISCONTINUED | OUTPATIENT
Start: 2022-01-01 | End: 2022-01-01

## 2022-01-01 RX ORDER — ENOXAPARIN SODIUM 100 MG/ML
40 INJECTION SUBCUTANEOUS EVERY 24 HOURS
Refills: 0 | Status: DISCONTINUED | OUTPATIENT
Start: 2022-01-01 | End: 2022-01-01

## 2022-01-01 RX ORDER — POTASSIUM CHLORIDE 20 MEQ
20 PACKET (EA) ORAL EVERY 4 HOURS
Refills: 0 | Status: COMPLETED | OUTPATIENT
Start: 2022-01-01 | End: 2022-01-01

## 2022-01-01 RX ORDER — CIPROFLOXACIN LACTATE 400MG/40ML
400 VIAL (ML) INTRAVENOUS EVERY 8 HOURS
Refills: 0 | Status: DISCONTINUED | OUTPATIENT
Start: 2022-01-01 | End: 2022-01-01

## 2022-01-01 RX ORDER — BACLOFEN 100 %
1 POWDER (GRAM) MISCELLANEOUS
Qty: 0 | Refills: 0 | DISCHARGE

## 2022-01-01 RX ORDER — AMPICILLIN SODIUM AND SULBACTAM SODIUM 250; 125 MG/ML; MG/ML
3 INJECTION, POWDER, FOR SUSPENSION INTRAMUSCULAR; INTRAVENOUS EVERY 6 HOURS
Refills: 0 | Status: DISCONTINUED | OUTPATIENT
Start: 2022-01-01 | End: 2022-01-01

## 2022-01-01 RX ORDER — ONDANSETRON 8 MG/1
4 TABLET, FILM COATED ORAL ONCE
Refills: 0 | Status: COMPLETED | OUTPATIENT
Start: 2022-01-01 | End: 2022-01-01

## 2022-01-01 RX ORDER — MINERAL OIL
30 OIL (ML) MISCELLANEOUS
Qty: 900 | Refills: 0
Start: 2022-01-01 | End: 2022-01-01

## 2022-01-01 RX ORDER — ROBINUL 0.2 MG/ML
0.2 INJECTION INTRAMUSCULAR; INTRAVENOUS EVERY 6 HOURS
Refills: 0 | Status: DISCONTINUED | OUTPATIENT
Start: 2022-01-01 | End: 2022-01-01

## 2022-01-01 RX ORDER — LISINOPRIL 2.5 MG/1
30 TABLET ORAL DAILY
Refills: 0 | Status: DISCONTINUED | OUTPATIENT
Start: 2022-01-01 | End: 2022-01-01

## 2022-01-01 RX ORDER — CEFTRIAXONE 500 MG/1
2000 INJECTION, POWDER, FOR SOLUTION INTRAMUSCULAR; INTRAVENOUS EVERY 24 HOURS
Refills: 0 | Status: COMPLETED | OUTPATIENT
Start: 2022-01-01 | End: 2022-01-01

## 2022-01-01 RX ORDER — DIATRIZOATE MEGLUMINE 180 MG/ML
650 INJECTION, SOLUTION INTRAVESICAL ONCE
Refills: 0 | Status: DISCONTINUED | OUTPATIENT
Start: 2022-01-01 | End: 2022-01-01

## 2022-01-01 RX ORDER — ATORVASTATIN CALCIUM 80 MG/1
80 TABLET, FILM COATED ORAL AT BEDTIME
Refills: 0 | Status: DISCONTINUED | OUTPATIENT
Start: 2022-01-01 | End: 2022-01-01

## 2022-01-01 RX ORDER — CEFEPIME 1 G/1
2000 INJECTION, POWDER, FOR SOLUTION INTRAMUSCULAR; INTRAVENOUS EVERY 8 HOURS
Refills: 0 | Status: DISCONTINUED | OUTPATIENT
Start: 2022-01-01 | End: 2022-01-01

## 2022-01-01 RX ORDER — INSULIN GLARGINE 100 [IU]/ML
1 INJECTION, SOLUTION SUBCUTANEOUS AT BEDTIME
Refills: 0 | Status: DISCONTINUED | OUTPATIENT
Start: 2022-01-01 | End: 2022-01-01

## 2022-01-01 RX ORDER — LACTULOSE 10 G/15ML
15 SOLUTION ORAL
Qty: 900 | Refills: 0
Start: 2022-01-01 | End: 2022-01-01

## 2022-01-01 RX ORDER — AZITHROMYCIN 500 MG/1
500 TABLET, FILM COATED ORAL ONCE
Refills: 0 | Status: COMPLETED | OUTPATIENT
Start: 2022-01-01 | End: 2022-01-01

## 2022-01-01 RX ORDER — MINERAL OIL
133 OIL (ML) MISCELLANEOUS ONCE
Refills: 0 | Status: COMPLETED | OUTPATIENT
Start: 2022-01-01 | End: 2022-01-01

## 2022-01-01 RX ORDER — LEVETIRACETAM 250 MG/1
1000 TABLET, FILM COATED ORAL ONCE
Refills: 0 | Status: COMPLETED | OUTPATIENT
Start: 2022-01-01 | End: 2022-01-01

## 2022-01-01 RX ORDER — CIPROFLOXACIN LACTATE 400MG/40ML
400 VIAL (ML) INTRAVENOUS ONCE
Refills: 0 | Status: COMPLETED | OUTPATIENT
Start: 2022-01-01 | End: 2022-01-01

## 2022-01-01 RX ORDER — DIATRIZOATE MEGLUMINE 180 MG/ML
30 INJECTION, SOLUTION INTRAVESICAL ONCE
Refills: 0 | Status: DISCONTINUED | OUTPATIENT
Start: 2022-01-01 | End: 2022-01-01

## 2022-01-01 RX ORDER — MIDAZOLAM HYDROCHLORIDE 1 MG/ML
2 INJECTION, SOLUTION INTRAMUSCULAR; INTRAVENOUS
Refills: 0 | Status: DISCONTINUED | OUTPATIENT
Start: 2022-01-01 | End: 2022-01-01

## 2022-01-01 RX ORDER — CARBAMAZEPINE 200 MG
200 TABLET ORAL EVERY 12 HOURS
Refills: 0 | Status: DISCONTINUED | OUTPATIENT
Start: 2022-01-01 | End: 2022-01-01

## 2022-01-01 RX ORDER — PIPERACILLIN AND TAZOBACTAM 4; .5 G/20ML; G/20ML
3.38 INJECTION, POWDER, LYOPHILIZED, FOR SOLUTION INTRAVENOUS EVERY 8 HOURS
Refills: 0 | Status: DISCONTINUED | OUTPATIENT
Start: 2022-01-01 | End: 2022-01-01

## 2022-01-01 RX ORDER — HYDROMORPHONE HYDROCHLORIDE 2 MG/ML
0.5 INJECTION INTRAMUSCULAR; INTRAVENOUS; SUBCUTANEOUS ONCE
Refills: 0 | Status: DISCONTINUED | OUTPATIENT
Start: 2022-01-01 | End: 2022-01-01

## 2022-01-01 RX ORDER — HEPARIN SODIUM 5000 [USP'U]/ML
5000 INJECTION INTRAVENOUS; SUBCUTANEOUS EVERY 8 HOURS
Refills: 0 | Status: DISCONTINUED | OUTPATIENT
Start: 2022-01-01 | End: 2022-01-01

## 2022-01-01 RX ORDER — CEFEPIME 1 G/1
INJECTION, POWDER, FOR SOLUTION INTRAMUSCULAR; INTRAVENOUS
Refills: 0 | Status: DISCONTINUED | OUTPATIENT
Start: 2022-01-01 | End: 2022-01-01

## 2022-01-01 RX ORDER — METRONIDAZOLE 500 MG
500 TABLET ORAL ONCE
Refills: 0 | Status: COMPLETED | OUTPATIENT
Start: 2022-01-01 | End: 2022-01-01

## 2022-01-01 RX ORDER — RISPERIDONE 4 MG/1
1 TABLET ORAL
Refills: 0 | Status: DISCONTINUED | OUTPATIENT
Start: 2022-01-01 | End: 2022-01-01

## 2022-01-01 RX ORDER — DIATRIZOATE MEGLUMINE 180 MG/ML
500 INJECTION, SOLUTION INTRAVESICAL ONCE
Refills: 0 | Status: DISCONTINUED | OUTPATIENT
Start: 2022-01-01 | End: 2022-01-01

## 2022-01-01 RX ORDER — DOCUSATE SODIUM 100 MG
1 CAPSULE ORAL
Qty: 0 | Refills: 0 | DISCHARGE

## 2022-01-01 RX ORDER — ENOXAPARIN SODIUM 100 MG/ML
40 INJECTION SUBCUTANEOUS DAILY
Refills: 0 | Status: DISCONTINUED | OUTPATIENT
Start: 2022-01-01 | End: 2022-01-01

## 2022-01-01 RX ORDER — LEVETIRACETAM 250 MG/1
5 TABLET, FILM COATED ORAL
Qty: 0 | Refills: 0 | DISCHARGE
Start: 2022-01-01

## 2022-01-01 RX ORDER — POTASSIUM CHLORIDE 20 MEQ
40 PACKET (EA) ORAL ONCE
Refills: 0 | Status: COMPLETED | OUTPATIENT
Start: 2022-01-01 | End: 2022-01-01

## 2022-01-01 RX ADMIN — Medication 1 APPLICATION(S): at 05:25

## 2022-01-01 RX ADMIN — HYDROMORPHONE HYDROCHLORIDE 0.5 MILLIGRAM(S): 2 INJECTION INTRAMUSCULAR; INTRAVENOUS; SUBCUTANEOUS at 15:46

## 2022-01-01 RX ADMIN — Medication 500 MILLIGRAM(S): at 22:26

## 2022-01-01 RX ADMIN — Medication 1 TABLET(S): at 11:52

## 2022-01-01 RX ADMIN — Medication 500 MILLIGRAM(S): at 21:10

## 2022-01-01 RX ADMIN — AMPICILLIN SODIUM AND SULBACTAM SODIUM 200 GRAM(S): 250; 125 INJECTION, POWDER, FOR SUSPENSION INTRAMUSCULAR; INTRAVENOUS at 05:22

## 2022-01-01 RX ADMIN — Medication 1 APPLICATION(S): at 05:14

## 2022-01-01 RX ADMIN — Medication 1 GRAM(S): at 17:36

## 2022-01-01 RX ADMIN — Medication 10 MILLIGRAM(S): at 11:50

## 2022-01-01 RX ADMIN — AMPICILLIN SODIUM AND SULBACTAM SODIUM 200 GRAM(S): 250; 125 INJECTION, POWDER, FOR SUSPENSION INTRAMUSCULAR; INTRAVENOUS at 11:33

## 2022-01-01 RX ADMIN — CHLORHEXIDINE GLUCONATE 1 APPLICATION(S): 213 SOLUTION TOPICAL at 05:54

## 2022-01-01 RX ADMIN — Medication 100 MILLIGRAM(S): at 06:25

## 2022-01-01 RX ADMIN — Medication 100 MILLIGRAM(S): at 18:13

## 2022-01-01 RX ADMIN — Medication 500 MILLIGRAM(S): at 13:38

## 2022-01-01 RX ADMIN — Medication 500 MILLIGRAM(S): at 12:15

## 2022-01-01 RX ADMIN — Medication 100 MILLIGRAM(S): at 18:05

## 2022-01-01 RX ADMIN — Medication 10 MILLIGRAM(S): at 12:17

## 2022-01-01 RX ADMIN — Medication 81 MILLIGRAM(S): at 11:25

## 2022-01-01 RX ADMIN — Medication 5 MILLIGRAM(S): at 06:22

## 2022-01-01 RX ADMIN — Medication 200 MILLIGRAM(S): at 16:38

## 2022-01-01 RX ADMIN — Medication 200 MILLIGRAM(S): at 17:38

## 2022-01-01 RX ADMIN — Medication 1 GRAM(S): at 05:15

## 2022-01-01 RX ADMIN — LACTULOSE 10 GRAM(S): 10 SOLUTION ORAL at 17:54

## 2022-01-01 RX ADMIN — SIMVASTATIN 10 MILLIGRAM(S): 20 TABLET, FILM COATED ORAL at 22:13

## 2022-01-01 RX ADMIN — POLYETHYLENE GLYCOL 3350 17 GRAM(S): 17 POWDER, FOR SOLUTION ORAL at 05:54

## 2022-01-01 RX ADMIN — CEFEPIME 100 MILLIGRAM(S): 1 INJECTION, POWDER, FOR SOLUTION INTRAMUSCULAR; INTRAVENOUS at 13:55

## 2022-01-01 RX ADMIN — Medication 40 MILLIEQUIVALENT(S): at 11:56

## 2022-01-01 RX ADMIN — CEFEPIME 100 MILLIGRAM(S): 1 INJECTION, POWDER, FOR SOLUTION INTRAMUSCULAR; INTRAVENOUS at 06:25

## 2022-01-01 RX ADMIN — LACTULOSE 10 GRAM(S): 10 SOLUTION ORAL at 11:52

## 2022-01-01 RX ADMIN — CHLORHEXIDINE GLUCONATE 1 APPLICATION(S): 213 SOLUTION TOPICAL at 05:23

## 2022-01-01 RX ADMIN — CHLORHEXIDINE GLUCONATE 1 APPLICATION(S): 213 SOLUTION TOPICAL at 05:08

## 2022-01-01 RX ADMIN — CHLORHEXIDINE GLUCONATE 1 APPLICATION(S): 213 SOLUTION TOPICAL at 05:03

## 2022-01-01 RX ADMIN — Medication 1 MILLIGRAM(S): at 12:35

## 2022-01-01 RX ADMIN — HEPARIN SODIUM 5000 UNIT(S): 5000 INJECTION INTRAVENOUS; SUBCUTANEOUS at 22:13

## 2022-01-01 RX ADMIN — Medication 1 TABLET(S): at 12:00

## 2022-01-01 RX ADMIN — Medication 1 TABLET(S): at 05:42

## 2022-01-01 RX ADMIN — SIMVASTATIN 10 MILLIGRAM(S): 20 TABLET, FILM COATED ORAL at 22:40

## 2022-01-01 RX ADMIN — POLYETHYLENE GLYCOL 3350 17 GRAM(S): 17 POWDER, FOR SOLUTION ORAL at 05:35

## 2022-01-01 RX ADMIN — Medication 500 MILLIGRAM(S): at 11:52

## 2022-01-01 RX ADMIN — Medication 1 GRAM(S): at 18:44

## 2022-01-01 RX ADMIN — Medication 1 APPLICATION(S): at 06:18

## 2022-01-01 RX ADMIN — Medication 1 APPLICATION(S): at 07:01

## 2022-01-01 RX ADMIN — MORPHINE SULFATE 5 MILLIGRAM(S): 50 CAPSULE, EXTENDED RELEASE ORAL at 14:00

## 2022-01-01 RX ADMIN — HEPARIN SODIUM 5000 UNIT(S): 5000 INJECTION INTRAVENOUS; SUBCUTANEOUS at 17:49

## 2022-01-01 RX ADMIN — Medication 1 APPLICATION(S): at 22:25

## 2022-01-01 RX ADMIN — LEVETIRACETAM 400 MILLIGRAM(S): 250 TABLET, FILM COATED ORAL at 05:03

## 2022-01-01 RX ADMIN — Medication 10 MILLIGRAM(S): at 12:02

## 2022-01-01 RX ADMIN — ONDANSETRON 4 MILLIGRAM(S): 8 TABLET, FILM COATED ORAL at 05:18

## 2022-01-01 RX ADMIN — HEPARIN SODIUM 5000 UNIT(S): 5000 INJECTION INTRAVENOUS; SUBCUTANEOUS at 22:07

## 2022-01-01 RX ADMIN — CEFTRIAXONE 1000 MILLIGRAM(S): 500 INJECTION, POWDER, FOR SOLUTION INTRAMUSCULAR; INTRAVENOUS at 17:00

## 2022-01-01 RX ADMIN — Medication 300 MILLIGRAM(S): at 18:07

## 2022-01-01 RX ADMIN — POLYETHYLENE GLYCOL 3350 17 GRAM(S): 17 POWDER, FOR SOLUTION ORAL at 17:28

## 2022-01-01 RX ADMIN — Medication 30 MILLILITER(S): at 12:17

## 2022-01-01 RX ADMIN — SIMVASTATIN 10 MILLIGRAM(S): 20 TABLET, FILM COATED ORAL at 21:30

## 2022-01-01 RX ADMIN — Medication 5 MILLIGRAM(S): at 05:01

## 2022-01-01 RX ADMIN — LACTULOSE 10 GRAM(S): 10 SOLUTION ORAL at 05:23

## 2022-01-01 RX ADMIN — Medication 1 GRAM(S): at 18:05

## 2022-01-01 RX ADMIN — Medication 1 TABLET(S): at 12:16

## 2022-01-01 RX ADMIN — MORPHINE SULFATE 2 MILLIGRAM(S): 50 CAPSULE, EXTENDED RELEASE ORAL at 10:50

## 2022-01-01 RX ADMIN — HEPARIN SODIUM 5000 UNIT(S): 5000 INJECTION INTRAVENOUS; SUBCUTANEOUS at 15:54

## 2022-01-01 RX ADMIN — Medication 1 APPLICATION(S): at 05:08

## 2022-01-01 RX ADMIN — SENNA PLUS 2 TABLET(S): 8.6 TABLET ORAL at 22:46

## 2022-01-01 RX ADMIN — HYDROMORPHONE HYDROCHLORIDE 0.5 MILLIGRAM(S): 2 INJECTION INTRAMUSCULAR; INTRAVENOUS; SUBCUTANEOUS at 19:23

## 2022-01-01 RX ADMIN — Medication 30 MILLILITER(S): at 12:35

## 2022-01-01 RX ADMIN — HEPARIN SODIUM 5000 UNIT(S): 5000 INJECTION INTRAVENOUS; SUBCUTANEOUS at 21:33

## 2022-01-01 RX ADMIN — POLYETHYLENE GLYCOL 3350 17 GRAM(S): 17 POWDER, FOR SOLUTION ORAL at 17:56

## 2022-01-01 RX ADMIN — Medication 1 MILLIGRAM(S): at 11:51

## 2022-01-01 RX ADMIN — SIMVASTATIN 10 MILLIGRAM(S): 20 TABLET, FILM COATED ORAL at 22:07

## 2022-01-01 RX ADMIN — SENNA PLUS 2 TABLET(S): 8.6 TABLET ORAL at 21:28

## 2022-01-01 RX ADMIN — Medication 1 APPLICATION(S): at 18:03

## 2022-01-01 RX ADMIN — Medication 1 TABLET(S): at 13:23

## 2022-01-01 RX ADMIN — CEFEPIME 100 MILLIGRAM(S): 1 INJECTION, POWDER, FOR SOLUTION INTRAMUSCULAR; INTRAVENOUS at 05:08

## 2022-01-01 RX ADMIN — CEFTRIAXONE 100 MILLIGRAM(S): 500 INJECTION, POWDER, FOR SOLUTION INTRAMUSCULAR; INTRAVENOUS at 06:20

## 2022-01-01 RX ADMIN — AZITHROMYCIN 255 MILLIGRAM(S): 500 TABLET, FILM COATED ORAL at 16:32

## 2022-01-01 RX ADMIN — AMPICILLIN SODIUM AND SULBACTAM SODIUM 200 GRAM(S): 250; 125 INJECTION, POWDER, FOR SUSPENSION INTRAMUSCULAR; INTRAVENOUS at 13:35

## 2022-01-01 RX ADMIN — Medication 1 APPLICATION(S): at 23:23

## 2022-01-01 RX ADMIN — POLYETHYLENE GLYCOL 3350 17 GRAM(S): 17 POWDER, FOR SOLUTION ORAL at 05:44

## 2022-01-01 RX ADMIN — HEPARIN SODIUM 5000 UNIT(S): 5000 INJECTION INTRAVENOUS; SUBCUTANEOUS at 06:37

## 2022-01-01 RX ADMIN — PANTOPRAZOLE SODIUM 40 MILLIGRAM(S): 20 TABLET, DELAYED RELEASE ORAL at 05:12

## 2022-01-01 RX ADMIN — AMPICILLIN SODIUM AND SULBACTAM SODIUM 200 GRAM(S): 250; 125 INJECTION, POWDER, FOR SUSPENSION INTRAMUSCULAR; INTRAVENOUS at 03:13

## 2022-01-01 RX ADMIN — SODIUM CHLORIDE 100 MILLILITER(S): 9 INJECTION INTRAMUSCULAR; INTRAVENOUS; SUBCUTANEOUS at 05:45

## 2022-01-01 RX ADMIN — PANTOPRAZOLE SODIUM 40 MILLIGRAM(S): 20 TABLET, DELAYED RELEASE ORAL at 06:10

## 2022-01-01 RX ADMIN — CHLORHEXIDINE GLUCONATE 1 APPLICATION(S): 213 SOLUTION TOPICAL at 05:15

## 2022-01-01 RX ADMIN — Medication 1 TABLET(S): at 18:08

## 2022-01-01 RX ADMIN — Medication 200 MILLIGRAM(S): at 05:35

## 2022-01-01 RX ADMIN — LACTULOSE 10 GRAM(S): 10 SOLUTION ORAL at 05:43

## 2022-01-01 RX ADMIN — HYDROMORPHONE HYDROCHLORIDE 0.5 MILLIGRAM(S): 2 INJECTION INTRAMUSCULAR; INTRAVENOUS; SUBCUTANEOUS at 21:29

## 2022-01-01 RX ADMIN — SODIUM CHLORIDE 10 MILLILITER(S): 9 INJECTION INTRAMUSCULAR; INTRAVENOUS; SUBCUTANEOUS at 15:42

## 2022-01-01 RX ADMIN — Medication 1 APPLICATION(S): at 17:47

## 2022-01-01 RX ADMIN — Medication 1 APPLICATION(S): at 05:33

## 2022-01-01 RX ADMIN — Medication 500 MILLIGRAM(S): at 15:27

## 2022-01-01 RX ADMIN — Medication 1 APPLICATION(S): at 05:42

## 2022-01-01 RX ADMIN — LISINOPRIL 10 MILLIGRAM(S): 2.5 TABLET ORAL at 05:40

## 2022-01-01 RX ADMIN — CHLORHEXIDINE GLUCONATE 1 APPLICATION(S): 213 SOLUTION TOPICAL at 05:10

## 2022-01-01 RX ADMIN — HEPARIN SODIUM 5000 UNIT(S): 5000 INJECTION INTRAVENOUS; SUBCUTANEOUS at 22:28

## 2022-01-01 RX ADMIN — Medication 1 GRAM(S): at 17:28

## 2022-01-01 RX ADMIN — Medication 200 MILLIGRAM(S): at 05:39

## 2022-01-01 RX ADMIN — Medication 300 MILLIGRAM(S): at 06:47

## 2022-01-01 RX ADMIN — LISINOPRIL 10 MILLIGRAM(S): 2.5 TABLET ORAL at 05:19

## 2022-01-01 RX ADMIN — HEPARIN SODIUM 5000 UNIT(S): 5000 INJECTION INTRAVENOUS; SUBCUTANEOUS at 05:43

## 2022-01-01 RX ADMIN — Medication 1 GRAM(S): at 05:05

## 2022-01-01 RX ADMIN — SIMVASTATIN 10 MILLIGRAM(S): 20 TABLET, FILM COATED ORAL at 23:23

## 2022-01-01 RX ADMIN — Medication 1 MILLIGRAM(S): at 12:32

## 2022-01-01 RX ADMIN — HEPARIN SODIUM 5000 UNIT(S): 5000 INJECTION INTRAVENOUS; SUBCUTANEOUS at 15:58

## 2022-01-01 RX ADMIN — Medication 200 MILLIGRAM(S): at 17:32

## 2022-01-01 RX ADMIN — Medication 1 GRAM(S): at 05:12

## 2022-01-01 RX ADMIN — HEPARIN SODIUM 5000 UNIT(S): 5000 INJECTION INTRAVENOUS; SUBCUTANEOUS at 13:07

## 2022-01-01 RX ADMIN — Medication 200 MILLIGRAM(S): at 05:22

## 2022-01-01 RX ADMIN — CEFEPIME 100 MILLIGRAM(S): 1 INJECTION, POWDER, FOR SOLUTION INTRAMUSCULAR; INTRAVENOUS at 23:06

## 2022-01-01 RX ADMIN — LACTULOSE 10 GRAM(S): 10 SOLUTION ORAL at 18:37

## 2022-01-01 RX ADMIN — Medication 500 MILLIGRAM(S): at 12:40

## 2022-01-01 RX ADMIN — SODIUM CHLORIDE 75 MILLILITER(S): 9 INJECTION, SOLUTION INTRAVENOUS at 03:47

## 2022-01-01 RX ADMIN — Medication 1 APPLICATION(S): at 18:08

## 2022-01-01 RX ADMIN — LACTULOSE 10 GRAM(S): 10 SOLUTION ORAL at 05:34

## 2022-01-01 RX ADMIN — Medication 50 MILLILITER(S): at 11:53

## 2022-01-01 RX ADMIN — Medication 30 MILLILITER(S): at 11:26

## 2022-01-01 RX ADMIN — Medication 1 TABLET(S): at 11:05

## 2022-01-01 RX ADMIN — LEVETIRACETAM 400 MILLIGRAM(S): 250 TABLET, FILM COATED ORAL at 05:08

## 2022-01-01 RX ADMIN — Medication 1 APPLICATION(S): at 17:01

## 2022-01-01 RX ADMIN — Medication 1 APPLICATION(S): at 05:06

## 2022-01-01 RX ADMIN — HEPARIN SODIUM 5000 UNIT(S): 5000 INJECTION INTRAVENOUS; SUBCUTANEOUS at 07:43

## 2022-01-01 RX ADMIN — LACTULOSE 10 GRAM(S): 10 SOLUTION ORAL at 18:06

## 2022-01-01 RX ADMIN — SIMVASTATIN 10 MILLIGRAM(S): 20 TABLET, FILM COATED ORAL at 21:58

## 2022-01-01 RX ADMIN — Medication 100 MILLIGRAM(S): at 05:04

## 2022-01-01 RX ADMIN — CEFTRIAXONE 100 MILLIGRAM(S): 500 INJECTION, POWDER, FOR SOLUTION INTRAMUSCULAR; INTRAVENOUS at 07:09

## 2022-01-01 RX ADMIN — Medication 30 MILLILITER(S): at 11:37

## 2022-01-01 RX ADMIN — SODIUM ZIRCONIUM CYCLOSILICATE 10 GRAM(S): 10 POWDER, FOR SUSPENSION ORAL at 18:04

## 2022-01-01 RX ADMIN — ZINC SULFATE TAB 220 MG (50 MG ZINC EQUIVALENT) 220 MILLIGRAM(S): 220 (50 ZN) TAB at 12:44

## 2022-01-01 RX ADMIN — Medication 1 GRAM(S): at 18:04

## 2022-01-01 RX ADMIN — LISINOPRIL 10 MILLIGRAM(S): 2.5 TABLET ORAL at 05:45

## 2022-01-01 RX ADMIN — CHLORHEXIDINE GLUCONATE 1 APPLICATION(S): 213 SOLUTION TOPICAL at 05:45

## 2022-01-01 RX ADMIN — Medication 500 MILLIGRAM(S): at 06:24

## 2022-01-01 RX ADMIN — Medication 200 MILLIGRAM(S): at 18:07

## 2022-01-01 RX ADMIN — SENNA PLUS 2 TABLET(S): 8.6 TABLET ORAL at 22:58

## 2022-01-01 RX ADMIN — SENNA PLUS 2 TABLET(S): 8.6 TABLET ORAL at 21:20

## 2022-01-01 RX ADMIN — CEFEPIME 100 MILLIGRAM(S): 1 INJECTION, POWDER, FOR SOLUTION INTRAMUSCULAR; INTRAVENOUS at 21:32

## 2022-01-01 RX ADMIN — Medication 81 MILLIGRAM(S): at 13:50

## 2022-01-01 RX ADMIN — Medication 500 MILLIGRAM(S): at 14:47

## 2022-01-01 RX ADMIN — SODIUM CHLORIDE 75 MILLILITER(S): 9 INJECTION, SOLUTION INTRAVENOUS at 21:31

## 2022-01-01 RX ADMIN — CHLORHEXIDINE GLUCONATE 1 APPLICATION(S): 213 SOLUTION TOPICAL at 05:05

## 2022-01-01 RX ADMIN — Medication 1 TABLET(S): at 17:19

## 2022-01-01 RX ADMIN — LEVETIRACETAM 400 MILLIGRAM(S): 250 TABLET, FILM COATED ORAL at 18:37

## 2022-01-01 RX ADMIN — Medication 1 APPLICATION(S): at 13:02

## 2022-01-01 RX ADMIN — Medication 1 GRAM(S): at 05:44

## 2022-01-01 RX ADMIN — Medication 100 MILLIGRAM(S): at 17:29

## 2022-01-01 RX ADMIN — LEVETIRACETAM 400 MILLIGRAM(S): 250 TABLET, FILM COATED ORAL at 18:04

## 2022-01-01 RX ADMIN — Medication 10 MILLIGRAM(S): at 11:33

## 2022-01-01 RX ADMIN — CHLORHEXIDINE GLUCONATE 1 APPLICATION(S): 213 SOLUTION TOPICAL at 06:24

## 2022-01-01 RX ADMIN — Medication 1 APPLICATION(S): at 17:34

## 2022-01-01 RX ADMIN — HEPARIN SODIUM 5000 UNIT(S): 5000 INJECTION INTRAVENOUS; SUBCUTANEOUS at 13:01

## 2022-01-01 RX ADMIN — Medication 1 MILLIGRAM(S): at 12:23

## 2022-01-01 RX ADMIN — Medication 1 TABLET(S): at 11:26

## 2022-01-01 RX ADMIN — Medication 5 MILLIGRAM(S): at 05:03

## 2022-01-01 RX ADMIN — SENNA PLUS 2 TABLET(S): 8.6 TABLET ORAL at 21:29

## 2022-01-01 RX ADMIN — CEFEPIME 100 MILLIGRAM(S): 1 INJECTION, POWDER, FOR SOLUTION INTRAMUSCULAR; INTRAVENOUS at 22:29

## 2022-01-01 RX ADMIN — SIMVASTATIN 10 MILLIGRAM(S): 20 TABLET, FILM COATED ORAL at 21:20

## 2022-01-01 RX ADMIN — HEPARIN SODIUM 5000 UNIT(S): 5000 INJECTION INTRAVENOUS; SUBCUTANEOUS at 05:13

## 2022-01-01 RX ADMIN — Medication 100 MILLIGRAM(S): at 05:44

## 2022-01-01 RX ADMIN — Medication 200 MILLIGRAM(S): at 05:55

## 2022-01-01 RX ADMIN — Medication 81 MILLIGRAM(S): at 11:37

## 2022-01-01 RX ADMIN — IOHEXOL 30 MILLILITER(S): 300 INJECTION, SOLUTION INTRAVENOUS at 20:11

## 2022-01-01 RX ADMIN — CHLORHEXIDINE GLUCONATE 1 APPLICATION(S): 213 SOLUTION TOPICAL at 05:50

## 2022-01-01 RX ADMIN — Medication 81 MILLIGRAM(S): at 13:09

## 2022-01-01 RX ADMIN — PANTOPRAZOLE SODIUM 40 MILLIGRAM(S): 20 TABLET, DELAYED RELEASE ORAL at 06:33

## 2022-01-01 RX ADMIN — LACTULOSE 10 GRAM(S): 10 SOLUTION ORAL at 05:35

## 2022-01-01 RX ADMIN — Medication 1 APPLICATION(S): at 06:53

## 2022-01-01 RX ADMIN — ONDANSETRON 4 MILLIGRAM(S): 8 TABLET, FILM COATED ORAL at 14:49

## 2022-01-01 RX ADMIN — Medication 1 APPLICATION(S): at 05:53

## 2022-01-01 RX ADMIN — LEVETIRACETAM 400 MILLIGRAM(S): 250 TABLET, FILM COATED ORAL at 17:29

## 2022-01-01 RX ADMIN — Medication 500 MILLIGRAM(S): at 21:20

## 2022-01-01 RX ADMIN — LEVETIRACETAM 400 MILLIGRAM(S): 250 TABLET, FILM COATED ORAL at 05:02

## 2022-01-01 RX ADMIN — Medication 200 MILLIGRAM(S): at 05:46

## 2022-01-01 RX ADMIN — Medication 1 TABLET(S): at 17:49

## 2022-01-01 RX ADMIN — Medication 1 MILLIGRAM(S): at 11:12

## 2022-01-01 RX ADMIN — Medication 100 MILLIGRAM(S): at 18:17

## 2022-01-01 RX ADMIN — Medication 50 MILLIEQUIVALENT(S): at 23:13

## 2022-01-01 RX ADMIN — Medication 1 APPLICATION(S): at 17:51

## 2022-01-01 RX ADMIN — HEPARIN SODIUM 5000 UNIT(S): 5000 INJECTION INTRAVENOUS; SUBCUTANEOUS at 15:59

## 2022-01-01 RX ADMIN — Medication 100 MILLIGRAM(S): at 05:14

## 2022-01-01 RX ADMIN — Medication 1 APPLICATION(S): at 13:24

## 2022-01-01 RX ADMIN — PANTOPRAZOLE SODIUM 40 MILLIGRAM(S): 20 TABLET, DELAYED RELEASE ORAL at 06:24

## 2022-01-01 RX ADMIN — HEPARIN SODIUM 5000 UNIT(S): 5000 INJECTION INTRAVENOUS; SUBCUTANEOUS at 13:02

## 2022-01-01 RX ADMIN — Medication 1 MILLIGRAM(S): at 11:52

## 2022-01-01 RX ADMIN — HEPARIN SODIUM 5000 UNIT(S): 5000 INJECTION INTRAVENOUS; SUBCUTANEOUS at 06:22

## 2022-01-01 RX ADMIN — SODIUM CHLORIDE 75 MILLILITER(S): 9 INJECTION INTRAMUSCULAR; INTRAVENOUS; SUBCUTANEOUS at 09:15

## 2022-01-01 RX ADMIN — Medication 100 MILLIGRAM(S): at 14:14

## 2022-01-01 RX ADMIN — LACTULOSE 10 GRAM(S): 10 SOLUTION ORAL at 06:46

## 2022-01-01 RX ADMIN — SIMVASTATIN 10 MILLIGRAM(S): 20 TABLET, FILM COATED ORAL at 22:26

## 2022-01-01 RX ADMIN — CEFEPIME 100 MILLIGRAM(S): 1 INJECTION, POWDER, FOR SOLUTION INTRAMUSCULAR; INTRAVENOUS at 22:07

## 2022-01-01 RX ADMIN — Medication 500 MILLIGRAM(S): at 15:20

## 2022-01-01 RX ADMIN — PIPERACILLIN AND TAZOBACTAM 25 GRAM(S): 4; .5 INJECTION, POWDER, LYOPHILIZED, FOR SOLUTION INTRAVENOUS at 14:11

## 2022-01-01 RX ADMIN — Medication 500 MILLIGRAM(S): at 22:06

## 2022-01-01 RX ADMIN — LACTULOSE 10 GRAM(S): 10 SOLUTION ORAL at 12:44

## 2022-01-01 RX ADMIN — POLYETHYLENE GLYCOL 3350 17 GRAM(S): 17 POWDER, FOR SOLUTION ORAL at 05:46

## 2022-01-01 RX ADMIN — CEFEPIME 100 MILLIGRAM(S): 1 INJECTION, POWDER, FOR SOLUTION INTRAMUSCULAR; INTRAVENOUS at 22:11

## 2022-01-01 RX ADMIN — Medication 1 TABLET(S): at 12:23

## 2022-01-01 RX ADMIN — CEFEPIME 100 MILLIGRAM(S): 1 INJECTION, POWDER, FOR SOLUTION INTRAMUSCULAR; INTRAVENOUS at 15:59

## 2022-01-01 RX ADMIN — ROBINUL 0.2 MILLIGRAM(S): 0.2 INJECTION INTRAMUSCULAR; INTRAVENOUS at 10:16

## 2022-01-01 RX ADMIN — SCOPALAMINE 1 PATCH: 1 PATCH, EXTENDED RELEASE TRANSDERMAL at 10:15

## 2022-01-01 RX ADMIN — Medication 500 MILLIGRAM(S): at 13:24

## 2022-01-01 RX ADMIN — Medication 1 APPLICATION(S): at 21:12

## 2022-01-01 RX ADMIN — LACTULOSE 10 GRAM(S): 10 SOLUTION ORAL at 18:05

## 2022-01-01 RX ADMIN — CEFTRIAXONE 100 MILLIGRAM(S): 500 INJECTION, POWDER, FOR SOLUTION INTRAMUSCULAR; INTRAVENOUS at 16:31

## 2022-01-01 RX ADMIN — LEVETIRACETAM 400 MILLIGRAM(S): 250 TABLET, FILM COATED ORAL at 17:39

## 2022-01-01 RX ADMIN — AMPICILLIN SODIUM AND SULBACTAM SODIUM 200 GRAM(S): 250; 125 INJECTION, POWDER, FOR SUSPENSION INTRAMUSCULAR; INTRAVENOUS at 05:51

## 2022-01-01 RX ADMIN — Medication 10 MILLIGRAM(S): at 11:39

## 2022-01-01 RX ADMIN — Medication 1 GRAM(S): at 17:23

## 2022-01-01 RX ADMIN — Medication 1 APPLICATION(S): at 17:22

## 2022-01-01 RX ADMIN — Medication 81 MILLIGRAM(S): at 12:16

## 2022-01-01 RX ADMIN — Medication 200 MILLIGRAM(S): at 05:52

## 2022-01-01 RX ADMIN — Medication 1 GRAM(S): at 18:10

## 2022-01-01 RX ADMIN — Medication 1 APPLICATION(S): at 06:30

## 2022-01-01 RX ADMIN — Medication 10 MILLIGRAM(S): at 11:26

## 2022-01-01 RX ADMIN — SODIUM CHLORIDE 2100 MILLILITER(S): 9 INJECTION, SOLUTION INTRAVENOUS at 23:28

## 2022-01-01 RX ADMIN — Medication 1 APPLICATION(S): at 18:19

## 2022-01-01 RX ADMIN — Medication 133 MILLILITER(S): at 12:40

## 2022-01-01 RX ADMIN — Medication 10 MILLIGRAM(S): at 13:53

## 2022-01-01 RX ADMIN — AMLODIPINE BESYLATE 10 MILLIGRAM(S): 2.5 TABLET ORAL at 05:52

## 2022-01-01 RX ADMIN — Medication 10 MILLIGRAM(S): at 11:37

## 2022-01-01 RX ADMIN — AMLODIPINE BESYLATE 10 MILLIGRAM(S): 2.5 TABLET ORAL at 05:05

## 2022-01-01 RX ADMIN — SENNA PLUS 2 TABLET(S): 8.6 TABLET ORAL at 23:18

## 2022-01-01 RX ADMIN — AMLODIPINE BESYLATE 10 MILLIGRAM(S): 2.5 TABLET ORAL at 07:43

## 2022-01-01 RX ADMIN — CEFEPIME 100 MILLIGRAM(S): 1 INJECTION, POWDER, FOR SOLUTION INTRAMUSCULAR; INTRAVENOUS at 14:18

## 2022-01-01 RX ADMIN — DIATRIZOATE MEGLUMINE 50 MILLILITER(S): 180 INJECTION, SOLUTION INTRAVESICAL at 17:35

## 2022-01-01 RX ADMIN — Medication 10 MILLIGRAM(S): at 13:09

## 2022-01-01 RX ADMIN — SODIUM CHLORIDE 75 MILLILITER(S): 9 INJECTION, SOLUTION INTRAVENOUS at 09:58

## 2022-01-01 RX ADMIN — Medication 1 DROP(S): at 17:53

## 2022-01-01 RX ADMIN — HEPARIN SODIUM 5000 UNIT(S): 5000 INJECTION INTRAVENOUS; SUBCUTANEOUS at 05:01

## 2022-01-01 RX ADMIN — Medication 200 MILLIGRAM(S): at 05:38

## 2022-01-01 RX ADMIN — Medication 500 MILLIGRAM(S): at 05:36

## 2022-01-01 RX ADMIN — LACTULOSE 10 GRAM(S): 10 SOLUTION ORAL at 05:52

## 2022-01-01 RX ADMIN — HEPARIN SODIUM 5000 UNIT(S): 5000 INJECTION INTRAVENOUS; SUBCUTANEOUS at 23:13

## 2022-01-01 RX ADMIN — SODIUM CHLORIDE 75 MILLILITER(S): 9 INJECTION, SOLUTION INTRAVENOUS at 15:31

## 2022-01-01 RX ADMIN — Medication 4000 MILLILITER(S): at 22:56

## 2022-01-01 RX ADMIN — SENNA PLUS 2 TABLET(S): 8.6 TABLET ORAL at 21:06

## 2022-01-01 RX ADMIN — Medication 1 TABLET(S): at 06:45

## 2022-01-01 RX ADMIN — ENOXAPARIN SODIUM 40 MILLIGRAM(S): 100 INJECTION SUBCUTANEOUS at 07:00

## 2022-01-01 RX ADMIN — Medication 100 MILLIGRAM(S): at 05:11

## 2022-01-01 RX ADMIN — BNT162B2 0.3 MILLILITER(S): 0.23 INJECTION, SUSPENSION INTRAMUSCULAR at 18:33

## 2022-01-01 RX ADMIN — CHLORHEXIDINE GLUCONATE 1 APPLICATION(S): 213 SOLUTION TOPICAL at 06:19

## 2022-01-01 RX ADMIN — Medication 81 MILLIGRAM(S): at 12:35

## 2022-01-01 RX ADMIN — LACTULOSE 10 GRAM(S): 10 SOLUTION ORAL at 05:44

## 2022-01-01 RX ADMIN — SIMVASTATIN 10 MILLIGRAM(S): 20 TABLET, FILM COATED ORAL at 22:58

## 2022-01-01 RX ADMIN — Medication 100 MILLIGRAM(S): at 07:42

## 2022-01-01 RX ADMIN — SODIUM CHLORIDE 1000 MILLILITER(S): 9 INJECTION, SOLUTION INTRAVENOUS at 13:22

## 2022-01-01 RX ADMIN — Medication 1 MILLIGRAM(S): at 11:05

## 2022-01-01 RX ADMIN — LACTULOSE 10 GRAM(S): 10 SOLUTION ORAL at 18:13

## 2022-01-01 RX ADMIN — HEPARIN SODIUM 5000 UNIT(S): 5000 INJECTION INTRAVENOUS; SUBCUTANEOUS at 15:09

## 2022-01-01 RX ADMIN — Medication 1 APPLICATION(S): at 17:24

## 2022-01-01 RX ADMIN — CEFEPIME 100 MILLIGRAM(S): 1 INJECTION, POWDER, FOR SOLUTION INTRAMUSCULAR; INTRAVENOUS at 15:57

## 2022-01-01 RX ADMIN — LACTULOSE 10 GRAM(S): 10 SOLUTION ORAL at 17:28

## 2022-01-01 RX ADMIN — Medication 500 MILLIGRAM(S): at 13:04

## 2022-01-01 RX ADMIN — CEFTRIAXONE 100 MILLIGRAM(S): 500 INJECTION, POWDER, FOR SOLUTION INTRAMUSCULAR; INTRAVENOUS at 05:36

## 2022-01-01 RX ADMIN — ENOXAPARIN SODIUM 40 MILLIGRAM(S): 100 INJECTION SUBCUTANEOUS at 11:26

## 2022-01-01 RX ADMIN — Medication 1 APPLICATION(S): at 06:38

## 2022-01-01 RX ADMIN — ZINC SULFATE TAB 220 MG (50 MG ZINC EQUIVALENT) 220 MILLIGRAM(S): 220 (50 ZN) TAB at 11:05

## 2022-01-01 RX ADMIN — Medication 1 APPLICATION(S): at 06:16

## 2022-01-01 RX ADMIN — LACTULOSE 10 GRAM(S): 10 SOLUTION ORAL at 01:41

## 2022-01-01 RX ADMIN — Medication 10 MILLIGRAM(S): at 11:25

## 2022-01-01 RX ADMIN — Medication 500 MILLIGRAM(S): at 05:55

## 2022-01-01 RX ADMIN — Medication 5 MILLIGRAM(S): at 05:24

## 2022-01-01 RX ADMIN — Medication 250 MILLIGRAM(S): at 07:49

## 2022-01-01 RX ADMIN — ENOXAPARIN SODIUM 40 MILLIGRAM(S): 100 INJECTION SUBCUTANEOUS at 11:40

## 2022-01-01 RX ADMIN — HEPARIN SODIUM 5000 UNIT(S): 5000 INJECTION INTRAVENOUS; SUBCUTANEOUS at 05:24

## 2022-01-01 RX ADMIN — CHLORHEXIDINE GLUCONATE 1 APPLICATION(S): 213 SOLUTION TOPICAL at 05:39

## 2022-01-01 RX ADMIN — HEPARIN SODIUM 5000 UNIT(S): 5000 INJECTION INTRAVENOUS; SUBCUTANEOUS at 13:13

## 2022-01-01 RX ADMIN — Medication 1 APPLICATION(S): at 05:29

## 2022-01-01 RX ADMIN — LACTULOSE 10 GRAM(S): 10 SOLUTION ORAL at 17:22

## 2022-01-01 RX ADMIN — ENOXAPARIN SODIUM 40 MILLIGRAM(S): 100 INJECTION SUBCUTANEOUS at 12:02

## 2022-01-01 RX ADMIN — ENOXAPARIN SODIUM 40 MILLIGRAM(S): 100 INJECTION SUBCUTANEOUS at 12:18

## 2022-01-01 RX ADMIN — Medication 1 APPLICATION(S): at 06:15

## 2022-01-01 RX ADMIN — Medication 5 MILLIGRAM(S): at 07:42

## 2022-01-01 RX ADMIN — AMPICILLIN SODIUM AND SULBACTAM SODIUM 200 GRAM(S): 250; 125 INJECTION, POWDER, FOR SUSPENSION INTRAMUSCULAR; INTRAVENOUS at 18:37

## 2022-01-01 RX ADMIN — HEPARIN SODIUM 5000 UNIT(S): 5000 INJECTION INTRAVENOUS; SUBCUTANEOUS at 22:26

## 2022-01-01 RX ADMIN — Medication 1 APPLICATION(S): at 18:07

## 2022-01-01 RX ADMIN — PANTOPRAZOLE SODIUM 40 MILLIGRAM(S): 20 TABLET, DELAYED RELEASE ORAL at 07:43

## 2022-01-01 RX ADMIN — Medication 10 MILLIGRAM(S): at 11:32

## 2022-01-01 RX ADMIN — Medication 100 MILLIGRAM(S): at 23:27

## 2022-01-01 RX ADMIN — Medication 5 MILLIGRAM(S): at 05:14

## 2022-01-01 RX ADMIN — LEVETIRACETAM 400 MILLIGRAM(S): 250 TABLET, FILM COATED ORAL at 11:50

## 2022-01-01 RX ADMIN — LISINOPRIL 10 MILLIGRAM(S): 2.5 TABLET ORAL at 05:54

## 2022-01-01 RX ADMIN — Medication 500 MILLIGRAM(S): at 22:46

## 2022-01-01 RX ADMIN — LACTULOSE 10 GRAM(S): 10 SOLUTION ORAL at 17:32

## 2022-01-01 RX ADMIN — HEPARIN SODIUM 5000 UNIT(S): 5000 INJECTION INTRAVENOUS; SUBCUTANEOUS at 05:55

## 2022-01-01 RX ADMIN — POLYETHYLENE GLYCOL 3350 17 GRAM(S): 17 POWDER, FOR SOLUTION ORAL at 06:25

## 2022-01-01 RX ADMIN — HEPARIN SODIUM 5000 UNIT(S): 5000 INJECTION INTRAVENOUS; SUBCUTANEOUS at 21:30

## 2022-01-01 RX ADMIN — LISINOPRIL 10 MILLIGRAM(S): 2.5 TABLET ORAL at 05:46

## 2022-01-01 RX ADMIN — SODIUM CHLORIDE 75 MILLILITER(S): 9 INJECTION, SOLUTION INTRAVENOUS at 22:12

## 2022-01-01 RX ADMIN — LEVETIRACETAM 400 MILLIGRAM(S): 250 TABLET, FILM COATED ORAL at 06:24

## 2022-01-01 RX ADMIN — LACTULOSE 10 GRAM(S): 10 SOLUTION ORAL at 05:38

## 2022-01-01 RX ADMIN — Medication 1 APPLICATION(S): at 05:38

## 2022-01-01 RX ADMIN — Medication 500 MILLIGRAM(S): at 12:35

## 2022-01-01 RX ADMIN — LEVETIRACETAM 400 MILLIGRAM(S): 250 TABLET, FILM COATED ORAL at 17:12

## 2022-01-01 RX ADMIN — SIMVASTATIN 10 MILLIGRAM(S): 20 TABLET, FILM COATED ORAL at 21:53

## 2022-01-01 RX ADMIN — POLYETHYLENE GLYCOL 3350 17 GRAM(S): 17 POWDER, FOR SOLUTION ORAL at 17:54

## 2022-01-01 RX ADMIN — SIMVASTATIN 10 MILLIGRAM(S): 20 TABLET, FILM COATED ORAL at 21:28

## 2022-01-01 RX ADMIN — Medication 1 TABLET(S): at 12:41

## 2022-01-01 RX ADMIN — CEFEPIME 100 MILLIGRAM(S): 1 INJECTION, POWDER, FOR SOLUTION INTRAMUSCULAR; INTRAVENOUS at 06:15

## 2022-01-01 RX ADMIN — Medication 500 MILLIGRAM(S): at 12:23

## 2022-01-01 RX ADMIN — DIATRIZOATE MEGLUMINE 30 MILLILITER(S): 180 INJECTION, SOLUTION INTRAVESICAL at 15:11

## 2022-01-01 RX ADMIN — Medication 1 APPLICATION(S): at 13:11

## 2022-01-01 RX ADMIN — Medication 1 TABLET(S): at 12:35

## 2022-01-01 RX ADMIN — MORPHINE SULFATE 2 MILLIGRAM(S): 50 CAPSULE, EXTENDED RELEASE ORAL at 21:29

## 2022-01-01 RX ADMIN — LISINOPRIL 30 MILLIGRAM(S): 2.5 TABLET ORAL at 07:43

## 2022-01-01 RX ADMIN — Medication 10 MILLIGRAM(S): at 12:35

## 2022-01-01 RX ADMIN — Medication 1 TABLET(S): at 05:52

## 2022-01-01 RX ADMIN — PANTOPRAZOLE SODIUM 40 MILLIGRAM(S): 20 TABLET, DELAYED RELEASE ORAL at 05:24

## 2022-01-01 RX ADMIN — Medication 300 MILLIGRAM(S): at 05:33

## 2022-01-01 RX ADMIN — ENOXAPARIN SODIUM 40 MILLIGRAM(S): 100 INJECTION SUBCUTANEOUS at 05:30

## 2022-01-01 RX ADMIN — Medication 30 MILLILITER(S): at 11:34

## 2022-01-01 RX ADMIN — ZINC SULFATE TAB 220 MG (50 MG ZINC EQUIVALENT) 220 MILLIGRAM(S): 220 (50 ZN) TAB at 12:41

## 2022-01-01 RX ADMIN — Medication 500 MILLIGRAM(S): at 05:45

## 2022-01-01 RX ADMIN — LACTULOSE 10 GRAM(S): 10 SOLUTION ORAL at 02:08

## 2022-01-01 RX ADMIN — Medication 1 APPLICATION(S): at 05:46

## 2022-01-01 RX ADMIN — LACTULOSE 10 GRAM(S): 10 SOLUTION ORAL at 15:58

## 2022-01-01 RX ADMIN — Medication 1 MILLIGRAM(S): at 13:25

## 2022-01-01 RX ADMIN — POLYETHYLENE GLYCOL 3350 17 GRAM(S): 17 POWDER, FOR SOLUTION ORAL at 05:20

## 2022-01-01 RX ADMIN — SENNA PLUS 2 TABLET(S): 8.6 TABLET ORAL at 21:10

## 2022-01-01 RX ADMIN — HEPARIN SODIUM 5000 UNIT(S): 5000 INJECTION INTRAVENOUS; SUBCUTANEOUS at 13:23

## 2022-01-01 RX ADMIN — HEPARIN SODIUM 5000 UNIT(S): 5000 INJECTION INTRAVENOUS; SUBCUTANEOUS at 13:53

## 2022-01-01 RX ADMIN — POLYETHYLENE GLYCOL 3350 17 GRAM(S): 17 POWDER, FOR SOLUTION ORAL at 07:00

## 2022-01-01 RX ADMIN — HEPARIN SODIUM 5000 UNIT(S): 5000 INJECTION INTRAVENOUS; SUBCUTANEOUS at 06:18

## 2022-01-01 RX ADMIN — Medication 300 MILLIGRAM(S): at 07:00

## 2022-01-01 RX ADMIN — Medication 200 MILLIGRAM(S): at 19:46

## 2022-01-01 RX ADMIN — Medication 10 MILLIGRAM(S): at 12:15

## 2022-01-01 RX ADMIN — LEVETIRACETAM 400 MILLIGRAM(S): 250 TABLET, FILM COATED ORAL at 05:42

## 2022-01-01 RX ADMIN — Medication 1 APPLICATION(S): at 13:16

## 2022-01-01 RX ADMIN — Medication 100 MILLIGRAM(S): at 17:37

## 2022-01-01 RX ADMIN — Medication 1 ENEMA: at 12:40

## 2022-01-01 RX ADMIN — Medication 50 MILLIEQUIVALENT(S): at 17:37

## 2022-01-01 RX ADMIN — SODIUM CHLORIDE 75 MILLILITER(S): 9 INJECTION, SOLUTION INTRAVENOUS at 04:39

## 2022-01-01 RX ADMIN — LACTULOSE 10 GRAM(S): 10 SOLUTION ORAL at 06:23

## 2022-01-01 RX ADMIN — LACTULOSE 10 GRAM(S): 10 SOLUTION ORAL at 17:37

## 2022-01-01 RX ADMIN — Medication 100 MILLIGRAM(S): at 17:08

## 2022-01-01 RX ADMIN — HEPARIN SODIUM 5000 UNIT(S): 5000 INJECTION INTRAVENOUS; SUBCUTANEOUS at 22:11

## 2022-01-01 RX ADMIN — Medication 1 APPLICATION(S): at 05:15

## 2022-01-01 RX ADMIN — Medication 30 MILLILITER(S): at 12:18

## 2022-01-01 RX ADMIN — AMLODIPINE BESYLATE 10 MILLIGRAM(S): 2.5 TABLET ORAL at 05:45

## 2022-01-01 RX ADMIN — Medication 1 GRAM(S): at 17:17

## 2022-01-01 RX ADMIN — Medication 1 GRAM(S): at 05:53

## 2022-01-01 RX ADMIN — LEVETIRACETAM 500 MILLIGRAM(S): 250 TABLET, FILM COATED ORAL at 06:28

## 2022-01-01 RX ADMIN — SODIUM CHLORIDE 100 MILLILITER(S): 9 INJECTION, SOLUTION INTRAVENOUS at 11:13

## 2022-01-01 RX ADMIN — SIMVASTATIN 10 MILLIGRAM(S): 20 TABLET, FILM COATED ORAL at 21:29

## 2022-01-01 RX ADMIN — LACTULOSE 10 GRAM(S): 10 SOLUTION ORAL at 16:39

## 2022-01-01 RX ADMIN — ZINC SULFATE TAB 220 MG (50 MG ZINC EQUIVALENT) 220 MILLIGRAM(S): 220 (50 ZN) TAB at 11:52

## 2022-01-01 RX ADMIN — Medication 100 MILLIGRAM(S): at 17:43

## 2022-01-01 RX ADMIN — Medication 500 MILLIGRAM(S): at 23:18

## 2022-01-01 RX ADMIN — Medication 1 MILLIGRAM(S): at 11:59

## 2022-01-01 RX ADMIN — Medication 100 MILLIGRAM(S): at 17:36

## 2022-01-01 RX ADMIN — HEPARIN SODIUM 5000 UNIT(S): 5000 INJECTION INTRAVENOUS; SUBCUTANEOUS at 23:23

## 2022-01-01 RX ADMIN — SENNA PLUS 2 TABLET(S): 8.6 TABLET ORAL at 22:10

## 2022-01-01 RX ADMIN — Medication 200 MILLIGRAM(S): at 05:04

## 2022-01-01 RX ADMIN — PIPERACILLIN AND TAZOBACTAM 25 GRAM(S): 4; .5 INJECTION, POWDER, LYOPHILIZED, FOR SOLUTION INTRAVENOUS at 22:32

## 2022-01-01 RX ADMIN — CHLORHEXIDINE GLUCONATE 1 APPLICATION(S): 213 SOLUTION TOPICAL at 03:24

## 2022-01-01 RX ADMIN — LISINOPRIL 10 MILLIGRAM(S): 2.5 TABLET ORAL at 05:53

## 2022-01-01 RX ADMIN — AMPICILLIN SODIUM AND SULBACTAM SODIUM 200 GRAM(S): 250; 125 INJECTION, POWDER, FOR SUSPENSION INTRAMUSCULAR; INTRAVENOUS at 06:27

## 2022-01-01 RX ADMIN — LEVETIRACETAM 400 MILLIGRAM(S): 250 TABLET, FILM COATED ORAL at 17:00

## 2022-01-01 RX ADMIN — MORPHINE SULFATE 2 MILLIGRAM(S): 50 CAPSULE, EXTENDED RELEASE ORAL at 17:55

## 2022-01-01 RX ADMIN — Medication 1 APPLICATION(S): at 17:23

## 2022-01-01 RX ADMIN — PIPERACILLIN AND TAZOBACTAM 25 GRAM(S): 4; .5 INJECTION, POWDER, LYOPHILIZED, FOR SOLUTION INTRAVENOUS at 22:40

## 2022-01-01 RX ADMIN — CEFEPIME 100 MILLIGRAM(S): 1 INJECTION, POWDER, FOR SOLUTION INTRAMUSCULAR; INTRAVENOUS at 15:53

## 2022-01-01 RX ADMIN — LEVETIRACETAM 400 MILLIGRAM(S): 250 TABLET, FILM COATED ORAL at 06:19

## 2022-01-01 RX ADMIN — Medication 1 APPLICATION(S): at 05:01

## 2022-01-01 RX ADMIN — HEPARIN SODIUM 5000 UNIT(S): 5000 INJECTION INTRAVENOUS; SUBCUTANEOUS at 05:45

## 2022-01-01 RX ADMIN — Medication 1 APPLICATION(S): at 06:03

## 2022-01-01 RX ADMIN — Medication 100 MILLIGRAM(S): at 05:53

## 2022-01-01 RX ADMIN — Medication 1 APPLICATION(S): at 05:58

## 2022-01-01 RX ADMIN — CEFEPIME 100 MILLIGRAM(S): 1 INJECTION, POWDER, FOR SOLUTION INTRAMUSCULAR; INTRAVENOUS at 13:51

## 2022-01-01 RX ADMIN — Medication 30 MILLILITER(S): at 13:35

## 2022-01-01 RX ADMIN — LISINOPRIL 10 MILLIGRAM(S): 2.5 TABLET ORAL at 05:36

## 2022-01-01 RX ADMIN — HEPARIN SODIUM 5000 UNIT(S): 5000 INJECTION INTRAVENOUS; SUBCUTANEOUS at 21:19

## 2022-01-01 RX ADMIN — Medication 1 APPLICATION(S): at 17:25

## 2022-01-01 RX ADMIN — Medication 1 APPLICATION(S): at 18:09

## 2022-01-01 RX ADMIN — LACTULOSE 10 GRAM(S): 10 SOLUTION ORAL at 07:00

## 2022-01-01 RX ADMIN — SIMVASTATIN 10 MILLIGRAM(S): 20 TABLET, FILM COATED ORAL at 21:41

## 2022-01-01 RX ADMIN — SIMVASTATIN 10 MILLIGRAM(S): 20 TABLET, FILM COATED ORAL at 21:06

## 2022-01-01 RX ADMIN — HEPARIN SODIUM 5000 UNIT(S): 5000 INJECTION INTRAVENOUS; SUBCUTANEOUS at 05:39

## 2022-01-01 RX ADMIN — PANTOPRAZOLE SODIUM 40 MILLIGRAM(S): 20 TABLET, DELAYED RELEASE ORAL at 06:27

## 2022-01-01 RX ADMIN — SIMVASTATIN 10 MILLIGRAM(S): 20 TABLET, FILM COATED ORAL at 21:40

## 2022-01-01 RX ADMIN — Medication 5 MILLIGRAM(S): at 05:38

## 2022-01-01 RX ADMIN — SENNA PLUS 2 TABLET(S): 8.6 TABLET ORAL at 21:53

## 2022-01-01 RX ADMIN — LEVETIRACETAM 400 MILLIGRAM(S): 250 TABLET, FILM COATED ORAL at 17:16

## 2022-01-01 RX ADMIN — SIMVASTATIN 10 MILLIGRAM(S): 20 TABLET, FILM COATED ORAL at 22:25

## 2022-01-01 RX ADMIN — HEPARIN SODIUM 5000 UNIT(S): 5000 INJECTION INTRAVENOUS; SUBCUTANEOUS at 06:06

## 2022-01-01 RX ADMIN — LEVETIRACETAM 400 MILLIGRAM(S): 250 TABLET, FILM COATED ORAL at 08:00

## 2022-01-01 RX ADMIN — Medication 1 GRAM(S): at 17:12

## 2022-01-01 RX ADMIN — CEFEPIME 100 MILLIGRAM(S): 1 INJECTION, POWDER, FOR SOLUTION INTRAMUSCULAR; INTRAVENOUS at 05:39

## 2022-01-01 RX ADMIN — Medication 5 MILLIGRAM(S): at 05:52

## 2022-01-01 RX ADMIN — SODIUM CHLORIDE 75 MILLILITER(S): 9 INJECTION, SOLUTION INTRAVENOUS at 05:39

## 2022-01-01 RX ADMIN — HEPARIN SODIUM 5000 UNIT(S): 5000 INJECTION INTRAVENOUS; SUBCUTANEOUS at 13:11

## 2022-01-01 RX ADMIN — PIPERACILLIN AND TAZOBACTAM 25 GRAM(S): 4; .5 INJECTION, POWDER, LYOPHILIZED, FOR SOLUTION INTRAVENOUS at 05:49

## 2022-01-01 RX ADMIN — POLYETHYLENE GLYCOL 3350 17 GRAM(S): 17 POWDER, FOR SOLUTION ORAL at 17:53

## 2022-01-01 RX ADMIN — Medication 1 APPLICATION(S): at 17:40

## 2022-01-01 RX ADMIN — Medication 1 GRAM(S): at 17:01

## 2022-01-01 RX ADMIN — SIMVASTATIN 10 MILLIGRAM(S): 20 TABLET, FILM COATED ORAL at 22:47

## 2022-01-01 RX ADMIN — SIMVASTATIN 10 MILLIGRAM(S): 20 TABLET, FILM COATED ORAL at 21:19

## 2022-01-01 RX ADMIN — AZITHROMYCIN 500 MILLIGRAM(S): 500 TABLET, FILM COATED ORAL at 17:30

## 2022-01-01 RX ADMIN — Medication 1 APPLICATION(S): at 17:35

## 2022-01-01 RX ADMIN — Medication 1 APPLICATION(S): at 07:47

## 2022-01-01 RX ADMIN — SODIUM CHLORIDE 50 MILLILITER(S): 9 INJECTION, SOLUTION INTRAVENOUS at 22:57

## 2022-01-01 RX ADMIN — Medication 1 MILLIGRAM(S): at 12:44

## 2022-01-01 RX ADMIN — LACTULOSE 10 GRAM(S): 10 SOLUTION ORAL at 17:53

## 2022-01-01 RX ADMIN — POLYETHYLENE GLYCOL 3350 17 GRAM(S): 17 POWDER, FOR SOLUTION ORAL at 05:40

## 2022-01-01 RX ADMIN — Medication 4000 MILLILITER(S): at 15:41

## 2022-01-01 RX ADMIN — Medication 500 MILLIGRAM(S): at 05:46

## 2022-01-01 RX ADMIN — Medication 1 TABLET(S): at 11:12

## 2022-01-01 RX ADMIN — Medication 1 APPLICATION(S): at 21:20

## 2022-01-01 RX ADMIN — Medication 1 APPLICATION(S): at 16:30

## 2022-01-01 RX ADMIN — HEPARIN SODIUM 5000 UNIT(S): 5000 INJECTION INTRAVENOUS; SUBCUTANEOUS at 21:40

## 2022-01-01 RX ADMIN — Medication 30 MILLILITER(S): at 12:51

## 2022-01-01 RX ADMIN — ENOXAPARIN SODIUM 40 MILLIGRAM(S): 100 INJECTION SUBCUTANEOUS at 11:50

## 2022-01-01 RX ADMIN — Medication 200 MILLIGRAM(S): at 17:24

## 2022-01-01 RX ADMIN — Medication 1 TABLET(S): at 07:00

## 2022-01-01 RX ADMIN — ENOXAPARIN SODIUM 40 MILLIGRAM(S): 100 INJECTION SUBCUTANEOUS at 17:24

## 2022-01-01 RX ADMIN — CEFEPIME 100 MILLIGRAM(S): 1 INJECTION, POWDER, FOR SOLUTION INTRAMUSCULAR; INTRAVENOUS at 14:00

## 2022-01-01 RX ADMIN — Medication 5 MILLIGRAM(S): at 05:45

## 2022-01-01 RX ADMIN — NEOMYCIN SULFATE 500 MILLIGRAM(S): 500 TABLET ORAL at 15:28

## 2022-01-01 RX ADMIN — HEPARIN SODIUM 5000 UNIT(S): 5000 INJECTION INTRAVENOUS; SUBCUTANEOUS at 22:25

## 2022-01-01 RX ADMIN — POLYETHYLENE GLYCOL 3350 17 GRAM(S): 17 POWDER, FOR SOLUTION ORAL at 17:32

## 2022-01-01 RX ADMIN — SODIUM CHLORIDE 75 MILLILITER(S): 9 INJECTION, SOLUTION INTRAVENOUS at 18:38

## 2022-01-01 RX ADMIN — CHLORHEXIDINE GLUCONATE 1 APPLICATION(S): 213 SOLUTION TOPICAL at 06:25

## 2022-01-01 RX ADMIN — LACTULOSE 10 GRAM(S): 10 SOLUTION ORAL at 21:41

## 2022-01-01 RX ADMIN — LACTULOSE 10 GRAM(S): 10 SOLUTION ORAL at 05:20

## 2022-01-01 RX ADMIN — HEPARIN SODIUM 5000 UNIT(S): 5000 INJECTION INTRAVENOUS; SUBCUTANEOUS at 13:15

## 2022-01-01 RX ADMIN — PIPERACILLIN AND TAZOBACTAM 25 GRAM(S): 4; .5 INJECTION, POWDER, LYOPHILIZED, FOR SOLUTION INTRAVENOUS at 14:28

## 2022-01-01 RX ADMIN — Medication 500 MILLIGRAM(S): at 12:44

## 2022-01-01 RX ADMIN — Medication 200 MILLIGRAM(S): at 06:24

## 2022-01-01 RX ADMIN — Medication 1 GRAM(S): at 05:25

## 2022-01-01 RX ADMIN — Medication 100 MILLIGRAM(S): at 18:04

## 2022-01-01 RX ADMIN — HEPARIN SODIUM 5000 UNIT(S): 5000 INJECTION INTRAVENOUS; SUBCUTANEOUS at 21:44

## 2022-01-01 RX ADMIN — AMLODIPINE BESYLATE 10 MILLIGRAM(S): 2.5 TABLET ORAL at 05:24

## 2022-01-01 RX ADMIN — HEPARIN SODIUM 5000 UNIT(S): 5000 INJECTION INTRAVENOUS; SUBCUTANEOUS at 05:05

## 2022-01-01 RX ADMIN — Medication 0: at 14:36

## 2022-01-01 RX ADMIN — CEFEPIME 100 MILLIGRAM(S): 1 INJECTION, POWDER, FOR SOLUTION INTRAMUSCULAR; INTRAVENOUS at 13:22

## 2022-01-01 RX ADMIN — Medication 200 MILLIGRAM(S): at 17:30

## 2022-01-01 RX ADMIN — Medication 7.69 MICROGRAM(S)/KG/MIN: at 09:36

## 2022-01-01 RX ADMIN — HEPARIN SODIUM 5000 UNIT(S): 5000 INJECTION INTRAVENOUS; SUBCUTANEOUS at 05:08

## 2022-01-01 RX ADMIN — Medication 10 MILLIGRAM(S): at 12:46

## 2022-01-01 RX ADMIN — ENOXAPARIN SODIUM 40 MILLIGRAM(S): 100 INJECTION SUBCUTANEOUS at 05:52

## 2022-01-01 RX ADMIN — SIMVASTATIN 10 MILLIGRAM(S): 20 TABLET, FILM COATED ORAL at 21:47

## 2022-01-01 RX ADMIN — Medication 200 MILLIGRAM(S): at 17:54

## 2022-01-01 RX ADMIN — DIATRIZOATE MEGLUMINE 30 MILLILITER(S): 180 INJECTION, SOLUTION INTRAVESICAL at 15:08

## 2022-01-01 RX ADMIN — SODIUM CHLORIDE 100 MILLILITER(S): 9 INJECTION INTRAMUSCULAR; INTRAVENOUS; SUBCUTANEOUS at 16:03

## 2022-01-01 RX ADMIN — POLYETHYLENE GLYCOL 3350 17 GRAM(S): 17 POWDER, FOR SOLUTION ORAL at 05:31

## 2022-01-01 RX ADMIN — HEPARIN SODIUM 5000 UNIT(S): 5000 INJECTION INTRAVENOUS; SUBCUTANEOUS at 22:05

## 2022-01-01 RX ADMIN — SODIUM CHLORIDE 100 MILLILITER(S): 9 INJECTION, SOLUTION INTRAVENOUS at 08:48

## 2022-01-01 RX ADMIN — Medication 1 MILLIGRAM(S): at 11:50

## 2022-01-01 RX ADMIN — Medication 1 APPLICATION(S): at 18:46

## 2022-01-01 RX ADMIN — POLYETHYLENE GLYCOL 3350 17 GRAM(S): 17 POWDER, FOR SOLUTION ORAL at 17:22

## 2022-01-01 RX ADMIN — ENOXAPARIN SODIUM 40 MILLIGRAM(S): 100 INJECTION SUBCUTANEOUS at 05:51

## 2022-01-01 RX ADMIN — SENNA PLUS 2 TABLET(S): 8.6 TABLET ORAL at 22:06

## 2022-01-01 RX ADMIN — Medication 10 MILLIGRAM(S): at 11:12

## 2022-01-01 RX ADMIN — Medication 1 APPLICATION(S): at 17:17

## 2022-01-01 RX ADMIN — PIPERACILLIN AND TAZOBACTAM 25 GRAM(S): 4; .5 INJECTION, POWDER, LYOPHILIZED, FOR SOLUTION INTRAVENOUS at 05:52

## 2022-01-01 RX ADMIN — CEFEPIME 100 MILLIGRAM(S): 1 INJECTION, POWDER, FOR SOLUTION INTRAMUSCULAR; INTRAVENOUS at 13:23

## 2022-01-01 RX ADMIN — ENOXAPARIN SODIUM 40 MILLIGRAM(S): 100 INJECTION SUBCUTANEOUS at 06:46

## 2022-01-01 RX ADMIN — Medication 1 APPLICATION(S): at 05:11

## 2022-01-01 RX ADMIN — Medication 1 APPLICATION(S): at 21:29

## 2022-01-01 RX ADMIN — SIMVASTATIN 10 MILLIGRAM(S): 20 TABLET, FILM COATED ORAL at 22:05

## 2022-01-01 RX ADMIN — LACTULOSE 10 GRAM(S): 10 SOLUTION ORAL at 17:51

## 2022-01-01 RX ADMIN — Medication 1 MILLIGRAM(S): at 11:26

## 2022-01-01 RX ADMIN — Medication 81 MILLIGRAM(S): at 11:13

## 2022-01-01 RX ADMIN — Medication 5 MILLIGRAM(S): at 05:43

## 2022-01-01 RX ADMIN — ZINC SULFATE TAB 220 MG (50 MG ZINC EQUIVALENT) 220 MILLIGRAM(S): 220 (50 ZN) TAB at 12:15

## 2022-01-01 RX ADMIN — CHLORHEXIDINE GLUCONATE 1 APPLICATION(S): 213 SOLUTION TOPICAL at 06:37

## 2022-01-01 RX ADMIN — Medication 81 MILLIGRAM(S): at 12:45

## 2022-01-01 RX ADMIN — CHLORHEXIDINE GLUCONATE 1 APPLICATION(S): 213 SOLUTION TOPICAL at 06:15

## 2022-01-01 RX ADMIN — SIMVASTATIN 10 MILLIGRAM(S): 20 TABLET, FILM COATED ORAL at 22:32

## 2022-01-01 RX ADMIN — Medication 1 MILLIGRAM(S): at 12:41

## 2022-01-01 RX ADMIN — Medication 50 MILLIEQUIVALENT(S): at 22:39

## 2022-01-01 RX ADMIN — LACTULOSE 10 GRAM(S): 10 SOLUTION ORAL at 22:07

## 2022-01-01 RX ADMIN — Medication 200 MILLIGRAM(S): at 17:28

## 2022-01-01 RX ADMIN — HEPARIN SODIUM 5000 UNIT(S): 5000 INJECTION INTRAVENOUS; SUBCUTANEOUS at 14:34

## 2022-01-01 RX ADMIN — Medication 1 GRAM(S): at 05:03

## 2022-01-01 RX ADMIN — SODIUM CHLORIDE 75 MILLILITER(S): 9 INJECTION, SOLUTION INTRAVENOUS at 15:47

## 2022-01-01 RX ADMIN — Medication 30 MILLILITER(S): at 17:55

## 2022-01-01 RX ADMIN — Medication 650 MILLIGRAM(S): at 15:01

## 2022-01-01 RX ADMIN — Medication 1 APPLICATION(S): at 17:13

## 2022-01-01 RX ADMIN — PANTOPRAZOLE SODIUM 40 MILLIGRAM(S): 20 TABLET, DELAYED RELEASE ORAL at 05:43

## 2022-01-01 RX ADMIN — LISINOPRIL 10 MILLIGRAM(S): 2.5 TABLET ORAL at 05:04

## 2022-01-01 RX ADMIN — Medication 10 MILLIGRAM(S): at 11:49

## 2022-01-01 RX ADMIN — Medication 300 MILLIGRAM(S): at 17:51

## 2022-01-01 RX ADMIN — CEFTRIAXONE 100 MILLIGRAM(S): 500 INJECTION, POWDER, FOR SOLUTION INTRAMUSCULAR; INTRAVENOUS at 07:01

## 2022-01-01 RX ADMIN — Medication 100 MILLIGRAM(S): at 18:44

## 2022-01-01 RX ADMIN — Medication 500 MILLIGRAM(S): at 11:04

## 2022-01-01 RX ADMIN — HEPARIN SODIUM 5000 UNIT(S): 5000 INJECTION INTRAVENOUS; SUBCUTANEOUS at 21:15

## 2022-01-01 RX ADMIN — Medication 1 MILLIGRAM(S): at 11:17

## 2022-01-01 RX ADMIN — LISINOPRIL 30 MILLIGRAM(S): 2.5 TABLET ORAL at 05:24

## 2022-01-01 RX ADMIN — Medication 5 MILLIGRAM(S): at 05:05

## 2022-01-01 RX ADMIN — MORPHINE SULFATE 2 MILLIGRAM(S): 50 CAPSULE, EXTENDED RELEASE ORAL at 12:37

## 2022-01-01 RX ADMIN — Medication 1 GRAM(S): at 17:47

## 2022-01-01 RX ADMIN — ZINC SULFATE TAB 220 MG (50 MG ZINC EQUIVALENT) 220 MILLIGRAM(S): 220 (50 ZN) TAB at 13:07

## 2022-01-01 RX ADMIN — SODIUM CHLORIDE 1000 MILLILITER(S): 9 INJECTION, SOLUTION INTRAVENOUS at 16:00

## 2022-01-01 RX ADMIN — CEFEPIME 100 MILLIGRAM(S): 1 INJECTION, POWDER, FOR SOLUTION INTRAMUSCULAR; INTRAVENOUS at 22:05

## 2022-01-01 RX ADMIN — PIPERACILLIN AND TAZOBACTAM 25 GRAM(S): 4; .5 INJECTION, POWDER, LYOPHILIZED, FOR SOLUTION INTRAVENOUS at 21:58

## 2022-01-01 RX ADMIN — Medication 10 MILLIGRAM(S): at 15:37

## 2022-01-01 RX ADMIN — Medication 1 APPLICATION(S): at 21:48

## 2022-01-01 RX ADMIN — SIMVASTATIN 10 MILLIGRAM(S): 20 TABLET, FILM COATED ORAL at 21:14

## 2022-01-01 RX ADMIN — Medication 1 ENEMA: at 18:17

## 2022-01-01 RX ADMIN — CHLORHEXIDINE GLUCONATE 1 APPLICATION(S): 213 SOLUTION TOPICAL at 07:03

## 2022-01-01 RX ADMIN — Medication 500 MILLIGRAM(S): at 16:26

## 2022-01-01 RX ADMIN — LACTULOSE 10 GRAM(S): 10 SOLUTION ORAL at 05:41

## 2022-01-01 RX ADMIN — SODIUM CHLORIDE 75 MILLILITER(S): 9 INJECTION, SOLUTION INTRAVENOUS at 03:35

## 2022-01-01 RX ADMIN — LACTULOSE 10 GRAM(S): 10 SOLUTION ORAL at 17:25

## 2022-01-01 RX ADMIN — SODIUM CHLORIDE 50 MILLILITER(S): 9 INJECTION INTRAMUSCULAR; INTRAVENOUS; SUBCUTANEOUS at 10:15

## 2022-01-01 RX ADMIN — Medication 81 MILLIGRAM(S): at 11:47

## 2022-01-01 RX ADMIN — Medication 30 MILLILITER(S): at 14:56

## 2022-01-01 RX ADMIN — Medication 81 MILLIGRAM(S): at 11:26

## 2022-01-01 RX ADMIN — LACTULOSE 10 GRAM(S): 10 SOLUTION ORAL at 21:30

## 2022-01-01 RX ADMIN — Medication 200 MILLIGRAM(S): at 05:44

## 2022-01-01 RX ADMIN — Medication 10 MILLIGRAM(S): at 11:40

## 2022-01-01 RX ADMIN — Medication 200 MILLIGRAM(S): at 17:10

## 2022-01-01 RX ADMIN — Medication 81 MILLIGRAM(S): at 11:39

## 2022-01-01 RX ADMIN — Medication 1 GRAM(S): at 07:40

## 2022-01-01 RX ADMIN — Medication 500 MILLIGRAM(S): at 21:06

## 2022-01-01 RX ADMIN — Medication 1 APPLICATION(S): at 13:01

## 2022-01-01 RX ADMIN — CHLORHEXIDINE GLUCONATE 1 APPLICATION(S): 213 SOLUTION TOPICAL at 06:26

## 2022-01-01 RX ADMIN — Medication 1 APPLICATION(S): at 06:37

## 2022-01-01 RX ADMIN — ENOXAPARIN SODIUM 40 MILLIGRAM(S): 100 INJECTION SUBCUTANEOUS at 11:12

## 2022-01-01 RX ADMIN — Medication 1 APPLICATION(S): at 05:09

## 2022-01-01 RX ADMIN — Medication 30 MILLILITER(S): at 11:49

## 2022-01-01 RX ADMIN — Medication 1 GRAM(S): at 06:26

## 2022-01-01 RX ADMIN — LISINOPRIL 30 MILLIGRAM(S): 2.5 TABLET ORAL at 05:44

## 2022-01-01 RX ADMIN — Medication 1 TABLET(S): at 12:44

## 2022-01-01 RX ADMIN — Medication 500 MILLIGRAM(S): at 21:28

## 2022-01-01 RX ADMIN — Medication 5 MILLIGRAM(S): at 06:25

## 2022-01-01 RX ADMIN — POLYETHYLENE GLYCOL 3350 17 GRAM(S): 17 POWDER, FOR SOLUTION ORAL at 05:04

## 2022-01-01 RX ADMIN — LEVETIRACETAM 400 MILLIGRAM(S): 250 TABLET, FILM COATED ORAL at 05:39

## 2022-01-01 RX ADMIN — HEPARIN SODIUM 5000 UNIT(S): 5000 INJECTION INTRAVENOUS; SUBCUTANEOUS at 13:24

## 2022-01-01 RX ADMIN — LACTULOSE 10 GRAM(S): 10 SOLUTION ORAL at 05:05

## 2022-01-01 RX ADMIN — LACTULOSE 10 GRAM(S): 10 SOLUTION ORAL at 18:43

## 2022-01-01 RX ADMIN — Medication 200 MILLIGRAM(S): at 06:22

## 2022-01-01 RX ADMIN — LACTULOSE 10 GRAM(S): 10 SOLUTION ORAL at 05:45

## 2022-01-01 RX ADMIN — POLYETHYLENE GLYCOL 3350 17 GRAM(S): 17 POWDER, FOR SOLUTION ORAL at 05:38

## 2022-01-01 RX ADMIN — Medication 5 MILLIGRAM(S): at 07:49

## 2022-01-01 RX ADMIN — ENOXAPARIN SODIUM 40 MILLIGRAM(S): 100 INJECTION SUBCUTANEOUS at 05:39

## 2022-01-01 RX ADMIN — LACTULOSE 10 GRAM(S): 10 SOLUTION ORAL at 14:34

## 2022-01-01 RX ADMIN — Medication 30 MILLILITER(S): at 11:39

## 2022-01-01 RX ADMIN — Medication 500 MILLIGRAM(S): at 14:44

## 2022-01-01 RX ADMIN — POLYETHYLENE GLYCOL 3350 17 GRAM(S): 17 POWDER, FOR SOLUTION ORAL at 17:10

## 2022-01-01 RX ADMIN — Medication 1 GRAM(S): at 18:17

## 2022-01-01 RX ADMIN — LEVETIRACETAM 500 MILLIGRAM(S): 250 TABLET, FILM COATED ORAL at 17:12

## 2022-01-01 RX ADMIN — CEFEPIME 100 MILLIGRAM(S): 1 INJECTION, POWDER, FOR SOLUTION INTRAMUSCULAR; INTRAVENOUS at 23:15

## 2022-01-01 RX ADMIN — LEVETIRACETAM 400 MILLIGRAM(S): 250 TABLET, FILM COATED ORAL at 05:05

## 2022-01-01 RX ADMIN — Medication 650 MILLIGRAM(S): at 19:07

## 2022-01-01 RX ADMIN — HEPARIN SODIUM 5000 UNIT(S): 5000 INJECTION INTRAVENOUS; SUBCUTANEOUS at 13:03

## 2022-01-01 RX ADMIN — CEFEPIME 100 MILLIGRAM(S): 1 INJECTION, POWDER, FOR SOLUTION INTRAMUSCULAR; INTRAVENOUS at 23:28

## 2022-01-01 RX ADMIN — Medication 81 MILLIGRAM(S): at 12:17

## 2022-01-01 RX ADMIN — MORPHINE SULFATE 5 MILLIGRAM(S): 50 CAPSULE, EXTENDED RELEASE ORAL at 18:13

## 2022-01-01 RX ADMIN — Medication 1 APPLICATION(S): at 05:45

## 2022-01-01 RX ADMIN — Medication 500 MILLIGRAM(S): at 14:27

## 2022-01-01 RX ADMIN — Medication 200 MILLIGRAM(S): at 17:56

## 2022-01-01 RX ADMIN — POLYETHYLENE GLYCOL 3350 17 GRAM(S): 17 POWDER, FOR SOLUTION ORAL at 18:07

## 2022-01-01 RX ADMIN — Medication 1 ENEMA: at 12:22

## 2022-01-01 RX ADMIN — POLYETHYLENE GLYCOL 3350 17 GRAM(S): 17 POWDER, FOR SOLUTION ORAL at 16:39

## 2022-01-01 RX ADMIN — SIMVASTATIN 10 MILLIGRAM(S): 20 TABLET, FILM COATED ORAL at 22:10

## 2022-01-01 RX ADMIN — CEFEPIME 100 MILLIGRAM(S): 1 INJECTION, POWDER, FOR SOLUTION INTRAMUSCULAR; INTRAVENOUS at 13:15

## 2022-01-01 RX ADMIN — MORPHINE SULFATE 2 MILLIGRAM(S): 50 CAPSULE, EXTENDED RELEASE ORAL at 18:46

## 2022-01-01 RX ADMIN — Medication 300 MILLIGRAM(S): at 05:40

## 2022-01-01 RX ADMIN — HEPARIN SODIUM 5000 UNIT(S): 5000 INJECTION INTRAVENOUS; SUBCUTANEOUS at 06:25

## 2022-01-01 RX ADMIN — LISINOPRIL 30 MILLIGRAM(S): 2.5 TABLET ORAL at 05:05

## 2022-01-01 RX ADMIN — LISINOPRIL 10 MILLIGRAM(S): 2.5 TABLET ORAL at 06:45

## 2022-01-01 RX ADMIN — SIMVASTATIN 10 MILLIGRAM(S): 20 TABLET, FILM COATED ORAL at 21:09

## 2022-01-01 RX ADMIN — HYDROMORPHONE HYDROCHLORIDE 0.5 MILLIGRAM(S): 2 INJECTION INTRAMUSCULAR; INTRAVENOUS; SUBCUTANEOUS at 16:16

## 2022-01-01 RX ADMIN — Medication 200 MILLIGRAM(S): at 17:16

## 2022-01-01 RX ADMIN — Medication 1 APPLICATION(S): at 17:38

## 2022-01-01 RX ADMIN — Medication 1 APPLICATION(S): at 06:23

## 2022-01-01 RX ADMIN — ZINC SULFATE TAB 220 MG (50 MG ZINC EQUIVALENT) 220 MILLIGRAM(S): 220 (50 ZN) TAB at 13:25

## 2022-01-01 RX ADMIN — CEFEPIME 100 MILLIGRAM(S): 1 INJECTION, POWDER, FOR SOLUTION INTRAMUSCULAR; INTRAVENOUS at 05:16

## 2022-01-01 RX ADMIN — Medication 81 MILLIGRAM(S): at 11:32

## 2022-01-01 RX ADMIN — Medication 1 APPLICATION(S): at 17:19

## 2022-01-01 RX ADMIN — Medication 100 MILLIGRAM(S): at 05:25

## 2022-01-01 RX ADMIN — Medication 300 MILLIGRAM(S): at 17:20

## 2022-01-01 RX ADMIN — AMPICILLIN SODIUM AND SULBACTAM SODIUM 200 GRAM(S): 250; 125 INJECTION, POWDER, FOR SUSPENSION INTRAMUSCULAR; INTRAVENOUS at 23:37

## 2022-01-01 RX ADMIN — LISINOPRIL 10 MILLIGRAM(S): 2.5 TABLET ORAL at 06:59

## 2022-01-01 RX ADMIN — Medication 81 MILLIGRAM(S): at 11:33

## 2022-01-01 RX ADMIN — Medication 200 MILLIGRAM(S): at 05:48

## 2022-01-01 RX ADMIN — CEFEPIME 100 MILLIGRAM(S): 1 INJECTION, POWDER, FOR SOLUTION INTRAMUSCULAR; INTRAVENOUS at 21:43

## 2022-01-01 RX ADMIN — POLYETHYLENE GLYCOL 3350 17 GRAM(S): 17 POWDER, FOR SOLUTION ORAL at 05:55

## 2022-01-01 RX ADMIN — Medication 1 APPLICATION(S): at 05:16

## 2022-01-01 RX ADMIN — SIMVASTATIN 10 MILLIGRAM(S): 20 TABLET, FILM COATED ORAL at 23:18

## 2022-01-01 RX ADMIN — Medication 1 APPLICATION(S): at 06:47

## 2022-01-01 RX ADMIN — CEFEPIME 100 MILLIGRAM(S): 1 INJECTION, POWDER, FOR SOLUTION INTRAMUSCULAR; INTRAVENOUS at 06:19

## 2022-01-01 RX ADMIN — CHLORHEXIDINE GLUCONATE 1 APPLICATION(S): 213 SOLUTION TOPICAL at 05:47

## 2022-01-01 RX ADMIN — Medication 500 MILLIGRAM(S): at 13:10

## 2022-01-01 RX ADMIN — Medication 500 MILLIGRAM(S): at 05:04

## 2022-01-01 RX ADMIN — ZINC SULFATE TAB 220 MG (50 MG ZINC EQUIVALENT) 220 MILLIGRAM(S): 220 (50 ZN) TAB at 12:35

## 2022-01-01 RX ADMIN — Medication 1 TABLET(S): at 05:31

## 2022-01-01 RX ADMIN — CEFEPIME 100 MILLIGRAM(S): 1 INJECTION, POWDER, FOR SOLUTION INTRAMUSCULAR; INTRAVENOUS at 05:18

## 2022-01-01 RX ADMIN — LACTULOSE 10 GRAM(S): 10 SOLUTION ORAL at 05:13

## 2022-01-01 RX ADMIN — LEVETIRACETAM 400 MILLIGRAM(S): 250 TABLET, FILM COATED ORAL at 17:26

## 2022-01-01 RX ADMIN — Medication 1 GRAM(S): at 06:22

## 2022-01-01 RX ADMIN — AMPICILLIN SODIUM AND SULBACTAM SODIUM 200 GRAM(S): 250; 125 INJECTION, POWDER, FOR SUSPENSION INTRAMUSCULAR; INTRAVENOUS at 17:56

## 2022-01-01 RX ADMIN — LEVETIRACETAM 400 MILLIGRAM(S): 250 TABLET, FILM COATED ORAL at 17:22

## 2022-01-01 RX ADMIN — HEPARIN SODIUM 5000 UNIT(S): 5000 INJECTION INTRAVENOUS; SUBCUTANEOUS at 21:31

## 2022-01-01 RX ADMIN — SIMVASTATIN 10 MILLIGRAM(S): 20 TABLET, FILM COATED ORAL at 22:46

## 2022-01-01 RX ADMIN — CEFTRIAXONE 100 MILLIGRAM(S): 500 INJECTION, POWDER, FOR SOLUTION INTRAMUSCULAR; INTRAVENOUS at 05:55

## 2022-01-01 RX ADMIN — SIMVASTATIN 10 MILLIGRAM(S): 20 TABLET, FILM COATED ORAL at 22:06

## 2022-01-01 RX ADMIN — LISINOPRIL 10 MILLIGRAM(S): 2.5 TABLET ORAL at 05:31

## 2022-01-01 RX ADMIN — SENNA PLUS 2 TABLET(S): 8.6 TABLET ORAL at 22:47

## 2022-01-01 RX ADMIN — MORPHINE SULFATE 5 MILLIGRAM(S): 50 CAPSULE, EXTENDED RELEASE ORAL at 14:18

## 2022-01-01 RX ADMIN — HEPARIN SODIUM 5000 UNIT(S): 5000 INJECTION INTRAVENOUS; SUBCUTANEOUS at 21:47

## 2022-01-01 RX ADMIN — Medication 1 GRAM(S): at 18:06

## 2022-01-01 RX ADMIN — CHLORHEXIDINE GLUCONATE 1 APPLICATION(S): 213 SOLUTION TOPICAL at 05:38

## 2022-01-01 RX ADMIN — SODIUM CHLORIDE 100 MILLILITER(S): 9 INJECTION INTRAMUSCULAR; INTRAVENOUS; SUBCUTANEOUS at 20:29

## 2022-01-01 RX ADMIN — POLYETHYLENE GLYCOL 3350 17 GRAM(S): 17 POWDER, FOR SOLUTION ORAL at 17:51

## 2022-01-01 RX ADMIN — HEPARIN SODIUM 5000 UNIT(S): 5000 INJECTION INTRAVENOUS; SUBCUTANEOUS at 05:18

## 2022-01-01 RX ADMIN — Medication 1 MILLIGRAM(S): at 12:16

## 2022-01-01 RX ADMIN — Medication 300 MILLIGRAM(S): at 06:45

## 2022-01-01 RX ADMIN — POLYETHYLENE GLYCOL 3350 17 GRAM(S): 17 POWDER, FOR SOLUTION ORAL at 05:52

## 2022-01-01 RX ADMIN — NEOMYCIN SULFATE 500 MILLIGRAM(S): 500 TABLET ORAL at 23:18

## 2022-01-01 RX ADMIN — Medication 1 APPLICATION(S): at 17:12

## 2022-01-01 RX ADMIN — Medication 300 MILLIGRAM(S): at 17:52

## 2022-01-01 RX ADMIN — LISINOPRIL 30 MILLIGRAM(S): 2.5 TABLET ORAL at 05:52

## 2022-01-01 RX ADMIN — Medication 1 APPLICATION(S): at 05:55

## 2022-01-01 RX ADMIN — Medication 1 GRAM(S): at 05:02

## 2022-01-01 RX ADMIN — CHLORHEXIDINE GLUCONATE 1 APPLICATION(S): 213 SOLUTION TOPICAL at 05:09

## 2022-01-01 RX ADMIN — Medication 500 MILLIGRAM(S): at 05:38

## 2022-01-01 RX ADMIN — ENOXAPARIN SODIUM 40 MILLIGRAM(S): 100 INJECTION SUBCUTANEOUS at 05:19

## 2022-01-01 RX ADMIN — Medication 1 TABLET(S): at 17:51

## 2022-01-01 RX ADMIN — ROBINUL 0.4 MILLIGRAM(S): 0.2 INJECTION INTRAMUSCULAR; INTRAVENOUS at 21:33

## 2022-01-01 RX ADMIN — Medication 1 GRAM(S): at 05:43

## 2022-01-01 RX ADMIN — Medication 1 GRAM(S): at 17:08

## 2022-01-01 RX ADMIN — PIPERACILLIN AND TAZOBACTAM 25 GRAM(S): 4; .5 INJECTION, POWDER, LYOPHILIZED, FOR SOLUTION INTRAVENOUS at 14:10

## 2022-01-01 RX ADMIN — CEFEPIME 100 MILLIGRAM(S): 1 INJECTION, POWDER, FOR SOLUTION INTRAMUSCULAR; INTRAVENOUS at 08:25

## 2022-01-01 RX ADMIN — ENOXAPARIN SODIUM 40 MILLIGRAM(S): 100 INJECTION SUBCUTANEOUS at 11:32

## 2022-01-01 RX ADMIN — HEPARIN SODIUM 5000 UNIT(S): 5000 INJECTION INTRAVENOUS; SUBCUTANEOUS at 15:15

## 2022-01-01 RX ADMIN — Medication 100 MILLIGRAM(S): at 05:38

## 2022-01-01 RX ADMIN — Medication 1 GRAM(S): at 05:38

## 2022-01-01 RX ADMIN — HEPARIN SODIUM 5000 UNIT(S): 5000 INJECTION INTRAVENOUS; SUBCUTANEOUS at 05:53

## 2022-01-01 RX ADMIN — Medication 1 APPLICATION(S): at 17:41

## 2022-01-01 RX ADMIN — ENOXAPARIN SODIUM 40 MILLIGRAM(S): 100 INJECTION SUBCUTANEOUS at 13:09

## 2022-01-01 RX ADMIN — SODIUM CHLORIDE 1000 MILLILITER(S): 9 INJECTION, SOLUTION INTRAVENOUS at 14:39

## 2022-01-01 RX ADMIN — Medication 81 MILLIGRAM(S): at 11:40

## 2022-01-01 RX ADMIN — Medication 100 MILLIGRAM(S): at 06:22

## 2022-01-01 RX ADMIN — HEPARIN SODIUM 5000 UNIT(S): 5000 INJECTION INTRAVENOUS; SUBCUTANEOUS at 21:42

## 2022-01-01 RX ADMIN — PIPERACILLIN AND TAZOBACTAM 25 GRAM(S): 4; .5 INJECTION, POWDER, LYOPHILIZED, FOR SOLUTION INTRAVENOUS at 06:21

## 2022-01-01 RX ADMIN — Medication 250 MILLIGRAM(S): at 23:32

## 2022-01-01 RX ADMIN — Medication 81 MILLIGRAM(S): at 12:02

## 2022-01-01 RX ADMIN — LACTULOSE 10 GRAM(S): 10 SOLUTION ORAL at 05:51

## 2022-01-01 RX ADMIN — MORPHINE SULFATE 5 MILLIGRAM(S): 50 CAPSULE, EXTENDED RELEASE ORAL at 18:22

## 2022-01-28 NOTE — CONSULT NOTE ADULT - SUBJECTIVE AND OBJECTIVE BOX
Patient is a 56y old  Male who presents with a chief complaint of unresponsiveness in the radiology suite    HPI: 55 Y/O M with PMHx of CVA (1980s with residual left sided weakness and aphasia), hx of seizure disorder (no seizure in last 20 yrs), CAD, Right retinal detatchment s/p corneal transplant, tardive diskinesia, HTN, HLD, DM type II no on insulin, sacral excoriations, came in for episode of unresponsiveness in radiology suite, patient was supposed to have a cxr, had an episode of unresponsiveness in radiology, and code blue(no cardiac arrest).       PAST MEDICAL & SURGICAL HISTORY:  CVA (cerebral vascular accident)  1980&#x27;s    CAD (coronary artery disease)    Retinal detachment    Hypertension    Hypertension    High cholesterol    Diabetes    Eczema    Anxiety    Diarrhea    Weakness  left side    Seizure    Aphasia    History of corneal transplant  right   4/13/17        SOCIAL HX:   Smoking  -ve                       ETOH         -ve                   Other  -ve    FAMILY HISTORY:  :  No known cardiovacular family hisotry     ROS:  See HPI     Allergies    No Known Allergies    Intolerances          PHYSICAL EXAM    ICU Vital Signs Last 24 Hrs    HR: 82 (28 Jan 2022 13:56) (82 - 101)  BP: 105/65 (28 Jan 2022 13:56) (51/36 - 144/81)  on ra 99%    General: cachectic.  HEENT:  MIKALA               Lungs: Bilateral BS  Cardiovascular: Regular  Gastrointestinal: abdominal distention, hypoactive bowel sounds  Musculoskeletal: wheelchair and bed-bound  Skin: sacral ulcer  Neurological: Limited ROM, more awake,       LABS:                          10.4   29.72 )-----------( 207      ( 28 Jan 2022 11:20 )             31.4                                               01-28    133<L>  |  94<L>  |  29<H>  ----------------------------<  91  3.2<L>   |  24  |  0.7    Ca    9.6      28 Jan 2022 11:20  Mg     2.1     01-28    TPro  7.6  /  Alb  3.8  /  TBili  0.3  /  DBili  x   /  AST  17  /  ALT  24  /  AlkPhos  148<H>  01-28      PT/INR - ( 28 Jan 2022 11:20 )   PT: 14.00 sec;   INR: 1.22 ratio         PTT - ( 28 Jan 2022 11:20 )  PTT:32.2 sec                                           CARDIAC MARKERS ( 28 Jan 2022 11:20 )  x     / 0.03 ng/mL / x     / x     / x                                                LIVER FUNCTIONS - ( 28 Jan 2022 11:20 )  Alb: 3.8 g/dL / Pro: 7.6 g/dL / ALK PHOS: 148 U/L / ALT: 24 U/L / AST: 17 U/L / GGT: x                                                                                                                                       X-Rays      Ct noted RLL aspiration/tree in bud  Abdominal distention, fecal impaction                                                                               ECHO    MEDICATIONS  (STANDING):    MEDICATIONS  (PRN):

## 2022-01-28 NOTE — PATIENT PROFILE ADULT - FALL HARM RISK - HARM RISK INTERVENTIONS

## 2022-01-28 NOTE — H&P ADULT - NSHPLABSRESULTS_GEN_ALL_CORE
10.4   29.72 )-----------( 207      ( 28 Jan 2022 11:20 )             31.4       01-28    133<L>  |  94<L>  |  29<H>  ----------------------------<  91  3.2<L>   |  24  |  0.7    Ca    9.6      28 Jan 2022 11:20  Mg     2.1     01-28    TPro  7.6  /  Alb  3.8  /  TBili  0.3  /  DBili  x   /  AST  17  /  ALT  24  /  AlkPhos  148<H>  01-28          Magnesium, Serum: 2.1 mg/dL (01-28-22 @ 11:20)      PT/INR - ( 28 Jan 2022 11:20 )   PT: 14.00 sec;   INR: 1.22 ratio         PTT - ( 28 Jan 2022 11:20 )  PTT:32.2 sec    Lactate Trend      CARDIAC MARKERS ( 28 Jan 2022 11:20 )  x     / 0.03 ng/mL / x     / x     / x          < from: CT Abdomen and Pelvis w/ IV Cont (01.28.22 @ 12:56) >    IMPRESSION:    Markedly distended colon and rectum with rectal fecal impaction. Rectum   measures 15.6 cm in transverse diameter. No pneumoperitoneum.    Right lower lobe groundglass opacities with tree-in-bud nodularity.   Findings likely infectious/inflammatory in etiology.    --- End of Report ---    < end of copied text >    < from: CT Head No Cont (01.28.22 @ 12:56) >    IMPRESSION:    No evidence of acute intracranial pathology. Stable exam since 1/12/2021.    --- End of Report ---    < end of copied text >    < from: Xray Chest 1 View- PORTABLE-Urgent (01.28.22 @ 11:34) >    Impression:    Right basilar opacity.        --- End of Report ---    < end of copied text >

## 2022-01-28 NOTE — ED ADULT NURSE NOTE - OBJECTIVE STATEMENT
PAtient was having a chest xray in radiology, code called when patient had a sz. Stroke called in ED and cancelled.

## 2022-01-28 NOTE — H&P ADULT - PROBLEM SELECTOR PLAN 1
# Seizure vs CVA vs hypotension   # Hx CVA in 1980s w/ residual left sided deficits - continue baclofen and risperidone PRN   - Obtain REEG  - Neuro consult   - MR head non-con  - Continue carbamazepine  - q8h neuro checks   - Continue ASA 81 and statin

## 2022-01-28 NOTE — CONSULT NOTE ADULT - ASSESSMENT
Impression:  1 episode of unresponsiveness(Seizure?) now back to baseline.  Dilated bowel likely acute on chronic vs chronic.  Probable aspiration PNA  Hx of CVA wheelchair bound/bed-bound.    PLAN:    CNS:  avoid CNS depressants, check AED levels, continue AED, speech and swallow eval.      HEENT:  Oral care    PULMONARY:  HOB @ 45 degrees, aspiration precautions    CARDIOVASCULAR: recheck lactate, IV fluids LR for now.  EKG, trend trops    GI: GI prophylaxis , bowel regimen, water enema.                                         Feeding Speech and swallow eval    RENAL:  F/u  lytes.  Correct as needed. accurate I/O  replete lytes  INFECTIOUS DISEASE: Cefepime and flagyl for now    HEMATOLOGICAL:  DVT prophylaxis.    ENDOCRINE:  Follow up FS.  Insulin protocol if needed.    MUSCULOSKELETAL: bed rest    CODE STATUS: FULL CODE    DISPOSITION: Tele for now, recall if any change in status.       Impression:  1 episode of unresponsiveness(Seizure?) now back to baseline.  Dilated bowel likely acute on chronic vs chronic.  Probable aspiration PNA  Hx of CVA wheelchair bound/bed-bound.    PLAN:    CNS:  avoid CNS depressants, check AED levels, continue AED, speech and swallow eval.      HEENT:  Oral care    PULMONARY:  HOB @ 45 degrees, aspiration precautions    CARDIOVASCULAR: recheck lactate, IV fluids LR for now.  EKG, trend trops    GI: GI prophylaxis , bowel regimen, water enema.                                         Feeding Speech and swallow eval    RENAL:  F/u  lytes.  Correct as needed. accurate I/O  replete lytes  INFECTIOUS DISEASE: Cefepime and flagyl for now    HEMATOLOGICAL:  DVT prophylaxis.    ENDOCRINE:  Follow up FS.  Insulin protocol if needed.    MUSCULOSKELETAL: bed rest    CODE STATUS: FULL CODE    DISPOSITION: 3s Tele for now, recall if any change in status.

## 2022-01-28 NOTE — H&P ADULT - ATTENDING COMMENTS
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 Patient minimally verbal so sister at bedside is providing history. Patient had a rapid response while going for CXR for follow up of aspiration PNA in November 2021. Patient has episodes of unresponsiveness lasting 20 minutes, currently being treated with anti-epileptic medications. Work-up was negative for stroke but noted to have leukocytosis likely 2/2 infection of unknown source, hypotension (improved after 1L), fecal impaction.    #AMS (neuro vs. metabolic)  - neuro consulted  - EEG ordered; order MRI brain once MRI form has been filled out  - continue anti-epileptic meds  - infectious work-up    #Severe Sepsis 2/2 unknown etiology (PNA vs. intrabdominal source)  - start Zosyn; patient not a NH resident so will hold off on MRSA coverage  - f/u infectious work-up  - continue IVF  - monitor hemodynamics    #Fecal impaction  - water enema  - AXR in AM to monitor for stool burden    #FENP  - puree diet; 1 to 1 feedings  - continue home meds  - DVT ppx  - FULL CODE

## 2022-01-28 NOTE — ED PROVIDER NOTE - OBJECTIVE STATEMENT
57 y/o male with hx of CVA (nonverbal and barely follows command), seizure, and aspiration pneumonia who was sent from outpatient radiology in this hospital. Unable to obtain HPI from pt; sister was at bedside and helped with HPI. Pt aspirated last November which required several days of hospitalization. Pt has been following up with his PCP and was referred for chest xray today. While pt was upstairs, pt was found unresponsive and was brought in to the ER. Pt regained consciousness when he arrived ER. Rest of HPI was limited.

## 2022-01-28 NOTE — ED ADULT NURSE NOTE - NSIMPLEMENTINTERV_GEN_ALL_ED
Implemented All Fall with Harm Risk Interventions:  [place] to call system. Call bell, personal items and telephone within reach. Instruct patient to call for assistance. Room bathroom lighting operational. Non-slip footwear when patient is off stretcher. Physically safe environment: no spills, clutter or unnecessary equipment. Stretcher in lowest position, wheels locked, appropriate side rails in place. Provide visual cue, wrist band, yellow gown, etc. Monitor gait and stability. Monitor for mental status changes and reorient to person, place, and time. Review medications for side effects contributing to fall risk. Reinforce activity limits and safety measures with patient and family. Provide visual clues: red socks.
none

## 2022-01-28 NOTE — ED PROVIDER NOTE - CARE PLAN
1 Principal Discharge DX:	Sepsis due to pneumonia  Secondary Diagnosis:	Seizure   Principal Discharge DX:	Sepsis due to pneumonia  Secondary Diagnosis:	Seizure  Secondary Diagnosis:	Septic shock

## 2022-01-28 NOTE — ED ADULT TRIAGE NOTE - CHIEF COMPLAINT QUOTE
pt was in for outpatient chest xray, went unresponsive, code was called on the floor. pt came down to ed, pt not responding, but has a pulse. code stroke called on pt

## 2022-01-28 NOTE — H&P ADULT - NSICDXPASTMEDICALHX_GEN_ALL_CORE_FT
PAST MEDICAL HISTORY:  Anxiety     CAD (coronary artery disease)     CVA (cerebral vascular accident) 1980's x2   Residual left sided weakness    Diabetes     High cholesterol     Hypertension     Retinal detachment     Seizure

## 2022-01-28 NOTE — H&P ADULT - PROBLEM SELECTOR PLAN 2
# Severe sepsis   - F/u blood cultures  - F/u urine cultures   - Additional 1L LR; then continue LR @ 100cc/hr   - Monitor pulse ox  - Continue IV abx - zosyn and vanco   - ID consult

## 2022-01-28 NOTE — H&P ADULT - ASSESSMENT
ASSESSMENT: Pt is a 56M w/ PMH CVA x2 (w/ residual left sided deficits; wheelchair bound), DMT2, HTN and seizures being admitted for LOC and sepsis w/o clear origin.    In ED, pt is afebrile w/ WBC: 29.7, BP: 105/65, HR: 82, Na: 133, K: 3.2, BUN/cr: 29/0.7, Trop: 0.03, Lactate: 3.3, CXR: right basilar opacity, CTH (-), CT abdomen: markedly distended colon/rectum with fecal impaction and RLL ground glass opacities. Pt was given IV cefepime 2g, IV clindamycin 600mg, IV levaquin 750mg, 1L LR, IV keppra 1g, IV zofran 4mg and was briefly on a levophed gtt.         PLAN: Case d/w Dr. Garcia   - Admit to inpatient level of care - non-ccu tele  - Monitor vitals  - OOB to chair  - AM labs  - CHG bath  - PT consult   - Pureed w/ thickened liquids diet  - VTE ppx: SQ heparin  - GI ppx: protonix

## 2022-01-28 NOTE — ED PROVIDER NOTE - PHYSICAL EXAMINATION
CONSTITUTIONAL: In no apparent distress.   HEAD: Normocephalic; atraumatic.   RESPIRATORY: No signs of respiratory distress. Lung sounds are clear in all lobes bilaterally without rales, rhonchi, or wheezes.  CARDIOVASCULAR: Regular rate and rhythm.   GI: Distended and nontender to palpation.   EXT: Upper extremities are contracted. Bedbound.  SKIN: Skin is warm, dry.   NEURO: A & O x 0 baseline.

## 2022-01-28 NOTE — ED PROVIDER NOTE - CLINICAL SUMMARY MEDICAL DECISION MAKING FREE TEXT BOX
Patient arrived in the emergency department hypotensive and poorly responsive.  He was critically ill.  His abnormal vital signs and mental status improved with ED treatment.  This patient has septic shock.  IV fluid and IV antibiotics given.  His initial presentation was consistent with seizure.  IV Keppra given.  He will be admitted to a monitored setting.

## 2022-01-28 NOTE — ED ADULT TRIAGE NOTE - IDEAL BODY WEIGHT(KG)
A/P:  ROSETTA ALVAREZ is a 34y  s/p  PPD# 1 of a viable male infant.   - VSS.   - Pain is well controlled  - Tolerating PO intake  - Normal bowel function  - Bleeding wnl  - Male infant desires circumcision   - Rh +  - Dispo: Home pending attending approval A/P:  ROSETTA ALVAREZ is a 34y  s/p  PPD# 1 of a viable male infant.   - VSS.   - Pain is well controlled  - Tolerating PO intake  - Normal bowel function  - Bleeding wnl  - Male infant desires circumcision   - Rh +  - Dispo: Home pending attending approval     Pt was seen and examined at bedside  Agree with the statement above.  Joseph PGY3 68

## 2022-01-28 NOTE — H&P ADULT - HISTORY OF PRESENT ILLNESS
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 Pt was initially at hospital for outpatient CXR and was a code blue 2/2 LOC. Pt's sister/caregiver at bedside providing collateral. She reports that patient was very lethargic and non-responsive for approximately 20 minutes and that pt's blood pressure was very low initially. She states that something similar happened twice in the past at [facility] and the patient's blood pressure was low at those times as well. She states that patient is back at his baseline at this time. Unable to obtain ROS 2/2 pts mental status.     In ED, pt is afebrile w/ WBC: 29.7, BP: 105/65, HR: 82, Na: 133, K: 3.2, BUN/cr: 29/0.7, Trop: 0.03, Lactate: 3.3, CXR: right basilar opacity, CTH (-), CT abdomen: markedly distended colon/rectum with fecal impaction and RLL ground glass opacities. Pt was given IV cefepime 2g, IV clindamycin 600mg, IV levaquin 750mg, 1L LR, IV keppra 1g, IV zofran 4mg and was briefly on a levophed gtt.

## 2022-01-28 NOTE — H&P ADULT - PROBLEM SELECTOR PLAN 6
Problem: Mobility Impaired (Adult and Pediatric)  Goal: *Acute Goals and Plan of Care (Insert Text)  Physical Therapy Goals  Initiated 2/11/2017    1. Patient will move from supine to sit and sit to supine in bed with independence within 4 days. 2. Patient will perform sit to stand with modified independence within 4 days. 3. Patient will ambulate with modified independence for 200 feet with the least restrictive device within 4 days. 4. Patient will verbalize and demonstrate understanding of lateral precautions per protocol within 4 days. 5. Patient will perform all home exercise program per protocol with supervision/set-up within 4 days. Physical Therapy Goals  Initiated 2/1/2017    1. Patient will move from supine to sit and sit to supine , scoot up and down and roll side to side in bed with modified independence within 4 days. 2. Patient will perform sit to stand with modified independence within 4 days. 3. Patient will ambulate with modified independence for 200 feet with the least restrictive device within 4 days. 4. Patient will verbalize and demonstrate understanding of lateral precautions per protocol within 4 days. 5. Patient will perform all home exercise program per protocol with independence within 4 days. PHYSICAL THERAPY TREATMENT  Patient: Anuj Haile (32 y.o. female)  Date: 2/12/2017  Diagnosis: LEFT HIP BIPOLAR  Failed total joint replacement (HCC)  Failed total hip arthroplasty ([facility] [address] <principal problem not specified>  Procedure(s) (LRB):  LEFT TOTAL HIP ARTHROPLASTY CONVERSION (Left) 13 Days Post-Op  Precautions: WBAT (LLE)  Chart, physical therapy assessment, plan of care and goals were reviewed. ASSESSMENT:   Patient agreeable to treatment in spite of just returning supine, RN present. Patient is at CGA/SBA for all functional mobility with gait training demonstrated at toe down left LE with a distance of 50 feet with RW. Patient with pain level of 7/10.   Patient requesting to return supine post treatment, bed alarm activated. Progression toward goals:  [ ]      Improving appropriately and progressing toward goals  [X]      Improving slowly and progressing toward goals  [ ]      Not making progress toward goals and plan of care will be adjusted       PLAN:  Patient continues to benefit from skilled intervention to address the above impairments. Continue treatment per established plan of care. Discharge Recommendations: To Be Determined  Further Equipment Recommendations for Discharge:  rolling walker       SUBJECTIVE:   Patient stated I'll work with you.       OBJECTIVE DATA SUMMARY:   Critical Behavior:  Neurologic State: Alert  Orientation Level: Oriented X4  Cognition: Appropriate for age attention/concentration  Safety/Judgement: Awareness of environment, Insight into deficits, Good awareness of safety precautions  Functional Mobility Training:  Bed Mobility:     Supine to Sit: Stand-by asssistance;Supervision  Sit to Supine: Supervision;Stand-by asssistance                       Transfers:  Sit to Stand: Contact guard assistance  Stand to Sit: Contact guard assistance                             Balance:  Sitting: Intact; With support  Standing: Intact; With support  Standing - Static: Good  Standing - Dynamic : Fair  Ambulation/Gait Training:  Distance (ft): 50 Feet (ft)  Assistive Device: Walker, rolling;Gait belt  Ambulation - Level of Assistance: Contact guard assistance              Left Side Weight Bearing: Toe touch        Speed/Jeanie: Slow                    Pain:  Pain Scale 1: Numeric (0 - 10)  Pain Intensity 1: 9  Pain Location 1: Head  Pain Orientation 1: Anterior  Pain Description 1: Aching  Pain Intervention(s) 1: Medication (see MAR)  Activity Tolerance:   Good.      After treatment:   [ ] Patient left in no apparent distress sitting up in chair  [X] Patient left in no apparent distress in bed  [X] Call bell left within reach  [X] Nursing notified  [ ] Caregiver present  [X] Bed alarm activated      COMMUNICATION/COLLABORATION:   The patients plan of care was discussed with: Registered Nurse     Ruth Mendez PTA   Time Calculation: 14 mins -  - Continue ASA and statin

## 2022-01-29 NOTE — CONSULT NOTE ADULT - ASSESSMENT
Patient is a 56y old  Male with PMH of CVA x2 (w/ residual left sided deficits; wheelchair bound), DMT2, HTN and seizures being admitted for LOC and sepsis w/o clear origin. He was initially at hospital for outpatient CXR and was a code blue 2/2 LOC. As per Pt's sister/caregiver, reports that patient was very lethargic and non-responsive for approximately 20 minutes and that pt's blood pressure was very low initially. ON admission, he found to have tachycardia, hypotension, BP of 51/36 and Leukocytosis.  The CXR shows right basilar opacity,  CT abdomen: markedly distended colon/rectum with fecal impaction and RLL ground glass opacities.  He was given IV cefepime 2g, IV clindamycin 600mg, and IV levaquin 750mg. The ID consult requested to assist with further evaluation and antibiotic management.     # Sepsis/Septic shock ( Tachycardia + hypotension, BP of 51/36 + Leukocytosis)  # Pneumonia- on CXR Right lower lobe ground glass opacities    would recommend:    1. Please obtain Procalcitonin level and MRSA PCR  2. Follow up Blood cultures  3. Continue Zosyn until culture is finalized  4. Monitor WBC count    will follow the patient with you and make further recommendation based on the clinical course and Lab results  Thank you for the opportunity to participate in Mr. CALIXTO's care      Attending Attestation:    Spent more than 65 minutes on total encounter, more than 50 % of the visit was spent counseling and/or coordinating care by the Attending physician.     Patient is a 56y old  Male with PMH of CVA x2 (w/ residual left sided deficits; wheelchair bound), DMT2, HTN and seizures being admitted for LOC and sepsis w/o clear origin. He was initially at hospital for outpatient CXR and was a code blue 2/2 LOC. As per Pt's sister/caregiver, reports that patient was very lethargic and non-responsive for approximately 20 minutes and that pt's blood pressure was very low initially. ON admission, he found to have tachycardia, hypotension, BP of 51/36 and Leukocytosis.  The CXR shows right basilar opacity,  CT abdomen: markedly distended colon/rectum with fecal impaction and RLL ground glass opacities.  He was given IV cefepime 2g, IV clindamycin 600mg, and IV levaquin 750mg. The ID consult requested to assist with further evaluation and antibiotic management.     # Sepsis/Septic shock ( Tachycardia + hypotension, BP of 51/36 + Leukocytosis)  # Pneumonia- on CXR Right lower lobe ground glass opacities  # Decubitus ulcer    would recommend:    1. Please obtain Procalcitonin level and MRSA PCR  2. Follow up Blood cultures  3. Continue Zosyn until culture is finalized  4. Monitor WBC count  5. Surgical evaluation for possible debridement of Decubitus ulcer     d/w Nursing staff    will follow the patient with you and make further recommendation based on the clinical course and Lab results  Thank you for the opportunity to participate in Mr. CALIXTO's care      Attending Attestation:    Spent more than 65 minutes on total encounter, more than 50 % of the visit was spent counseling and/or coordinating care by the Attending physician.

## 2022-01-29 NOTE — PROGRESS NOTE ADULT - ASSESSMENT
ASSESSMENT: Pt is a 56M w/ PMH CVA x2 (w/ residual left sided deficits; wheelchair bound), DMT2, HTN and seizures being admitted for LOC and sepsis w/o clear origin.    In ED, pt is afebrile w/ WBC: 29.7, BP: 105/65, HR: 82, Na: 133, K: 3.2, BUN/cr: 29/0.7, Trop: 0.03, Lactate: 3.3, CXR: right basilar opacity, CTH (-), CT abdomen: markedly distended colon/rectum with fecal impaction and RLL ground glass opacities. Pt was given IV cefepime 2g, IV clindamycin 600mg, IV levaquin 750mg, 1L LR, IV keppra 1g, IV zofran 4mg and was briefly on a levophed gtt.         PLAN: Case d/w Dr. Garcia   - Admit to inpatient level of care - non-ccu tele  - Monitor vitals  - OOB to chair  - AM labs  - CHG bath  - PT consult   - Pureed w/ thickened liquids diet  - VTE ppx: SQ heparin  - GI ppx: protonix  ASSESSMENT: Pt is a 56M w/ PMH CVA x2 (w/ residual left sided deficits; wheelchair bound), DMT2, HTN and seizures being admitted for LOC and sepsis w/o clear origin.    In ED, pt is afebrile w/ WBC: 29.7, BP: 105/65, HR: 82, Na: 133, K: 3.2, BUN/cr: 29/0.7, Trop: 0.03, Lactate: 3.3, CXR: right basilar opacity, CTH (-), CT abdomen: markedly distended colon/rectum with fecal impaction and RLL ground glass opacities. Pt was given IV cefepime 2g, IV clindamycin 600mg, IV levaquin 750mg, 1L LR, IV keppra 1g, IV zofran 4mg and was briefly on a levophed gtt.     # Loss of consciousness of moderate duration.   ·  Plan: # Seizure vs CVA vs hypotension   # Hx CVA in 1980s w/ residual left sided deficits - continue baclofen and risperidone PRN   - Obtain REEG  - Neuro consult; f/u recs   - MR head non-con  - Continue carbamazepine  - q8h neuro checks   - Continue ASA 81 and statin.    # RLL pneumonia.   # Severe sepsis   - Leukocytosis improving  - F/u blood cultures  - F/u urine cultures   - Additional 1L LR; then continue LR @ 100cc/hr   - Monitor pulse ox  - Continue IV abx - zosyn    - ID consult.    # Fecal impaction.   - GI consult  - Tap water enema - q8h until BM   - ABD XR shows fecal impaction  - Monitor BM    #Hypokalemia, resolved  - K- rider x1 1/28  - Repeat K in AM.      #Hyponatremia, resolved  - Na: 133 -> 156 with IVF  - IVF   - Recheck in AM.     Problem/Plan - 6:  ·  Problem: CAD (coronary artery disease).   ·  Plan: -  - Continue ASA and statin.     Problem/Plan - 7:  ·  Problem: Hypertension.   - Low BP in ED, s/p short levophed gtt  - Monitor BP  - Hold lisinopril     Problem/Plan - 8:  ·  Problem: Seizure.   - Continue carbamazepine   - Seizure precautions.    #FENP  - Pureed w/ thickened liquids diet  - continue home meds  - VTE ppx: SQ heparin  - GI ppx: protonix

## 2022-01-29 NOTE — PHYSICAL THERAPY INITIAL EVALUATION ADULT - GENERAL OBSERVATIONS, REHAB EVAL
09:55-10;20 Chart reviewed. Pt encountered supine in bed,  may be seen by Physical Therapist as confirmed with Nurse. Patient appeared in no pain or discomfort; +tele; +IV RUE; condom catheter; seizure pads 

## 2022-01-29 NOTE — DIETITIAN INITIAL EVALUATION ADULT. - OTHER INFO
pt is 56 year old male with hx of CVA x2 with L sided deficits, wheelchair bound, DM, HTN, seizures, aspiration PNA (nov. 2021) while having outpt CXR pt became unresponsive s/p code blue. CT chest--> R basilar opacity. CT abd--> distended colon/rectum with fecal impaction. pt admitted with severe sepsis unknown etiology, RLL PNA, tap water enema 2/2 fecal impaction.

## 2022-01-29 NOTE — CONSULT NOTE ADULT - SUBJECTIVE AND OBJECTIVE BOX
56 y.o M w/ hx of CVA x2 (w/ residual left sided deficits; wheelchair bound), DM2, HTN and seizures being admitted for LOC and sepsis w/o clear origin, found to have fecal impaction for which he is being evaluated.   Pt was initially at hospital for outpatient CXR and was a code blue 2/2 LOC. Pt's sister/caregiver at bedside providing collateral. She reports that patient was very lethargic and non-responsive for approximately 20 minutes and that pt's blood pressure was very low initially.   She states that something similar happened twice in the past at [facility] and the patient's blood pressure was low at those times as well. She states that patient is back at his baseline at this time. He is admitted and undergoing workup for his LOC.     In ED, pt is afebrile w/ WBC: 29.7, BP: 105/65, HR: 82, Na: 133, K: 3.2, BUN/cr: 29/0.7, Trop: 0.03, Lactate: 3.3, CXR: right basilar opacity, CTH (-), CT abdomen: markedly distended colon/rectum with fecal impaction and RLL ground glass opacities. Pt was given IV cefepime 2g, IV clindamycin 600mg, IV levaquin 750mg, 1L LR, IV keppra 1g, IV zofran 4mg and was briefly on a levophed gtt.       review of prior CT from 8/2021 also shows a distended rectum.   he was initiated on tap water enemas.    PAST MEDICAL & SURGICAL HISTORY:  CVA (cerebral vascular accident) x2  Residual left sided weakness  CAD (coronary artery disease)  Retinal detachment  Hypertension  High cholesterol  Diabetes  Anxiety  Seizure  History of corneal transplant right       Social History:  Tobacco: neg  Alcohol: neg  Drugs: neg    Home Medications:  Aspir 81 oral delayed release tablet: 1 tab(s) orally once a day (28 Jan 2022 15:48)  baclofen 10 mg oral tablet: 1 tab(s) orally once a day (28 Jan 2022 15:48)  risperiDONE 1 mg oral tablet: 1 tab(s) orally once a day, As Needed for agitation (28 Jan 2022 15:48)  simvastatin 10 mg oral tablet: 1 tab(s) orally once a day (at bedtime) (28 Jan 2022 15:48)    MEDICATIONS  (STANDING):  aspirin enteric coated 81 milliGRAM(s) Oral daily  baclofen 10 milliGRAM(s) Oral daily  carBAMazepine Suspension 200 milliGRAM(s) Oral two times a day  chlorhexidine 4% Liquid 1 Application(s) Topical <User Schedule>  enoxaparin Injectable 40 milliGRAM(s) SubCutaneous daily  lactated ringers Bolus 1000 milliLiter(s) IV Bolus once  lactated ringers. 1000 milliLiter(s) (100 mL/Hr) IV Continuous <Continuous>  piperacillin/tazobactam IVPB.. 3.375 Gram(s) IV Intermittent every 8 hours  simvastatin 10 milliGRAM(s) Oral at bedtime    MEDICATIONS  (PRN):  risperiDONE   Tablet 1 milliGRAM(s) Oral daily PRN agitation      Allergies  No Known Allergies      Review of Systems:   Constitutional:  No Fever, No Chills  ENT/Mouth:  No Hearing Changes,  No Difficulty Swallowing  Eyes:  No Eye Pain, No Vision Changes  Cardiovascular:  No Chest Pain, No Palpitations  Respiratory:  No Cough, No Dyspnea  Gastrointestinal:  As described in HPI  Musculoskeletal:  No Joint Swelling, No Back Pain  Skin:  No Skin Lesions, No Jaundice  Neuro:  No Syncope, No Dizziness  Heme/Lymph:  No Bruising, No Bleeding.          Physical Examination:  T(C): 36.8 (01-29-22 @ 14:05), Max: 37.1 (01-29-22 @ 05:40)  HR: 83 (01-29-22 @ 14:05) (82 - 102)  BP: 116/79 (01-29-22 @ 14:05) (116/79 - 168/81)  RR: 20 (01-29-22 @ 14:05) (18 - 20)  SpO2: 98% (01-28-22 @ 18:30) (98% - 99%)  Height (cm): 188 (01-28-22 @ 18:22)  Weight (kg): 56.2 (01-28-22 @ 18:22)    01-28-22 @ 07:01  -  01-29-22 @ 07:00  --------------------------------------------------------  IN: 0 mL / OUT: 400 mL / NET: -400 mL        Constitutional: No acute distress.  Eyes:. Conjunctivae are clear, Sclera is non-icteric.  Ears Nose and Throat: The external ears are normal appearing,  Oral mucosa is pink and moist.  Respiratory:  No signs of respiratory distress. Lung sounds are clear bilaterally.  Cardiovascular:  S1 S2, Regular rate and rhythm.  GI: Abdomen is soft, symmetric, and non-tender without distention. There are no visible lesions or scars. Bowel sounds are present and normoactive in all four quadrants. No masses, hepatomegaly, or splenomegaly are noted.   Neuro: No Tremor, No involuntary movements  Skin: No rashes, No Jaundice.          Data:                        9.4    17.47 )-----------( 201      ( 29 Jan 2022 09:00 )             28.7     Hgb Trend:  9.4  01-29-22 @ 09:00  10.4  01-28-22 @ 11:20        01-29    136  |  102  |  22<H>  ----------------------------<  96  4.2   |  21  |  0.5<L>    Ca    8.7      29 Jan 2022 09:00  Mg     2.1     01-28    TPro  7.6  /  Alb  3.8  /  TBili  0.3  /  DBili  x   /  AST  17  /  ALT  24  /  AlkPhos  148<H>  01-28    Liver panel trend:  TBili 0.3   /   AST 17   /   ALT 24   /   AlkP 148   /   Tptn 7.6   /   Alb 3.8    /   DBili --      01-28      PT/INR - ( 28 Jan 2022 11:20 )   PT: 14.00 sec;   INR: 1.22 ratio         PTT - ( 28 Jan 2022 11:20 )  PTT:32.2 sec        Radiology:    < from: CT Abdomen and Pelvis w/ IV Cont (01.28.22 @ 12:56) >  IMPRESSION:    Markedly distended colon and rectum with rectal fecal impaction. Rectum   measures 15.6 cm in transverse diameter. No pneumoperitoneum.    Right lower lobe groundglass opacities with tree-in-bud nodularity.   Findings likely infectious/inflammatory in etiology.    < end of copied text >     56 y.o M w/ hx of CVA x2 (w/ residual left sided deficits; wheelchair bound), DM2, HTN and seizures being admitted for LOC and sepsis w/o clear origin, found to have fecal impaction for which he is being evaluated.   Pt was initially at hospital for outpatient CXR and was a code blue 2/2 LOC. Pt's sister/caregiver at bedside providing collateral. She reports that patient was very lethargic and non-responsive for approximately 20 minutes and that pt's blood pressure was very low initially.   She states that something similar happened twice in the past at [facility] and the patient's blood pressure was low at those times as well. She states that patient is back at his baseline at this time. He is admitted and undergoing workup for his LOC.     In ED, pt is afebrile w/ WBC: 29.7, BP: 105/65, HR: 82, Na: 133, K: 3.2, BUN/cr: 29/0.7, Trop: 0.03, Lactate: 3.3, CXR: right basilar opacity, CTH (-), CT abdomen: markedly distended colon/rectum with fecal impaction and RLL ground glass opacities. Pt was given IV cefepime 2g, IV clindamycin 600mg, IV levaquin 750mg, 1L LR, IV keppra 1g, IV zofran 4mg and was briefly on a levophed gtt.       review of prior CT from 8/2021 also shows a distended rectum.   he was initiated on tap water enemas.    PAST MEDICAL & SURGICAL HISTORY:  CVA (cerebral vascular accident) x2  Residual left sided weakness  CAD (coronary artery disease)  Retinal detachment  Hypertension  High cholesterol  Diabetes  Anxiety  Seizure  History of corneal transplant right       Social History:  Tobacco: neg  Alcohol: neg  Drugs: neg    Home Medications:  Aspir 81 oral delayed release tablet: 1 tab(s) orally once a day (28 Jan 2022 15:48)  baclofen 10 mg oral tablet: 1 tab(s) orally once a day (28 Jan 2022 15:48)  risperiDONE 1 mg oral tablet: 1 tab(s) orally once a day, As Needed for agitation (28 Jan 2022 15:48)  simvastatin 10 mg oral tablet: 1 tab(s) orally once a day (at bedtime) (28 Jan 2022 15:48)    MEDICATIONS  (STANDING):  aspirin enteric coated 81 milliGRAM(s) Oral daily  baclofen 10 milliGRAM(s) Oral daily  carBAMazepine Suspension 200 milliGRAM(s) Oral two times a day  chlorhexidine 4% Liquid 1 Application(s) Topical <User Schedule>  enoxaparin Injectable 40 milliGRAM(s) SubCutaneous daily  lactated ringers Bolus 1000 milliLiter(s) IV Bolus once  lactated ringers. 1000 milliLiter(s) (100 mL/Hr) IV Continuous <Continuous>  piperacillin/tazobactam IVPB.. 3.375 Gram(s) IV Intermittent every 8 hours  simvastatin 10 milliGRAM(s) Oral at bedtime    MEDICATIONS  (PRN):  risperiDONE   Tablet 1 milliGRAM(s) Oral daily PRN agitation      Allergies  No Known Allergies      Review of Systems:   Constitutional:  No Fever, No Chills  ENT/Mouth:  No Hearing Changes,  No Difficulty Swallowing  Eyes:  No Eye Pain, No Vision Changes  Cardiovascular:  No Chest Pain, No Palpitations  Respiratory:  No Cough, No Dyspnea  Gastrointestinal:  As described in HPI  Musculoskeletal:  No Joint Swelling, No Back Pain  Skin:  No Skin Lesions, No Jaundice  Neuro:  No Syncope, No Dizziness  Heme/Lymph:  No Bruising, No Bleeding.          Physical Examination:  T(C): 36.8 (01-29-22 @ 14:05), Max: 37.1 (01-29-22 @ 05:40)  HR: 83 (01-29-22 @ 14:05) (82 - 102)  BP: 116/79 (01-29-22 @ 14:05) (116/79 - 168/81)  RR: 20 (01-29-22 @ 14:05) (18 - 20)  SpO2: 98% (01-28-22 @ 18:30) (98% - 99%)  Height (cm): 188 (01-28-22 @ 18:22)  Weight (kg): 56.2 (01-28-22 @ 18:22)    01-28-22 @ 07:01  -  01-29-22 @ 07:00  --------------------------------------------------------  IN: 0 mL / OUT: 400 mL / NET: -400 mL        Constitutional: No acute distress.  Eyes:. Conjunctivae are clear, Sclera is non-icteric.  Respiratory:  No signs of respiratory distress.  Cardiovascular:  S1 S2, Regular rate and rhythm.  GI: Abdomen is soft, symmetric, and non-tender without distention.  Neuro: awake and alert, responsive, has L sided deficits          Data:                        9.4    17.47 )-----------( 201      ( 29 Jan 2022 09:00 )             28.7     Hgb Trend:  9.4  01-29-22 @ 09:00  10.4  01-28-22 @ 11:20        01-29    136  |  102  |  22<H>  ----------------------------<  96  4.2   |  21  |  0.5<L>    Ca    8.7      29 Jan 2022 09:00  Mg     2.1     01-28    TPro  7.6  /  Alb  3.8  /  TBili  0.3  /  DBili  x   /  AST  17  /  ALT  24  /  AlkPhos  148<H>  01-28    Liver panel trend:  TBili 0.3   /   AST 17   /   ALT 24   /   AlkP 148   /   Tptn 7.6   /   Alb 3.8    /   DBili --      01-28      PT/INR - ( 28 Jan 2022 11:20 )   PT: 14.00 sec;   INR: 1.22 ratio         PTT - ( 28 Jan 2022 11:20 )  PTT:32.2 sec        Radiology:    < from: CT Abdomen and Pelvis w/ IV Cont (01.28.22 @ 12:56) >  IMPRESSION:    Markedly distended colon and rectum with rectal fecal impaction. Rectum   measures 15.6 cm in transverse diameter. No pneumoperitoneum.    Right lower lobe groundglass opacities with tree-in-bud nodularity.   Findings likely infectious/inflammatory in etiology.    < end of copied text >

## 2022-01-29 NOTE — PROGRESS NOTE ADULT - SUBJECTIVE AND OBJECTIVE BOX
MARILY SILVANO  [address] 2 ([address])            Patient was evaluated and examined  by bedside,                 REVIEW OF SYSTEMS:  please see pertinent positives mentioned above, all other 12 ROS negative      T(C): , Max: 37.1 (01-29-22 @ 05:40)  HR: 95 (01-29-22 @ 05:40)  BP: 118/83 (01-29-22 @ 05:40)  RR: 20 (01-29-22 @ 05:40)  SpO2: 98% (01-28-22 @ 18:30)  CAPILLARY BLOOD GLUCOSE      POCT Blood Glucose.: 105 mg/dL (28 Jan 2022 11:08)      PHYSICAL EXAM:  General: NAD, awake, minimally verbal, contracted, patient is laying comfortably in bed  HEENT: AT, NC, Supple, NO JVD, NO CB  Lungs: CTA B/L, no wheezing, no rhonchi  CVS: normal S1, S2, RRR, NO M/G/R  Abdomen: soft, bowel sounds present, non-tender, non-distended  Extremities: no edema, no clubbing, no cyanosis, positive peripheral pulses b/l  Neuro: no acute focal neurological deficits  Skin: no rush, no ecchymosis      LAB  CBC  Date: 01-28-22 @ 11:20  Mean cell [id number]  Mean cell Hemoglobin Conc33.1  Mean cell Volum 89.2  Platelet count-Automate 207  RBC Count 3.52  Red Cell Distrib Width16.1  WBC Count29.72  % Albumin, Urine--  Hematocrit 31.4  Hemoglobin 10.4    BMP  01-28-22 @ 11:20  Blood Gas Arterial-Calcium,Ionized--  Blood Urea Nitrogen, Serum 29 mg/dL<H> [10 - 20]  Carbon Dioxide, Serum24 mmol/L [17 - 32]  Chloride, Serum94 mmol/L<L> [98 - 110]  Creatinie, Serum0.7 mg/dL [0.7 - 1.5]  Glucose, Serum91 mg/dL [70 - 99]  Potassium, Serum3.2 mmol/L<L> [3.5 - 5.0]  Sodium, Serum 133 mmol/L<L> [135 - 146]        PT/INR - ( 28 Jan 2022 11:20 )   PT: 14.00 sec;   INR: 1.22 ratio         PTT - ( 28 Jan 2022 11:20 )  PTT:32.2 sec      Microbiology:      RADIOLOGY & ADDITIONAL TESTS:  CT A/P 1/28:  IMPRESSION:  Markedly distended colon and rectum with rectal fecal impaction. Rectum measures 15.6 cm in transverse diameter. No pneumoperitoneum.  Right lower lobe groundglass opacities with tree-in-bud nodularity. Findings likely infectious/inflammatory in etiology.    CT head 1/28:  Findings:  There is stable mild ventricular prominence superimposed upon a mild degree of parenchymal volume loss.  There is are stable chronic lacunar infarcts within the left corona radiata and internal capsule.  There is no acute intracranial hemorrhage, extra-axial fluid collection or midline shift. Gray-white matter differentiation is maintained.  The visualized paranasal sinuses are clear. There is sclerosis and/or under pneumatization of the mastoid complexes. Soft tissue opacity within the left external auditory canal likely reflecting cerumen.    CXR 1/28:  The overall heart size is within normal limits.    Right basilar opacity.    Elevation of the right hemidiaphragm and dilatation of the right hepatic flexure.      Medications:  aspirin enteric coated 81 milliGRAM(s) Oral daily  baclofen 10 milliGRAM(s) Oral daily  carBAMazepine Suspension 200 milliGRAM(s) Oral two times a day  chlorhexidine 4% Liquid 1 Application(s) Topical <User Schedule>  enoxaparin Injectable 40 milliGRAM(s) SubCutaneous daily  lactated ringers Bolus 1000 milliLiter(s) IV Bolus once  lactated ringers. 1000 milliLiter(s) IV Continuous <Continuous>  piperacillin/tazobactam IVPB.. 3.375 Gram(s) IV Intermittent every 8 hours  risperiDONE   Tablet 1 milliGRAM(s) Oral daily PRN  simvastatin 10 milliGRAM(s) Oral at bedtime        Assessment and Plan:     SILVANO CALIXTO  [address] 2 ([address])            Patient was evaluated and examined  by bedside, awake and alert, shaking his head no when asked about pain or difficulty breathing. Patient is hungry but no other complaints.                REVIEW OF SYSTEMS:  please see pertinent positives mentioned above, all other 12 ROS negative      T(C): , Max: 37.1 (01-29-22 @ 05:40)  HR: 95 (01-29-22 @ 05:40)  BP: 118/83 (01-29-22 @ 05:40)  RR: 20 (01-29-22 @ 05:40)  SpO2: 98% (01-28-22 @ 18:30)  CAPILLARY BLOOD GLUCOSE      POCT Blood Glucose.: 105 mg/dL (28 Jan 2022 11:08)      PHYSICAL EXAM:  General: NAD, awake, minimally verbal, contracted, patient is laying comfortably in bed  HEENT: AT, NC, Supple, NO JVD, NO CB  Lungs: CTA B/L, no wheezing, no rhonchi  CVS: normal S1, S2, RRR, NO M/G/R  Abdomen: soft, bowel sounds present, non-tender, non-distended  Extremities: no edema, no clubbing, no cyanosis, positive peripheral pulses b/l  Neuro: no acute focal neurological deficits  Skin: no rush, no ecchymosis      LAB  CBC  Date: 01-28-22 @ 11:20  Mean cell [id number]  Mean cell Hemoglobin Conc33.1  Mean cell Volum 89.2  Platelet count-Automate 207  RBC Count 3.52  Red Cell Distrib Width16.1  WBC Count29.72  % Albumin, Urine--  Hematocrit 31.4  Hemoglobin 10.4    BMP  01-28-22 @ 11:20  Blood Gas Arterial-Calcium,Ionized--  Blood Urea Nitrogen, Serum 29 mg/dL<H> [10 - 20]  Carbon Dioxide, Serum24 mmol/L [17 - 32]  Chloride, Serum94 mmol/L<L> [98 - 110]  Creatinie, Serum0.7 mg/dL [0.7 - 1.5]  Glucose, Serum91 mg/dL [70 - 99]  Potassium, Serum3.2 mmol/L<L> [3.5 - 5.0]  Sodium, Serum 133 mmol/L<L> [135 - 146]        PT/INR - ( 28 Jan 2022 11:20 )   PT: 14.00 sec;   INR: 1.22 ratio         PTT - ( 28 Jan 2022 11:20 )  PTT:32.2 sec      Microbiology:      RADIOLOGY & ADDITIONAL TESTS:  CT A/P 1/28:  IMPRESSION:  Markedly distended colon and rectum with rectal fecal impaction. Rectum measures 15.6 cm in transverse diameter. No pneumoperitoneum.  Right lower lobe groundglass opacities with tree-in-bud nodularity. Findings likely infectious/inflammatory in etiology.    CT head 1/28:  Findings:  There is stable mild ventricular prominence superimposed upon a mild degree of parenchymal volume loss.  There is are stable chronic lacunar infarcts within the left corona radiata and internal capsule.  There is no acute intracranial hemorrhage, extra-axial fluid collection or midline shift. Gray-white matter differentiation is maintained.  The visualized paranasal sinuses are clear. There is sclerosis and/or under pneumatization of the mastoid complexes. Soft tissue opacity within the left external auditory canal likely reflecting cerumen.    CXR 1/28:  The overall heart size is within normal limits.    Right basilar opacity.    Elevation of the right hemidiaphragm and dilatation of the right hepatic flexure.      Medications:  aspirin enteric coated 81 milliGRAM(s) Oral daily  baclofen 10 milliGRAM(s) Oral daily  carBAMazepine Suspension 200 milliGRAM(s) Oral two times a day  chlorhexidine 4% Liquid 1 Application(s) Topical <User Schedule>  enoxaparin Injectable 40 milliGRAM(s) SubCutaneous daily  lactated ringers Bolus 1000 milliLiter(s) IV Bolus once  lactated ringers. 1000 milliLiter(s) IV Continuous <Continuous>  piperacillin/tazobactam IVPB.. 3.375 Gram(s) IV Intermittent every 8 hours  risperiDONE   Tablet 1 milliGRAM(s) Oral daily PRN  simvastatin 10 milliGRAM(s) Oral at bedtime        Assessment and Plan:

## 2022-01-29 NOTE — DIETITIAN INITIAL EVALUATION ADULT. - ADD RECOMMEND
Add MVI daily, vitamin C 500 mg q 12 hrs, ZnS04- 220 mg daily x 10 days. RD to monitor tolerance to po diet, labs/meds, NFPF and f/u as needed within 2-4 days

## 2022-01-29 NOTE — PHYSICAL THERAPY INITIAL EVALUATION ADULT - DISCHARGE DISPOSITION, PT EVAL
As discussed with RN Lucille and agreed with sister Nicole,no further skilled PT indicated at this time in this setting as patient functioning at same level at home where patient is dependent for all functional mobility and family uses a pedro lift to transfer to wheelchair. Nursing to continue with routine ROM, turning, positioning. Please reconsult PT as needed and appropriate. AM-PAC mobility score=6

## 2022-01-29 NOTE — PHYSICAL THERAPY INITIAL EVALUATION ADULT - PASSIVE RANGE OF MOTION EXAMINATION, REHAB EVAL
Both upper extremities/Both lower extremities joints within functional limits and painfree required PROM to compelte except for some active movemen on RUE, with tightness form mid-endranges deb LUE

## 2022-01-29 NOTE — CONSULT NOTE ADULT - ASSESSMENT
56 y.o M w/ hx of CVA x2 (w/ residual left sided deficits; wheelchair bound), DM2, HTN and seizures being admitted for LOC and sepsis w/o clear origin, found to have fecal impaction for which he is being evaluated.     #fecal impaction  pt likely has chronic constipation and possible slow colonic transit motility in the setting of his DM2, hx of CVA, and bedbound status    -recommend fecal mechanical disimpaction followed by aggressive tap water enemas q2 for now until disimpaction of fecal load  -pt will need bowel regimen with miralax TID which can be started now and continued after discharge, titrated to 1-2 BMs/day  -avoid narcotics, anticholinergics, and BZDs  -monitor electrolytes and adjust to keep K > 4 and Mg > 2  -GI/DVT ppx  -PT / OOB to chair  -rest of management per primary team

## 2022-01-29 NOTE — PHYSICAL THERAPY INITIAL EVALUATION ADULT - ADDITIONAL COMMENTS
Per sister Nicole over the phone, patient lives with family in an apartment on the 5th floor of a building with elevator, transfers to wheelchair with pedro lift; not able to stand or walk

## 2022-01-29 NOTE — DIETITIAN INITIAL EVALUATION ADULT. - ORAL INTAKE PTA/DIET HISTORY
RD to obtain upon f/u visit    as per EMR last recorded weight is 66.9 kgs from 1/22/20 admission, assuming weights obtained are accurate pt has had a loss of ~23# over two years.

## 2022-01-29 NOTE — CONSULT NOTE ADULT - ASSESSMENT
56M w/ PMH CVA x2 (w/ residual left sided deficits; wheelchair bound), DMT2, HTN and localized related epilepsy on Tegretol at [facility]  being admitted for LOC and sepsis w/o clear origin.    In ED, pt is afebrile w/ WBC: 29.7, BP: 105/65, HR: 82, Na: 133, K: 3.2, BUN/cr: 29/0.7, Trop: 0.03, Lactate: 3.3, CXR: right basilar opacity, CTH (-), CT abdomen: markedly distended colon/rectum with fecal impaction and RLL ground glass opacities. Pt was given IV cefepime 2g, IV clindamycin 600mg, IV levaquin 750mg, 1L LR, IV keppra 1g, IV zofran 4mg and was briefly on a levophed gtt.     Altered mental status likely due to toxic metabolic encephalopathy   Seizure   CVA    - Brain MRI w/o  - Routine EEG  - Per neurology note dated on Jan 2020 patient is supposed to be on Tegretol.   - Repeat Tegretol level on 1/30 in am before the morning dose   - Continue carbamazepine at home dose 200mg q12hrs for now  - Seizure precautions  - Ativan 2mg IV PRN for generalized tonic-cloonic seizure lasting longer than 2  minutes, or two consecutive seizures without return to baseline in-between  - Keep Mg above 2.0.  - Will follow  56M w/ PMH CVA x2 (w/ residual left sided deficits; wheelchair bound), DMT2, HTN and localized related epilepsy on Tegretol at [facility]  being admitted for LOC and sepsis w/o clear origin.    In ED, pt is afebrile w/ WBC: 29.7, BP: 105/65, HR: 82, Na: 133, K: 3.2, BUN/cr: 29/0.7, Trop: 0.03, Lactate: 3.3, CXR: right basilar opacity, CTH (-), CT abdomen: markedly distended colon/rectum with fecal impaction and RLL ground glass opacities. Pt was given IV cefepime 2g, IV clindamycin 600mg, IV levaquin 750mg, 1L LR, IV keppra 1g, IV zofran 4mg and was briefly on a levophed gtt.     Altered mental status likely due to toxic metabolic encephalopathy   Seizure   CVA    - Brain MRI w/o  - Routine EEG  - Per neurology note dated on Jan 2020 patient is supposed to be on Tegretol.   - Repeat Tegretol level on 1/30 in am before the morning dose   - Continue carbamazepine at home dose 200mg q12hrs for now  - Seizure precautions  - Ativan 2mg IV PRN for generalized tonic-cloonic seizure lasting longer than 2  minutes, or two consecutive seizures without return to baseline in-between  - Keep Mg above 2.0.  - Will follow

## 2022-01-29 NOTE — DIETITIAN INITIAL EVALUATION ADULT. - PERTINENT MEDS FT
MEDICATIONS  (STANDING):  aspirin enteric coated 81 milliGRAM(s) Oral daily  baclofen 10 milliGRAM(s) Oral daily  carBAMazepine Suspension 200 milliGRAM(s) Oral two times a day  chlorhexidine 4% Liquid 1 Application(s) Topical <User Schedule>  enoxaparin Injectable 40 milliGRAM(s) SubCutaneous daily  lactated ringers Bolus 1000 milliLiter(s) IV Bolus once  lactated ringers. 1000 milliLiter(s) (100 mL/Hr) IV Continuous <Continuous>  piperacillin/tazobactam IVPB.. 3.375 Gram(s) IV Intermittent every 8 hours  simvastatin 10 milliGRAM(s) Oral at bedtime    MEDICATIONS  (PRN):  risperiDONE   Tablet 1 milliGRAM(s) Oral daily PRN agitation

## 2022-01-29 NOTE — PHYSICAL THERAPY INITIAL EVALUATION ADULT - PERTINENT HX OF CURRENT PROBLEM, REHAB EVAL
57 y/o male admitted with diagnosis of Sepsis secondary to Pneumonia, Seizure; sent from Radiology after becoming unresponsive during Chest X-ray

## 2022-01-29 NOTE — CONSULT NOTE ADULT - SUBJECTIVE AND OBJECTIVE BOX
Patient is a 56y old  Male with PMH of CVA x2 (w/ residual left sided deficits; wheelchair bound), DMT2, HTN and seizures being admitted for LOC and sepsis w/o clear origin. He was initially at hospital for outpatient CXR and was a code blue 2/2 LOC. As per Pt's sister/caregiver, reports that patient was very lethargic and non-responsive for approximately 20 minutes and that pt's blood pressure was very low initially. ON admission, he found to have tachycardia, hypotension, BP of 51/36 and Leukocytosis.  The CXR shows right basilar opacity,  CT abdomen: markedly distended colon/rectum with fecal impaction and RLL ground glass opacities.  He was given IV cefepime 2g, IV clindamycin 600mg, and IV levaquin 750mg. The ID consult requested to assist with further evaluation and antibiotic management.       REVIEW OF SYSTEMS: Total of twelve systems have been reviewed  and found to be negative unless mentioned in HPI        PAST MEDICAL & SURGICAL HISTORY:  CVA (cerebral vascular accident)  1980&#x27;s x2   Residual left sided weakness  CAD (coronary artery disease)  Retinal detachment  Hypertension  High cholesterol  Diabetes  Anxiety, Seizure  History of  Right corneal transplant, 4/13/17        SOCIAL HISTORY  Alcohol: Does not drink  Tobacco: Does not smoke  Illicit substance use: None      FAMILY HISTORY: Non contributory to the present illness      ALLERGIES: No Known Allergies      Vital Signs Last 24 Hrs  T(C): 37.1 (01-29-22 @ 05:40), Max: 37.1 (01-29-22 @ 05:40)  T(F): 98.8 (01-29-22 @ 05:40), Max: 98.8 (01-29-22 @ 05:40)  HR: 95 (01-29-22 @ 05:40) (82 - 102)  BP: 118/83 (01-29-22 @ 05:40) (51/36 - 168/81)  BP(mean): --  RR: 20 (01-29-22 @ 05:40) (18 - 20)  SpO2: 98% (01-28-22 @ 18:30) (98% - 99%)        PHYSICAL EXAM:  GENERAL: Not in distress   CHEST/LUNG:  Not using accessory muscles   HEART: s1 and s2 present  ABDOMEN:  Nontender and  Nondistended  EXTREMITIES: No pedal  edema  CNS: Awake and Alert      LABS:                        10.4   29.72 )-----------( 207      ( 28 Jan 2022 11:20 )             31.4       01-28    133<L>  |  94<L>  |  29<H>  ----------------------------<  91  3.2<L>   |  24  |  0.7    Ca    9.6      28 Jan 2022 11:20  Mg     2.1     01-28    TPro  7.6  /  Alb  3.8  /  TBili  0.3  /  DBili  x   /  AST  17  /  ALT  24  /  AlkPhos  148<H>  01-28    PT/INR - ( 28 Jan 2022 11:20 )   PT: 14.00 sec;   INR: 1.22 ratio      PTT - ( 28 Jan 2022 11:20 )  PTT:32.2 sec      CAPILLARY BLOOD GLUCOSE  POCT Blood Glucose.: 105 mg/dL (28 Jan 2022 11:08)        MEDICATIONS  (STANDING):  aspirin enteric coated 81 milliGRAM(s) Oral daily  baclofen 10 milliGRAM(s) Oral daily  carBAMazepine Suspension 200 milliGRAM(s) Oral two times a day  chlorhexidine 4% Liquid 1 Application(s) Topical <User Schedule>  enoxaparin Injectable 40 milliGRAM(s) SubCutaneous daily  lactated ringers Bolus 1000 milliLiter(s) IV Bolus once  lactated ringers. 1000 milliLiter(s) (100 mL/Hr) IV Continuous <Continuous>  piperacillin/tazobactam IVPB.. 3.375 Gram(s) IV Intermittent every 8 hours  simvastatin 10 milliGRAM(s) Oral at bedtime    MEDICATIONS  (PRN):  risperiDONE   Tablet 1 milliGRAM(s) Oral daily PRN agitation        RADIOLOGY & ADDITIONAL TESTS:    < from: CT Abdomen and Pelvis w/ IV Cont (01.28.22 @ 12:56) >  Markedly distended colon and rectum with rectal fecal impaction. Rectum   measures 15.6 cm in transverse diameter. No pneumoperitoneum.    Right lower lobe groundglass opacities with tree-in-bud nodularity.   Findings likely infectious/inflammatory in etiology.      < from: CT Head No Cont (01.28.22 @ 12:56) >  No evidence of acute intracranial pathology. Stable exam since 1/12/2021.    < from: Xray Chest 1 View- PORTABLE-Urgent (01.28.22 @ 11:34) > Right basilar opacity.      MICROBIOLOGY DATA:    COVID-19 PCR (01.28.22 @ 11:35)   COVID-19 PCR: NotDetec               Patient is a 56y old  Male with PMH of CVA x2 (w/ residual left sided deficits; wheelchair bound), DMT2, HTN and seizures being admitted for LOC and sepsis w/o clear origin. He was initially at hospital for outpatient CXR and was a code blue 2/2 LOC. As per Pt's sister/caregiver, reports that patient was very lethargic and non-responsive for approximately 20 minutes and that pt's blood pressure was very low initially. ON admission, he found to have tachycardia, hypotension, BP of 51/36 and Leukocytosis.  The CXR shows right basilar opacity,  CT abdomen: markedly distended colon/rectum with fecal impaction and RLL ground glass opacities.  He was given IV cefepime 2g, IV clindamycin 600mg, and IV levaquin 750mg. The ID consult requested to assist with further evaluation and antibiotic management.       REVIEW OF SYSTEMS: Unable to obtain due to mental status unless mentioned in HPI      PAST MEDICAL & SURGICAL HISTORY:  CVA (cerebral vascular accident)  1980&#x27;s x2   Residual left sided weakness  CAD (coronary artery disease)  Retinal detachment  Hypertension  High cholesterol  Diabetes  Anxiety, Seizure  History of  Right corneal transplant, 4/13/17        SOCIAL HISTORY  Alcohol: Does not drink  Tobacco: Does not smoke  Illicit substance use: None      FAMILY HISTORY: Non contributory to the present illness      ALLERGIES: No Known Allergies      Vital Signs Last 24 Hrs  T(C): 37.1 (01-29-22 @ 05:40), Max: 37.1 (01-29-22 @ 05:40)  T(F): 98.8 (01-29-22 @ 05:40), Max: 98.8 (01-29-22 @ 05:40)  HR: 95 (01-29-22 @ 05:40) (82 - 102)  BP: 118/83 (01-29-22 @ 05:40) (51/36 - 168/81)  BP(mean): --  RR: 20 (01-29-22 @ 05:40) (18 - 20)  SpO2: 98% (01-28-22 @ 18:30) (98% - 99%)        PHYSICAL EXAM:  GENERAL: Not in distress , very cachectic  CHEST/LUNG:  Not using accessory muscles   HEART: s1 and s2 present  ABDOMEN:  Nontender and  Nondistended  BACK: stage IV decubitus ulcer  EXTREMITIES: No pedal  edema  CNS: Awake and Alert      LABS:                        10.4   29.72 )-----------( 207      ( 28 Jan 2022 11:20 )             31.4       01-28    133<L>  |  94<L>  |  29<H>  ----------------------------<  91  3.2<L>   |  24  |  0.7    Ca    9.6      28 Jan 2022 11:20  Mg     2.1     01-28    TPro  7.6  /  Alb  3.8  /  TBili  0.3  /  DBili  x   /  AST  17  /  ALT  24  /  AlkPhos  148<H>  01-28    PT/INR - ( 28 Jan 2022 11:20 )   PT: 14.00 sec;   INR: 1.22 ratio      PTT - ( 28 Jan 2022 11:20 )  PTT:32.2 sec      CAPILLARY BLOOD GLUCOSE  POCT Blood Glucose.: 105 mg/dL (28 Jan 2022 11:08)        MEDICATIONS  (STANDING):  aspirin enteric coated 81 milliGRAM(s) Oral daily  baclofen 10 milliGRAM(s) Oral daily  carBAMazepine Suspension 200 milliGRAM(s) Oral two times a day  chlorhexidine 4% Liquid 1 Application(s) Topical <User Schedule>  enoxaparin Injectable 40 milliGRAM(s) SubCutaneous daily  lactated ringers Bolus 1000 milliLiter(s) IV Bolus once  lactated ringers. 1000 milliLiter(s) (100 mL/Hr) IV Continuous <Continuous>  piperacillin/tazobactam IVPB.. 3.375 Gram(s) IV Intermittent every 8 hours  simvastatin 10 milliGRAM(s) Oral at bedtime    MEDICATIONS  (PRN):  risperiDONE   Tablet 1 milliGRAM(s) Oral daily PRN agitation        RADIOLOGY & ADDITIONAL TESTS:    < from: CT Abdomen and Pelvis w/ IV Cont (01.28.22 @ 12:56) >  Markedly distended colon and rectum with rectal fecal impaction. Rectum   measures 15.6 cm in transverse diameter. No pneumoperitoneum.    Right lower lobe groundglass opacities with tree-in-bud nodularity.   Findings likely infectious/inflammatory in etiology.      < from: CT Head No Cont (01.28.22 @ 12:56) >  No evidence of acute intracranial pathology. Stable exam since 1/12/2021.    < from: Xray Chest 1 View- PORTABLE-Urgent (01.28.22 @ 11:34) > Right basilar opacity.      MICROBIOLOGY DATA:    COVID-19 PCR (01.28.22 @ 11:35)   COVID-19 PCR: NotDetec

## 2022-01-29 NOTE — DIETITIAN INITIAL EVALUATION ADULT. - OTHER CALCULATIONS
1466* 1.3=1905 kcal vs 3295-8826 kcals (30-35 kcal/kg/BW) 73-84g protein (1.3-1.5g/kg/BW) 1;1 kcal for estimated fluid needs

## 2022-01-29 NOTE — CONSULT NOTE ADULT - SUBJECTIVE AND OBJECTIVE BOX
Neurology  Consult Note  01-29-22    Name:  SILVANO CALIXTO; 56y (1965)    Reason for Admission: SEPSIS DUE TO PNEUMONIA; SEIZURE        Chief Complaint:  HPI:  Pt is a 56M w/ PMH CVA x2 (w/ residual left sided deficits; wheelchair bound), DMT2, HTN and seizures being admitted for LOC and sepsis w/o clear origin. Pt was initially at hospital for outpatient CXR and was a code blue 2/2 LOC. Pt's sister/caregiver at bedside providing collateral. She reports that patient was very lethargic and non-responsive for approximately 20 minutes and that pt's blood pressure was very low initially. She states that something similar happened twice in the past at [facility] and the patient's blood pressure was low at those times as well. She states that patient is back at his baseline at this time. Unable to obtain ROS 2/2 pts mental status.     In ED, pt is afebrile w/ WBC: 29.7, BP: 105/65, HR: 82, Na: 133, K: 3.2, BUN/cr: 29/0.7, Trop: 0.03, Lactate: 3.3, CXR: right basilar opacity, CTH (-), CT abdomen: markedly distended colon/rectum with fecal impaction and RLL ground glass opacities. Pt was given IV cefepime 2g, IV clindamycin 600mg, IV levaquin 750mg, 1L LR, IV keppra 1g, IV zofran 4mg and was briefly on a levophed gtt.    (28 Jan 2022 15:49)    During the neurology encounter patient is mildly lethargic. Able to tell me his name and follows commands intermittently.     Review of Systems:  Unable to obtain         PMHx:   CVA (cerebral vascular accident)    CAD (coronary artery disease)    Retinal detachment    Hypertension    Hypertension    High cholesterol    Diabetes    Eczema    Anxiety    Diarrhea    Weakness    Seizure    Aphasia      PFHx:     PSuHx:   History of corneal transplant      PSoHx:   No illicit drug         Medications:  MEDICATIONS  (STANDING):  aspirin enteric coated 81 milliGRAM(s) Oral daily  baclofen 10 milliGRAM(s) Oral daily  carBAMazepine Suspension 200 milliGRAM(s) Oral two times a day  chlorhexidine 4% Liquid 1 Application(s) Topical <User Schedule>  enoxaparin Injectable 40 milliGRAM(s) SubCutaneous daily  lactated ringers Bolus 1000 milliLiter(s) IV Bolus once  lactated ringers. 1000 milliLiter(s) (100 mL/Hr) IV Continuous <Continuous>  piperacillin/tazobactam IVPB.. 3.375 Gram(s) IV Intermittent every 8 hours  simvastatin 10 milliGRAM(s) Oral at bedtime    MEDICATIONS  (PRN):  risperiDONE   Tablet 1 milliGRAM(s) Oral daily PRN agitation    Vitals:  T(C): 36.8 (01-29-22 @ 14:05), Max: 37.1 (01-29-22 @ 05:40)  HR: 83 (01-29-22 @ 14:05) (83 - 102)  BP: 116/79 (01-29-22 @ 14:05) (116/79 - 168/81)  RR: 20 (01-29-22 @ 14:05) (18 - 20)  SpO2: 98% (01-28-22 @ 18:30) (98% - 98%)    Labs:                        9.4    17.47 )-----------( 201      ( 29 Jan 2022 09:00 )             28.7     01-29    136  |  102  |  22<H>  ----------------------------<  96  4.2   |  21  |  0.5<L>    Ca    8.7      29 Jan 2022 09:00  Mg     2.1     01-28    TPro  7.6  /  Alb  3.8  /  TBili  0.3  /  DBili  x   /  AST  17  /  ALT  24  /  AlkPhos  148<H>  01-28    CAPILLARY BLOOD GLUCOSE      POCT Blood Glucose.: 105 mg/dL (28 Jan 2022 11:08)    LIVER FUNCTIONS - ( 28 Jan 2022 11:20 )  Alb: 3.8 g/dL / Pro: 7.6 g/dL / ALK PHOS: 148 U/L / ALT: 24 U/L / AST: 17 U/L / GGT: x             PT/INR - ( 28 Jan 2022 11:20 )   PT: 14.00 sec;   INR: 1.22 ratio         PTT - ( 28 Jan 2022 11:20 )  PTT:32.2 sec    PHYSICAL EXAMINATION:  General: Lethargic but not in acute distress   Eyes: Conjunctiva and sclera clear.  Neurologic:  - Mental Status:  Mildly lethargic. Dysarthric. Oriented to his name. Followed some commands intermittently.   - Cranial Nerves II-XII:    II:  Right eye: blind. Respond to treat in the left   Mild left facial asymmetry   Moves the right  side spontaneously  Left arm flexes and spastic. Left leg: no movement   Moves right side against gravity.      CT head:     No evidence of acute intracranial pathology. Stable exam since 1/12/2021.

## 2022-01-30 NOTE — PROGRESS NOTE ADULT - ASSESSMENT
ASSESSMENT: Pt is a 56M w/ PMH CVA x2 (w/ residual left sided deficits; wheelchair bound), DMT2, HTN and seizures being admitted for LOC and sepsis w/o clear origin.    In ED, pt is afebrile w/ WBC: 29.7, BP: 105/65, HR: 82, Na: 133, K: 3.2, BUN/cr: 29/0.7, Trop: 0.03, Lactate: 3.3, CXR: right basilar opacity, CTH (-), CT abdomen: markedly distended colon/rectum with fecal impaction and RLL ground glass opacities. Pt was given IV cefepime 2g, IV clindamycin 600mg, IV levaquin 750mg, 1L LR, IV keppra 1g, IV zofran 4mg and was briefly on a levophed gtt.     # Loss of consciousness of moderate duration.   ·  Plan: # Seizure vs CVA vs hypotension   # Hx CVA in 1980s w/ residual left sided deficits - continue baclofen and risperidone PRN   - Obtain REEG  - Neuro consult; f/u recs   - MR head non-con  - Continue carbamazepine  - q8h neuro checks   - Continue ASA 81 and statin.    # RLL pneumonia.   # Severe sepsis   - Leukocytosis improving  - F/u blood cultures  - F/u urine cultures   - Additional 1L LR; then continue LR @ 100cc/hr   - Monitor pulse ox  - Continue IV abx - zosyn    - ID consult.    # Fecal impaction.   - GI consult  - Tap water enema - q8h until BM   - ABD XR shows fecal impaction  - Monitor BM    #Hypokalemia, resolved  - K- rider x1 1/28  - Repeat K in AM.      #Hyponatremia, resolved  - Na: 133 -> 156 with IVF  - IVF   - Recheck in AM.     Problem/Plan - 6:  ·  Problem: CAD (coronary artery disease).   ·  Plan: -  - Continue ASA and statin.     Problem/Plan - 7:  ·  Problem: Hypertension.   - Low BP in ED, s/p short levophed gtt  - Monitor BP  - Hold lisinopril     Problem/Plan - 8:  ·  Problem: Seizure.   - Continue carbamazepine   - Seizure precautions.    #FENP  - Pureed w/ thickened liquids diet  - continue home meds  - VTE ppx: SQ heparin  - GI ppx: protonix

## 2022-01-30 NOTE — PROGRESS NOTE ADULT - SUBJECTIVE AND OBJECTIVE BOX
SILVANO CALIXTO  [address] 2 ([address])            Patient was evaluated and examined  by bedside, awake and alert, shaking his head no when asked about pain or difficulty breathing. Patient is hungry but no other complaints.                REVIEW OF SYSTEMS:  please see pertinent positives mentioned above, all other 12 ROS negative      T(C): , Max: 37.1 (01-29-22 @ 05:40)  HR: 95 (01-29-22 @ 05:40)  BP: 118/83 (01-29-22 @ 05:40)  RR: 20 (01-29-22 @ 05:40)  SpO2: 98% (01-28-22 @ 18:30)  CAPILLARY BLOOD GLUCOSE      POCT Blood Glucose.: 105 mg/dL (28 Jan 2022 11:08)      PHYSICAL EXAM:  General: NAD, awake, minimally verbal, contracted, patient is laying comfortably in bed  HEENT: AT, NC, Supple, NO JVD, NO CB  Lungs: CTA B/L, no wheezing, no rhonchi  CVS: normal S1, S2, RRR, NO M/G/R  Abdomen: soft, bowel sounds present, non-tender, non-distended  Extremities: no edema, no clubbing, no cyanosis, positive peripheral pulses b/l  Neuro: no acute focal neurological deficits  Skin: no rush, no ecchymosis      LAB  CBC  Date: 01-28-22 @ 11:20  Mean cell [id number]  Mean cell Hemoglobin Conc33.1  Mean cell Volum 89.2  Platelet count-Automate 207  RBC Count 3.52  Red Cell Distrib Width16.1  WBC Count29.72  % Albumin, Urine--  Hematocrit 31.4  Hemoglobin 10.4    BMP  01-28-22 @ 11:20  Blood Gas Arterial-Calcium,Ionized--  Blood Urea Nitrogen, Serum 29 mg/dL<H> [10 - 20]  Carbon Dioxide, Serum24 mmol/L [17 - 32]  Chloride, Serum94 mmol/L<L> [98 - 110]  Creatinie, Serum0.7 mg/dL [0.7 - 1.5]  Glucose, Serum91 mg/dL [70 - 99]  Potassium, Serum3.2 mmol/L<L> [3.5 - 5.0]  Sodium, Serum 133 mmol/L<L> [135 - 146]        PT/INR - ( 28 Jan 2022 11:20 )   PT: 14.00 sec;   INR: 1.22 ratio         PTT - ( 28 Jan 2022 11:20 )  PTT:32.2 sec      Microbiology:      RADIOLOGY & ADDITIONAL TESTS:  CT A/P 1/28:  IMPRESSION:  Markedly distended colon and rectum with rectal fecal impaction. Rectum measures 15.6 cm in transverse diameter. No pneumoperitoneum.  Right lower lobe groundglass opacities with tree-in-bud nodularity. Findings likely infectious/inflammatory in etiology.    CT head 1/28:  Findings:  There is stable mild ventricular prominence superimposed upon a mild degree of parenchymal volume loss.  There is are stable chronic lacunar infarcts within the left corona radiata and internal capsule.  There is no acute intracranial hemorrhage, extra-axial fluid collection or midline shift. Gray-white matter differentiation is maintained.  The visualized paranasal sinuses are clear. There is sclerosis and/or under pneumatization of the mastoid complexes. Soft tissue opacity within the left external auditory canal likely reflecting cerumen.    CXR 1/28:  The overall heart size is within normal limits.    Right basilar opacity.    Elevation of the right hemidiaphragm and dilatation of the right hepatic flexure.      Medications:  aspirin enteric coated 81 milliGRAM(s) Oral daily  baclofen 10 milliGRAM(s) Oral daily  carBAMazepine Suspension 200 milliGRAM(s) Oral two times a day  chlorhexidine 4% Liquid 1 Application(s) Topical <User Schedule>  enoxaparin Injectable 40 milliGRAM(s) SubCutaneous daily  lactated ringers Bolus 1000 milliLiter(s) IV Bolus once  lactated ringers. 1000 milliLiter(s) IV Continuous <Continuous>  piperacillin/tazobactam IVPB.. 3.375 Gram(s) IV Intermittent every 8 hours  risperiDONE   Tablet 1 milliGRAM(s) Oral daily PRN  simvastatin 10 milliGRAM(s) Oral at bedtime        Assessment and Plan:

## 2022-01-31 NOTE — PROGRESS NOTE ADULT - SUBJECTIVE AND OBJECTIVE BOX
SILVANO CALIXTO  [address] 309 2 ([address] [facility])        Patient was evaluated and examined by bedside, awake and alert, shaking his head no when asked about pain or difficulty breathing. Patient sleeping comfortably but arousable, no complaints.          REVIEW OF SYSTEMS:  please see pertinent positives mentioned above, all other 12 ROS negative    Vital Signs Last 24 Hrs  T(C): 36.5 (31 Jan 2022 13:10), Max: 37 (30 Jan 2022 21:53)  T(F): 97.7 (31 Jan 2022 13:10), Max: 98.6 (30 Jan 2022 21:53)  HR: 71 (31 Jan 2022 13:10) (71 - 79)  BP: 147/71 (31 Jan 2022 13:10) (125/70 - 161/79)  BP(mean): --  RR: 16 (31 Jan 2022 13:10) (16 - 20)  SpO2: 98% (30 Jan 2022 19:36) (98% - 98%)    POCT Blood Glucose.: 105 mg/dL (28 Jan 2022 11:08)      PHYSICAL EXAM:  General: NAD, awake, minimally verbal, contracted, patient is laying comfortably in bed  HEENT: AT, NC, Supple, NO JVD, NO CB  Lungs: CTA B/L, no wheezing, no rhonchi  CVS: normal S1, S2, RRR, NO M/G/R  Abdomen: soft, bowel sounds present, non-tender, non-distended  Extremities: no edema, no clubbing, no cyanosis, positive peripheral pulses b/l  Neuro: no acute focal neurological deficits  Skin: no rush, no ecchymosis      LAB  CBC  Date: 01-28-22 @ 11:20  Mean cell Asskabbink62.5  Mean cell Hemoglobin Conc33.1  Mean cell Volum 89.2  Platelet count-Automate 207  RBC Count 3.52  Red Cell Distrib Width16.1  WBC Count29.72  % Albumin, Urine--  Hematocrit 31.4  Hemoglobin 10.4    [facility]  01-28-22 @ 11:20  Blood Gas Arterial-Calcium,Ionized--  Blood Urea Nitrogen, Serum 29 mg/dL<H> [10 - 20]  Carbon Dioxide, Serum24 mmol/L [17 - 32]  Chloride, Serum94 mmol/L<L> [98 - 110]  Creatinie, Serum0.7 mg/dL [0.7 - 1.5]  Glucose, Serum91 mg/dL [70 - 99]  Potassium, Serum3.2 mmol/L<L> [3.5 - 5.0]  Sodium, Serum 133 mmol/L<L> [135 - 146]        PT/INR - ( 28 Jan 2022 11:20 )   PT: 14.00 sec;   INR: 1.22 ratio         PTT - ( 28 Jan 2022 11:20 )  PTT:32.2 sec      Microbiology:      RADIOLOGY & ADDITIONAL TESTS:  CT A/P 1/28:  IMPRESSION:  Markedly distended colon and rectum with rectal fecal impaction. Rectum measures 15.6 cm in transverse diameter. No pneumoperitoneum.  Right lower lobe groundglass opacities with tree-in-bud nodularity. Findings likely infectious/inflammatory in etiology.    CT head 1/28:  Findings:  There is stable mild ventricular prominence superimposed upon a mild degree of parenchymal volume loss.  There is are stable chronic lacunar infarcts within the left corona radiata and internal capsule.  There is no acute intracranial hemorrhage, extra-axial fluid collection or midline shift. Gray-white matter differentiation is maintained.  The visualized paranasal sinuses are clear. There is sclerosis and/or under pneumatization of the mastoid complexes. Soft tissue opacity within the left external auditory canal likely reflecting cerumen.    CXR 1/28:  The overall heart size is within normal limits.    Right basilar opacity.    Elevation of the right hemidiaphragm and dilatation of the right hepatic flexure.      Medications:  aspirin enteric coated 81 milliGRAM(s) Oral daily  baclofen 10 milliGRAM(s) Oral daily  carBAMazepine Suspension 200 milliGRAM(s) Oral two times a day  chlorhexidine 4% Liquid 1 Application(s) Topical <User Schedule>  enoxaparin Injectable 40 milliGRAM(s) SubCutaneous daily  lactated ringers Bolus 1000 milliLiter(s) IV Bolus once  lactated ringers. 1000 milliLiter(s) IV Continuous <Continuous>  piperacillin/tazobactam IVPB.. 3.375 Gram(s) IV Intermittent every 8 hours  risperiDONE   Tablet 1 milliGRAM(s) Oral daily PRN  simvastatin 10 milliGRAM(s) Oral at bedtime        Assessment and Plan:

## 2022-01-31 NOTE — PROGRESS NOTE ADULT - ASSESSMENT
Patient is a 56y old  Male with PMH of CVA x2 (w/ residual left sided deficits; wheelchair bound), DMT2, HTN and seizures being admitted for LOC and sepsis w/o clear origin. He was initially at hospital for outpatient CXR and was a code blue 2/2 LOC. As per Pt's sister/caregiver, reports that patient was very lethargic and non-responsive for approximately 20 minutes and that pt's blood pressure was very low initially. ON admission, he found to have tachycardia, hypotension, BP of 51/36 and Leukocytosis.  The CXR shows right basilar opacity,  CT abdomen: markedly distended colon/rectum with fecal impaction and RLL ground glass opacities.  He was given IV cefepime 2g, IV clindamycin 600mg, and IV levaquin 750mg. The ID consult requested to assist with further evaluation and antibiotic management.     # Sepsis/Septic shock ( Tachycardia + hypotension, BP of 51/36 + Leukocytosis)  - Blood Cx 1/28 NG   # Pneumonia- on CXR Right lower lobe ground glass opacities  # Fecal Impaction   # Decubitus ulcer    Recommendations  - narrow zosyn to ceftriaxone 2g daily and PO flagyl 500 mg TID   - trend WBC   - wound care consult for decub ulcer    Please call or message on [employer] Teams if with any questions.  Spectra [phone]

## 2022-01-31 NOTE — PROGRESS NOTE ADULT - ASSESSMENT
56 y.o M w/ hx of CVA x2 (w/ residual left sided deficits; wheelchair bound), DM2, HTN and seizures being admitted for LOC and sepsis w/o clear origin, found to have fecal impaction for which he is being evaluated.     Problem 1-Markedly distended colon and rectum with rectal fecal impaction. Rectum   measures 15.6 cm in transverse diameter. No pneumoperitoneum.  #fecal impaction  pt likely has chronic constipation and possible slow colonic transit motility in the setting of his DM2, hx of CVA, and bedbound status  -recommend   -performed bedside manual disimpaction today, mild stool burden evacuated, lavaged colon with 200ccs of normal saline  followed by aggressive tap water enemas q2 for now until disimpaction of fecal load  -pt will need bowel regimen with Miralax TID which can be started now and continued after discharge, titrated to 1-2 BMs/day  -avoid narcotics, anticholinergics, and BZDs  -monitor electrolytes and adjust to keep K > 4 and Mg > 2  -GI/DVT ppx  -PT / OOB to chair      Problem 2-Right lower lobe groundglass opacities with tree-in-bud nodularity.   Findings likely infectious/inflammatory in etiology.  Rec  - Care as per primary team

## 2022-01-31 NOTE — PROGRESS NOTE ADULT - PROBLEM SELECTOR PLAN 4
-  - K: 3.3  - K- rider x1   - Repeat K in AM

## 2022-01-31 NOTE — PROGRESS NOTE ADULT - PROBLEM SELECTOR PLAN 3
-  - GI consult  - Tap water enema - q8h until BM   - ABD XR in AM   - Monitor BM

## 2022-01-31 NOTE — PROGRESS NOTE ADULT - SUBJECTIVE AND OBJECTIVE BOX
56yMale  Being followed for fecal impaction  Interval history: No hematemesis, melena, blood in stool reported. Patient having large bowel movements.      PAST MEDICAL & SURGICAL HISTORY:  CVA (cerebral vascular accident)  1980&#x27;s x2   Residual left sided weakness    CAD (coronary artery disease)    Retinal detachment    Hypertension    High cholesterol    Diabetes    Anxiety    Seizure    History of corneal transplant  right   4/13/17            Social History: No smoking. No alcohol. No illegal drug use.            MEDICATIONS  (STANDING):  aspirin enteric coated 81 milliGRAM(s) Oral daily  baclofen 10 milliGRAM(s) Oral daily  carBAMazepine Suspension 200 milliGRAM(s) Oral two times a day  chlorhexidine 4% Liquid 1 Application(s) Topical <User Schedule>  enoxaparin Injectable 40 milliGRAM(s) SubCutaneous daily  lactated ringers Bolus 1000 milliLiter(s) IV Bolus once  lactated ringers. 1000 milliLiter(s) (100 mL/Hr) IV Continuous <Continuous>  metroNIDAZOLE    Tablet 500 milliGRAM(s) Oral every 8 hours  piperacillin/tazobactam IVPB.. 3.75 Gram(s) IV Intermittent once  simvastatin 10 milliGRAM(s) Oral at bedtime    MEDICATIONS  (PRN):  risperiDONE   Tablet 1 milliGRAM(s) Oral daily PRN agitation      Allergies:   No Known Allergies      REVIEW OF SYSTEMS:  unobtainable     VITAL SIGNS:   T(F): 97.7 (01-31-22 @ 13:10), Max: 98.6 (01-30-22 @ 21:53)  HR: 71 (01-31-22 @ 13:10) (71 - 79)  BP: 147/71 (01-31-22 @ 13:10) (125/70 - 161/79)  RR: 16 (01-31-22 @ 13:10) (16 - 20)  SpO2: 98% (01-30-22 @ 19:36) (98% - 98%)    PHYSICAL EXAM:  GENERAL: patient does not respond to prompts   HEAD:  Atraumatic, Normocephalic  EYES: conjunctiva and sclera clear  NECK: Supple, no JVD or thyromegaly  CHEST/LUNG: b/l rhonchi  HEART: Regular rate and rhythm; normal S1, S2, No murmurs.  ABDOMEN: Soft, nontender, nondistended; Bowel sounds present  NEUROLOGY: No asterixis or tremor.   SKIN: Intact, no jaundice  Rectal exam-mild stool burden in rectal vault          LABS:                        9.1    11.16 )-----------( 175      ( 31 Jan 2022 07:47 )             27.7     01-31    140  |  105  |  13  ----------------------------<  67<L>  3.7   |  26  |  <0.5<L>    Ca    8.1<L>      31 Jan 2022 07:47    TPro  5.8<L>  /  Alb  3.0<L>  /  TBili  0.4  /  DBili  x   /  AST  18  /  ALT  22  /  AlkPhos  121<H>  01-31    LIVER FUNCTIONS - ( 31 Jan 2022 07:47 )  Alb: 3.0 g/dL / Pro: 5.8 g/dL / ALK PHOS: 121 U/L / ALT: 22 U/L / AST: 18 U/L / GGT: x               IMAGING:    < from: CT Abdomen and Pelvis w/ IV Cont (01.28.22 @ 12:56) >    ACC: [id number] EXAM:  CT ABDOMEN AND PELVIS IC                          PROCEDURE DATE:  01/28/2022          INTERPRETATION:  CLINICAL STATEMENT: Abdominal distention    TECHNIQUE: Contiguous axial CT images were obtained from the lower chest   to the pubic symphysis following administration of 100cc Optiray 320   intravenous contrast.  Oral contrast was not administered.  Reformatted   images in the coronal and sagittal planes were acquired.    COMPARISON CT: August 30, 2021    OTHER STUDIES USED FOR CORRELATION: None.      FINDINGS:    LOWER CHEST: Right lower lobe groundglass opacities with tree-in-bud   nodularity.    HEPATOBILIARY: No suspicious parenchymal lesion or biliary ductal   dilatation.    SPLEEN: Unremarkable.    PANCREAS: Unremarkable.    ADRENAL GLANDS: Unremarkable.    KIDNEYS: Symmetric renal enhancement without hydronephrosis bilaterally.    ABDOMINOPELVIC NODES: Unremarkable.    PELVIC ORGANS: Under distended urinary bladder with bladder extending   into right inguinal hernia.    PERITONEUM/MESENTERY/BOWEL: Markedly distended colon and rectum with   rectal fecal impaction. Rectum measures 15.6 cm in transverse diameter.   No small bowel dilatation. No pneumoperitoneum or ascites..    BONES/SOFT TISSUES: No acute osseous abnormality..      IMPRESSION:    Markedly distended colon and rectum with rectal fecal impaction. Rectum   measures 15.6 cm in transverse diameter. No pneumoperitoneum.    Right lower lobe groundglass opacities with tree-in-bud nodularity.   Findings likely infectious/inflammatory in etiology.    --- End of Report ---            ELLIOT LANDAU MD; Attending Radiologist  This document has been electronically signed. Jan 28 2022  1:07PM    < end of copied text >    < from: Xray Abdomen 1 View PORTABLE -Routine (Xray Abdomen 1 View PORTABLE -Routine in AM.) (01.29.22 @ 05:30) >    ACC: [id number] EXAM:  XR ABDOMEN PORTABLE ROUTINE 1V                          PROCEDURE DATE:  01/29/2022          INTERPRETATION:  Clinical History / Reason for exam: 56-year-old male   with fecal impaction.    EXAMINATION: Abdominal x-ray    COMPARISON: CT scan of the abdomen and pelvis performed 1/28/2021    TECHNIQUE: An AP portable view of the abdomen and pelvis is submitted.    FINDINGS: There is are gas distended bowel loops in the left lower   quadrant and upper abdomen bilaterally.  There is likely a large volume   of fecal material within the distal colon and rectum.  No abnormal masses   or calcifications are seen otherwise.  The visualized lung bases are   clear.    IMPRESSION:  Large fecal volume within the distal colon and rectum, as   previously.    --- End of Report ---            SYLVIE AGARWAL MD; Attending Radiologist  This document has been electronically signed. Jan 29 2022  1:26PM    < end of copied text >

## 2022-01-31 NOTE — PROGRESS NOTE ADULT - PROBLEM SELECTOR PLAN 5
-  - Na: 133  - IVF   - Recheck in AM

## 2022-01-31 NOTE — PROGRESS NOTE ADULT - SUBJECTIVE AND OBJECTIVE BOX
SILVANO CALIXTO  56y, Male  Allergy: No Known Allergies      LOS  3d    CHIEF COMPLAINT: Unresponsivness, Sepsis (30 Jan 2022 08:34)      INTERVAL EVENTS/HPI  - No acute events overnight  - T(F): , Max: 98.6 (01-30-22 @ 21:53)  - had BM  - WBC Count: 11.16 (01-31-22 @ 07:47)  WBC Count: 13.25 (01-30-22 @ 08:14)     - Creatinine, Serum: <0.5 (01-31-22 @ 07:47)       ROS  unable to obtain history secondary to patient's mental status and/or sedation    VITALS:  T(F): 97.4, Max: 98.6 (01-30-22 @ 21:53)  HR: 74  BP: 161/79  RR: 20Vital Signs Last 24 Hrs  T(C): 36.3 (31 Jan 2022 05:00), Max: 37 (30 Jan 2022 21:53)  T(F): 97.4 (31 Jan 2022 05:00), Max: 98.6 (30 Jan 2022 21:53)  HR: 74 (31 Jan 2022 05:00) (74 - 79)  BP: 161/79 (31 Jan 2022 05:00) (125/70 - 161/79)  BP(mean): --  RR: 20 (31 Jan 2022 05:00) (18 - 20)  SpO2: 98% (30 Jan 2022 19:36) (98% - 98%)    PHYSICAL EXAM:  Gen: NAD, resting in bed  HEENT: Normocephalic, atraumatic  Neck: supple, no lymphadenopathy  CV: Regular rate & regular rhythm  Lungs: decreased BS at bases, no fremitus  Abdomen: Soft, BS present  Ext: Warm, well perfused  Neuro: non focal, awake  Skin: no rash, no erythema  Lines: no phlebitis    FH: Non-contributory  Social Hx: Non-contributory    TESTS & MEASUREMENTS:                        9.1    11.16 )-----------( 175      ( 31 Jan 2022 07:47 )             27.7     01-31    140  |  105  |  13  ----------------------------<  67<L>  3.7   |  26  |  <0.5<L>    Ca    8.1<L>      31 Jan 2022 07:47    TPro  5.8<L>  /  Alb  3.0<L>  /  TBili  0.4  /  DBili  x   /  AST  18  /  ALT  22  /  AlkPhos  121<H>  01-31    eGFR if Non African American: 133 mL/min/1.73M2 (01-31-22 @ 07:47)  eGFR if : 154 mL/min/1.73M2 (01-31-22 @ 07:47)    LIVER FUNCTIONS - ( 31 Jan 2022 07:47 )  Alb: 3.0 g/dL / Pro: 5.8 g/dL / ALK PHOS: 121 U/L / ALT: 22 U/L / AST: 18 U/L / GGT: x               Culture - Blood (collected 01-28-22 @ 13:30)  Source: .Blood Blood  Preliminary Report (01-29-22 @ 23:02):    No growth to date.    Culture - Blood (collected 01-28-22 @ 13:30)  Source: .Blood Blood  Preliminary Report (01-29-22 @ 23:02):    No growth to date.        Blood Gas Venous - Lactate: 3.30 mmol/L (01-28-22 @ 13:10)  Blood Gas Venous - Lactate: 1.50 mmol/L (01-28-22 @ 12:10)      INFECTIOUS DISEASES TESTING  Procalcitonin, Serum: 0.16 (01-30-22 @ 08:14)  COVID-19 PCR: NotDetec (01-28-22 @ 11:35)  COVID-19 PCR: NotDetec (08-30-21 @ 11:10)      INFLAMMATORY MARKERS      RADIOLOGY & ADDITIONAL TESTS:  I have personally reviewed the last available Chest xray  CXR      CT  CT Abdomen and Pelvis w/ IV Cont:   ACC: [id number] EXAM:  CT ABDOMEN AND PELVIS IC                          PROCEDURE DATE:  01/28/2022          INTERPRETATION:  CLINICAL STATEMENT: Abdominal distention    TECHNIQUE: Contiguous axial CT images were obtained from the lower chest   to the pubic symphysis following administration of 100cc Optiray 320   intravenous contrast.  Oral contrast was not administered.  Reformatted   images in the coronal and sagittal planes were acquired.    COMPARISON CT: August 30, 2021    OTHER STUDIES USED FOR CORRELATION: None.      FINDINGS:    LOWER CHEST: Right lower lobe groundglass opacities with tree-in-bud   nodularity.    HEPATOBILIARY: No suspicious parenchymal lesion or biliary ductal   dilatation.    SPLEEN: Unremarkable.    PANCREAS: Unremarkable.    ADRENAL GLANDS: Unremarkable.    KIDNEYS: Symmetric renal enhancement without hydronephrosis bilaterally.    ABDOMINOPELVIC NODES: Unremarkable.    PELVIC ORGANS: Under distended urinary bladder with bladder extending   into right inguinal hernia.    PERITONEUM/MESENTERY/BOWEL: Markedly distended colon and rectum with   rectal fecal impaction. Rectum measures 15.6 cm in transverse diameter.   No small bowel dilatation. No pneumoperitoneum or ascites..    BONES/SOFT TISSUES: No acute osseous abnormality..      IMPRESSION:    Markedly distended colon and rectum with rectal fecal impaction. Rectum   measures 15.6 cm in transverse diameter. No pneumoperitoneum.    Right lower lobe groundglass opacities with tree-in-bud nodularity.   Findings likely infectious/inflammatory in etiology.    --- End of Report ---            ELLIOT LANDAU MD; Attending Radiologist  This document has been electronically signed. Jan 28 2022  1:07PM (01-28-22 @ 12:56)      CARDIOLOGY TESTING  12 Lead ECG:   Ventricular Rate 88 BPM    Atrial Rate 88 BPM    P-R Interval 192 ms    QRS Duration 96 ms    Q-T Interval 390 ms    QTC Calculation(Bazett) 471 ms    P Axis 96 degrees    R Axis 78 degrees    T Axis 71 degrees    Diagnosis Line Normal sinus rhythm  Nonspecific T wave abnormality  Abnormal ECG    Confirmed by KEY CA MD ([id number]) on 1/28/2022 1:59:38 PM (01-28-22 @ 12:32)  12 Lead ECG:   Ventricular Rate 83 BPM    Atrial Rate 83 BPM    P-R Interval 178 ms    QRS Duration 100 ms    Q-T Interval 358 ms    QTC Calculation(Bazett) 420 ms    P Axis 84 degrees    R Axis 75 degrees    T Axis 115 degrees    Diagnosis Line Normal sinus rhythm with sinus arrhythmia  T wave abnormality, consider inferolateral ischemia      Confirmed by KEY CA MD ([id number]) on 1/28/2022 1:59:32 PM (01-28-22 @ 11:15)      MEDICATIONS  aspirin enteric coated 81 Oral daily  baclofen 10 Oral daily  carBAMazepine Suspension 200 Oral two times a day  chlorhexidine 4% Liquid 1 Topical <User Schedule>  enoxaparin Injectable 40 SubCutaneous daily  lactated ringers Bolus 1000 IV Bolus once  lactated ringers. 1000 IV Continuous <Continuous>  piperacillin/tazobactam IVPB.. 3.375 IV Intermittent every 8 hours  simvastatin 10 Oral at bedtime      WEIGHT  Weight (kg): 56.2 (01-28-22 @ 18:22)  Creatinine, Serum: <0.5 mg/dL (01-31-22 @ 07:47)      ANTIBIOTICS:  piperacillin/tazobactam IVPB.. 3.375 Gram(s) IV Intermittent every 8 hours      All available historical records have been reviewed

## 2022-01-31 NOTE — PROGRESS NOTE ADULT - PROBLEM SELECTOR PLAN 7
-  - Low BP in ED, s/p short levophed gtt  - Monitor BP  - Hold lisinopril

## 2022-01-31 NOTE — PROGRESS NOTE ADULT - PROBLEM SELECTOR PLAN 8
-  - Continue carbamazepine   - Seizure precautions

## 2022-02-01 NOTE — PROGRESS NOTE ADULT - SUBJECTIVE AND OBJECTIVE BOX
SILVANO CALIXTO  56y, Male  Allergy: No Known Allergies      LOS  4d    CHIEF COMPLAINT: impaction (01 Feb 2022 14:36)      INTERVAL EVENTS/HPI  - No acute events overnight  - T(F): , Max: 98.6 (01-31-22 @ 21:52)  - non-verbal  - WBC Count: 9.56 (02-01-22 @ 07:38)  WBC Count: 11.16 (01-31-22 @ 07:47)     - Creatinine, Serum: <0.5 (02-01-22 @ 07:38)  Creatinine, Serum: <0.5 (01-31-22 @ 07:47)       ROS  unable to obtain history secondary to patient's mental status and/or sedation    VITALS:  T(F): 97.7, Max: 98.6 (01-31-22 @ 21:52)  HR: 78  BP: 164/80  RR: 18Vital Signs Last 24 Hrs  T(C): 36.5 (01 Feb 2022 13:02), Max: 37 (31 Jan 2022 21:52)  T(F): 97.7 (01 Feb 2022 13:02), Max: 98.6 (31 Jan 2022 21:52)  HR: 78 (01 Feb 2022 13:02) (60 - 78)  BP: 164/80 (01 Feb 2022 13:02) (122/66 - 168/82)  BP(mean): --  RR: 18 (01 Feb 2022 13:02) (18 - 18)  SpO2: --    PHYSICAL EXAM:  Gen: NAD, resting in bed  HEENT: Normocephalic, atraumatic  Neck: supple, no lymphadenopathy  CV: Regular rate & regular rhythm  Lungs: decreased BS at bases, no fremitus  Abdomen: Soft, BS present  Ext: Warm, well perfused  Neuro: non focal, awake  Skin: no rash, no erythema  Lines: no phlebitis    FH: Non-contributory  Social Hx: Non-contributory    TESTS & MEASUREMENTS:                        9.4    9.56  )-----------( 213      ( 01 Feb 2022 07:38 )             28.5     02-01    139  |  102  |  10  ----------------------------<  80  4.4   |  27  |  <0.5<L>    Ca    8.5      01 Feb 2022 07:38  Phos  2.6     02-01  Mg     2.1     02-01    TPro  5.8<L>  /  Alb  3.0<L>  /  TBili  0.4  /  DBili  x   /  AST  18  /  ALT  22  /  AlkPhos  121<H>  01-31    eGFR if Non African American: 133 mL/min/1.73M2 (02-01-22 @ 07:38)  eGFR if : 154 mL/min/1.73M2 (02-01-22 @ 07:38)    LIVER FUNCTIONS - ( 31 Jan 2022 07:47 )  Alb: 3.0 g/dL / Pro: 5.8 g/dL / ALK PHOS: 121 U/L / ALT: 22 U/L / AST: 18 U/L / GGT: x               Culture - Blood (collected 01-28-22 @ 13:30)  Source: .Blood Blood  Preliminary Report (01-29-22 @ 23:02):    No growth to date.    Culture - Blood (collected 01-28-22 @ 13:30)  Source: .Blood Blood  Preliminary Report (01-29-22 @ 23:02):    No growth to date.        Blood Gas Venous - Lactate: 3.30 mmol/L (01-28-22 @ 13:10)  Blood Gas Venous - Lactate: 1.50 mmol/L (01-28-22 @ 12:10)      INFECTIOUS DISEASES TESTING  Procalcitonin, Serum: 0.16 (01-30-22 @ 08:14)  COVID-19 PCR: NotDetec (01-28-22 @ 11:35)  COVID-19 PCR: NotDetec (08-30-21 @ 11:10)      INFLAMMATORY MARKERS      RADIOLOGY & ADDITIONAL TESTS:  I have personally reviewed the last available Chest xray  CXR      CT      CARDIOLOGY TESTING  12 Lead ECG:   Ventricular Rate 88 BPM    Atrial Rate 88 BPM    P-R Interval 192 ms    QRS Duration 96 ms    Q-T Interval 390 ms    QTC Calculation(Bazett) 471 ms    P Axis 96 degrees    R Axis 78 degrees    T Axis 71 degrees    Diagnosis Line Normal sinus rhythm  Nonspecific T wave abnormality  Abnormal ECG    Confirmed by KEY CA MD ([id number]) on 1/28/2022 1:59:38 PM (01-28-22 @ 12:32)  12 Lead ECG:   Ventricular Rate 83 BPM    Atrial Rate 83 BPM    P-R Interval 178 ms    QRS Duration 100 ms    Q-T Interval 358 ms    QTC Calculation(Bazett) 420 ms    P Axis 84 degrees    R Axis 75 degrees    T Axis 115 degrees    Diagnosis Line Normal sinus rhythm with sinus arrhythmia  T wave abnormality, consider inferolateral ischemia      Confirmed by KEY CA MD ([id number]) on 1/28/2022 1:59:32 PM (01-28-22 @ 11:15)      MEDICATIONS  aspirin enteric coated 81 Oral daily  baclofen 10 Oral daily  carBAMazepine Suspension 200 Oral two times a day  cefTRIAXone   IVPB 2000 IV Intermittent every 24 hours  chlorhexidine 4% Liquid 1 Topical <User Schedule>  enoxaparin Injectable 40 SubCutaneous daily  lactated ringers Bolus 1000 IV Bolus once  metroNIDAZOLE    Tablet 500 Oral every 8 hours  polyethylene glycol 3350 17 Oral two times a day  simvastatin 10 Oral at bedtime      WEIGHT  Weight (kg): 56.2 (01-28-22 @ 18:22)  Creatinine, Serum: <0.5 mg/dL (02-01-22 @ 07:38)      ANTIBIOTICS:  cefTRIAXone   IVPB 2000 milliGRAM(s) IV Intermittent every 24 hours  metroNIDAZOLE    Tablet 500 milliGRAM(s) Oral every 8 hours      All available historical records have been reviewed

## 2022-02-01 NOTE — CONSULT NOTE ADULT - SUBJECTIVE AND OBJECTIVE BOX
56M w/ PMH CVA x2 (w/ residual left sided deficits; wheelchair bound), DMT2, HTN and seizures being admitted for LOC and sepsis w/o clear origin.  CXR: 1/28 ;  right basilar opacity, CTH (-),   CT abdomen 1/28: markedly distended colon/rectum with fecal impaction and RLL ground glass opacities.   Pt started on tap water enema Q 8hrs and having large BMs. Per nurse, pt had BM x 3 during the day shift. Pt is non verbal but nod to no pain. Hx obtained from chart.       Vital Signs Last 24 Hrs  T(C): 36.8 (01 Feb 2022 20:30), Max: 36.9 (01 Feb 2022 05:00)  T(F): 98.2 (01 Feb 2022 20:30), Max: 98.5 (01 Feb 2022 05:00)  HR: 71 (01 Feb 2022 20:30) (60 - 78)  BP: 173/90 (01 Feb 2022 20:30) (164/80 - 173/90)  BP(mean): --  RR: 18 (01 Feb 2022 20:30) (18 - 18)  SpO2: --      PHYSICAL EXAM:      Constitutional: NAD    Eyes: PERRLA, no conjuctivitis    Neck: no lymphadenopathy    Respiratory: +air entry, no rales, no rhonchi, no wheezes    Cardiovascular: +S1 and S2, regular rate and rhythm    Gastrointestinal: +BS, soft, non-tender, mildly distended    Extremities:  no edema, no calf tenderness    Neurological: sensation intact, contracted    Skin: no rashes, normal turgor                            9.4    9.56  )-----------( 213      ( 01 Feb 2022 07:38 )             28.5     02-01    139  |  102  |  10  ----------------------------<  80  4.4   |  27  |  <0.5<L>    Ca    8.5      01 Feb 2022 07:38  Phos  2.6     02-01  Mg     2.1     02-01    TPro  5.8<L>  /  Alb  3.0<L>  /  TBili  0.4  /  DBili  x   /  AST  18  /  ALT  22  /  AlkPhos  121<H>  01-31                  CAPILLARY BLOOD GLUCOSE          Culture - Blood (collected 01-28-22 @ 13:30)  Source: .Blood Blood  Preliminary Report (01-29-22 @ 23:02):    No growth to date.    Culture - Blood (collected 01-28-22 @ 13:30)  Source: .Blood Blood  < from: Xray Abdomen 1 View PORTABLE -Routine (Xray Abdomen 1 View PORTABLE -Routine in AM.) (01.29.22 @ 05:30) >  IMPRESSION:  Large fecal volume within the distal colon and rectum, as   previously.    < end of copied text >  < from: CT Abdomen and Pelvis w/ IV Cont (01.28.22 @ 12:56) >    IMPRESSION:    Markedly distended colon and rectum with rectal fecal impaction. Rectum   measures 15.6 cm in transverse diameter. No pneumoperitoneum.    Right lower lobe groundglass opacities with tree-in-bud nodularity.   Findings likely infectious/inflammatory in etiology.    < end of copied text >  Preliminary Report (01-29-22 @ 23:02):    No growth to date.

## 2022-02-01 NOTE — PROGRESS NOTE ADULT - ASSESSMENT
ASSESSMENT: Pt is a 56M w/ PMH CVA x2 (w/ residual left sided deficits; wheelchair bound), DMT2, HTN and seizures being admitted for LOC and sepsis w/o clear origin.    In ED, pt is afebrile w/ WBC: 29.7, BP: 105/65, HR: 82, Na: 133, K: 3.2, BUN/cr: 29/0.7, Trop: 0.03, Lactate: 3.3, CXR: right basilar opacity, CTH (-), CT abdomen: markedly distended colon/rectum with fecal impaction and RLL ground glass opacities. Pt was given IV cefepime 2g, IV clindamycin 600mg, IV levaquin 750mg, 1L LR, IV keppra 1g, IV zofran 4mg and was briefly on a levophed gtt. Patient admitted for sepsis and fecal impaction.    # Loss of consciousness of moderate duration  # Seizure vs CVA vs hypotension   # Hx CVA in 1980s w/ residual left sided deficits - continue baclofen and risperidone PRN   - abn EEG  - Neuro consult; f/u recs   - MR head w/o contrast pending  - Continue carbamazepine  - q8h neuro checks   - Continue ASA 81 and statin.    # RLL pneumonia  # Severe sepsis   - Leukocytosis improving  - F/u blood cultures  - F/u urine cultures   - hold IVF tonight  - Monitor pulse ox  - deescalate IV abx - zosyn (1/29-1/31); IV ceftriaxone and PO flagyl (1/31-)  - ID consult    # Fecal impaction.   - GI recs appreciated  - Tap water enema and miralax tid  - no BM  - colorectal consulted for possible resection   - ABD XR shows fecal impaction; will repeat abd xr  - Monitor BM  - could be from carbamazepine      #CAD (coronary artery disease)  - Continue ASA and statin.    #Hypertension  - Low BP in ED, s/p short levophed gtt  - Monitor BP  - Hold lisinopril    #Seizure   - Continue carbamazepine   - Seizure precautions.    #FENP  - Pureed w/ thickened liquids diet  - continue home meds  - VTE ppx: SQ heparin  - GI ppx: protonix   - FULL CODE

## 2022-02-01 NOTE — PROGRESS NOTE ADULT - ASSESSMENT
Impression: 56y year old  Male with severe constipation/impaction now on bowel regimen.  No bowel movement today      Recommendation:  Restart enemas at least TWE tid.  Miralax 17 g po tid  Repeat AXR today  Long term, this massive impaction will recur.  Consider Colorectal surgery evaluation for colostomy    Main GI issue improving/stable/    If procedure anticipated, were risks and benefits reviewed with the patient:    Were lab tests ordered or reviewed: yes    Were radiology tests ordered or reviewed:yes

## 2022-02-01 NOTE — PROGRESS NOTE ADULT - SUBJECTIVE AND OBJECTIVE BOX
Gastroenterology Consultation        Patient is a 56y old  Male who presents with a chief complaint of Unresponsiveness, Sepsis (31 Jan 2022 17:21)      HPI:  Pt is a 56M w/ PMH CVA x2 (w/ residual left sided deficits; wheelchair bound), DMT2, HTN and seizures being admitted for LOC and sepsis w/o clear origin. Pt was initially at hospital for outpatient CXR and was a code blue 2/2 LOC. Pt's sister/caregiver at bedside providing collateral. She reports that patient was very lethargic and non-responsive for approximately 20 minutes and that pt's blood pressure was very low initially. She states that something similar happened twice in the past at [facility] and the patient's blood pressure was low at those times as well. She states that patient is back at his baseline at this time. Unable to obtain ROS 2/2 pts mental status.     In ED, pt is afebrile w/ WBC: 29.7, BP: 105/65, HR: 82, Na: 133, K: 3.2, BUN/cr: 29/0.7, Trop: 0.03, Lactate: 3.3, CXR: right basilar opacity, CTH (-), CT abdomen: markedly distended colon/rectum with fecal impaction and RLL ground glass opacities. Pt was given IV cefepime 2g, IV clindamycin 600mg, IV levaquin 750mg, 1L LR, IV keppra 1g, IV zofran 4mg and was briefly on a levophed gtt.    (28 Jan 2022 15:49)      PAST MEDICAL & SURGICAL HISTORY:  CVA (cerebral vascular accident)  1980&#x27;s x2   Residual left sided weakness    CAD (coronary artery disease)    Retinal detachment    Hypertension    High cholesterol    Diabetes    Anxiety    Seizure    History of corneal transplant  right   4/13/17        Allergies: No Known Allergies    Medications; aspirin enteric coated 81 milliGRAM(s) Oral daily  baclofen 10 milliGRAM(s) Oral daily  carBAMazepine Suspension 200 milliGRAM(s) Oral two times a day  cefTRIAXone   IVPB 2000 milliGRAM(s) IV Intermittent every 24 hours  chlorhexidine 4% Liquid 1 Application(s) Topical <User Schedule>  enoxaparin Injectable 40 milliGRAM(s) SubCutaneous daily  lactated ringers Bolus 1000 milliLiter(s) IV Bolus once  metroNIDAZOLE    Tablet 500 milliGRAM(s) Oral every 8 hours  polyethylene glycol 3350 17 Gram(s) Oral two times a day  risperiDONE   Tablet 1 milliGRAM(s) Oral daily PRN  simvastatin 10 milliGRAM(s) Oral at bedtime  Review of Systems: noncontributory other than listed in Admission H & P  Social History:  Former smoker.   Denies drinking and drug use. (28 Jan 2022 15:49)    Physical Examination:  T(C): 36.5 (02-01-22 @ 13:02), Max: 37 (01-31-22 @ 21:52)  HR: 78 (02-01-22 @ 13:02) (60 - 78)  BP: 164/80 (02-01-22 @ 13:02) (122/66 - 168/82)  RR: 18 (02-01-22 @ 13:02) (18 - 18)  SpO2: --    GENERAL/Constitutional:  Appears older than stated age, well-groomed, thin, no distress  Head Ears Nose Mouth throat::  NC/AT,  conjunctivae clear and pink, no thyromegaly, nodules, adenopathy, no JVD, sclera -anicteric  CHEST/Respiratory/:  Full & symmetric excursion, no increased effort, breath sounds clear  HEART/Cardiovascular:  Regular rhythm, S1, S2, no murmur/rub/S3/S4, no abdominal bruit, no edema  ABDOMEN/GI:  Soft, non-tender, non-distended, normoactive bowel sounds,  no masses ,no hepato-splenomegaly, no signs of chronic liver disease  EXTREMITIES/musculoskeletal::  no cyanosis,clubbing or edema or obvious fracutre  SKIN:  No rash/erythema/ecchymoses/petechiae/wounds/abscess/warm/dry          Data:                        9.4    9.56  )-----------( 213      ( 01 Feb 2022 07:38 )             28.5     02-01    139  |  102  |  10  ----------------------------<  80  4.4   |  27  |  <0.5<L>    Ca    8.5      01 Feb 2022 07:38  Phos  2.6     02-01  Mg     2.1     02-01    TPro  5.8<L>  /  Alb  3.0<L>  /  TBili  0.4  /  DBili  x   /  AST  18  /  ALT  22  /  AlkPhos  121<H>  01-31    LIVER FUNCTIONS - ( 31 Jan 2022 07:47 )  Alb: 3.0 g/dL / Pro: 5.8 g/dL / ALK PHOS: 121 U/L / ALT: 22 U/L / AST: 18 U/L / GGT: x

## 2022-02-01 NOTE — PROGRESS NOTE ADULT - SUBJECTIVE AND OBJECTIVE BOX
Patient was evaluated and examined by bedside, awake and alert, shaking his head no when asked about pain or difficulty breathing. Patient awake and responding with sounds.          REVIEW OF SYSTEMS:  please see pertinent positives mentioned above, all other 12 ROS negative    Vital Signs Last 24 Hrs  T(C): 36.5 (31 Jan 2022 13:10), Max: 37 (30 Jan 2022 21:53)  T(F): 97.7 (31 Jan 2022 13:10), Max: 98.6 (30 Jan 2022 21:53)  HR: 71 (31 Jan 2022 13:10) (71 - 79)  BP: 147/71 (31 Jan 2022 13:10) (125/70 - 161/79)  BP(mean): --  RR: 16 (31 Jan 2022 13:10) (16 - 20)  SpO2: 98% (30 Jan 2022 19:36) (98% - 98%)    POCT Blood Glucose.: 105 mg/dL (28 Jan 2022 11:08)      PHYSICAL EXAM:  General: NAD, awake, minimally verbal, contracted, patient is laying comfortably in bed  HEENT: AT, NC, Supple, NO JVD, NO CB  Lungs: CTA B/L, no wheezing, no rhonchi  CVS: normal S1, S2, RRR, NO M/G/R  Abdomen: soft, bowel sounds present, non-tender, non-distended  Extremities: no edema, no clubbing, no cyanosis, positive peripheral pulses b/l  Neuro: no acute focal neurological deficits  Skin: no rush, no ecchymosis      LAB  CBC  Date: 01-28-22 @ 11:20  Mean cell Jhomucrrxa68.5  Mean cell Hemoglobin Conc33.1  Mean cell Volum 89.2  Platelet count-Automate 207  RBC Count 3.52  Red Cell Distrib Width16.1  WBC Count29.72  % Albumin, Urine--  Hematocrit 31.4  Hemoglobin 10.4    BMP  01-28-22 @ 11:20  Blood Gas Arterial-Calcium,Ionized--  Blood Urea Nitrogen, Serum 29 mg/dL<H> [10 - 20]  Carbon Dioxide, Serum24 mmol/L [17 - 32]  Chloride, Serum94 mmol/L<L> [98 - 110]  Creatinie, Serum0.7 mg/dL [0.7 - 1.5]  Glucose, Serum91 mg/dL [70 - 99]  Potassium, Serum3.2 mmol/L<L> [3.5 - 5.0]  Sodium, Serum 133 mmol/L<L> [135 - 146]        PT/INR - ( 28 Jan 2022 11:20 )   PT: 14.00 sec;   INR: 1.22 ratio         PTT - ( 28 Jan 2022 11:20 )  PTT:32.2 sec      Microbiology:      RADIOLOGY & ADDITIONAL TESTS:  CT A/P 1/28:  IMPRESSION:  Markedly distended colon and rectum with rectal fecal impaction. Rectum measures 15.6 cm in transverse diameter. No pneumoperitoneum.  Right lower lobe groundglass opacities with tree-in-bud nodularity. Findings likely infectious/inflammatory in etiology.    CT head 1/28:  Findings:  There is stable mild ventricular prominence superimposed upon a mild degree of parenchymal volume loss.  There is are stable chronic lacunar infarcts within the left corona radiata and internal capsule.  There is no acute intracranial hemorrhage, extra-axial fluid collection or midline shift. Gray-white matter differentiation is maintained.  The visualized paranasal sinuses are clear. There is sclerosis and/or under pneumatization of the mastoid complexes. Soft tissue opacity within the left external auditory canal likely reflecting cerumen.    CXR 1/28:  The overall heart size is within normal limits.    Right basilar opacity.    Elevation of the right hemidiaphragm and dilatation of the right hepatic flexure.      Medications:  aspirin enteric coated 81 milliGRAM(s) Oral daily  baclofen 10 milliGRAM(s) Oral daily  carBAMazepine Suspension 200 milliGRAM(s) Oral two times a day  chlorhexidine 4% Liquid 1 Application(s) Topical <User Schedule>  enoxaparin Injectable 40 milliGRAM(s) SubCutaneous daily  lactated ringers Bolus 1000 milliLiter(s) IV Bolus once  lactated ringers. 1000 milliLiter(s) IV Continuous <Continuous>  piperacillin/tazobactam IVPB.. 3.375 Gram(s) IV Intermittent every 8 hours  risperiDONE   Tablet 1 milliGRAM(s) Oral daily PRN  simvastatin 10 milliGRAM(s) Oral at bedtime        Assessment and Plan:

## 2022-02-01 NOTE — CONSULT NOTE ADULT - ASSESSMENT
56M w/ PMH CVA x2 (w/ residual left sided deficits; wheelchair bound), DMT2, HTN and seizures being admitted for LOC and sepsis w/o clear origin.  CXR: 1/28 ;  right basilar opacity, CTH (-),   CT abdomen 1/28: markedly distended colon/rectum with fecal impaction and RLL ground glass opacities.   Pt started on tap water enema Q 8hrs and having large BMs. Per nurse, pt had BM x 3 during the day shift. Pt is non verbal but nod to no pain. Pt consulted for possible colectomy.     A/P:   -IV hydration  -Cont enema  -IV abx for pneumonia/sepsis   -GI and DVT prophylaxis

## 2022-02-01 NOTE — PROGRESS NOTE ADULT - ASSESSMENT
Patient is a 56y old  Male with PMH of CVA x2 (w/ residual left sided deficits; wheelchair bound), DMT2, HTN and seizures being admitted for LOC and sepsis w/o clear origin. He was initially at hospital for outpatient CXR and was a code blue 2/2 LOC. As per Pt's sister/caregiver, reports that patient was very lethargic and non-responsive for approximately 20 minutes and that pt's blood pressure was very low initially. ON admission, he found to have tachycardia, hypotension, BP of 51/36 and Leukocytosis.  The CXR shows right basilar opacity,  CT abdomen: markedly distended colon/rectum with fecal impaction and RLL ground glass opacities.  He was given IV cefepime 2g, IV clindamycin 600mg, and IV levaquin 750mg. The ID consult requested to assist with further evaluation and antibiotic management.     # Sepsis/Septic shock ( Tachycardia + hypotension, BP of 51/36 + Leukocytosis)  - Blood Cx 1/28 NG   # Pneumonia- on CXR Right lower lobe ground glass opacities  # Fecal Impaction   # Decubitus ulcer    Recommendations  - ceftriaxone 2g daily and PO flagyl 500 mg TID   - as WBC improving - can plan for 7 day course (last day tomorrow)  - trend WBC   - local wound care  - ensure bowel movements     Please call or message on [employer] Teams if with any questions.  Spectra [phone]

## 2022-02-02 NOTE — PROGRESS NOTE ADULT - SUBJECTIVE AND OBJECTIVE BOX
Progress Note: Surgery  Patient: SILVANO CALIXTO , 56y (1965)Male   MRN: [id number]  Location: [facility] [address] [place] [address] 2  Visit: 01-28-22 Inpatient  Date: 02-02-22 @ 07:49    Events over 24h: No acute event overnight. No new complaint. Pt is hemodynamically stable. On diet, tolerating well. Denies nausea, vomiting, and/or abdominal pain. Pt is wheelchair bound. Voiding adequately. +Bowel movement, loose/watery overnight, x3 BM day prior    Vitals: T(F): 99 (02-02-22 @ 05:00), Max: 99 (02-02-22 @ 05:00)  HR: 71 (02-02-22 @ 05:00)  BP: 151/89 (02-02-22 @ 05:00) (151/89 - 173/90)  RR: 18 (02-02-22 @ 05:00)  SpO2: --    Diet: Diet, Pureed:   Moderately Thick Liquids (MODTHICKLIQS)  Supplement Feeding Modality:  Oral  Ensure Enlive Cans or Servings Per Day:  1       Frequency:  Three Times a day (01-28-22 @ 16:40)    IV Fluid: dextrose 5%. 1000 milliLiter(s) (50 mL/Hr) IV Continuous <Continuous>    In:   02-01-22 @ 07:01  -  02-02-22 @ 07:00  --------------------------------------------------------  IN: 0 mL    Out:   02-01-22 @ 07:01  -  02-02-22 @ 07:00  --------------------------------------------------------  OUT:    Incontinent per Condom Catheter (mL): 1200 mL  Total OUT: 1200 mL    Net:   02-01-22 @ 07:01  -  02-02-22 @ 07:00  --------------------------------------------------------  NET: -1200 mL    Physical Examination:  General Appearance: NAD, alert and cooperative  Heart: S1 and S2. No murmurs. Rhythm is regular.  Lungs: Clear to auscultation BL without rales, rhonchi, wheezing, crackles or diminished breath sounds.  Abdomen: Soft, nondistended, nontender. No rigidity, guarding, or rebound tenderness. No masses palpated.   Skin: Warm/dry, Normal color, No jaundice.     Medications: [Standing]  aspirin enteric coated 81 milliGRAM(s) Oral daily  baclofen 10 milliGRAM(s) Oral daily  carBAMazepine Suspension 200 milliGRAM(s) Oral two times a day  cefTRIAXone   IVPB 2000 milliGRAM(s) IV Intermittent every 24 hours  chlorhexidine 4% Liquid 1 Application(s) Topical <User Schedule>  dextrose 5%. 1000 milliLiter(s) (50 mL/Hr) IV Continuous <Continuous>  enoxaparin Injectable 40 milliGRAM(s) SubCutaneous daily  metroNIDAZOLE    Tablet 500 milliGRAM(s) Oral every 8 hours  polyethylene glycol 3350 17 Gram(s) Oral two times a day  simvastatin 10 milliGRAM(s) Oral at bedtime    Medications:[PRN]  risperiDONE   Tablet 1 milliGRAM(s) Oral daily PRN    Labs:                        9.4    9.56  )-----------( 213      ( 01 Feb 2022 07:38 )             28.5   02-01    139  |  102  |  10  ----------------------------<  80  4.4   |  27  |  <0.5<L>    Ca    8.5      01 Feb 2022 07:38  Phos  2.6     02-01  Mg     2.1     02-01    Micro/Urine:    Imaging:  < from: Xray Abdomen 1 View PORTABLE -Routine (Xray Abdomen 1 View PORTABLE -Routine in AM.) (01.29.22 @ 05:30) >    IMPRESSION:  Large fecal volume within the distal colon and rectum, as   previously.  < end of copied text >

## 2022-02-02 NOTE — PROGRESS NOTE ADULT - SUBJECTIVE AND OBJECTIVE BOX
Gastroenterology progress note:     Patient is a 56y old  Male who presents with a chief complaint of Unresponsiveness, Sepsis (02 Feb 2022 15:59)       Admitted on: 01-28-22    We are following the patient for:  constipation     Interval History:  Seen by surgery  aggressive bowel regimen in place         PAST MEDICAL & SURGICAL HISTORY:  CVA (cerebral vascular accident)  1980&#x27;s x2   Residual left sided weakness    CAD (coronary artery disease)    Retinal detachment    Hypertension    High cholesterol    Diabetes    Anxiety    Seizure    History of corneal transplant  right   4/13/17        MEDICATIONS  (STANDING):  aspirin enteric coated 81 milliGRAM(s) Oral daily  baclofen 10 milliGRAM(s) Oral daily  carBAMazepine Suspension 200 milliGRAM(s) Oral two times a day  cefTRIAXone   IVPB 2000 milliGRAM(s) IV Intermittent every 24 hours  chlorhexidine 4% Liquid 1 Application(s) Topical <User Schedule>  dextrose 5%. 1000 milliLiter(s) (50 mL/Hr) IV Continuous <Continuous>  enoxaparin Injectable 40 milliGRAM(s) SubCutaneous daily  metroNIDAZOLE    Tablet 500 milliGRAM(s) Oral every 8 hours  mineral oil 30 milliLiter(s) Oral daily  polyethylene glycol 3350 17 Gram(s) Oral two times a day  polyethylene glycol/electrolyte Solution. 4000 milliLiter(s) Oral once  senna 2 Tablet(s) Oral at bedtime  simvastatin 10 milliGRAM(s) Oral at bedtime    MEDICATIONS  (PRN):  risperiDONE   Tablet 1 milliGRAM(s) Oral daily PRN agitation      Allergies  No Known Allergies    Social History:  Former smoker.       Patient is not mentally capable of giving ROS    Physical Examination:  T(C): 36.6 (02-02-22 @ 13:17), Max: 37.2 (02-02-22 @ 05:00)  HR: 74 (02-02-22 @ 13:17) (71 - 74)  BP: 143/84 (02-02-22 @ 13:17) (143/84 - 173/90)  RR: 16 (02-02-22 @ 13:17) (16 - 18)  SpO2: --      02-01-22 @ 07:01  -  02-02-22 @ 07:00  --------------------------------------------------------  IN: 0 mL / OUT: 1200 mL / NET: -1200 mL    02-02-22 @ 07:01  -  02-02-22 @ 17:07  --------------------------------------------------------  IN: 0 mL / OUT: 725 mL / NET: -725 mL      Constitutional: No acute distress.  Ears, Nose, Mouth, throat: normal external ear, nose normal in appearance, mouth : no gross lesions  Respiratory:  No signs of respiratory distress. Lung sounds are clear bilaterally.  Cardiovascular:  S1 S2, Regular rate and rhythm.  GI/Abdominal: Abdomen is soft, symmetric, and non-tender without distention. There are no visible lesions or scars. Bowel sounds are present and normoactive in all four quadrants. No masses, hepatomegaly, or splenomegaly are noted.   Musculoskelatal: no obvious deformity or fracture  Skin: No rashes, No Jaundice.       Data:                        10.9   7.04  )-----------( 259      ( 02 Feb 2022 09:06 )             33.4     Hgb trend:  10.9  02-02-22 @ 09:06  9.4  02-01-22 @ 07:38  9.1  01-31-22 @ 07:47        02-02    136  |  97<L>  |  7<L>  ----------------------------<  206<H>  4.1   |  25  |  <0.5<L>    Ca    8.8      02 Feb 2022 09:06  Phos  2.6     02-01  Mg     2.1     02-01    TPro  6.7  /  Alb  3.4<L>  /  TBili  <0.2  /  DBili  x   /  AST  24  /  ALT  28  /  AlkPhos  138<H>  02-02    Liver panel trend:  TBili <0.2   /   AST 24   /   ALT 28   /   AlkP 138   /   Tptn 6.7   /   Alb 3.4    /   DBili --      02-02  TBili 0.4   /   AST 18   /   ALT 22   /   AlkP 121   /   Tptn 5.8   /   Alb 3.0    /   DBili -- 01-31  TBili 0.3   /   AST 17   /   ALT 24   /   AlkP 148   /   Tptn 7.6   /   Alb 3.8    /   DBili -- 01-28

## 2022-02-02 NOTE — PROGRESS NOTE ADULT - ASSESSMENT
ASSESSMENT: Pt is a 56M w/ PMH CVA x2 (w/ residual left sided deficits; wheelchair bound), DMT2, HTN and seizures being admitted for LOC and sepsis w/o clear origin.    In ED, pt is afebrile w/ WBC: 29.7, BP: 105/65, HR: 82, Na: 133, K: 3.2, BUN/cr: 29/0.7, Trop: 0.03, Lactate: 3.3, CXR: right basilar opacity, CTH (-), CT abdomen: markedly distended colon/rectum with fecal impaction and RLL ground glass opacities. Pt was given IV cefepime 2g, IV clindamycin 600mg, IV levaquin 750mg, 1L LR, IV keppra 1g, IV zofran 4mg and was briefly on a levophed gtt. Patient admitted for sepsis and fecal impaction.    # Loss of consciousness of moderate duration  # Seizure vs CVA vs hypotension   # Hx CVA in 1980s w/ residual left sided deficits - continue baclofen and risperidone PRN   - abn EEG  - Neuro consult; f/u recs   - MR head w/o contrast pending  - Continue carbamazepine  - q8h neuro checks   - Continue ASA 81 and statin.    # RLL pneumonia  # Severe sepsis   - Leukocytosis improving  - F/u blood cultures  - F/u urine cultures   - hold IVF tonight  - Monitor pulse ox  - deescalate IV abx - zosyn (1/29-1/31); IV ceftriaxone and PO flagyl (1/31-)  - ID consult    # Fecal impaction  - GI and Surg recs appreciated  - Tap water enema and miralax tid  - no BMs  - colorectal surg deferring colectomy in favor of endoscopic decompression  - repeat ABD XR shows fecal impaction  - Monitor BM  - could be from carbamazepine      #CAD (coronary artery disease)  - Continue ASA and statin.    #Hypertension  - Low BP in ED, s/p short levophed gtt  - Monitor BP  - Hold lisinopril    #Seizure   - Continue carbamazepine   - Seizure precautions.    #FENP  - Pureed w/ thickened liquids diet  - continue home meds  - VTE ppx: SQ heparin  - GI ppx: protonix   - FULL CODE

## 2022-02-02 NOTE — PROGRESS NOTE ADULT - ASSESSMENT
Assessment:  56M w/ PMH CVA x2 (w/ residual left sided deficits; wheelchair bound), DMT2, HTN and seizures being admitted for LOC and SIRS w/o clear origin, on admission patient found to have large fecal load s/p manual disimpaction by GI service, general surgery consult placed to evaluate for possible colostomy. Patient having BM x3 during day, x1 during night. Abdomen is soft, nondistended and patient not complaining of abdominal pain    Plan:  - No indication for colostomy creation  - start PPI daily for GI PPX  - f/u repeat KUB from 2/2 AM  - c/w bowel regiment: Senna, Miralax, Tap water enema TID  - f/u GI for possible endoscopic decompression if patient stops having BM  - correct electrolytes daily, K >4, Mg > 2, Phos > 3  - avoid narcotics  - c/w current diet  -Pain control as needed  -Hemodynamic monitoring as per routine  -Strict input and output monitoring    Date/Time: 02-02-22 @ 07:49

## 2022-02-02 NOTE — PROGRESS NOTE ADULT - ASSESSMENT
Impression: 56y o Male  being followed for:  constipation with massive fecal load on CT    Plan bowel regimen, surgery to decide if colostomy indicated.  Recall   gi as needed

## 2022-02-02 NOTE — PROGRESS NOTE ADULT - SUBJECTIVE AND OBJECTIVE BOX
Patient was evaluated and examined by bedside, awake and alert, shaking his head no when asked about pain or difficulty breathing. Patient awake and responding with sounds. Zero BMs today and one BM yesterday.       REVIEW OF SYSTEMS:  please see pertinent positives mentioned above, all other 12 ROS negative    Vital Signs Last 24 Hrs  T(C): 36.5 (31 Jan 2022 13:10), Max: 37 (30 Jan 2022 21:53)  T(F): 97.7 (31 Jan 2022 13:10), Max: 98.6 (30 Jan 2022 21:53)  HR: 71 (31 Jan 2022 13:10) (71 - 79)  BP: 147/71 (31 Jan 2022 13:10) (125/70 - 161/79)  BP(mean): --  RR: 16 (31 Jan 2022 13:10) (16 - 20)  SpO2: 98% (30 Jan 2022 19:36) (98% - 98%)    POCT Blood Glucose.: 105 mg/dL (28 Jan 2022 11:08)      PHYSICAL EXAM:  General: NAD, awake, minimally verbal, contracted, patient is laying comfortably in bed  HEENT: AT, NC, Supple, NO JVD, NO CB  Lungs: CTA B/L, no wheezing, no rhonchi  CVS: normal S1, S2, RRR, NO M/G/R  Abdomen: soft, bowel sounds present, non-tender, non-distended  Extremities: no edema, no clubbing, no cyanosis, positive peripheral pulses b/l  Neuro: no acute focal neurological deficits  Skin: no rush, no ecchymosis      LAB  CBC  Date: 01-28-22 @ 11:20  Mean cell Suyljwrfpt33.5  Mean cell Hemoglobin Conc33.1  Mean cell Volum 89.2  Platelet count-Automate 207  RBC Count 3.52  Red Cell Distrib Width16.1  WBC Count29.72  % Albumin, Urine--  Hematocrit 31.4  Hemoglobin 10.4    BMP  01-28-22 @ 11:20  Blood Gas Arterial-Calcium,Ionized--  Blood Urea Nitrogen, Serum 29 mg/dL<H> [10 - 20]  Carbon Dioxide, Serum24 mmol/L [17 - 32]  Chloride, Serum94 mmol/L<L> [98 - 110]  Creatinie, Serum0.7 mg/dL [0.7 - 1.5]  Glucose, Serum91 mg/dL [70 - 99]  Potassium, Serum3.2 mmol/L<L> [3.5 - 5.0]  Sodium, Serum 133 mmol/L<L> [135 - 146]        PT/INR - ( 28 Jan 2022 11:20 )   PT: 14.00 sec;   INR: 1.22 ratio         PTT - ( 28 Jan 2022 11:20 )  PTT:32.2 sec      Microbiology:      RADIOLOGY & ADDITIONAL TESTS:  CT A/P 1/28:  IMPRESSION:  Markedly distended colon and rectum with rectal fecal impaction. Rectum measures 15.6 cm in transverse diameter. No pneumoperitoneum.  Right lower lobe groundglass opacities with tree-in-bud nodularity. Findings likely infectious/inflammatory in etiology.    CT head 1/28:  Findings:  There is stable mild ventricular prominence superimposed upon a mild degree of parenchymal volume loss.  There is are stable chronic lacunar infarcts within the left corona radiata and internal capsule.  There is no acute intracranial hemorrhage, extra-axial fluid collection or midline shift. Gray-white matter differentiation is maintained.  The visualized paranasal sinuses are clear. There is sclerosis and/or under pneumatization of the mastoid complexes. Soft tissue opacity within the left external auditory canal likely reflecting cerumen.    CXR 1/28:  The overall heart size is within normal limits.    Right basilar opacity.    Elevation of the right hemidiaphragm and dilatation of the right hepatic flexure.      Medications:  aspirin enteric coated 81 milliGRAM(s) Oral daily  baclofen 10 milliGRAM(s) Oral daily  carBAMazepine Suspension 200 milliGRAM(s) Oral two times a day  chlorhexidine 4% Liquid 1 Application(s) Topical <User Schedule>  enoxaparin Injectable 40 milliGRAM(s) SubCutaneous daily  lactated ringers Bolus 1000 milliLiter(s) IV Bolus once  lactated ringers. 1000 milliLiter(s) IV Continuous <Continuous>  piperacillin/tazobactam IVPB.. 3.375 Gram(s) IV Intermittent every 8 hours  risperiDONE   Tablet 1 milliGRAM(s) Oral daily PRN  simvastatin 10 milliGRAM(s) Oral at bedtime        Assessment and Plan:

## 2022-02-03 NOTE — ADVANCED PRACTICE NURSE CONSULT - RECOMMEDATIONS
Unable to initiate pre- op  stoma teaching at this time.  Wocn to f/u post op.
Plan:  Clean  coccyx and knee  with soap and water, pat dry then apply triad with Allevyn foam dressing   Pressure  injury   preventive  measures  skin care   Assess wound and inform primary provider of any changes   Case discussed with primary Rn   Wound/ ostomy specialist  to f/u as needed     Offloading: [ ] Frequent position changes [ ] Devices/Equipment  Cleansing: [ ] Saline [ ] Soap/Water [ ] Other: ______  Topicals: [ ] Barrier Cream [ ] Antimicrobial [ ] Enzymatic Wound Debridement  Dressings: [ ] Dry, sterile [ ] Foam [ ] Absorbant Pads [ ] Collagenase

## 2022-02-03 NOTE — ADVANCED PRACTICE NURSE CONSULT - ASSESSMENT
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 Pt was initially at hospital for outpatient CXR and was a code blue 2/2 LOC. Pt's sister/caregiver at bedside providing collateral. She reports that patient was very lethargic and non-responsive for approximately 20 minutes and that pt's blood pressure was very low initially. She states that something similar happened twice in the past at [facility] and the patient's blood pressure was low at those times as well. She states that patient is back at his baseline at this time. Unable to obtain ROS 2/2 pts mental status.   In ED, pt is afebrile w/ WBC: 29.7, BP: 105/65, HR: 82, Na: 133, K: 3.2, BUN/cr: 29/0.7, Trop: 0.03, Lactate: 3.3, CXR: right basilar opacity, CTH (-), CT abdomen: markedly distended colon/rectum with fecal impaction and RLL ground glass opacities. Pt was given IV cefepime 2g, IV clindamycin 600mg, IV levaquin 750mg, 1L LR, IV keppra 1g, IV zofran 4mg and was briefly on a levophed gtt.      PAST MEDICAL & SURGICAL HISTORY:  CVA (cerebral vascular accident)  1980&#x27;s x2   Residual left sided weakness  CAD (coronary artery disease)  Retinal detachment  Hypertension  High cholesterol  Diabetes  Anxiety  Seizure  History of corneal transplant  right   4/13/17    Assessment:   Patient  received   in bed  awake,  confused  non-verbal during WOCN visit, not following or responding to commands,  Pt's abdomen assessed in laying  position in bed, abdomen flat, semi-soft, rectus muscle palpated. Pt stoma sited in LUQ for possible transverse colostomy   within the rectus muscle, void of any scarring, dimpling, creasing, bony prominences or pt's belt line. Difficulty assessing rectus muscle due to pt's mental status, pt unable to follow commands of coughing or lifting legs during palpitation of rectus muscle.   Current upper quadrant site, visualized in laying  position only . Tegaderm dressings applied over markings. Final stoma site to be determined by surgeon     
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 Pt was initially at hospital for outpatient CXR and was a code blue 2/2 LOC. Pt's sister/caregiver at bedside providing collateral. She reports that patient was very lethargic and non-responsive for approximately 20 minutes and that pt's blood pressure was very low initially. She states that something similar happened twice in the past at [facility] and the patient's blood pressure was low at those times as well. She states that patient is back at his baseline at this time. Unable to obtain ROS 2/2 pts mental status.   In ED, pt is afebrile w/ WBC: 29.7, BP: 105/65, HR: 82, Na: 133, K: 3.2, BUN/cr: 29/0.7, Trop: 0.03, Lactate: 3.3, CXR: right basilar opacity, CTH (-), CT abdomen: markedly distended colon/rectum with fecal impaction and RLL ground glass opacities. Pt was given IV cefepime 2g, IV clindamycin 600mg, IV levaquin 750mg, 1L LR, IV keppra 1g, IV zofran 4mg and was briefly on a levophed gtt.       PAST MEDICAL & SURGICAL HISTORY:  CVA (cerebral vascular accident)  1980&#x27;s x2   Residual left sided weakness  CAD (coronary artery disease)  Retinal detachment  Hypertension  High cholesterol  Diabetes  Anxiety  Seizure  History of corneal transplant  right   4/13/17    Assessment:  Patient received in bed,  awake non verbal.                      Skin assessed-      Pressure Injury  #1  Location:  coccyx   Stage : III  Size:  1.5x1x0.3  Tissue Description :  Pale pink, macerated    Wound Exudate : scant    Wound Edge:   Intact   Periwound Condition:     Other Etiology:  [ ] Aterial  [ ] Venous   [ ] Surgical Incision  [x ] Other:  R lateral knee partial thickness ulceration                    Wound base pink, no drainage or foul smell noted

## 2022-02-03 NOTE — PROGRESS NOTE ADULT - ASSESSMENT
56M w/ PMH CVA x2 (w/ residual left sided deficits; wheelchair bound), DMT2, HTN and seizures being admitted for LOC and SIRS w/o clear origin, on admission patient found to have large fecal load s/p manual disimpaction by GI service, general surgery consult placed to evaluate for possible colostomy. Patient having BM x3 during day, x1 during night. Abdomen is soft, nondistended and patient not complaining of abdominal pain      Case D/w Dr. Garcia:  Plan:   - case D/W GI (Dr. Gorman) earlier: no GI intervention, defer to surgery for colostomy  - Pt for lap transverse loop colostomy tomorrow 2/4/22   - Golytely today   - F/U COVID swab today (pending)  - NPO P MN   - f/u am labs

## 2022-02-03 NOTE — PROGRESS NOTE ADULT - SUBJECTIVE AND OBJECTIVE BOX
S; Pt without any significant events overnight.  Pt now scheduled for laparascopic transverse colostomy tomorrow - to receive bowel prep today (golytely)  O; Vital Signs Last 24 Hrs  T(C): 36.4 (03 Feb 2022 13:48), Max: 36.6 (02 Feb 2022 20:51)  T(F): 97.5 (03 Feb 2022 13:48), Max: 97.9 (02 Feb 2022 20:51)  HR: 79 (03 Feb 2022 13:48) (71 - 84)  BP: 107/63 (03 Feb 2022 13:48) (107/63 - 169/85)  BP(mean): --  RR: 17 (03 Feb 2022 13:48) (16 - 17)  SpO2: 98% (03 Feb 2022 08:02) (98% - 98%)    MEDICATIONS  (STANDING):  aspirin enteric coated 81 milliGRAM(s) Oral daily  baclofen 10 milliGRAM(s) Oral daily  carBAMazepine Suspension 200 milliGRAM(s) Oral two times a day  cefTRIAXone   IVPB 2000 milliGRAM(s) IV Intermittent every 24 hours  chlorhexidine 4% Liquid 1 Application(s) Topical <User Schedule>  dextrose 5%. 1000 milliLiter(s) (50 mL/Hr) IV Continuous <Continuous>  enoxaparin Injectable 40 milliGRAM(s) SubCutaneous daily  metroNIDAZOLE    Tablet 500 milliGRAM(s) Oral every 8 hours  mineral oil 30 milliLiter(s) Oral daily  neomycin 500 milliGRAM(s) Oral <User Schedule>  polyethylene glycol 3350 17 Gram(s) Oral two times a day  senna 2 Tablet(s) Oral at bedtime  simvastatin 10 milliGRAM(s) Oral at bedtime    MEDICATIONS  (PRN):  risperiDONE   Tablet 1 milliGRAM(s) Oral daily PRN agitation    EXAM:  abd: soft, mild generalized distention, NT    Labs:  CAPILLARY BLOOD GLUCOSE                              10.7   7.92  )-----------( 279      ( 03 Feb 2022 07:50 )             32.4         02-02    136  |  97<L>  |  7<L>  ----------------------------<  206<H>  4.1   |  25  |  <0.5<L>          LFTs:             6.7  | <0.2 | 24       ------------------[138     ( 02 Feb 2022 09:06 )  3.4  | x    | 28          Lipase:x      Amylase:x           Xray Abdomen 1 View PORTABLE -Routine (Xray Abdomen 1 View PORTABLE -Routine in AM.) (02.03.22 @ 07:17) >  INTERPRETATION:  Clinical History / Reason for exam: Ileus    2 supine abdominal x-rays are provided for review and compared to prior   examination dated 2/2/2022    Findings/  impression:    Increase in gaseous distention of the colon measuring up to 8.7 cm.    Rectal fecal impaction, incompletely evaluated.    Degenerative change

## 2022-02-03 NOTE — PROGRESS NOTE ADULT - SUBJECTIVE AND OBJECTIVE BOX
Patient was evaluated and examined by bedside, awake and alert, shaking his head no when asked about pain or difficulty breathing. Patient awake and responding with sounds. Endoscopic decompression favored by surg rather than colectomy with ostomy creation.      REVIEW OF SYSTEMS:  please see pertinent positives mentioned above, all other 12 ROS negative    Vital Signs Last 24 Hrs  T(C): 36.5 (31 Jan 2022 13:10), Max: 37 (30 Jan 2022 21:53)  T(F): 97.7 (31 Jan 2022 13:10), Max: 98.6 (30 Jan 2022 21:53)  HR: 71 (31 Jan 2022 13:10) (71 - 79)  BP: 147/71 (31 Jan 2022 13:10) (125/70 - 161/79)  BP(mean): --  RR: 16 (31 Jan 2022 13:10) (16 - 20)  SpO2: 98% (30 Jan 2022 19:36) (98% - 98%)    POCT Blood Glucose.: 105 mg/dL (28 Jan 2022 11:08)      PHYSICAL EXAM:  General: NAD, awake, minimally verbal, contracted, patient is laying comfortably in bed  HEENT: AT, NC, Supple, NO JVD, NO CB  Lungs: CTA B/L, no wheezing, no rhonchi  CVS: normal S1, S2, RRR, NO M/G/R  Abdomen: soft, bowel sounds present, non-tender, non-distended  Extremities: no edema, no clubbing, no cyanosis, positive peripheral pulses b/l  Neuro: no acute focal neurological deficits  Skin: no rush, no ecchymosis      LAB  CBC  Date: 01-28-22 @ 11:20  Mean cell Vxhftbrxtp61.5  Mean cell Hemoglobin Conc33.1  Mean cell Volum 89.2  Platelet count-Automate 207  RBC Count 3.52  Red Cell Distrib Width16.1  WBC Count29.72  % Albumin, Urine--  Hematocrit 31.4  Hemoglobin 10.4    BMP  01-28-22 @ 11:20  Blood Gas Arterial-Calcium,Ionized--  Blood Urea Nitrogen, Serum 29 mg/dL<H> [10 - 20]  Carbon Dioxide, Serum24 mmol/L [17 - 32]  Chloride, Serum94 mmol/L<L> [98 - 110]  Creatinie, Serum0.7 mg/dL [0.7 - 1.5]  Glucose, Serum91 mg/dL [70 - 99]  Potassium, Serum3.2 mmol/L<L> [3.5 - 5.0]  Sodium, Serum 133 mmol/L<L> [135 - 146]        PT/INR - ( 28 Jan 2022 11:20 )   PT: 14.00 sec;   INR: 1.22 ratio         PTT - ( 28 Jan 2022 11:20 )  PTT:32.2 sec      Microbiology:      RADIOLOGY & ADDITIONAL TESTS:  CT A/P 1/28:  IMPRESSION:  Markedly distended colon and rectum with rectal fecal impaction. Rectum measures 15.6 cm in transverse diameter. No pneumoperitoneum.  Right lower lobe groundglass opacities with tree-in-bud nodularity. Findings likely infectious/inflammatory in etiology.    CT head 1/28:  Findings:  There is stable mild ventricular prominence superimposed upon a mild degree of parenchymal volume loss.  There is are stable chronic lacunar infarcts within the left corona radiata and internal capsule.  There is no acute intracranial hemorrhage, extra-axial fluid collection or midline shift. Gray-white matter differentiation is maintained.  The visualized paranasal sinuses are clear. There is sclerosis and/or under pneumatization of the mastoid complexes. Soft tissue opacity within the left external auditory canal likely reflecting cerumen.    CXR 1/28:  The overall heart size is within normal limits.    Right basilar opacity.    Elevation of the right hemidiaphragm and dilatation of the right hepatic flexure.      Medications:  aspirin enteric coated 81 milliGRAM(s) Oral daily  baclofen 10 milliGRAM(s) Oral daily  carBAMazepine Suspension 200 milliGRAM(s) Oral two times a day  chlorhexidine 4% Liquid 1 Application(s) Topical <User Schedule>  enoxaparin Injectable 40 milliGRAM(s) SubCutaneous daily  lactated ringers Bolus 1000 milliLiter(s) IV Bolus once  lactated ringers. 1000 milliLiter(s) IV Continuous <Continuous>  piperacillin/tazobactam IVPB.. 3.375 Gram(s) IV Intermittent every 8 hours  risperiDONE   Tablet 1 milliGRAM(s) Oral daily PRN  simvastatin 10 milliGRAM(s) Oral at bedtime        Assessment and Plan:

## 2022-02-04 NOTE — CONSULT NOTE ADULT - CONSULT REASON
AMS
Risk Stratification
Fecal impaction
abdominal distention
Colonic distention, for possible colectomy
Severe sepsis

## 2022-02-04 NOTE — PROGRESS NOTE ADULT - SUBJECTIVE AND OBJECTIVE BOX
S; Per RN, pt having BM's overnight (x 3). Received golytely yesterday in preparation for colostomy placement today  O;  Vital Signs Last 24 Hrs  T(C): 36.5 (04 Feb 2022 04:56), Max: 36.7 (03 Feb 2022 21:00)  T(F): 97.7 (04 Feb 2022 04:56), Max: 98 (03 Feb 2022 21:00)  HR: 87 (04 Feb 2022 04:56) (75 - 87)  BP: 153/89 (04 Feb 2022 04:56) (107/63 - 161/91)  BP(mean): --  RR: 16 (04 Feb 2022 04:56) (16 - 17)  SpO2: --    I&O's Detail    03 Feb 2022 07:01  -  04 Feb 2022 07:00  --------------------------------------------------------  IN:  Total IN: 0 mL    OUT:    Incontinent per Condom Catheter (mL): 1000 mL  Total OUT: 1000 mL    Total NET: -1000 mL      EXAM:  abd: soft, NT/ND    Labs:  CAPILLARY BLOOD GLUCOSE                              9.7    9.02  )-----------( 310      ( 04 Feb 2022 09:54 )             29.6       Auto Neutrophil %: 69.7 % (02-04-22 @ 09:54)  Auto Immature Granulocyte %: 0.2 % (02-04-22 @ 09:54)    02-04    138  |  100  |  9<L>  ----------------------------<  92  3.8   |  25  |  0.5<L>      Calcium, Total Serum: 8.5 mg/dL (02-04-22 @ 09:54)    Xray Abdomen 1 View PORTABLE -Routine (Xray Abdomen 1 View PORTABLE -Routine in AM.) (02.03.22 @ 07:17) >  2 supine abdominal x-rays are provided for review and compared to prior   examination dated 2/2/2022    Findings/  impression:    Increase in gaseous distention of the colon measuring up to 8.7 cm.    Rectal fecal impaction, incompletely evaluated.    Degenerative change    < end of copied text >

## 2022-02-04 NOTE — CONSULT NOTE ADULT - SUBJECTIVE AND OBJECTIVE BOX
HPI:  Pt is a 56M w/ PMH CVA x2 (w/ residual left sided deficits; wheelchair bound), DMT2, HTN and seizures being admitted for LOC and sepsis w/o clear origin. Pt was initially at hospital for outpatient CXR and was a code blue 2/2 LOC. Pt's sister/caregiver at bedside providing collateral. She reports that patient was very lethargic and non-responsive for approximately 20 minutes and that pt's blood pressure was very low initially. She states that something similar happened twice in the past at [facility] and the patient's blood pressure was low at those times as well. She states that patient is back at his baseline at this time. Unable to obtain ROS 2/2 pts mental status.     In ED, pt is afebrile w/ WBC: 29.7, BP: 105/65, HR: 82, Na: 133, K: 3.2, BUN/cr: 29/0.7, Trop: 0.03, Lactate: 3.3, CXR: right basilar opacity, CTH (-), CT abdomen: markedly distended colon/rectum with fecal impaction and RLL ground glass opacities. Pt was given IV cefepime 2g, IV clindamycin 600mg, IV levaquin 750mg, 1L LR, IV keppra 1g, IV zofran 4mg and was briefly on a levophed gtt.    (28 Jan 2022 15:49)        HPI-Cardiology: Risk Stratification    Pt evaluated at bedside. [place] [facility] [place] and sepsis. Radiology tests and hospital records, were reviewed, as well as previous notes on this patient. Unable to get HPI and asses for Hx of cardiac disease due to Pt limited verbal response on assessment and A&O x1. Hx obtained from Chart, and medical team.      PAST MEDICAL & SURGICAL HISTORY  CVA (cerebral vascular accident)  1980&#x27;s x2   Residual left sided weakness    CAD (coronary artery disease)    Retinal detachment    Hypertension    High cholesterol    Diabetes    Anxiety    Seizure    History of corneal transplant  right   4/13/17        ALLERGIES:  No Known Allergies      MEDICATIONS:  MEDICATIONS  (STANDING):  aspirin enteric coated 81 milliGRAM(s) Oral daily  baclofen 10 milliGRAM(s) Oral daily  carBAMazepine Suspension 200 milliGRAM(s) Oral two times a day  cefTRIAXone   IVPB 2000 milliGRAM(s) IV Intermittent every 24 hours  chlorhexidine 2% Cloths 1 Application(s) Topical once  chlorhexidine 4% Liquid 1 Application(s) Topical <User Schedule>  dextrose 5%. 1000 milliLiter(s) (50 mL/Hr) IV Continuous <Continuous>  enoxaparin Injectable 40 milliGRAM(s) SubCutaneous daily  lactulose Syrup 10 Gram(s) Oral two times a day  metroNIDAZOLE    Tablet 500 milliGRAM(s) Oral every 8 hours  mineral oil 30 milliLiter(s) Oral daily  polyethylene glycol 3350 17 Gram(s) Oral two times a day  senna 2 Tablet(s) Oral at bedtime  simvastatin 10 milliGRAM(s) Oral at bedtime    MEDICATIONS  (PRN):  risperiDONE   Tablet 1 milliGRAM(s) Oral daily PRN agitation      HOME MEDICATIONS:  Home Medications:  Aspir 81 oral delayed release tablet: 1 tab(s) orally once a day (28 Jan 2022 15:48)  baclofen 10 mg oral tablet: 1 tab(s) orally once a day (28 Jan 2022 15:48)  risperiDONE 1 mg oral tablet: 1 tab(s) orally once a day, As Needed for agitation (28 Jan 2022 15:48)  simvastatin 10 mg oral tablet: 1 tab(s) orally once a day (at bedtime) (28 Jan 2022 15:48)      VITALS:   T(F): 98 (02-04 @ 14:02), Max: 99 (02-02 @ 05:00)  HR: 79 (02-04 @ 14:02) (71 - 87)  BP: 164/97 (02-04 @ 14:02) (107/63 - 173/90)  BP(mean): --  RR: 18 (02-04 @ 14:02) (16 - 18)  SpO2: 98% (02-03 @ 08:02) (98% - 98%)    I&O's Summary    03 Feb 2022 07:01  -  04 Feb 2022 07:00  --------------------------------------------------------  IN: 0 mL / OUT: 1000 mL / NET: -1000 mL    04 Feb 2022 07:01  -  04 Feb 2022 15:12  --------------------------------------------------------  IN: 0 mL / OUT: 200 mL / NET: -200 mL        REVIEW OF SYSTEMS:  Unable to asses due to Pt's mental status         PHYSICAL EXAM:  NEURO: patient is awake, limited verbal response, A&O x1  GEN: Not in acute distress  NECK: no thyroid enlargement, no JVD  LUNGS: Clear to auscultation bilaterally   CARDIOVASCULAR: S1/S2 present, RRR , no murmurs or rubs, no carotid bruits,  + PP bilaterally  ABD: Soft, non-tender, non-distended, +BS  EXT: Left side residual deficits   SKIN: Intact    LABS:                        9.7    9.02  )-----------( 310      ( 04 Feb 2022 09:54 )             29.6     02-04    138  |  100  |  9<L>  ----------------------------<  92  3.8   |  25  |  0.5<L>    Ca    8.5      04 Feb 2022 09:54  Phos  2.9     02-04  Mg     2.0     02-04      PT/INR - ( 04 Feb 2022 09:54 )   PT: 14.90 sec;   INR: 1.30 ratio         PTT - ( 04 Feb 2022 09:54 )  PTT:39.1 sec        01-29 Chol 109 LDL -- HDL 71 Trig 35      RADIOLOGY:  -CXR:  < from: Xray Chest 1 View- PORTABLE-Urgent (01.28.22 @ 11:34) >  Impression:    Right basilar opacity.        --- End of Report ---    < end of copied text >      -TTE:  `< from: Transthoracic Echocardiogram (01.23.20 @ 16:54) >    Summary:   1. Left ventricular ejection fraction, by visual estimation, is 55 to 60%.   2. Mildly increased LV wall thickness.   3. Spectral Doppler shows impaired relaxation pattern of left ventricular myocardial filling (Grade I diastolic dysfunction).      < end of copied text >      ECG:  `< from: 12 Lead ECG (01.28.22 @ 12:32) >   Normal sinus rhythm  Nonspecific T wave abnormality  Abnormal ECG    Confirmed by KEY CA MD ([id number]) on 1/28/2022 1:59:38 PM    < end of copied text >

## 2022-02-04 NOTE — PROGRESS NOTE ADULT - ASSESSMENT
ASSESSMENT: Pt is a 56M w/ PMH CVA x2 (w/ residual left sided deficits; wheelchair bound), DMT2, HTN and seizures being admitted for LOC and sepsis w/o clear origin.    In ED, pt is afebrile w/ WBC: 29.7, BP: 105/65, HR: 82, Na: 133, K: 3.2, BUN/cr: 29/0.7, Trop: 0.03, Lactate: 3.3, CXR: right basilar opacity, CTH (-), CT abdomen: markedly distended colon/rectum with fecal impaction and RLL ground glass opacities. Pt was given IV cefepime 2g, IV clindamycin 600mg, IV levaquin 750mg, 1L LR, IV keppra 1g, IV zofran 4mg and was briefly on a levophed gtt. Patient admitted for sepsis and fecal impaction.    # Loss of consciousness of moderate duration  # Seizure vs CVA vs hypotension   # Hx CVA in 1980s w/ residual left sided deficits - continue baclofen and risperidone PRN   - abn EEG  - Neuro consult; f/u recs   - MR head w/o contrast pending  - Continue carbamazepine  - q8h neuro checks   - Continue ASA 81 and statin.    # RLL pneumonia  # Severe sepsis   - Leukocytosis improving  -  blood cultures NGTD  - Monitor pulse ox  - deescalate IV abx - zosyn (1/29-1/31); IV ceftriaxone and PO flagyl (1/31-)  - ID recs noted    # Pseudoobstruction with fecal impaction 2/2 to atonic colon  - GI and Surg recs appreciated  - Tap water enema and miralax tid  - golytely  - colorectal surg deferring colectomy in favor of endoscopic decompression  - repeat ABD XR shows fecal impaction  - Monitor BM  - could be from carbamazepine      #CAD (coronary artery disease)  - Continue ASA and statin.    #Hypertension  - Low BP in ED, s/p short levophed gtt  - Monitor BP  - restarted lisinopril    #Seizure   - Continue carbamazepine   - Seizure precautions.    #FENP  - Pureed w/ thickened liquids diet  - continue home meds  - VTE ppx: SQ heparin  - GI ppx: protonix   - FULL CODE

## 2022-02-04 NOTE — CONSULT NOTE ADULT - ATTENDING COMMENTS
Patient is a 56M with PMH of CVA x2 current non verbal, wheelchair bound, DMII, HTN and seizures was admitted for LOC and pneumonia.  Consult for fecal impaction    Patient seen and examined.  Patient appears comfortable    Abdomen - soft, non distended, non tender    Vitals labs and images reviewed    CTAP markedly distended colon/rectum with fecal impaction and RLL ground glass opacities.     A/P:   - Patient with chronically distended rectosigmoid colon.  - Aggressive bowel regimen  - will consider colostomy creation if patient does not have consistent bowel function
Patient poor historian. Hx CVA. His cardiac risk general surgery if needed is intermediate, Check correct lytes if needed. Check nutritional status. Check lytes correct if needed. DVT GI prophylaxix
patient seen and examined agree above note   no need for ICU BP now 140/80 pulse 80   pox 98% on RA   replace K   Gsx / GI consult for the bowel distension ?/ ileus chronic   can be observe on 3s tele low risk for 24 hrs for any arrythmia cause of syncope ?? seizure   repeat LA  abx as above

## 2022-02-04 NOTE — PROGRESS NOTE ADULT - REASON FOR ADMISSION
SIRS, unknown source possible PNA
Unresponsiveness, Sepsis
fecal impaction
impaction
Unresponsiveness, Sepsis
Unresponsivness, Sepsis
Unresponsiveness, Sepsis
Unresponsivess, Sepsis

## 2022-02-04 NOTE — PROGRESS NOTE ADULT - ASSESSMENT
Patient is a 56y old  Male with PMH of CVA x2 (w/ residual left sided deficits; wheelchair bound), DMT2, HTN and seizures being admitted for LOC and sepsis w/o clear origin. He was initially at hospital for outpatient CXR and was a code blue 2/2 LOC. As per Pt's sister/caregiver, reports that patient was very lethargic and non-responsive for approximately 20 minutes and that pt's blood pressure was very low initially. ON admission, he found to have tachycardia, hypotension, BP of 51/36 and Leukocytosis.  The CXR shows right basilar opacity,  CT abdomen: markedly distended colon/rectum with fecal impaction and RLL ground glass opacities.  He was given IV cefepime 2g, IV clindamycin 600mg, and IV levaquin 750mg. The ID consult requested to assist with further evaluation and antibiotic management.     # Sepsis/Septic shock ( Tachycardia + hypotension, BP of 51/36 + Leukocytosis)  - Blood Cx 1/28 NG   # Pneumonia- on CXR Right lower lobe ground glass opacities  # Fecal Impaction   # Decubitus ulcer    Recommendations  - would stop antibiotics as received > 7 day course of antibiotics and monitor   - would start antibiotics only if with concern for necrotic bowel/signs of ischemia from fecal impaction   - ensure bowel movements  - endoscopy held today as patient having bowel movements  - trend WBC     Please call or message on Microsoft Teams if with any questions.  Spectra [phone]

## 2022-02-04 NOTE — PROGRESS NOTE ADULT - ASSESSMENT
56M w/ PMH CVA x2 (w/ residual left sided deficits; wheelchair bound), DMT2, HTN and seizures being admitted for LOC and SIRS w/o clear origin, on admission patient found to have large fecal load s/p manual disimpaction by GI service, general surgery consult placed to evaluate for possible colostomy. Patient having BM x3 during day, x1 during night. Abdomen is soft, nondistended and patient not complaining of abdominal pain      Surgical resident, Dr. Cartagena, D/W Dr. Garcia:  - patient currently having BM's: surgical resident discussed colostomy placement with sister, who is caregiver, and she would like to hold off on procedure for now  - will D/w Dr. Garcia for further recommendations

## 2022-02-04 NOTE — CONSULT NOTE ADULT - ASSESSMENT
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 Found to have fecal impaction on imaging. Surgery wants to schedule Pt for colectomy.       Plan:  - Pt not alert, confused and unable to asses for any CP or SOB  - Currently no active cardiac conditions. No signs of ischemia, clinical exam not consistent with ADHF, and no stable arrhythmias noted.   - Poor functional capacity. METS<4  - Echo(1/23/2020): EF 55-60%. Repeat echo  - Revised Cardiac Risk Index: 3(Class IV risk)-15% risk for JUAN  - Shanks Perioperative Risk: 2% risk for JUAN  - Pt is at moderate risk for procedure/surgery  - Further cardiac workup will depend on clinical course  - All other workup per primary team.

## 2022-02-04 NOTE — CONSULT NOTE ADULT - CONSULT REQUESTED DATE/TIME
01-Feb-2022 22:29
29-Jan-2022
29-Jan-2022 14:34
04-Feb-2022 15:11
28-Jan-2022 14:49
29-Jan-2022 08:50

## 2022-02-04 NOTE — PROGRESS NOTE ADULT - PROBLEM SELECTOR PROBLEM 3
Fecal impaction

## 2022-02-04 NOTE — PROGRESS NOTE ADULT - SUBJECTIVE AND OBJECTIVE BOX
SILVANO CALIXTO  56y, Male  Allergy: No Known Allergies      LOS  7d    CHIEF COMPLAINT: Unresponsiveness, Sepsis (03 Feb 2022 08:38)      INTERVAL EVENTS/HPI  - No acute events overnight  - T(F): , Max: 98 (02-03-22 @ 21:00)  - planned for endoscopic decompression   - WBC Count: 9.02 (02-04-22 @ 09:54)  WBC Count: 7.92 (02-03-22 @ 07:50)     - Creatinine, Serum: 0.5 (02-04-22 @ 09:54)       ROS  unable to obtain history secondary to patient's mental status and/or sedation    VITALS:  T(F): 97.7, Max: 98 (02-03-22 @ 21:00)  HR: 87  BP: 153/89  RR: 16Vital Signs Last 24 Hrs  T(C): 36.5 (04 Feb 2022 04:56), Max: 36.7 (03 Feb 2022 21:00)  T(F): 97.7 (04 Feb 2022 04:56), Max: 98 (03 Feb 2022 21:00)  HR: 87 (04 Feb 2022 04:56) (75 - 87)  BP: 153/89 (04 Feb 2022 04:56) (107/63 - 161/91)  BP(mean): --  RR: 16 (04 Feb 2022 04:56) (16 - 17)  SpO2: --    PHYSICAL EXAM:  Gen: NAD, resting in bed  HEENT: Normocephalic, atraumatic  Neck: supple, no lymphadenopathy  CV: Regular rate & regular rhythm  Lungs: decreased BS at bases, no fremitus  Abdomen: Soft, BS present  Ext: Warm, well perfused  Neuro: non focal, awake  Skin: no rash, no erythema  Lines: no phlebitis    FH: Non-contributory  Social Hx: Non-contributory    TESTS & MEASUREMENTS:                        9.7    9.02  )-----------( 310      ( 04 Feb 2022 09:54 )             29.6     02-04    138  |  100  |  9<L>  ----------------------------<  92  3.8   |  25  |  0.5<L>    Ca    8.5      04 Feb 2022 09:54  Phos  2.9     02-04  Mg     2.0     02-04      eGFR if Non African American: 121 mL/min/1.73M2 (02-04-22 @ 09:54)  eGFR if : 140 mL/min/1.73M2 (02-04-22 @ 09:54)          Culture - Blood (collected 01-28-22 @ 13:30)  Source: .Blood Blood  Final Report (02-02-22 @ 23:00):    No Growth Final    Culture - Blood (collected 01-28-22 @ 13:30)  Source: .Blood Blood  Final Report (02-02-22 @ 23:00):    No Growth Final            INFECTIOUS DISEASES TESTING  COVID-19 PCR: NotDetec (02-03-22 @ 14:40)  Procalcitonin, Serum: 0.16 (01-30-22 @ 08:14)  COVID-19 PCR: NotDetec (01-28-22 @ 11:35)  COVID-19 PCR: NotDetec (08-30-21 @ 11:10)      INFLAMMATORY MARKERS      RADIOLOGY & ADDITIONAL TESTS:  I have personally reviewed the last available Chest xray  CXR      CT      CARDIOLOGY TESTING  12 Lead ECG:   Ventricular Rate 88 BPM    Atrial Rate 88 BPM    P-R Interval 192 ms    QRS Duration 96 ms    Q-T Interval 390 ms    QTC Calculation(Bazett) 471 ms    P Axis 96 degrees    R Axis 78 degrees    T Axis 71 degrees    Diagnosis Line Normal sinus rhythm  Nonspecific T wave abnormality  Abnormal ECG    Confirmed by KEY CA MD ([id number]) on 1/28/2022 1:59:38 PM (01-28-22 @ 12:32)  12 Lead ECG:   Ventricular Rate 83 BPM    Atrial Rate 83 BPM    P-R Interval 178 ms    QRS Duration 100 ms    Q-T Interval 358 ms    QTC Calculation(Bazett) 420 ms    P Axis 84 degrees    R Axis 75 degrees    T Axis 115 degrees    Diagnosis Line Normal sinus rhythm with sinus arrhythmia  T wave abnormality, consider inferolateral ischemia      Confirmed by KEY CA MD ([id number]) on 1/28/2022 1:59:32 PM (01-28-22 @ 11:15)      MEDICATIONS  aspirin enteric coated 81 Oral daily  baclofen 10 Oral daily  carBAMazepine Suspension 200 Oral two times a day  cefTRIAXone   IVPB 2000 IV Intermittent every 24 hours  chlorhexidine 2% Cloths 1 Topical once  chlorhexidine 4% Liquid 1 Topical <User Schedule>  dextrose 5%. 1000 IV Continuous <Continuous>  enoxaparin Injectable 40 SubCutaneous daily  lactulose Syrup 10 Oral two times a day  metroNIDAZOLE    Tablet 500 Oral every 8 hours  mineral oil 30 Oral daily  polyethylene glycol 3350 17 Oral two times a day  senna 2 Oral at bedtime  simvastatin 10 Oral at bedtime      WEIGHT  Weight (kg): 56.2 (01-28-22 @ 18:22)  Creatinine, Serum: 0.5 mg/dL (02-04-22 @ 09:54)      ANTIBIOTICS:  cefTRIAXone   IVPB 2000 milliGRAM(s) IV Intermittent every 24 hours  metroNIDAZOLE    Tablet 500 milliGRAM(s) Oral every 8 hours      All available historical records have been reviewed

## 2022-02-04 NOTE — PROGRESS NOTE ADULT - ATTENDING COMMENTS
I edited the note
Patient is a 56M with PMH of CVA x2 current non verbal, wheelchair bound, DMII, HTN and seizures was admitted for LOC and pneumonia.  Consult for fecal impaction    Patient seen and examined.  Patient appears comfortable, tolerating a diet and having BM per nursing staff.    Abdomen - soft, non distended, non tender    Vitals labs and images reviewed    CTAP markedly distended colon/rectum with fecal impaction and RLL ground glass opacities.     A/P:   - Patient with chronically distended rectosigmoid colon.  - Aggressive bowel regimen  - patient continues to have bowel function  - colostomy creation discussed with patient's sister who is his caregiver and she would like to defer at this time as he is having bowel function.  - We will observe the patient through the weekend and re-evaluate with repeat imaging next week.
Patient is a 56M with PMH of CVA x2 current non verbal, wheelchair bound, DMII, HTN and seizures was admitted for LOC and pneumonia.  Consult for fecal impaction    Patient seen and examined.  Patient appears comfortable, tolerating a diet and having BM per nursing staff.    Abdomen - soft, non distended, non tender    Vitals labs and images reviewed    CTAP markedly distended colon/rectum with fecal impaction and RLL ground glass opacities.     A/P:   - Patient with chronically distended rectosigmoid colon.  - Aggressive bowel regimen  - patient manually disimpacted and continues to have multiple loose BM  - will consider colostomy creation if patient does not have consistent bowel function  - tentatively scheduled for 2/4
Patient is a 56M with PMH of CVA x2 current non verbal, wheelchair bound, DMII, HTN and seizures was admitted for LOC and pneumonia.  Consult for fecal impaction    Patient seen and examined.  Patient appears comfortable, tolerating a diet and having BM per nursing staff.    Abdomen - soft, non distended, non tender    Vitals labs and images reviewed    CTAP markedly distended colon/rectum with fecal impaction and RLL ground glass opacities.     A/P:   - Patient with chronically distended rectosigmoid colon.  - Aggressive bowel regimen  - patient manually disimpacted and had multiple loose BM  - will consider colostomy creation if patient does not have consistent bowel function

## 2022-02-04 NOTE — PROGRESS NOTE ADULT - PROBLEM SELECTOR PROBLEM 1
Loss of consciousness of moderate duration

## 2022-02-04 NOTE — PROGRESS NOTE ADULT - SUBJECTIVE AND OBJECTIVE BOX
Handoff: Pt admitted for lethargy in the setting of RLL PNA and fecal impaction. S/p abx for pna and now being evaluated for possible colectomy with colostomy by surgery. GI declined endoscopic decompression due to futility. Sister consented today. Pt on multiple laxatives and colon prep, but atonic colon is permanent and 2/2 to previous CVAs. Would need a sustainable long term plan. Final dispo is to home with sister.       Patient was evaluated and examined by bedside, awake and alert, shaking his head no when asked about pain or difficulty breathing. Had 3BMs during the day with the start of colonic prep. Stools are volumous and liquid. Will repeat CT abd tomorrow. Spoke to sister, Nicole, at length to discuss risks and benefits of colostomy and she would like the procedure to be done. Conveyed info to surgery team. Awaiting on final plan.      REVIEW OF SYSTEMS:  please see pertinent positives mentioned above, all other 12 ROS negative    Vital Signs Last 24 Hrs  T(C): 36.5 (31 Jan 2022 13:10), Max: 37 (30 Jan 2022 21:53)  T(F): 97.7 (31 Jan 2022 13:10), Max: 98.6 (30 Jan 2022 21:53)  HR: 71 (31 Jan 2022 13:10) (71 - 79)  BP: 147/71 (31 Jan 2022 13:10) (125/70 - 161/79)  BP(mean): --  RR: 16 (31 Jan 2022 13:10) (16 - 20)  SpO2: 98% (30 Jan 2022 19:36) (98% - 98%)    POCT Blood Glucose.: 105 mg/dL (28 Jan 2022 11:08)      PHYSICAL EXAM:  General: NAD, awake, minimally verbal, contracted, patient is laying comfortably in bed  HEENT: AT, NC, Supple, NO JVD, NO CB  Lungs: CTA B/L, no wheezing, no rhonchi  CVS: normal S1, S2, RRR, NO M/G/R  Abdomen: soft, bowel sounds present, non-tender, non-distended  Extremities: no edema, no clubbing, no cyanosis, positive peripheral pulses b/l  Neuro: no acute focal neurological deficits  Skin: no rush, no ecchymosis      LAB  CBC  Date: 01-28-22 @ 11:20  Mean cell Nltdzwyyom19.5  Mean cell Hemoglobin Conc33.1  Mean cell Volum 89.2  Platelet count-Automate 207  RBC Count 3.52  Red Cell Distrib Width16.1  WBC Count29.72  % Albumin, Urine--  Hematocrit 31.4  Hemoglobin 10.4    BMP  01-28-22 @ 11:20  Blood Gas Arterial-Calcium,Ionized--  Blood Urea Nitrogen, Serum 29 mg/dL<H> [10 - 20]  Carbon Dioxide, Serum24 mmol/L [17 - 32]  Chloride, Serum94 mmol/L<L> [98 - 110]  Creatinie, Serum0.7 mg/dL [0.7 - 1.5]  Glucose, Serum91 mg/dL [70 - 99]  Potassium, Serum3.2 mmol/L<L> [3.5 - 5.0]  Sodium, Serum 133 mmol/L<L> [135 - 146]        PT/INR - ( 28 Jan 2022 11:20 )   PT: 14.00 sec;   INR: 1.22 ratio         PTT - ( 28 Jan 2022 11:20 )  PTT:32.2 sec      Microbiology:      RADIOLOGY & ADDITIONAL TESTS:  CT A/P 1/28:  IMPRESSION:  Markedly distended colon and rectum with rectal fecal impaction. Rectum measures 15.6 cm in transverse diameter. No pneumoperitoneum.  Right lower lobe groundglass opacities with tree-in-bud nodularity. Findings likely infectious/inflammatory in etiology.    CT head 1/28:  Findings:  There is stable mild ventricular prominence superimposed upon a mild degree of parenchymal volume loss.  There is are stable chronic lacunar infarcts within the left corona radiata and internal capsule.  There is no acute intracranial hemorrhage, extra-axial fluid collection or midline shift. Gray-white matter differentiation is maintained.  The visualized paranasal sinuses are clear. There is sclerosis and/or under pneumatization of the mastoid complexes. Soft tissue opacity within the left external auditory canal likely reflecting cerumen.    CXR 1/28:  The overall heart size is within normal limits.    Right basilar opacity.    Elevation of the right hemidiaphragm and dilatation of the right hepatic flexure.      Medications:  aspirin enteric coated 81 milliGRAM(s) Oral daily  baclofen 10 milliGRAM(s) Oral daily  carBAMazepine Suspension 200 milliGRAM(s) Oral two times a day  chlorhexidine 4% Liquid 1 Application(s) Topical <User Schedule>  enoxaparin Injectable 40 milliGRAM(s) SubCutaneous daily  lactated ringers Bolus 1000 milliLiter(s) IV Bolus once  lactated ringers. 1000 milliLiter(s) IV Continuous <Continuous>  piperacillin/tazobactam IVPB.. 3.375 Gram(s) IV Intermittent every 8 hours  risperiDONE   Tablet 1 milliGRAM(s) Oral daily PRN  simvastatin 10 milliGRAM(s) Oral at bedtime        Assessment and Plan:

## 2022-02-05 NOTE — PROGRESS NOTE ADULT - ASSESSMENT
Impression:  57 y/o male with resolving colonic distention and fecal impaction with multiple formed BM's per nurse.         Plan:  - Surgical intervention cancelled yesterday by Dr. Garcia due to having multiple BM's and family does not want a Colostomy placed at his time.   - Continue bowel regime.  - Continue present medical mgt per medical team.  - Will d/w covering surgery attending today.

## 2022-02-05 NOTE — PROGRESS NOTE ADULT - ASSESSMENT
56M w/ PMH CVA x2 (w/ residual left sided deficits; wheelchair bound), DMT2, HTN and seizures being admitted for LOC and sepsis w/o clear origin.    In ED, pt is afebrile w/ WBC: 29.7, BP: 105/65, HR: 82, Na: 133, K: 3.2, BUN/cr: 29/0.7, Trop: 0.03, Lactate: 3.3, CXR: right basilar opacity, CTH (-), CT abdomen: markedly distended colon/rectum with fecal impaction and RLL ground glass opacities. Pt was given IV cefepime 2g, IV clindamycin 600mg, IV levaquin 750mg, 1L LR, IV keppra 1g, IV zofran 4mg and was briefly on a levophed gtt. Patient admitted for sepsis and fecal impaction.    # Loss of consciousness of moderate duration  # Seizure vs CVA vs hypotension   # Hx CVA in 1980s w/ residual left sided deficits - continue baclofen and risperidone PRN   - abn EEG  - Neuro consult; f/u recs   - MR head w/o contrast unremarkable  - Continue carbamazepine  - q8h neuro checks   - Continue ASA 81 and statin.  {[id number]}  # RLL pneumonia  # Severe sepsis   - Leukocytosis improving  -  blood cultures NGTD  - Monitor pulse ox  - deescalate IV abx - zosyn (1/29-1/31); IV ceftriaxone and PO flagyl (1/31-)  - ID recs noted  {[id number]}  # Pseudoobstruction with fecal impaction 2/2 to atonic colon  - GI and Surg recs appreciated  - Tap water enema and miralax tid  - golytely  - colorectal surg deferring colectomy in favor of endoscopic decompression  - repeat ABD XR shows fecal impaction  - Monitor BM  - could be from carbamazepine  {[id number]}  {[id number]}  {[id number]}#CAD (coronary artery disease)  - Continue ASA and statin.    #Hypertension  - Low BP in ED, s/p short levophed gtt  - Monitor BP  - {[id number]}restarted lisinopril    #Seizure   - Continue carbamazepine   - Seizure precautions.    #FENP  - VTE ppx: Lovenox  - GI ppx: protonix   - FULL CODE

## 2022-02-05 NOTE — PROGRESS NOTE ADULT - SUBJECTIVE AND OBJECTIVE BOX
56M w/ PMH CVA x2 (w/ residual left sided deficits; wheelchair bound), DMT2, HTN and seizures being admitted for LOC and sepsis w/o clear origin.    In ED, pt is afebrile w/ WBC: 29.7, BP: 105/65, HR: 82, Na: 133, K: 3.2, BUN/cr: 29/0.7, Trop: 0.03, Lactate: 3.3, CXR: right basilar opacity, CTH (-), CT abdomen: markedly distended colon/rectum with fecal impaction and RLL ground glass opacities. Pt was given IV cefepime 2g, IV clindamycin 600mg, IV levaquin 750mg, 1L LR, IV keppra 1g, IV zofran 4mg and was briefly on a levophed gtt. Patient admitted for sepsis and fecal impaction.    Today:  Seen at bedside, answers "yes" and "no" to questions though does not speak coherently.          REVIEW OF SYSTEMS:  Not a reliable historian      MEDICATIONS  (STANDING):  aspirin enteric coated 81 milliGRAM(s) Oral daily  baclofen 10 milliGRAM(s) Oral daily  carBAMazepine Suspension 200 milliGRAM(s) Oral two times a day  chlorhexidine 2% Cloths 1 Application(s) Topical once  chlorhexidine 4% Liquid 1 Application(s) Topical <User Schedule>  enoxaparin Injectable 40 milliGRAM(s) SubCutaneous daily  lactulose Syrup 10 Gram(s) Oral two times a day  lisinopril 10 milliGRAM(s) Oral daily  metroNIDAZOLE    Tablet 500 milliGRAM(s) Oral every 8 hours  mineral oil 30 milliLiter(s) Oral daily  polyethylene glycol 3350 17 Gram(s) Oral two times a day  senna 2 Tablet(s) Oral at bedtime  simvastatin 10 milliGRAM(s) Oral at bedtime    MEDICATIONS  (PRN):  LORazepam   Injectable 1 milliGRAM(s) IV Push once PRN To be given prior to MRI  risperiDONE   Tablet 1 milliGRAM(s) Oral daily PRN agitation      Allergies  No Known Allergies          Vital Signs Last 24 Hrs  T(C): 36.6 (05 Feb 2022 05:16), Max: 36.6 (04 Feb 2022 20:30)  T(F): 97.8 (05 Feb 2022 05:16), Max: 97.8 (04 Feb 2022 20:30)  HR: 84 (05 Feb 2022 05:16) (84 - 97)  BP: 128/86 (05 Feb 2022 05:16) (125/78 - 128/86)  RR: 18 (05 Feb 2022 05:16) (18 - 18)      PHYSICAL EXAM:  General: NAD, awake, minimally verbal, contracted, patient is laying comfortably in bed  HEENT: AT, NC, Supple, NO JVD, NO CB  Lungs: CTA B/L, no wheezing, no rhonchi  CVS: normal S1, S2, RRR, NO M/G/R  Abdomen: soft, bowel sounds present, non-tender, non-distended  Extremities: no edema, no clubbing, no cyanosis, positive peripheral pulses b/l  Neuro: no acute focal neurological deficits  Skin: no rush, no ecchymosis      LABS:                        10.0   10.57 )-----------( 324      ( 05 Feb 2022 08:05 )             29.9     02-05    138  |  102  |  8<L>  ----------------------------<  80  3.7   |  28  |  <0.5<L>    Ca    8.5      05 Feb 2022 08:05  Phos  2.9     02-04  Mg     2.0     02-04      PT/INR - ( 04 Feb 2022 09:54 )   PT: 14.90 sec;   INR: 1.30 ratio         PTT - ( 04 Feb 2022 09:54 )  PTT:39.1 sec

## 2022-02-05 NOTE — PROGRESS NOTE ADULT - SUBJECTIVE AND OBJECTIVE BOX
.  Patient seen & examined.  No acute events noted overnight.  Patient's RN reports patient had multiple BM's overnight and this am.    Patient denies complaints of pain or N/V when asked.          I&O's Detail    04 Feb 2022 07:01  -  05 Feb 2022 07:00  --------------------------------------------------------  IN:  Total IN: 0 mL    OUT:    Incontinent per Condom Catheter (mL): 800 mL  Total OUT: 800 mL    Total NET: -800 mL          MEDICATIONS  (STANDING):  aspirin enteric coated 81 milliGRAM(s) Oral daily  baclofen 10 milliGRAM(s) Oral daily  carBAMazepine Suspension 200 milliGRAM(s) Oral two times a day  chlorhexidine 2% Cloths 1 Application(s) Topical once  chlorhexidine 4% Liquid 1 Application(s) Topical <User Schedule>  enoxaparin Injectable 40 milliGRAM(s) SubCutaneous daily  lactulose Syrup 10 Gram(s) Oral two times a day  lisinopril 10 milliGRAM(s) Oral daily  metroNIDAZOLE    Tablet 500 milliGRAM(s) Oral every 8 hours  mineral oil 30 milliLiter(s) Oral daily  polyethylene glycol 3350 17 Gram(s) Oral two times a day  senna 2 Tablet(s) Oral at bedtime  simvastatin 10 milliGRAM(s) Oral at bedtime    MEDICATIONS  (PRN):  risperiDONE   Tablet 1 milliGRAM(s) Oral daily PRN agitation          Vital Signs Last 24 Hrs  T(C): 36.6 (05 Feb 2022 05:16), Max: 36.7 (04 Feb 2022 14:02)  T(F): 97.8 (05 Feb 2022 05:16), Max: 98 (04 Feb 2022 14:02)  HR: 84 (05 Feb 2022 05:16) (79 - 97)  BP: 128/86 (05 Feb 2022 05:16) (125/78 - 164/97)  RR: 18 (05 Feb 2022 05:16) (18 - 18)          Physical Exam:  Abdomen:  + Bowel sounds,  mild distention.  Non-tender,  no rebound/guarding or peritoneal signs.              LABS:                        10.0   10.57 )-----------( 324      ( 05 Feb 2022 08:05 )             29.9     02-05    138  |  102  |  8<L>  ----------------------------<  80  3.7   |  28  |  <0.5<L>    Ca    8.5      05 Feb 2022 08:05  Phos  2.9     02-04  Mg     2.0     02-04        PT/INR - ( 04 Feb 2022 09:54 )   PT: 14.90 sec;   INR: 1.30 ratio    PTT - ( 04 Feb 2022 09:54 )  PTT:39.1 sec      . .  Patient seen & examined.  No acute events noted overnight.  Patient's RN reports patient had multiple BM's overnight and this am.    Patient denies complaints of pain or N/V when asked.          I&O's Detail    04 Feb 2022 07:01  -  05 Feb 2022 07:00  --------------------------------------------------------  IN:  Total IN: 0 mL    OUT:    Incontinent per Condom Catheter (mL): 800 mL  Total OUT: 800 mL    Total NET: -800 mL          MEDICATIONS  (STANDING):  aspirin enteric coated 81 milliGRAM(s) Oral daily  baclofen 10 milliGRAM(s) Oral daily  carBAMazepine Suspension 200 milliGRAM(s) Oral two times a day  chlorhexidine 2% Cloths 1 Application(s) Topical once  chlorhexidine 4% Liquid 1 Application(s) Topical <User Schedule>  enoxaparin Injectable 40 milliGRAM(s) SubCutaneous daily  lactulose Syrup 10 Gram(s) Oral two times a day  lisinopril 10 milliGRAM(s) Oral daily  metroNIDAZOLE    Tablet 500 milliGRAM(s) Oral every 8 hours  mineral oil 30 milliLiter(s) Oral daily  polyethylene glycol 3350 17 Gram(s) Oral two times a day  senna 2 Tablet(s) Oral at bedtime  simvastatin 10 milliGRAM(s) Oral at bedtime    MEDICATIONS  (PRN):  risperiDONE   Tablet 1 milliGRAM(s) Oral daily PRN agitation          Vital Signs Last 24 Hrs  T(C): 36.6 (05 Feb 2022 05:16), Max: 36.7 (04 Feb 2022 14:02)  T(F): 97.8 (05 Feb 2022 05:16), Max: 98 (04 Feb 2022 14:02)  HR: 84 (05 Feb 2022 05:16) (79 - 97)  BP: 128/86 (05 Feb 2022 05:16) (125/78 - 164/97)  RR: 18 (05 Feb 2022 05:16) (18 - 18)          Physical Exam:  Abdomen:  + Bowel sounds,  Soft, No distention.  Non-tender,  No rebound/guarding or peritoneal signs.              LABS:                        10.0   10.57 )-----------( 324      ( 05 Feb 2022 08:05 )             29.9     02-05    138  |  102  |  8<L>  ----------------------------<  80  3.7   |  28  |  <0.5<L>    Ca    8.5      05 Feb 2022 08:05  Phos  2.9     02-04  Mg     2.0     02-04        PT/INR - ( 04 Feb 2022 09:54 )   PT: 14.90 sec;   INR: 1.30 ratio    PTT - ( 04 Feb 2022 09:54 )  PTT:39.1 sec      .

## 2022-02-06 NOTE — PROGRESS NOTE ADULT - ASSESSMENT
SILVANO CALIXTO 56y Male  MRN#: [id number]   CODE STATUS:________      SUBJECTIVE  Patient is a 56y old Male who presents with a chief complaint of Unresponsiveness, Sepsis (04 Feb 2022 17:49)  Currently admitted to medicine with the primary diagnosis of Sepsis due to pneumonia  Today is hospital day 9d, and this morning he is _________ and reports ________ overnight events.     OBJECTIVE  PAST MEDICAL & SURGICAL HISTORY  CVA (cerebral vascular accident)  1980&#x27;s x2   Residual left sided weakness    CAD (coronary artery disease)    Retinal detachment    Hypertension    High cholesterol    Diabetes    Anxiety    Seizure    History of corneal transplant  right   4/13/17      ALLERGIES:  No Known Allergies    MEDICATIONS:  STANDING MEDICATIONS  aspirin enteric coated 81 milliGRAM(s) Oral daily  baclofen 10 milliGRAM(s) Oral daily  carBAMazepine Suspension 200 milliGRAM(s) Oral two times a day  chlorhexidine 2% Cloths 1 Application(s) Topical once  chlorhexidine 4% Liquid 1 Application(s) Topical <User Schedule>  enoxaparin Injectable 40 milliGRAM(s) SubCutaneous daily  lactulose Syrup 10 Gram(s) Oral two times a day  lisinopril 10 milliGRAM(s) Oral daily  metroNIDAZOLE    Tablet 500 milliGRAM(s) Oral every 8 hours  mineral oil 30 milliLiter(s) Oral daily  polyethylene glycol 3350 17 Gram(s) Oral two times a day  senna 2 Tablet(s) Oral at bedtime  simvastatin 10 milliGRAM(s) Oral at bedtime    PRN MEDICATIONS  risperiDONE   Tablet 1 milliGRAM(s) Oral daily PRN      VITAL SIGNS: Last 24 Hours  T(C): 36.9 (06 Feb 2022 05:09), Max: 36.9 (06 Feb 2022 05:09)  T(F): 98.5 (06 Feb 2022 05:09), Max: 98.5 (06 Feb 2022 05:09)  HR: 94 (06 Feb 2022 05:09) (81 - 94)  BP: 127/76 (06 Feb 2022 05:09) (127/76 - 146/79)  BP(mean): --  RR: 16 (06 Feb 2022 05:09) (16 - 18)  SpO2: --    LABS:                        9.2    10.23 )-----------( 337      ( 06 Feb 2022 07:31 )             28.4     02-06    139  |  102  |  15  ----------------------------<  89  3.4<L>   |  26  |  <0.5<L>    Ca    8.6      06 Feb 2022 07:31  Phos  2.8     02-06  Mg     2.1     02-06    TPro  6.0  /  Alb  3.2<L>  /  TBili  0.2  /  DBili  x   /  AST  10  /  ALT  17  /  AlkPhos  121<H>  02-06    PT/INR - ( 04 Feb 2022 09:54 )   PT: 14.90 sec;   INR: 1.30 ratio         PTT - ( 04 Feb 2022 09:54 )  PTT:39.1 sec              RADIOLOGY:  < from: EEG (01.29.22 @ 15:00) >  Impression:  -  Abnormal due to the presence of: generalized slowing as above  -    < end of copied text >      PHYSICAL EXAM:    General:  awake, minimally verbal, laying comfortably in bed  HEENT: NO JVD,  Lungs: CTA B/L,   CVS: normal S1, S2,   Abdomen: soft, non-tender, non-distended  Extremities: no edema, no clubbing, no cyanosis,  Neuro: AO*1  Skin: no rush, no ecchymosis    ASSESSMENT & PLAN    56M w/ PMH CVA x2 (w/ residual left sided deficits; wheelchair bound), DMT2, HTN and seizures being admitted for LOC and sepsis w/o clear origin.    In ED, pt is afebrile w/ WBC: 29.7, BP: 105/65, HR: 82, Na: 133, K: 3.2, BUN/cr: 29/0.7, Trop: 0.03, Lactate: 3.3, CXR: right basilar opacity, CTH (-), CT abdomen: markedly distended colon/rectum with fecal impaction and RLL ground glass opacities. Patient admitted for sepsis and fecal impaction.    # Loss of consciousness of moderate duration likely from hypotension and dehydration, resolved   # Hx CVA in 1980s w/ residual left sided deficits - continue baclofen and risperidone PRN   -  EEG generalized slowing no seizures   - stroke ruled out. MR head w/o contrast unremarkable  - Continue carbamazepine  - Continue ASA 81 and statin.    # RLL pneumonia  # Severe sepsis on admission   - Leukocytosis improving  -  blood cultures NGTD  - Monitor pulse ox, stable on room air   - s/p 7 days of abx   - ID recs noted   - PO flagyl (1/31- for possible bowel ischemia, d/c if remains stable with signs of ischemia     # Pseudoobstruction with fecal impaction 2/2 to atonic colon, resolving  - GI and Surg recs appreciated family does not want a Colostomy placed at his time.   - multiple BMs since yesterday. endoscopy held   - Tap water enema and Miralax tid  - Golytely  - repeat ABD XR shows fecal impaction  - Monitor BM  - could be from carbamazepine  - monitor electrolytes and correct as needed     #CAD (coronary artery disease)  - Continue ASA and statin.    #Hypertension, controlled   - Low BP in ED, s/p short levophed gtt  - Monitor BP  - restarted lisinopril    #Seizure   - Continue carbamazepine   - Seizure precautions.      - VTE ppx: Lovenox  - GI ppx: protonix   - FULL CODE  #Progress Note Handoff  Pending (specify): f/u BMs, PT   Disposition: Home___/SNF___/Other________/Unknown at this time________       SILVANO CALIXTO 56y Male  MRN#: [id number]   CODE STATUS:full code       SUBJECTIVE  Patient is a 56y old Male who presents with a chief complaint of Unresponsiveness, Sepsis (04 Feb 2022 17:49)  Currently admitted to medicine with the primary diagnosis of Sepsis due to pneumonia  Today is hospital day 9d, and this morning he is resting in bed and RN reports multiple BMs since yesterday.     OBJECTIVE  PAST MEDICAL & SURGICAL HISTORY  CVA (cerebral vascular accident)  1980&#x27;s x2   Residual left sided weakness    CAD (coronary artery disease)    Retinal detachment    Hypertension    High cholesterol    Diabetes    Anxiety    Seizure    History of corneal transplant  right   4/13/17      ALLERGIES:  No Known Allergies    MEDICATIONS:  STANDING MEDICATIONS  aspirin enteric coated 81 milliGRAM(s) Oral daily  baclofen 10 milliGRAM(s) Oral daily  carBAMazepine Suspension 200 milliGRAM(s) Oral two times a day  chlorhexidine 2% Cloths 1 Application(s) Topical once  chlorhexidine 4% Liquid 1 Application(s) Topical <User Schedule>  enoxaparin Injectable 40 milliGRAM(s) SubCutaneous daily  lactulose Syrup 10 Gram(s) Oral two times a day  lisinopril 10 milliGRAM(s) Oral daily  metroNIDAZOLE    Tablet 500 milliGRAM(s) Oral every 8 hours  mineral oil 30 milliLiter(s) Oral daily  polyethylene glycol 3350 17 Gram(s) Oral two times a day  senna 2 Tablet(s) Oral at bedtime  simvastatin 10 milliGRAM(s) Oral at bedtime    PRN MEDICATIONS  risperiDONE   Tablet 1 milliGRAM(s) Oral daily PRN      VITAL SIGNS: Last 24 Hours  T(C): 36.9 (06 Feb 2022 05:09), Max: 36.9 (06 Feb 2022 05:09)  T(F): 98.5 (06 Feb 2022 05:09), Max: 98.5 (06 Feb 2022 05:09)  HR: 94 (06 Feb 2022 05:09) (81 - 94)  BP: 127/76 (06 Feb 2022 05:09) (127/76 - 146/79)  BP(mean): --  RR: 16 (06 Feb 2022 05:09) (16 - 18)  SpO2: --    LABS:                        9.2    10.23 )-----------( 337      ( 06 Feb 2022 07:31 )             28.4     02-06    139  |  102  |  15  ----------------------------<  89  3.4<L>   |  26  |  <0.5<L>    Ca    8.6      06 Feb 2022 07:31  Phos  2.8     02-06  Mg     2.1     02-06    TPro  6.0  /  Alb  3.2<L>  /  TBili  0.2  /  DBili  x   /  AST  10  /  ALT  17  /  AlkPhos  121<H>  02-06    PT/INR - ( 04 Feb 2022 09:54 )   PT: 14.90 sec;   INR: 1.30 ratio         PTT - ( 04 Feb 2022 09:54 )  PTT:39.1 sec              RADIOLOGY:  < from: EEG (01.29.22 @ 15:00) >  Impression:  -  Abnormal due to the presence of: generalized slowing as above  -    < end of copied text >      PHYSICAL EXAM:    General:  awake, minimally verbal, laying comfortably in bed  HEENT: NO JVD,  Lungs: CTA B/L,   CVS: normal S1, S2,   Abdomen: soft, non-tender, non-distended  Extremities: no edema, no clubbing, no cyanosis,  Neuro: AO*1  Skin: no rush, no ecchymosis    ASSESSMENT & PLAN    56M w/ PMH CVA x2 (w/ residual left sided deficits; wheelchair bound), DMT2, HTN and seizures being admitted for LOC and sepsis w/o clear origin.    In ED, pt is afebrile w/ WBC: 29.7, BP: 105/65, HR: 82, Na: 133, K: 3.2, BUN/cr: 29/0.7, Trop: 0.03, Lactate: 3.3, CXR: right basilar opacity, CTH (-), CT abdomen: markedly distended colon/rectum with fecal impaction and RLL ground glass opacities. Patient admitted for sepsis and fecal impaction.    # Loss of consciousness of moderate duration likely from hypotension and dehydration, resolved   # Hx CVA in 1980s w/ residual left sided deficits - continue baclofen and risperidone PRN   -  EEG generalized slowing no seizures   - stroke ruled out. MR head w/o contrast unremarkable  - Continue carbamazepine  - Continue ASA 81 and statin.    # RLL pneumonia  # Severe sepsis on admission   - Leukocytosis improving  -  blood cultures NGTD  - Monitor pulse ox, stable on room air   - s/p 7 days of abx   - ID recs noted   - PO flagyl (1/31- for possible bowel ischemia, d/c if remains stable with signs of ischemia     # Pseudoobstruction with fecal impaction 2/2 to atonic colon, resolving  - GI and Surg recs appreciated family does not want a Colostomy placed at his time.   - multiple BMs since yesterday. endoscopy held   - Tap water enema and Miralax tid  - Golytely  - repeat ABD XR shows fecal impaction  - Monitor BMs  - repeat KUB  - could be from carbamazepine  - monitor electrolytes and correct as needed     #CAD (coronary artery disease)  - Continue ASA and statin.    #Hypertension, controlled   - Low BP in ED, s/p short levophed gtt  - Monitor BP  - restarted lisinopril    #Seizure   - Continue carbamazepine   - Seizure precautions.      - VTE ppx: Lovenox  - GI ppx: protonix   - FULL CODE    #Progress Note Handoff  Pending (specify): f/u BMs, PT, repeat KUB,   Disposition: PT noted; no SNF . discharge planning        SILVANO CALIXTO 56y Male  MRN#: [id number]   CODE STATUS:full code       SUBJECTIVE  Patient is a 56y old Male who presents with a chief complaint of Unresponsiveness, Sepsis (04 Feb 2022 17:49)  Currently admitted to medicine with the primary diagnosis of Sepsis due to pneumonia  Today is hospital day 9d, and this morning he is resting in bed and RN reports multiple BMs since yesterday.     OBJECTIVE  PAST MEDICAL & SURGICAL HISTORY  CVA (cerebral vascular accident)  1980&#x27;s x2   Residual left sided weakness    CAD (coronary artery disease)    Retinal detachment    Hypertension    High cholesterol    Diabetes    Anxiety    Seizure    History of corneal transplant  right   4/13/17      ALLERGIES:  No Known Allergies    MEDICATIONS:  STANDING MEDICATIONS  aspirin enteric coated 81 milliGRAM(s) Oral daily  baclofen 10 milliGRAM(s) Oral daily  carBAMazepine Suspension 200 milliGRAM(s) Oral two times a day  chlorhexidine 2% Cloths 1 Application(s) Topical once  chlorhexidine 4% Liquid 1 Application(s) Topical <User Schedule>  enoxaparin Injectable 40 milliGRAM(s) SubCutaneous daily  lactulose Syrup 10 Gram(s) Oral two times a day  lisinopril 10 milliGRAM(s) Oral daily  metroNIDAZOLE    Tablet 500 milliGRAM(s) Oral every 8 hours  mineral oil 30 milliLiter(s) Oral daily  polyethylene glycol 3350 17 Gram(s) Oral two times a day  senna 2 Tablet(s) Oral at bedtime  simvastatin 10 milliGRAM(s) Oral at bedtime    PRN MEDICATIONS  risperiDONE   Tablet 1 milliGRAM(s) Oral daily PRN      VITAL SIGNS: Last 24 Hours  T(C): 36.9 (06 Feb 2022 05:09), Max: 36.9 (06 Feb 2022 05:09)  T(F): 98.5 (06 Feb 2022 05:09), Max: 98.5 (06 Feb 2022 05:09)  HR: 94 (06 Feb 2022 05:09) (81 - 94)  BP: 127/76 (06 Feb 2022 05:09) (127/76 - 146/79)  BP(mean): --  RR: 16 (06 Feb 2022 05:09) (16 - 18)  SpO2: --    LABS:                        9.2    10.23 )-----------( 337      ( 06 Feb 2022 07:31 )             28.4     02-06    139  |  102  |  15  ----------------------------<  89  3.4<L>   |  26  |  <0.5<L>    Ca    8.6      06 Feb 2022 07:31  Phos  2.8     02-06  Mg     2.1     02-06    TPro  6.0  /  Alb  3.2<L>  /  TBili  0.2  /  DBili  x   /  AST  10  /  ALT  17  /  AlkPhos  121<H>  02-06    PT/INR - ( 04 Feb 2022 09:54 )   PT: 14.90 sec;   INR: 1.30 ratio         PTT - ( 04 Feb 2022 09:54 )  PTT:39.1 sec              RADIOLOGY:  < from: EEG (01.29.22 @ 15:00) >  Impression:  -  Abnormal due to the presence of: generalized slowing as above  -    < end of copied text >      PHYSICAL EXAM:    General:  awake, minimally verbal, laying comfortably in bed  HEENT: NO JVD,  Lungs: CTA B/L,   CVS: normal S1, S2,   Abdomen: soft, non-tender, non-distended  Extremities: no edema, no clubbing, no cyanosis,  Neuro: AO*1  Skin: no rush, no ecchymosis    ASSESSMENT & PLAN    56M w/ PMH CVA x2 (w/ residual left sided deficits; wheelchair bound), DMT2, HTN and seizures being admitted for LOC and sepsis w/o clear origin.    In ED, pt is afebrile w/ WBC: 29.7, BP: 105/65, HR: 82, Na: 133, K: 3.2, BUN/cr: 29/0.7, Trop: 0.03, Lactate: 3.3, CXR: right basilar opacity, CTH (-), CT abdomen: markedly distended colon/rectum with fecal impaction and RLL ground glass opacities. Patient admitted for sepsis and fecal impaction.    # Loss of consciousness of moderate duration likely from hypotension and dehydration, resolved   # Hx CVA in 1980s w/ residual left sided deficits - continue baclofen and risperidone PRN   -  EEG generalized slowing no seizures   - stroke ruled out. MR head w/o contrast unremarkable  - Continue carbamazepine  - Continue ASA 81 and statin.    # RLL pneumonia  # Severe sepsis on admission   - Leukocytosis improving  -  blood cultures NGTD  - Monitor pulse ox, stable on room air   - s/p 7 days of abx   - ID recs noted   - PO flagyl (1/31- for possible bowel ischemia, d/c if remains stable with signs of ischemia     # Pseudoobstruction with fecal impaction 2/2 to atonic colon, resolving  - GI and Surg recs appreciated family does not want a Colostomy placed at his time.   - multiple BMs since yesterday. endoscopy held   - Tap water enema and Miralax tid  - Golytely  - repeat ABD XR shows fecal impaction  - Monitor BMs  - repeat KUB  - could be from carbamazepine  - monitor electrolytes and correct as needed     #CAD (coronary artery disease)  - Continue ASA and statin.    #Hypertension, controlled   - Low BP in ED, s/p short levophed gtt  - Monitor BP  - restarted lisinopril    #Seizure   - Continue carbamazepine   - Seizure precautions.      - VTE ppx: Lovenox  - GI ppx: protonix   - FULL CODE    #Progress Note Handoff  Pending (specify): f/u BMs, PT, repeat KUB in AM  Disposition: PT noted; no SNF . discharge planning        SILVANO CALIXTO 56y Male  MRN#: [id number]   CODE STATUS:full code       SUBJECTIVE  Patient is a 56y old Male who presents with a chief complaint of Unresponsiveness, Sepsis (04 Feb 2022 17:49)  Currently admitted to medicine with the primary diagnosis of Sepsis due to pneumonia  Today is hospital day 9d, and this morning he is resting in bed and RN reports multiple BMs since yesterday.     OBJECTIVE  PAST MEDICAL & SURGICAL HISTORY  CVA (cerebral vascular accident)  1980&#x27;s x2   Residual left sided weakness    CAD (coronary artery disease)    Retinal detachment    Hypertension    High cholesterol    Diabetes    Anxiety    Seizure    History of corneal transplant  right   4/13/17      ALLERGIES:  No Known Allergies    MEDICATIONS:  STANDING MEDICATIONS  aspirin enteric coated 81 milliGRAM(s) Oral daily  baclofen 10 milliGRAM(s) Oral daily  carBAMazepine Suspension 200 milliGRAM(s) Oral two times a day  chlorhexidine 2% Cloths 1 Application(s) Topical once  chlorhexidine 4% Liquid 1 Application(s) Topical <User Schedule>  enoxaparin Injectable 40 milliGRAM(s) SubCutaneous daily  lactulose Syrup 10 Gram(s) Oral two times a day  lisinopril 10 milliGRAM(s) Oral daily  metroNIDAZOLE    Tablet 500 milliGRAM(s) Oral every 8 hours  mineral oil 30 milliLiter(s) Oral daily  polyethylene glycol 3350 17 Gram(s) Oral two times a day  senna 2 Tablet(s) Oral at bedtime  simvastatin 10 milliGRAM(s) Oral at bedtime    PRN MEDICATIONS  risperiDONE   Tablet 1 milliGRAM(s) Oral daily PRN      VITAL SIGNS: Last 24 Hours  T(C): 36.9 (06 Feb 2022 05:09), Max: 36.9 (06 Feb 2022 05:09)  T(F): 98.5 (06 Feb 2022 05:09), Max: 98.5 (06 Feb 2022 05:09)  HR: 94 (06 Feb 2022 05:09) (81 - 94)  BP: 127/76 (06 Feb 2022 05:09) (127/76 - 146/79)  BP(mean): --  RR: 16 (06 Feb 2022 05:09) (16 - 18)  SpO2: --    LABS:                        9.2    10.23 )-----------( 337      ( 06 Feb 2022 07:31 )             28.4     02-06    139  |  102  |  15  ----------------------------<  89  3.4<L>   |  26  |  <0.5<L>    Ca    8.6      06 Feb 2022 07:31  Phos  2.8     02-06  Mg     2.1     02-06    TPro  6.0  /  Alb  3.2<L>  /  TBili  0.2  /  DBili  x   /  AST  10  /  ALT  17  /  AlkPhos  121<H>  02-06    PT/INR - ( 04 Feb 2022 09:54 )   PT: 14.90 sec;   INR: 1.30 ratio         PTT - ( 04 Feb 2022 09:54 )  PTT:39.1 sec              RADIOLOGY:  < from: EEG (01.29.22 @ 15:00) >  Impression:  -  Abnormal due to the presence of: generalized slowing as above  -    < end of copied text >      PHYSICAL EXAM:    General:  awake, minimally verbal, laying comfortably in bed  HEENT: NO JVD,  Lungs: CTA B/L,   CVS: normal S1, S2,   Abdomen: soft, non-tender, non-distended  Extremities: no edema, no clubbing, no cyanosis,  Neuro: AO*1  Skin: no rush, no ecchymosis    ASSESSMENT & PLAN    56M w/ PMH CVA x2 (w/ residual left sided deficits; wheelchair bound), DMT2, HTN and seizures being admitted for LOC and sepsis w/o clear origin.    In ED, pt is afebrile w/ WBC: 29.7, BP: 105/65, HR: 82, Na: 133, K: 3.2, BUN/cr: 29/0.7, Trop: 0.03, Lactate: 3.3, CXR: right basilar opacity, CTH (-), CT abdomen: markedly distended colon/rectum with fecal impaction and RLL ground glass opacities. Patient admitted for sepsis and fecal impaction.    # Loss of consciousness of moderate duration likely from hypotension and dehydration, resolved   # Hx CVA in 1980s w/ residual left sided deficits - continue baclofen and risperidone PRN   -  EEG generalized slowing no seizures   - stroke ruled out. MR head w/o contrast unremarkable  - Continue carbamazepine  - Continue ASA 81 and statin.    # RLL pneumonia  # Severe sepsis on admission   - Leukocytosis improving  -  blood cultures NGTD  - Monitor pulse ox, stable on room air   - s/p 7 days of abx   - ID recs noted   - PO flagyl (1/31- for possible bowel ischemia, d/c if remains stable with signs of ischemia     # Pseudoobstruction with fecal impaction 2/2 to atonic colon, resolving  - GI and Surg recs appreciated family does not want a Colostomy placed at his time.   - multiple BMs since yesterday. endoscopy held   - Tap water enema and Miralax tid  - Golytely  - repeat ABD XR shows fecal impaction  - Monitor BMs  - serial KUB  - could be from carbamazepine and lack of ambulation  - monitor electrolytes and correct as needed     #CAD (coronary artery disease)  - Continue ASA and statin.    #Hypertension, controlled   - Low BP in ED, s/p short levophed gtt  - Monitor BP  - restarted lisinopril    #Seizure   - Continue carbamazepine   - Seizure precautions.      - VTE ppx: Lovenox  - GI ppx: protonix   - FULL CODE    #Progress Note Handoff  Pending (specify): f/u BMs, PT, repeat KUB in AM  plan discussed with the sister Nicole   Disposition: PT noted; no SNF . discharge planning

## 2022-02-07 NOTE — PROGRESS NOTE ADULT - ASSESSMENT
ASSESSMENT & PLAN    56M w/ PMH CVA x2 (w/ residual left sided deficits; wheelchair bound), DMT2, HTN and seizures being admitted for LOC and sepsis w/o clear origin.    In ED, pt is afebrile w/ WBC: 29.7, BP: 105/65, HR: 82, Na: 133, K: 3.2, BUN/cr: 29/0.7, Trop: 0.03, Lactate: 3.3, CXR: right basilar opacity, CTH (-), CT abdomen: markedly distended colon/rectum with fecal impaction and RLL ground glass opacities. Patient admitted for sepsis and fecal impaction.    # Loss of consciousness of moderate duration likely from hypotension and dehydration, resolved   # Hx CVA in 1980s w/ residual left sided deficits - continue baclofen and risperidone PRN   -  EEG generalized slowing no seizures   - stroke ruled out. MR head w/o contrast unremarkable  - Continue carbamazepine  - Continue ASA 81 and statin.    # RLL pneumonia  # Severe sepsis on admission   - Leukocytosis improving  -  blood cultures NGTD  - Monitor pulse ox, stable on room air   - s/p 7 days of abx   - ID recs noted   - PO flagyl (1/31- for possible bowel ischemia, d/c if remains stable with signs of ischemia     # Pseudoobstruction with fecal impaction 2/2 to atonic colon, resolving  - GI and Surg recs appreciated family does not want a Colostomy placed at his time.   - multiple BMs  - Tap water enema and Miralax tid  - Golytely completed  - repeat ABD XR shows fecal impaction  - Monitor BMs  - serial KUB  - could be from carbamazepine and lack of ambulation  - monitor electrolytes and correct as needed   - repeat CT shows clearance of upper stool burden but fecal stool burden size unchanged and large colon still dilated    #CAD (coronary artery disease)  - Continue ASA and statin.    #Hypertension, controlled   - Low BP in ED, s/p short levophed gtt  - Monitor BP  - restarted lisinopril    #Seizure   - Continue carbamazepine   - Seizure precautions.      - VTE ppx: Lovenox  - GI ppx: protonix   - FULL CODE    #Progress Note Handoff  Pending (specify): f/u BMs, PT  plan discussed with the sister Nicole   Disposition: PT noted; no SNF . discharge planning

## 2022-02-07 NOTE — PROGRESS NOTE ADULT - ASSESSMENT
57 y/o male with resolving colonic distention and fecal impaction with multiple  BM's per nurse.         Plan:  - Surgical intervention cancelled  by Dr. Garcia due to having multiple BM's and family does not want a Colostomy placed at his time.   - Continue bowel regime.  - Continue present medical mgt per medical team.  - Will d/w covering surgery attending today.

## 2022-02-07 NOTE — PROGRESS NOTE ADULT - SUBJECTIVE AND OBJECTIVE BOX
SILVANO CALIXTO 56y Male  MRN#: [id number]   CODE STATUS:full code       SUBJECTIVE  Patient is a 56y old Male who presents with a chief complaint of Unresponsiveness, Sepsis (04 Feb 2022 17:49)  Currently admitted to medicine with the primary diagnosis of Sepsis due to pneumonia  Today is hospital day 9d, and this morning he is resting in bed and RN reports multiple BMs since yesterday.     OBJECTIVE  PAST MEDICAL & SURGICAL HISTORY  CVA (cerebral vascular accident)  1980&#x27;s x2   Residual left sided weakness    CAD (coronary artery disease)    Retinal detachment    Hypertension    High cholesterol    Diabetes    Anxiety    Seizure    History of corneal transplant  right   4/13/17      ALLERGIES:  No Known Allergies    MEDICATIONS:  STANDING MEDICATIONS  aspirin enteric coated 81 milliGRAM(s) Oral daily  baclofen 10 milliGRAM(s) Oral daily  carBAMazepine Suspension 200 milliGRAM(s) Oral two times a day  chlorhexidine 2% Cloths 1 Application(s) Topical once  chlorhexidine 4% Liquid 1 Application(s) Topical <User Schedule>  enoxaparin Injectable 40 milliGRAM(s) SubCutaneous daily  lactulose Syrup 10 Gram(s) Oral two times a day  lisinopril 10 milliGRAM(s) Oral daily  metroNIDAZOLE    Tablet 500 milliGRAM(s) Oral every 8 hours  mineral oil 30 milliLiter(s) Oral daily  polyethylene glycol 3350 17 Gram(s) Oral two times a day  senna 2 Tablet(s) Oral at bedtime  simvastatin 10 milliGRAM(s) Oral at bedtime    PRN MEDICATIONS  risperiDONE   Tablet 1 milliGRAM(s) Oral daily PRN      VITAL SIGNS: Last 24 Hours  T(C): 36.9 (06 Feb 2022 05:09), Max: 36.9 (06 Feb 2022 05:09)  T(F): 98.5 (06 Feb 2022 05:09), Max: 98.5 (06 Feb 2022 05:09)  HR: 94 (06 Feb 2022 05:09) (81 - 94)  BP: 127/76 (06 Feb 2022 05:09) (127/76 - 146/79)  BP(mean): --  RR: 16 (06 Feb 2022 05:09) (16 - 18)  SpO2: --    LABS:                        9.2    10.23 )-----------( 337      ( 06 Feb 2022 07:31 )             28.4     02-06    139  |  102  |  15  ----------------------------<  89  3.4<L>   |  26  |  <0.5<L>    Ca    8.6      06 Feb 2022 07:31  Phos  2.8     02-06  Mg     2.1     02-06    TPro  6.0  /  Alb  3.2<L>  /  TBili  0.2  /  DBili  x   /  AST  10  /  ALT  17  /  AlkPhos  121<H>  02-06    PT/INR - ( 04 Feb 2022 09:54 )   PT: 14.90 sec;   INR: 1.30 ratio         PTT - ( 04 Feb 2022 09:54 )  PTT:39.1 sec              RADIOLOGY:  < from: EEG (01.29.22 @ 15:00) >  Impression:  -  Abnormal due to the presence of: generalized slowing as above  -    < end of copied text >      PHYSICAL EXAM:    General:  awake, minimally verbal, laying comfortably in bed  HEENT: NO JVD,  Lungs: CTA B/L,   CVS: normal S1, S2,   Abdomen: soft, non-tender, non-distended  Extremities: no edema, no clubbing, no cyanosis,  Neuro: AO*1  Skin: no rush, no ecchymosis

## 2022-02-07 NOTE — PROGRESS NOTE ADULT - SUBJECTIVE AND OBJECTIVE BOX
Patient seen & examined.  No acute events noted overnight.  Patient's RN reports patient had multiple BM's overnight x 5 - liquid   Patient denies complaints of pain or N/V when asked.      MEDICATIONS  (STANDING):  aspirin enteric coated 81 milliGRAM(s) Oral daily  baclofen 10 milliGRAM(s) Oral daily  carBAMazepine Suspension 200 milliGRAM(s) Oral two times a day  chlorhexidine 2% Cloths 1 Application(s) Topical once  chlorhexidine 4% Liquid 1 Application(s) Topical <User Schedule>  enoxaparin Injectable 40 milliGRAM(s) SubCutaneous daily  lactulose Syrup 10 Gram(s) Oral two times a day  lisinopril 10 milliGRAM(s) Oral daily  metroNIDAZOLE    Tablet 500 milliGRAM(s) Oral every 8 hours  mineral oil 30 milliLiter(s) Oral daily  polyethylene glycol 3350 17 Gram(s) Oral two times a day  senna 2 Tablet(s) Oral at bedtime  simvastatin 10 milliGRAM(s) Oral at bedtime    MEDICATIONS  (PRN):  risperiDONE   Tablet 1 milliGRAM(s) Oral daily PRN agitation      Vital Signs Last 24 Hrs  T(C): 36.9 (07 Feb 2022 05:48), Max: 36.9 (07 Feb 2022 05:48)  T(F): 98.4 (07 Feb 2022 05:48), Max: 98.4 (07 Feb 2022 05:48)  HR: 54 (07 Feb 2022 05:48) (54 - 82)  BP: 145/70 (07 Feb 2022 05:48) (126/75 - 145/70)  RR: 18 (07 Feb 2022 05:48) (17 - 18)      Physical Exam:  Abdomen:  + Bowel sounds,  Soft, No distention.  Non-tender,  No rebound/guarding or peritoneal signs.            02-06    139  |  102  |  15  ----------------------------<  89  3.4<L>   |  26  |  <0.5<L>    Ca    8.6      06 Feb 2022 07:31  Phos  2.8     02-06  Mg     2.1     02-06    TPro  6.0  /  Alb  3.2<L>  /  TBili  0.2  /  DBili  x   /  AST  10  /  ALT  17  /  AlkPhos  121<H>  02-06                            9.2    7.51  )-----------( 345      ( 07 Feb 2022 08:15 )             29.3     CAPILLARY BLOOD GLUCOSE           Rhofade Counseling: Rhofade is a topical medication which can decrease superficial blood flow where applied. Side effects are uncommon and include stinging, redness and allergic reactions.

## 2022-02-08 NOTE — DISCHARGE NOTE NURSING/CASE MANAGEMENT/SOCIAL WORK - NSDCPEFALRISK_GEN_ALL_CORE
For information on Fall & Injury Prevention, visit: [email or web address] OR  [email or web address] OR  https://www.cdc.gov/steadi/patient.html

## 2022-02-08 NOTE — DISCHARGE NOTE PROVIDER - HOSPITAL COURSE
56M w/ PMH CVA x2 (w/ residual left sided deficits; wheelchair bound), DMT2, HTN and seizures being admitted for LOC and sepsis w/o clear origin.  Pt treated for pneumonia and completed abx course. Pt's CT scan showed a large fecal load. Conservative approach using multiple laxatives used which cleared proximal colon but still has a large fecal load in the rectosigmoid. Surg declining intervention. Will continue laxative routine as OP. Will d/c home

## 2022-02-08 NOTE — PROGRESS NOTE ADULT - TIME BILLING
I have personally seen and examined this patient.    I have reviewed all pertinent clinical information and reviewed all relevant imaging and diagnostic studies personally.   I counseled the patient about diagnostic testing and treatment plan. All questions were answered.   I discussed recommendations with the primary team.
I have personally seen and examined this patient.    I have reviewed all pertinent clinical information and reviewed all relevant imaging and diagnostic studies personally.   I counseled the patient about diagnostic testing and treatment plan. All questions were answered.   I discussed recommendations with the primary team.
direct pt care
direct pt care
Coordination of care
I have personally seen and examined this patient.    I have reviewed all pertinent clinical information and reviewed all relevant imaging and diagnostic studies personally.   I counseled the patient about diagnostic testing and treatment plan. All questions were answered.   I discussed recommendations with the primary team.
direct pt care

## 2022-02-08 NOTE — DISCHARGE NOTE NURSING/CASE MANAGEMENT/SOCIAL WORK - PATIENT PORTAL LINK FT
You can access the FollowMyHealth Patient Portal offered by [facility] by registering at the following website: [email or web address]. By joining [employer]’s FollowMyHealth portal, you will also be able to view your health information using other applications (apps) compatible with our system.

## 2022-02-08 NOTE — PROGRESS NOTE ADULT - PROVIDER SPECIALTY LIST ADULT
Gastroenterology
Hospitalist
Infectious Disease
Gastroenterology
Hospitalist
Hospitalist
Infectious Disease
Infectious Disease
Surgery
Hospitalist
Surgery
Surgery
Gastroenterology
Hospitalist

## 2022-02-08 NOTE — DISCHARGE NOTE PROVIDER - CARE PROVIDER_API CALL
Mic Garcia)  ColonRectal Surgery; Surgery  [address], [address] [place] [address]  Phone: [phone]  Fax: [phone]  Follow Up Time:

## 2022-02-08 NOTE — PROGRESS NOTE ADULT - SUBJECTIVE AND OBJECTIVE BOX
SILVANO CALIXTO 56y Male  MRN#: [id number]   CODE STATUS:full code       SUBJECTIVE  Patient is a 56y old Male who presents with a chief complaint of Unresponsiveness, Sepsis (04 Feb 2022 17:49)  Currently admitted to medicine with the primary diagnosis of Sepsis due to pneumonia    Pt had no overnight BMs as overnight RN did not give pt any enemas despite standing order. Pt's fecal load diminished proximally however still has a large fecal load in the rectosigmoid area with increased compensatory diltation. Surg declines intervention however cannot be managed by manual disimpaction or by a small endoscopic scope. Risk for perforation and symptoms such as po intolerance, vomiting, constipation, and eventually infection could ensue and complications were explained to sister as pt does not have the insight into his condition. Pending d/c upon reinstatement of his HHA.     OBJECTIVE  PAST MEDICAL & SURGICAL HISTORY  CVA (cerebral vascular accident)  1980&#x27;s x2   Residual left sided weakness    CAD (coronary artery disease)    Retinal detachment    Hypertension    High cholesterol    Diabetes    Anxiety    Seizure    History of corneal transplant  right   4/13/17      ALLERGIES:  No Known Allergies    MEDICATIONS:  STANDING MEDICATIONS  aspirin enteric coated 81 milliGRAM(s) Oral daily  baclofen 10 milliGRAM(s) Oral daily  carBAMazepine Suspension 200 milliGRAM(s) Oral two times a day  chlorhexidine 2% Cloths 1 Application(s) Topical once  chlorhexidine 4% Liquid 1 Application(s) Topical <User Schedule>  enoxaparin Injectable 40 milliGRAM(s) SubCutaneous daily  lactulose Syrup 10 Gram(s) Oral two times a day  lisinopril 10 milliGRAM(s) Oral daily  metroNIDAZOLE    Tablet 500 milliGRAM(s) Oral every 8 hours  mineral oil 30 milliLiter(s) Oral daily  polyethylene glycol 3350 17 Gram(s) Oral two times a day  senna 2 Tablet(s) Oral at bedtime  simvastatin 10 milliGRAM(s) Oral at bedtime    PRN MEDICATIONS  risperiDONE   Tablet 1 milliGRAM(s) Oral daily PRN      VITAL SIGNS: Last 24 Hours  T(C): 36.9 (06 Feb 2022 05:09), Max: 36.9 (06 Feb 2022 05:09)  T(F): 98.5 (06 Feb 2022 05:09), Max: 98.5 (06 Feb 2022 05:09)  HR: 94 (06 Feb 2022 05:09) (81 - 94)  BP: 127/76 (06 Feb 2022 05:09) (127/76 - 146/79)  BP(mean): --  RR: 16 (06 Feb 2022 05:09) (16 - 18)  SpO2: --    LABS:                        9.2    10.23 )-----------( 337      ( 06 Feb 2022 07:31 )             28.4     02-06    139  |  102  |  15  ----------------------------<  89  3.4<L>   |  26  |  <0.5<L>    Ca    8.6      06 Feb 2022 07:31  Phos  2.8     02-06  Mg     2.1     02-06    TPro  6.0  /  Alb  3.2<L>  /  TBili  0.2  /  DBili  x   /  AST  10  /  ALT  17  /  AlkPhos  121<H>  02-06    PT/INR - ( 04 Feb 2022 09:54 )   PT: 14.90 sec;   INR: 1.30 ratio         PTT - ( 04 Feb 2022 09:54 )  PTT:39.1 sec              RADIOLOGY:  < from: EEG (01.29.22 @ 15:00) >  Impression:  -  Abnormal due to the presence of: generalized slowing as above  -    < end of copied text >      PHYSICAL EXAM:    General:  awake, minimally verbal, laying comfortably in bed  HEENT: NO JVD,  Lungs: CTA B/L,   CVS: normal S1, S2,   Abdomen: soft, non-tender, non-distended  Extremities: no edema, no clubbing, no cyanosis,  Neuro: AO*1  Skin: no rush, no ecchymosis

## 2022-02-08 NOTE — DISCHARGE NOTE PROVIDER - NSDCMRMEDTOKEN_GEN_ALL_CORE_FT
Aspir 81 oral delayed release tablet: 1 tab(s) orally once a day  baclofen 10 mg oral tablet: 1 tab(s) orally once a day  carBAMazepine 100 mg/5 mL oral suspension: 10 milliliter(s) orally every 12 hours   lactulose 10 g/15 mL oral syrup: 15 milliliter(s) orally 2 times a day  lisinopril 10 mg oral tablet: 1 tab(s) orally once a day   mineral oil oral liquid: 30 milliliter(s) orally once a day  polyethylene glycol 3350 oral powder for reconstitution: 17 gram(s) orally 2 times a day  risperiDONE 1 mg oral tablet: 1 tab(s) orally once a day, As Needed for agitation  senna oral tablet: 2 tab(s) orally once a day (at bedtime)  simvastatin 10 mg oral tablet: 1 tab(s) orally once a day (at bedtime)

## 2022-02-08 NOTE — DISCHARGE NOTE PROVIDER - CARE PROVIDERS DIRECT ADDRESSES
[email or web address]

## 2022-02-08 NOTE — CHART NOTE - NSCHARTNOTEFT_GEN_A_CORE
Case D/w Dr. Garcia: family does not want colostimy placed at this time therfore OR cancelled for today  Can restart diet today
ID recommendation: adjust antibiotic: discontinue Zosyn, Start Rocephrine 2gm Q24h: start in AM + Flagyl 500mg PO Q8h.  - discussed with Dr Garcia
Registered Dietitian Follow-Up     Patient Profile Reviewed                           Yes [X]   No []     Nutrition History Previously Obtained        Yes [X]  No []       Pertinent Information: pt is 56 year old male with hx of CVA x2 with L sided deficits, wheelchair bound, DM, HTN, seizures, aspiration PNA (nov. 2021) while having outpt CXR pt became unresponsive s/p code blue. CT chest--> R basilar opacity. CT abd--> distended colon/rectum with fecal impaction. pt admitted with severe sepsis unknown etiology, RLL PNA, tap water enema 2/2 fecal impaction.  1/31 s/p bedside manual disimpaction, tap water enema q 2 hrs and bowel regime. s/p large BMs daily. 2/4 family does not want colostomy, OR cancelled.  Continues to tolerate po diet well consumes > 75% of all meals/supplements with 1;1 feed.     Based on nutrition/weight hx pt does not meet enough criteria for malnutrition dx at this time.            Diet order:  Diet, Pureed:   Consistent Carbohydrate {No Snacks}  Moderately Thick Liquids (MODTHICKLIQS)  Supplement Feeding Modality:  Oral  Glucerna Shake Cans or Servings Per Day:  2       Frequency:  Three Times a day (02-03-22 @ 14:59) [Active]    Anthropometrics:  - Ht. 188 cm  - Wt. 56.2 kgs 1/28, RD checked bedscale weight today 55.2 kgs. As per EMR weight loss of 23# noted over course of two years.  - %wt change  - BMI 15.5        Pertinent Lab Data: 2/7/22  hgb 9.2L  hct 29.3L     Pertinent Meds:  MEDICATIONS  (STANDING):  aspirin enteric coated 81 milliGRAM(s) Oral daily  baclofen 10 milliGRAM(s) Oral daily  carBAMazepine Suspension 200 milliGRAM(s) Oral two times a day  lactulose Syrup 10 Gram(s) Oral two times a day  lisinopril 10 milliGRAM(s) Oral daily  metroNIDAZOLE    Tablet 500 milliGRAM(s) Oral every 8 hours  mineral oil 30 milliLiter(s) Oral daily  polyethylene glycol 3350 17 Gram(s) Oral two times a day  senna 2 Tablet(s) Oral at bedtime  simvastatin 10 milliGRAM(s) Oral at bedtime    MEDICATIONS  (PRN):  risperiDONE   Tablet 1 milliGRAM(s) Oral daily PRN agitation       Physical Findings:  - Appearance: alert   - GI function: + BS, daily loose BMS s/p bowel prep  - Tubes: n/a   - Oral/Mouth cavity:  - Skin: coccyx stage III noted     Nutrition Requirements  Weight Used:  55.2 kgs     Estimated Energy Needs    5192-4729 kcal (30-35 kcal/kg/BW), 72-83g protein (1.3-1.5g/kg/BW) 1;1 kcal for estimated fluid needs.       Nutrient Intake: pt consuming >75% of meals/supplements         [] Previous Nutrition Diagnosis:            [X] Ongoing          [] Resolved    increased nutrient needs 2/2 wounds remains        Nutrition Intervention: maintain on above diet/supplements     Goal/Expected Outcome: pt to continue to tolerate po diet and meet at least 75% of estimated nutrient needs     Indicator/Monitoring: RD to monitor po intake, labs/meds, NFPF and f/u as needed within 4-6 days.
Tried calling sister twice on both numbers. No answer on both. Need to discuss dispo planning
Chart review note, Consult follow. Will visit the patient later today.     - Brain MRI w/o  - Routine EEG  - Per neurology note dated on Jan 2020 patient is supposed to be on Tegretol. please clarify with patient   - Tegretol level  - Continue carbamazepine at home dose 200mg q12hrs for now  - May use liquid version  - Seizure precautions  - Ativan 2mg IV PRN for generalized tonic-cloonic seizure lasting longer than 2  minutes, or two consecutive seizures without return to baseline in-between  - Keep Mg above 2.0
Registered Dietitian Follow-Up     Patient Profile Reviewed                           Yes [X]   No []     Nutrition History Previously Obtained        Yes []  No [X]      Nutrition hx: spoke with pt's sister Nicole via phone states pt has great appetite at home consumes 100% of puree diet with mild-moderately thick fluids 3 meals daily plus either two glucerna shakes and or ensure enlives daily. As per Nicole pt's weight has gradually declined since 2017, attributes it to d/c of insulin.     Pertinent  Information: pt is 56 year old male with hx of CVA x2 with L sided deficits, wheelchair bound, DM, HTN, seizures, aspiration PNA (nov. 2021) while having outpt CXR pt became unresponsive s/p code blue. CT chest--> R basilar opacity. CT abd--> distended colon/rectum with fecal impaction. pt admitted with severe sepsis unknown etiology, RLL PNA, tap water enema 2/2 fecal impaction.  1/31 s/p bedside manual disimpaction, tap water enema q 2 hrs and bowel regime. s/p large BMs x 3 on 2/1.    Based on nutrition/weight hx pt does not meet enough criteria for malnutrition dx at this time.          Diet order:  Diet, NPO after Midnight:      NPO Start Date: 03-Feb-2022,   NPO Start Time: 23:59 (02-03-22 @ 14:20) [Active]  Diet, Pureed:   Moderately Thick Liquids (MODTHICKLIQS)  Supplement Feeding Modality:  Oral  Ensure Enlive Cans or Servings Per Day:  1       Frequency:  Three Times a day (01-28-22 @ 16:40) [Active]           Anthropometrics:  - Ht. 188 cm  - Wt. 56.2 kgs 1/28, RD checked bedscale weight today 55.2 kgs. As per EMR weight loss of 23# noted over course of two years.  - %wt change  - BMI 15.5  - IBW      Pertinent Lab Data: 2/3/22  hgb 10.7L  hct 32.4L  gluc 206H  alkp 138H     Pertinent Meds:MEDICATIONS  (STANDING):  aspirin enteric coated 81 milliGRAM(s) Oral daily  baclofen 10 milliGRAM(s) Oral daily  carBAMazepine Suspension 200 milliGRAM(s) Oral two times a day  cefTRIAXone   IVPB 2000 milliGRAM(s) IV Intermittent every 24 hours  chlorhexidine 4% Liquid 1 Application(s) Topical <User Schedule>  dextrose 5%. 1000 milliLiter(s) (50 mL/Hr) IV Continuous <Continuous>  enoxaparin Injectable 40 milliGRAM(s) SubCutaneous daily  erythromycin     enteric coated 250 milliGRAM(s) Oral <User Schedule>  metroNIDAZOLE    Tablet 500 milliGRAM(s) Oral every 8 hours  mineral oil 30 milliLiter(s) Oral daily  neomycin 500 milliGRAM(s) Oral <User Schedule>  polyethylene glycol 3350 17 Gram(s) Oral two times a day  polyethylene glycol/electrolyte Solution. 4000 milliLiter(s) Oral once  senna 2 Tablet(s) Oral at bedtime  simvastatin 10 milliGRAM(s) Oral at bedtime    MEDICATIONS  (PRN):  risperiDONE   Tablet 1 milliGRAM(s) Oral daily PRN agitation       Physical Findings:  - Appearance: alert, resting, severe muscle wasting noted to temple, clavicles, loss of subcutaneous fat to orbital region, buccula.   - GI function: +BS, + daily large BMs  - Tubes: n/a   - Oral/Mouth cavity:  - Skin: coccyx stage III noted      Nutrition Requirements  Weight Used: 55.2 kgs      Estimated nutrient Needs: 0521-6372 kcal (30-35 kcal/kg/BW), 72-83g protein (1.3-1.5g/kg/BW) 1;1 kcal for estimated fluid needs.        Nutrient Intake: pt consuming > 75% of meals and supplements         [] Previous Nutrition Diagnosis:            [X] Ongoing          [] Resolved    Increased nutrient needs 2/2 wounds      Nutrition Intervention: add CHO consistent restriction to present diet order. d/c ensure, add glucerna shake 240 ml x2 with meals (6 cans daily) 220 kcals, 10g protein per shake. Add MVI daily and vitamin C 500 mg q 12 hrs     Goal/Expected Outcome: pt to continue to tolerate po diet and meet > 75% of estimated nutrient needs      Indicator/Monitoring: Rd to monitor tolerance to po diet, labs/meds, NFPF and f/u as needed within 2-4 days

## 2022-02-08 NOTE — DISCHARGE NOTE PROVIDER - NSDCCPCAREPLAN_GEN_ALL_CORE_FT
PRINCIPAL DISCHARGE DIAGNOSIS  Diagnosis: Sepsis due to pneumonia  Assessment and Plan of Treatment: completed abx; no further treatment      SECONDARY DISCHARGE DIAGNOSES  Diagnosis: Seizure  Assessment and Plan of Treatment:     Diagnosis: Septic shock  Assessment and Plan of Treatment:     Diagnosis: Constipation  Assessment and Plan of Treatment: will need regimen of miralax, senna, mineral oil, and enemas

## 2022-02-17 PROBLEM — I63.9 CEREBRAL INFARCTION, UNSPECIFIED: Chronic | Status: ACTIVE | Noted: 2019-06-05

## 2022-02-20 NOTE — CDI QUERY NOTE - NSCDIOTHERTXTBX_GEN_ALL_CORE_HH
Encounter #:	[id number]	Admitted: 	1/28/2022   Medical Record #:	[id number]	CDI specialist:	Ana Scales   Patient Name:  	SILVANO CALIXTO	Contact #:	[phone]    DOCUMENTATION CLARIFICATION FORM       Dear Doctor Dennis Maradiaga	Date: 2/20/2022	     The Physician’s or Provider’s documentation of the patient’s presentation, evaluation, and medical management, as identified below, may support a diagnosis that is not documented in the medical record.  To accurately capture all diagnoses to the greatest degree of specificity reflecting the patient’s actual severity of illness, the documentation in this patient’s medical record requires additional clarification.  Please include more specific documentation, either known or suspected, of a corresponding diagnosis associated with the clinical information described below in your Progress Note and/or Discharge Summary.    After studies and based on the documentation below, could you please further clarify type of treated Pneumonia?    1.	The patient treated for aspiration PNA in RLL.  2.	The patient treated for Lobar pneumonia. Aspiration  PNA ruled out.  3.	RLL pneumonia (please specify type).  4.	Other (please specify).  5.	Unable to determine.    ?	1/28/2022 ED Provider Note   by Otf Rubalcava)  • HPI Objective Statement: 55 y/o male with hx of CVA (nonverbal and barely follows command), seizure, and aspiration pneumonia who was sent from outpatient radiology in this hospital. Unable to obtain HPI from pt; sister was at bedside and helped with HPI. Pt aspirated last November which required several days of hospitalization. Pt has been following up with his PCP and was referred for chest xray today. While pt was upstairs, pt was found unresponsive and was brought in to the ER. Pt regained consciousness when he arrived ER. Rest of HPI was limited.    ?	1/28/2022    Consult Note Adult-Critical Care Fellow/Attending by Meir Reyes)    X-Rays      Ct noted RLL aspiration/tree in bud    Assessment and Recommendation:   • Assessment	  Impression:  1 episode of unresponsiveness(Seizure?) now back to baseline.  Dilated bowel likely acute on chronic vs chronic.  Probable aspiration PNA    ?	2/7/2022    Progress Note Adult-Hospitalist Attending  by Dennis Maradiaga)  # RLL pneumonia  # Severe sepsis on admission   - Leukocytosis improving  -  blood cultures NGTD  - Monitor pulse ox, stable on room air   - s/p 7 days of abx   - ID recs noted   - PO flagyl (1/31- for possible bowel ischemia, d/c if remains stable with signs of ischemia    In responding to this request, please exercise your independent professional judgment. The fact that a question is asked does not imply that any particular answer is desired or expected. Documentation clarification is required for compliance, accuracy in coding and billing, claim validation and reporting severity of illness, quality data, and risk of mortality.  DO NOT REMOVE THIS RECORD WITHOUT FIRST NOTIFYING THE CDI SPECIALIST

## 2022-03-14 NOTE — H&P ADULT - NSHPPHYSICALEXAM_GEN_ALL_CORE
GEN: Chronically-ill appearing pt,NAD.   CV/CHEST:  PULM: CTA B/L with no W/C/R  ABG/GI: SNTTP, ND x 4 w/NA BS x 4 Q's  EXT: WWP x 4, however the B/L UEs are contracted.  SKIN: No Rashes   NEURO: Alert but non-verbal, follows verbal commands. RRR, no M/G/R. GEN: Chronically-ill appearing pt non verbal but appears to be uncomfortable.   CV/CHEST:  PULM: CTA B/L with no W/C/R  ABG/GI: SNTTP, ND x 4 w/NA BS x 4 Q's  EXT: WWP x 4, however the B/L UEs are contracted.  SKIN: + L Buttock stage 3 DTI w/+purulent malodorous discharge and granulation tissue, purple hue on bandage.   NEURO: Alert but non-verbal, follows verbal commands. RRR, no M/G/R.

## 2022-03-14 NOTE — ED PROVIDER NOTE - NS ED MD DISPO SPECIAL CONSIDERATION1
Improving Improving Improving Improving Improving Improving Improving Improving Improving Improving Improving Improving Improving Improving Improving Improving Improving Improving Improving None Improving Improving Improving Improving Improving Improving Improving Improving Improving Improving Improving Improving Improving Improving

## 2022-03-14 NOTE — H&P ADULT - ASSESSMENT
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 Described as pt was "staring off" for ~ 10 minutes and not responsive, eyes rolling into the back of head.    IMPRESSION/ACUTE ISSUES  # AMS - seizure vs. cardiogenic origin  # HAGMA 2/2 Lactic Acidosis  # Aspiration PNA vs. Pneumonitis    PLAN/CHRONIC ISSUES  # AMS - seizure vs. cardiogenic origin  # HAGMA 2/2 Lactic Acidosis - likely due to seizure  - Seizure/Fall/Aspiration Precautions  - REEG, Neuro consult, Tele admit  - CE x 3, AM EKG, CK now and in AM  - Trend Lactate  - SLP, PT evaluation   - HOB 45 Degrees  - NPO for now   - Check A1C, TSH, B12, Folate  - Keep Mg > 2.0 and K > 4.0    # Aspiration PNA vs. Pneumonitis  - s/p multiple ABX in ED, Unasyn 3q6 for 5-7 days   - MRSA Nares, Procal, CRP, BCx, UA/UCx  - Monitor fever/WBC curve   - f/u RVP    # Functional Quadriplegia  # h/o CVA x 2 w/residual neuro deficit  # h/o Seizure Disorder   - frequent repositioning   - c/w carbamazepine + risperidone     # HTN  # h/o CAD  - c/w Lisinopril   - c/w ASA + Statin    # DM-II  - Check A1C    # h/o Chronic Rectosigmoid Dilation + Constipation  - Miralax 17mg BID  - c/w Lactulose   - Senna 2caps QHS   - c/w Mineral Oil PO QD    DISPO: Tele   DVT PPX: Lovenox  CODE: Full  ASSESSMENT  55 YO M w/ a PMH of 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 Described as pt was "staring off" for ~ 10 minutes and not responsive, eyes rolling into the back of head.    IMPRESSION/ACUTE ISSUES  # AMS - seizure vs. cardiogenic origin  # HAGMA 2/2 Lactic Acidosis  # Aspiration PNA vs. Pneumonitis  # Stage 3 DTI L Buttock    PLAN/CHRONIC ISSUES  # AMS - seizure vs. cardiogenic origin  # HAGMA 2/2 Lactic Acidosis - likely due to seizure  - Seizure/Fall/Aspiration Precautions  - REEG, Neuro consult, Tele admit  - CE x 3, AM EKG, CK now and in AM  - Trend Lactate  - SLP, PT evaluation   - HOB 45 Degrees  - NPO for now   - Check A1C, TSH, B12, Folate  - Keep Mg > 2.0 and K > 4.0    # Aspiration PNA vs. Pneumonitis  - s/p multiple ABX in ED, Unasyn 3q6 for 5-7 days   - MRSA Nares, Procal, CRP, BCx, UA/UCx  - Monitor fever/WBC curve   - f/u RVP    # Stage III DTI L Buttock  - Burn Consult   - Wound Culture   - Morphine 2mg q4hrs PRN (note pt nonverbal will need to be assessed for nonverbal cues of pain)     # Functional Quadriplegia  # h/o CVA x 2 w/residual neuro deficit  # h/o Seizure Disorder   - frequent repositioning   - c/w carbamazepine + risperidone     # HTN  # h/o CAD  - c/w Lisinopril   - c/w ASA + Statin    # DM-II  - Check A1C    # h/o Chronic Rectosigmoid Dilation + Constipation  - Miralax 17mg BID  - c/w Lactulose   - Senna 2caps QHS   - c/w Mineral Oil PO QD    DISPO: Tele   DVT PPX: Lovenox  CODE: Full

## 2022-03-14 NOTE — HISTORY OF PRESENT ILLNESS
[FreeTextEntry1] : Before patient could be evaluated he become unresponsive.  He was placed on the ground and CPR was started.  Only one round of compressions was completed and no shocks were given.  He then became minimally responsive with a pulse.  EMS then arrived and he was transported to the ER.

## 2022-03-14 NOTE — ED ADULT NURSE NOTE - OBJECTIVE STATEMENT
Pt BIBA from Gastroenterologist's office for possible cardiac arrest. As per EMS, pt was found unresponsive by office staff, CPR was done for ~ 1-2 minutes, pt awakened and sent to this ER. No meds given PTA. H/O seizures, HTN, CVA as per family.

## 2022-03-14 NOTE — ED ADULT NURSE NOTE - NSPATIENTFLAG_GEN_A_ER
Green (Altered Mental Status/Behavior)/Red S (Sepsis)/Purple DH (Discharge Huddle; Vulnerable Patient) Green (Altered Mental Status/Behavior)/Orange (Pneumonia)/Red S (Sepsis)/Purple DH (Discharge Huddle; Vulnerable Patient)

## 2022-03-14 NOTE — ED PROVIDER NOTE - PHYSICAL EXAMINATION
CONSTITUTIONAL: Well-developed; well-nourished; in no acute distress, nontoxic appearing  SKIN: skin exam is warm and dry  HEAD: Normocephalic; atraumatic  EYES: PERRL, EOM intact; conjunctiva and sclera clear  ENT: MMM   NECK: No midline tenderness  CARD: S1, S2 normal, no murmur  RESP: Good air movement bilaterally  ABD: soft; non-distended; non-tender    EXT: Normal ROM. No cyanosis or edema  NEURO: awake, alert. No Focal deficits. GCS 15.

## 2022-03-14 NOTE — ED ADULT TRIAGE NOTE - CHIEF COMPLAINT QUOTE
Pt BIBA s/p cardiac arrest at MD office [address]. 2 minutes of CPR done on scene. No medications given. Pt awake and tachy in triage.

## 2022-03-14 NOTE — ED PROVIDER NOTE - CLINICAL SUMMARY MEDICAL DECISION MAKING FREE TEXT BOX
Patient presented status post episode of unresponsiveness during which time he received CPR for possible loss of pulse, although this is unclear.  Otherwise on arrival patient afebrile, hemodynamically stable, at baseline mental status per family.  Obtained labs which were remarkable for elevated lactate suggesting possible seizure as well as leukocytosis.  Otherwise no significant electrolyte abnormalities.  Scans revealed opacities bilaterally in the lungs likely secondary to aspiration but no other acute traumatic injury.  Patient remained hemodynamically stable during ED course and at baseline mental status.  Started on IV antibiotics and will require admission for further management.  Patient's family agreeable with plan.  Hemodynamically stable at time of admission.

## 2022-03-14 NOTE — ED ADULT NURSE NOTE - NSFALLRSKUNASSIST_ED_ALL_ED
Speech Therapy Daily Treatment  Infant Feeding/Swallow     Admitting diagnosis:   infant of 28 completed weeks of gestation [P07.31]   YOB: 2021, 6 week old   Corrected gestational age:34w 3d  Birth Weight: 3 lb 6.5 oz (1545 g)  Current hospitalization required due to the following medical needs:  Active Problems:      infant of 28 completed weeks of gestation    Twin delivery by     Respiratory distress of     At risk for apnea    Anemia    Hypernatremia    Thrombocytopenia (CMS/HCC)    RDS of     Hypotension due to hypovolemia    Elevated serum creatinine    Metabolic acidosis    Inguinal hernia  Resolved Problems:    Observation and evaluation of  for suspected infectious condition ruled out    Acute respiratory failure (CMS/HCC)    Cyclical neutropenia (CMS/HCC)    Hyperbilirubinemia    Medical changes or updates since last session: none reported    SUBJECTIVE   Collaboration with: Nurse Effie whom reports infant has had fluctuating alertness but could be seen. Ng feeding started prior to session.    Pain before session:  FLACC (face, legs, activity, cry, consolability):   Face 0 No particular expression or smile   Legs 0 Normal position or relaxed   Activity 0 Lying quietly, normal position, moves easily   Cry 0 No cry, quiet   Consolability 0 Content, relaxed   Score 0       OBJECTIVE   Infant awake and alert. Fluctuating O2 sats. No parent present.  Status at onset of session:   Physiologic: Stable autonomic behaviors including  stable heart rate, regular respirations and pink/stable color. or Instability of autonomic system as evidenced by  Occasional dips in O2 sats to high 80s prior to session.  Motor: Stable motor behaviors including  hand(s) to face.  State: Infant was in a drowsy and quiet alert state. Instability of state as evidenced by frequent transitions between states and  low level of alertness.    Current Feeding: NG,  40 mls q 3 hours, expressed breastmilk with human milk fortifier, 24 abdulaziz/oz   Respiratory Status: high flow nasal cannula, 2.75 L     Treatment/Intervention    Oral Motor/ Peripheral Examination  Structural observations: intact  Oral musculature: decreased strength of movement  Reflexes: age appropriate  Secretion management: within functional limits  Phonation: N/A  Non-nutritive suck: Rooting: with mild-moderate stimulation, Suck initiation: with mild-moderate stimulation, Tongue movements: impaired tongue cup, impaired seal, biting response, Pattern: arrhythmical, disorganized  Stress cues observed during oral motor:   - Motor: finger splaying, grimace   - Physiological: change in vital signs Occasional desats to high 70s-mid 80s  Oral motor treatment: Infant with delayed initiation of non-nutritive suck with decreased tongue cup and arrhythmic pattern. Dips in O2 sats noted intermittently throughout the session but would normalize spontaneously. Frequently dips in O2 sats were preceded by a grimace and bearing down. Tastes of breast milk not provided this session due to inconsistent ability to maintain vitals.    Family Centered Support/Education: Caregiver(s) not present. Will meet and provide teaching strategies as available.   Discussed with bedside nurse    ASSESSMENT:   Infant with decreased initiation of non-nutritive suck with decreased tongue cup and arrhythmic pattern. Suspect due to fluctuating alertness. Inconsistent ability to maintain O2 sats this session. Speech to follow to provide oral motor stimulation and to assess po feeding skills when developmentally and medically appropriate.    Impressions & Recommendations:  Aspiration Risk Minimal  Factors include: prematurity     Tips for Caregivers  Recommendation Comments: offer opportunities for non-nutritive sucking if showing readiness cues    Patient will benefit from speech therapy. Habilitative potential is good based on assessment above    Pain  after session:  FLACC (face, legs, activity, cry, consolability):   Face 0 No particular expression or smile   Legs 0 Normal position or relaxed   Activity 0 Lying quietly, normal position, moves easily   Cry 0 No cry, quiet   Consolability 0 Content, relaxed   Score 0       PLAN:   Suggestions for next session as indicated: Continue with plan of care    Goals:  Goals  Discharge Goal #1: Infant will initiate and maintain a safe, organized po feeding pattern to sustain nutrition and hydration  Goal Progression #1: Outcome not met, continue to monitor    Plan  Frequency: 2-3x/week    Documentation completed on following flowsheet: SLP flowsheet, Infant flowsheet   no

## 2022-03-14 NOTE — ED PROVIDER NOTE - OBJECTIVE STATEMENT
56 year old male, past medical history CVA with residual L sided deficits, wheelchair bound, DM, HTN, seizures, bib ems after episode of unresponsiveness. patient with recent admission for sepsis/syncope and fecal impaction, patient had outpatient fu with colorectal surgery, was in office today when patient became unresponsive and ?pulseless, CPR was started, patient returned to baseline after 2 mins. patient arrives to ed awake alert, @ baseline as per family. no signs of trauma. no recent fever, vomiting, shortness of breath. no falls.

## 2022-03-14 NOTE — ED ADULT NURSE NOTE - ED COMFORT CARE
Patient informed/Family informed/Explanation of wait/Warm blanket/Mouth care/Incontinence care/Pillow/Darkened room/TV

## 2022-03-14 NOTE — H&P ADULT - ATTENDING COMMENTS
***HX and physical limited due to pt being non-verbal. Supplemental information obtained from house staff, EMR, and family (Sister) at bedside    57 YO M w/ a PMH of 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 Described as pt was "staring off" for ~ 10 minutes and not responsive, eyes rolling into the back of head. Unable to obtain ROS.     In the ED, imaging revealed large stool burden and possible aspiration pneumonia. Pt started on IV ABXs (Ceftr/Azithro/Flagyl).     FMHx: Reviewed, not relevant    Physical exam shows chronically-ill pt in NAD. VSS, afebrile, not hypoxic on RA. Alert but non-verbal, will shake head yes and no to questions, follows verbal commands. B/L UEs are contracted. CTA B/L with no W/C/R. RRR, no M/G/R. ABD is soft and non-tender, normoactive BSs. LEs without swelling. No rashes. Labs and radiology as above.     Unresponsive episode, witnessed, likely seizure; doubt cardiac. Tele admit. Serial cardiac enzymes/EKGs. Echo. TSH, folate/B12. UA. EEG. Neuro consult. PT eval. Fall precautions.     Aspiration pneumonia; no sepsis on admission. IV ABXs (Ceftriaxone/Azithromycin). FU cultures. Anti-tussives PRN. Strep and legionella. MRSA nares. Send Procal/CRP. RVP.     Normocytic anemia, above baseline. Replace as necessary.    HAGMA (High Anion Gap Metabolic Acidosis) from lactic acidosis. IVFs (LR). Repeat lactate and BMP in the AM.     Hx of CVA x2 (w/ residual left sided deficits; wheelchair bound), DM2, HTN, and SZ disorder. Restart home meds, except as stated above. DVT PPX. Inform PCP of pt's admission to hospital. My note supersedes the residents note.     Date seen by Attending: 3/14/22

## 2022-03-14 NOTE — ED ADULT NURSE NOTE - INTERVENTIONS DEFINITIONS
[place] to call system/Call bell, personal items and telephone within reach/Instruct patient to call for assistance/Room bathroom lighting operational/Non-slip footwear when patient is off stretcher/Physically safe environment: no spills, clutter or unnecessary equipment/Stretcher in lowest position, wheels locked, appropriate side rails in place/Provide visual cue, wrist band, yellow gown, etc./Monitor gait and stability/Monitor for mental status changes and reorient to person, place, and time/Review medications for side effects contributing to fall risk/Reinforce activity limits and safety measures with patient and family/Provide visual clues: red socks

## 2022-03-14 NOTE — H&P ADULT - NSHPLABSRESULTS_GEN_ALL_CORE
< from: Transthoracic Echocardiogram (01.23.20 @ 16:54) >    Summary:   1. Left ventricular ejection fraction, by visual estimation, is 55 to 60%.   2. Mildly increased LV wall thickness.   3. Spectral Doppler shows impaired relaxation pattern of left ventricular myocardial filling (Grade I diastolic dysfunction).    < end of copied text >

## 2022-03-15 NOTE — PHYSICAL THERAPY INITIAL EVALUATION ADULT - PASSIVE RANGE OF MOTION EXAMINATION, REHAB EVAL
LUE fixed in maximal flexion , B LES fixed in ext and plantar flexion , R shoulder 75% , R elbow 80 % , wrist and hand 75% PROM

## 2022-03-15 NOTE — PROGRESS NOTE ADULT - ASSESSMENT
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 Described as pt was "staring off" for ~ 10 minutes and not responsive, eyes rolling into the back of head.    IMPRESSION/ACUTE ISSUES  # AMS - seizure vs. cardiogenic origin  # HAGMA 2/2 Lactic Acidosis  # Aspiration PNA vs. Pneumonitis  # Stage 3 DTI L Buttock    PLAN/CHRONIC ISSUES  # AMS - seizure vs. cardiogenic origin  # HAGMA 2/2 Lactic Acidosis - likely due to seizure  - Seizure/Fall/Aspiration Precautions  - REEG, Tele admit  - neuro following  - F/u tegretol level  - trop 0.02, trend  - Trend Lactate  - SLP, PT evaluation   - HOB 45 Degrees  - NPO for now   - Check A1C, TSH, B12, Folate  - Keep Mg > 2.0 and K > 4.0    # Aspiration PNA vs. Pneumonitis  - s/p multiple ABX in ED, Unasyn 3q6 for 5-7 days   - MRSA Nares, Procal, CRP, BCx, UA/UCx  - Monitor fever/WBC curve   - f/u RVP    # Stage III DTI L Buttock  - Burn Consult   - Wound Culture   - Morphine 2mg q4hrs PRN (note pt nonverbal will need to be assessed for nonverbal cues of pain)     # Functional Quadriplegia  # h/o CVA x 2 w/residual neuro deficit  # h/o Seizure Disorder   - frequent repositioning   - c/w carbamazepine + risperidone     # HTN  # h/o CAD  - c/w Lisinopril   - c/w ASA + Statin    # DM-II  - Check A1C    # h/o Chronic Rectosigmoid Dilation + Constipation  - Miralax 17mg BID  - c/w Lactulose   - Senna 2caps QHS   - c/w Mineral Oil PO QD    DISPO: Tele   DVT PPX: Lovenox  CODE: Full

## 2022-03-15 NOTE — PATIENT PROFILE ADULT - FALL HARM RISK - HARM RISK INTERVENTIONS

## 2022-03-15 NOTE — CONSULT NOTE ADULT - ATTENDING COMMENTS
Patient examined in f/u for breakthrough seizure in setting of infection.    Plan:  1.  f/u CBZ level.  2.  Keep magnesium > 2.0.  3.  Seizure precautions.  4.  Continue carbamazepine 200 mg twice daily.  5.  Discussed no driving no tub baths. Patient examined in f/u for breakthrough seizure in setting of infection.    Plan:  1.  f/u CBZ level.  2.  Keep magnesium > 2.0.  3.  Seizure precautions.  4.  Continue carbamazepine 200 mg twice daily.

## 2022-03-15 NOTE — PHYSICAL THERAPY INITIAL EVALUATION ADULT - TINETTI GAIT TEST, REHAB EVAL
[facility] Neurology Progress Note      Patient:   Solange Batres  MR#:    [id number]   Room:    [facility]259-   YOB: 1953  Date of Progress Note: 11/30/2021  Time of Note                           11:06 AM  Consulting Physician:  Alfred Krishnamurthy DO  Attending Physician:  Cathy Pendleton MD      INTERVAL HISTORY: Remains intubated but alert, following commands, no acute events overnight. REVIEW OF SYSTEMS:  Limited given intubated    PHYSICAL EXAM:    Constitutional    /63   Pulse 83   Temp 98.7 °F (37.1 °C) (Temporal)   Resp 24   Ht 5' 5\" (1.651 m)   Wt 105 lb 6.4 oz (47.8 kg)   SpO2 91%   BMI 17.54 kg/m²   General appearance: Intubated, sedated. EYES -   Conjunctiva normal  Pupillary exam as below, see CN exam in the neurologic exam  ENT-    No scars, masses, or lesions over external nose or ears  Oropharynx - intubated  Cardiovascular -   No clubbing, cyanosis, or edema   Pulmonary-   Mechanically ventilated    Musculoskeletal    No significant wasting of muscles noted  Gait as below, see gait exam in the neurologic exam  Muscle strength, tone, stability as below see the motor exam in the neurologic exam.   No bony deformities  Skin    Warm, dry, and intact to inspection and palpation. No rash, erythema, or pallor        NEUROLOGICAL EXAM     Mental status    []? Awake, alert, oriented   []? Affect attention and concentration appear appropriate  []? Recent and remote memory appears unremarkable  []? Speech normal without dysarthria or aphasia, comprehension and repetition intact. COMMENTS:  Eyes open, alert, following commands. Cranial Nerves []? No VF deficit to confrontation,  optic discs normal, no papilledema on fundoscopic exam.  []? PERRLA, EOMI, no nystagmus, conjugate eye movements, no ptosis  []? Face symmetric  []? Facial sensation intact  []? Tongue midline no atrophy or fasciculations present  []? Palate midline, hearing to finger rub normal  []?  Shoulder HISTORY: Code stroke  Technique: Single AP view of the chest COMPARISONS: Chest exam dated 4/29/2020 FINDINGS: Reidentified right suprahilar consolidation with volume loss. New developing consolidations in the left upper and left lower lobes with air bronchograms. No obvious pleural effusion. Underlying lung emphysema. Heart size is stable. The pulmonary vasculature are nondilated. Right chest wall [id number]. Spinal scoliotic curvature. 1. New multifocal consolidation in the left upper and left lower lobes, appearance concerning for new multifocal pneumonia. 2. Treatment-related changes in the right suprahilar region with scarring and volume loss. Signed by Dr Jose Graves    Result Date: 11/12/2021  EXAM: CT NECK ANGIOGRAM CT HEAD ANGIOGRAM on  11/12/2021 12:54 PM COMPARISON:  CT chest dated August 23, 2021. HISTORY:  76years-old Female. Code stroke TECHNIQUE: Multiple CT images were obtained of the of the head and neck after the administration of IV contrast. 3D MIP reformats were generated  and were sent to PACS for interpretation. Grading of carotid artery stenosis is performed by NASCET criteria. FINDINGS: CT Neck Angiogram: There is a conventional aortic arch with patent origins of the brachiocephalic trunk, left common carotid and left subclavian arteries. The bilateral common carotid arteries and subclavian arteries are well-opacified. There is atherosclerotic disease of the bilateral carotid bifurcations, left greater than than right, with essentially 0% stenosis. The bilateral internal carotid arteries are otherwise well opacified throughout. The bilateral vertebral arteries are well-opacified throughout. Additional findings: Emphysema. New left upper lobe opacities posteriorly, most concerning for infection. Additional right upper lobe opacity is also concerning for infection.  Centrally necrotic right upper and lower lobe posterior mass, incompletely visualized but appears grossly stable when compared to prior examination. Small bilateral pleural effusions. CT Head Angiogram: The right internal carotid artery demonstrates normal course and caliber without evidence of flow limiting stenosis or occlusion. The major branch vessels to the right anterior and middle cerebral arteries are seen without evidence of stenosis or occlusion. There is no evidence of aneurysm or vascular malformation. Some atherosclerotic disease in the carotid siphon. The left internal carotid artery demonstrates normal course and caliber without evidence of flow limiting stenosis or occlusion. The major branch vessels to the left anterior and middle cerebral arteries are seen without evidence of stenosis or occlusion. There is no evidence of aneurysm or vascular malformation. Some atherosclerotic disease in the carotid siphon. Evaluation of the posterior circulation demonstrates normal caliber of the vertebral arteries, basilar artery, and major branch vessels of the posterior cerebral arteries bilaterally without evidence of flow limiting stenosis or occlusion. There is no evidence of aneurysm or vascular malformation. CT Angiography Of The Neck With Intravenous Contrast 1. No evidence of high-grade arterial stenosis or underlying dissection. 2. Bilateral upper lobe new opacities are concerning for infection. CT Angiography Of The Head With Intravenous Contrast 1. No evidence of acute thrombotic occlusion, high-grade arterial stenosis, or focal cerebral aneurysm. Signed by Dr Keli Stover Date: 11/12/2021  EXAM: CT NECK ANGIOGRAM CT HEAD ANGIOGRAM on  11/12/2021 12:54 PM COMPARISON:  CT chest dated August 23, 2021. HISTORY:  76years-old Female. Code stroke TECHNIQUE: Multiple CT images were obtained of the of the head and neck after the administration of IV contrast. 3D MIP reformats were generated  and were sent to PACS for interpretation.  Grading of carotid artery stenosis is performed by NASCET criteria. FINDINGS: CT Neck Angiogram: There is a conventional aortic arch with patent origins of the brachiocephalic trunk, left common carotid and left subclavian arteries. The bilateral common carotid arteries and subclavian arteries are well-opacified. There is atherosclerotic disease of the bilateral carotid bifurcations, left greater than than right, with essentially 0% stenosis. The bilateral internal carotid arteries are otherwise well opacified throughout. The bilateral vertebral arteries are well-opacified throughout. Additional findings: Emphysema. New left upper lobe opacities posteriorly, most concerning for infection. Additional right upper lobe opacity is also concerning for infection. Centrally necrotic right upper and lower lobe posterior mass, incompletely visualized but appears grossly stable when compared to prior examination. Small bilateral pleural effusions. CT Head Angiogram: The right internal carotid artery demonstrates normal course and caliber without evidence of flow limiting stenosis or occlusion. The major branch vessels to the right anterior and middle cerebral arteries are seen without evidence of stenosis or occlusion. There is no evidence of aneurysm or vascular malformation. Some atherosclerotic disease in the carotid siphon. The left internal carotid artery demonstrates normal course and caliber without evidence of flow limiting stenosis or occlusion. The major branch vessels to the left anterior and middle cerebral arteries are seen without evidence of stenosis or occlusion. There is no evidence of aneurysm or vascular malformation. Some atherosclerotic disease in the carotid siphon. Evaluation of the posterior circulation demonstrates normal caliber of the vertebral arteries, basilar artery, and major branch vessels of the posterior cerebral arteries bilaterally without evidence of flow limiting stenosis or occlusion.  There is no evidence of aneurysm or vascular malformation. CT Angiography Of The Neck With Intravenous Contrast 1. No evidence of high-grade arterial stenosis or underlying dissection. 2. Bilateral upper lobe new opacities are concerning for infection. CT Angiography Of The Head With Intravenous Contrast 1. No evidence of acute thrombotic occlusion, high-grade arterial stenosis, or focal cerebral aneurysm. Signed by Dr Sara Caruso      Lab Results   Component Value Date    WBC 10.3 11/30/2021    HGB 9.3 (L) 11/30/2021    HCT 31.1 (L) 11/30/2021    .3 (H) 11/30/2021     11/30/2021     Lab Results   Component Value Date     (H) 11/30/2021    K 3.9 11/30/2021     11/30/2021    CO2 32 (H) 11/30/2021    BUN 9 11/30/2021    CREATININE 0.5 11/30/2021    GLUCOSE 138 (H) 11/30/2021    CALCIUM 8.6 (L) 11/30/2021    PROT 5.8 (L) 11/30/2021    LABALBU 2.7 (L) 11/30/2021    BILITOT 0.3 11/30/2021    ALKPHOS 77 11/30/2021    AST 22 11/30/2021    ALT 28 11/30/2021    LABGLOM >60 11/30/2021    GFRAA >59 11/30/2021    AGRATIO 1.4 05/01/2020    GLOB 3.5 02/25/2021     Lab Results   Component Value Date    INR 1.13 11/29/2021    INR 1.18 11/12/2021    INR 0.96 05/16/2019    PROTIME 14.7 (H) 11/29/2021    PROTIME 15.2 (H) 11/12/2021    PROTIME 12.2 05/16/2019       RECORD REVIEW:   Previous medical records, medications were reviewed at today's visit. Nursing/physician notes, imaging, labs and vitals reviewed. PT,OT and/or speech notes reviewed      ASSESSMENT:  76 y.o. admitted with AMS, hypoxia, pneumonia, lung cancer history, COPD, right sided weakness, slurred speech, MRI consistent with scattered bi-hemishperic strokes, likely embolic vs watershed. CTAs negative. ECHO, CD negative. EEG with slowing, nothing epileptiform. Remains intubated, exam stable / improved, more alert, following commands.       PLAN:  1. Supportive care   2. Follow Tele   3.   Continue addressing medical issues, pneumonia, respiratory failure - intubated. Pulmonology following, planing bronchoscopy today. 4.  ASA, statin, Eliquis. 5.  PT, OT, ST  6. DVT proph   7. Limit sedating medications, extubate when able, supplementing thiamine  8. Palliative care following      Please feel free to call with any questions. [phone] (cell phone).     Deja Peters DO  Board Certified Neurology Based on score patient is high fall risk

## 2022-03-15 NOTE — PHYSICAL THERAPY INITIAL EVALUATION ADULT - PERTINENT HX OF CURRENT PROBLEM, REHAB EVAL
55 YO M w/ a PMH of 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 Described as pt was "staring off" for ~ 10 minutes and not responsive, eyes rolling into the back of head.

## 2022-03-15 NOTE — PATIENT PROFILE ADULT - NSTOBACCOUNKNOWNSMK_GEN_A_CORE_RD
Physical Exam 
Musculoskeletal:  
     Legs: 
 
 
Room ER05: wound assess Left anterior lower leg vascular wound POA 
3.5x3x0.2cm, white base, moderate to large amount of serous drainage, periwound edema noted. Applied silicone dressing for absorption & healing, 4\" ace wrap to foot, 6\" ace wrap to lower leg. Will write for topical treatment for home health orders. Educated pt to obtain compression socks from [facility] to assist with leg swelling issues. Will turn over care to nursing staff at this time.  
Oswaldo PALOMINON, RN, [facility], [address] 

Cognitively Impaired

## 2022-03-15 NOTE — PROGRESS NOTE ADULT - ATTENDING COMMENTS
patient minimally verbal    O/e  right eye corneal opacification  mumbled speech, follows some commands  able to right upper extremity; left UE contracture  unable to move LE b/l    A&P    # AMS-possible breakthrough seizures  # h/o seizures  neuro following; EEG, tegretol level  tropX3- stable; tele monitoring, echo.     # Aspiration pneumonia vs pneumonitis  continue unasyn  patient has low grade fever and leucocytosis; could be reactive from seizures  continue antibiotics    # AGAP acidosis and lactic acidosis- resolved    # Pressure ulcer- buttocks- POA- burn consulted; wound care per recommendation  turn position q2  heel off   routine oral care    # 2.  Persistently distended rectosigmoid colon with stool. Otherwise no CT   evidence for acute abdominopelvic pathology.    # anemia- hg stable  monitor    # functional quadriplegia    # Htn  # CAD  # CVA    DVT px- lovenox    #Progress Note Handoff  Pending (specify):  Neuro follow up, Burn consult, advance in diet and monitoring tolerance  Disposition: SNF patient minimally verbal    O/e  right eye corneal opacification  mumbled speech, follows some commands  able to right upper extremity; left UE contracture  unable to move LE b/l    A&P    # AMS-possible breakthrough seizures  # h/o seizures  neuro following; EEG, tegretol level  tropX3- stable; tele monitoring, echo.     # Aspiration pneumonia vs pneumonitis  continue unasyn  patient has low grade fever and leucocytosis; could be reactive from seizures  continue antibiotics    # AGAP acidosis and lactic acidosis- resolved    # Pressure ulcer- buttocks- POA- burn consulted; wound care per recommendation  turn position q2  heel off   routine oral care    # Persistently distended rectosigmoid colon with stool notived in CT  abdomen soft  monitor tolerance of food  continue laxatives and monitor stool output    # anemia- hg stable  monitor    # functional quadriplegia    # Htn  # CAD  # CVA    DVT px- lovenox    #Progress Note Handoff  Pending (specify):  Neuro follow up, Burn consult, advance in diet and monitoring tolerance  Disposition: SNF

## 2022-03-15 NOTE — PHYSICAL THERAPY INITIAL EVALUATION ADULT - PERTINENT HX OF CURRENT PROBLEM, REHAB EVAL
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 Described as pt was "staring off" for ~ 10 minutes and not responsive, eyes rolling into the back of head.

## 2022-03-16 NOTE — PROGRESS NOTE ADULT - ATTENDING COMMENTS
patient minimally verbal    O/e  right eye corneal opacification  mumbled speech, follows some commands  able to right upper extremity; left UE contracture  unable to move LE b/l    A&P    # AMS-possible breakthrough seizures  # h/o seizures  neuro following; EEG, tegretol level  tropX3- stable; tele monitoring, echo.     # Aspiration pneumonia vs pneumonitis  continue unasyn  patient has low grade fever and leucocytosis; could be reactive from seizures  continue antibiotics    # AGAP acidosis and lactic acidosis- resolved    # Pressure ulcer- buttocks- POA- burn consulted; wound care per recommendation  turn position q2  heel off   routine oral care    # Persistently distended rectosigmoid colon with stool notived in CT  abdomen soft  monitor tolerance of food  continue laxatives and monitor stool output    # anemia- hg stable  monitor    # functional quadriplegia    # Htn  # CAD  # CVA    DVT px- lovenox    #Progress Note Handoff  Pending (specify):  burn and wound care consult   Disposition: SNF

## 2022-03-16 NOTE — PROGRESS NOTE ADULT - ASSESSMENT
57 yo M with PMHx of CVA x2 with residual L sided deficits, wheelchair bound, DM, HTN, Seizure Disorder brought in from NH after pt found to have been "staring off", not responding for over 10 minutes.    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

## 2022-03-16 NOTE — CHART NOTE - NSCHARTNOTEFT_GEN_A_CORE
Patients carbamazepine NON-TROUGH level was 7.8 on 200 mg carbamazepine twice a day.  Patient apparently has had several spells over past few months.    Recommend:  1.  Increase carbamazepine to 300 mg twice a day.  2.  Consider lowering or discontinuing baclofen as this can lower seizure threshold.  However this may lead to increased stiffness.   Consider outpatient botox for post stroke spasticity. Patients carbamazepine NON-TROUGH level was 7.8 on 200 mg carbamazepine twice a day.  Patient apparently has had several spells over past few months.    Recommend:  1.  Increase carbamazepine to 300 mg twice a day.  2.  Consider lowering or discontinuing baclofen as this can lower seizure threshold.  However this may lead to increased stiffness.   Consider outpatient botox for post stroke spasticity.  3.  Telemetry and supine and sitting orthostatics.

## 2022-03-17 NOTE — PROGRESS NOTE ADULT - ASSESSMENT
57 yo M with PMHx of CVA x2 with residual L sided deficits, wheelchair bound, DM, HTN, Seizure Disorder brought in from NH after pt found to have been "staring off", not responding for over 10 minutes.    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

## 2022-03-17 NOTE — DIETITIAN INITIAL EVALUATION ADULT. - PHYSCIAL ASSESSMENT
Cognition: A&Ox0, nonverbal  Skin: Pressure injures -- Sacral spine IV, L buttocks stage 2vs3? per flowsheets, coccyx stage III per WOCN  Edema: None documented  GI: Fecal incontinence

## 2022-03-17 NOTE — DISCHARGE NOTE NURSING/CASE MANAGEMENT/SOCIAL WORK - NSDCVIVACCINE_GEN_ALL_CORE_FT
No Vaccines Administered. COVID-19, mRNA, LNP-S, PF, 30 mcg/0.3 mL dose, rosina-sucrose (Pfizer); 20-Mar-2022 18:33; Felipe Yang (VALENTINO); [employer]; [id number] (Exp. Date: 30-Jun-2022); IntraMuscular; Deltoid Left.; 0.3 milliLiter(s);

## 2022-03-17 NOTE — DIETITIAN INITIAL EVALUATION ADULT. - PERTINENT MEDS FT
MEDICATIONS  (STANDING):  amoxicillin  875 milliGRAM(s)/clavulanate 1 Tablet(s) Oral every 12 hours  aspirin enteric coated 81 milliGRAM(s) Oral daily  baclofen 10 milliGRAM(s) Oral daily  carBAMazepine Suspension 300 milliGRAM(s) Oral two times a day  dextrose 5% + sodium chloride 0.45%. 1000 milliLiter(s) (75 mL/Hr) IV Continuous <Continuous>  enoxaparin Injectable 40 milliGRAM(s) SubCutaneous every 24 hours  lactulose Syrup 10 Gram(s) Oral two times a day  lisinopril 10 milliGRAM(s) Oral daily  mineral oil 30 milliLiter(s) Oral daily  polyethylene glycol 3350 17 Gram(s) Oral two times a day  senna 2 Tablet(s) Oral at bedtime  simvastatin 10 milliGRAM(s) Oral at bedtime    MEDICATIONS  (PRN):  morphine  - Injectable 2 milliGRAM(s) IV Push every 4 hours PRN Moderate Pain (4 - 6)  risperiDONE   Tablet 1 milliGRAM(s) Oral daily PRN Agitation

## 2022-03-17 NOTE — PROGRESS NOTE ADULT - ASSESSMENT
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

## 2022-03-17 NOTE — DIETITIAN INITIAL EVALUATION ADULT. - ORAL NUTRITION SUPPLEMENTS
RECOMMEND: Ensure enlive 3x/day (350kcal, 20 g pro/serving) to optimize nutrition status RECOMMEND: Ensure Enlive 3x/day (350kcal, 20 g pro/serving) to optimize nutrition status

## 2022-03-17 NOTE — DISCHARGE NOTE NURSING/CASE MANAGEMENT/SOCIAL WORK - NSDCPEFALRISK_GEN_ALL_CORE
For information on Fall & Injury Prevention, visit: [email or web address] OR  [email or web address] OR  https://www.cdc.gov/steadi/patient.html

## 2022-03-17 NOTE — CONSULT NOTE ADULT - ASSESSMENT
57 YO M w/ a PMH of CVA x 2 (w/ residual left sided deficits; wheelchair bound), DM2, HTN, and SZ disorder on tegretol presented s/p an episode of unresponsiveness. Described as pt was "staring off" for ~ 10 minutes and not responsive, with eye rolling followed by lethargy. As per sister at bedside patient has had few episodes in the past 2 months.  Reports that patient needs help with all ADLs and she gives him all his routine medications daily. He says Yes and No at baseline and is alert and awake. Currently at baseline.    Labs and Imaging so far:  CTH : Negative for acute findings  CT chest  tree in bud opacification pattern potentially compatible with aspiration. Pt given ABX (Ceftr/Azithro/Flagyl) by ED,   labs sig for lactate 4 > 2.6 after 1L LR bolus,  WBC 16.5K w/neutrophilic predominance, hgb 11.4 (higher than baseline ) AG 16, Na+Cl low, AlkPhos 200, ALT 46.      #Breakthrough Seizure in the setting of infection      Plan   - Routine EEG  - Tegretol level in am  - Continue carbamazepine at home dose 200mg q12hrs for now  - Seizure precautions  - Ativan 2mg IV PRN for generalized tonic-clonic seizure lasting longer than 2 minutes, or two consecutive seizures without return to baseline in-between  - Keep Mg above 2.0.  - Medical management as per primary team  - Will follow      
Patient is a 55 YO M w/ a PMH of 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 Seen by Burn for sacral wound.     - No surgical intervention needed  - Local wound care: please wash wound with soap and water. Apply santyl, Dakin WTD, cover with ABD pad and secure with tape.   - Offloading/Positioning

## 2022-03-17 NOTE — DISCHARGE NOTE NURSING/CASE MANAGEMENT/SOCIAL WORK - PATIENT PORTAL LINK FT
You can access the FollowMyHealth Patient Portal offered by [facility] by registering at the following website: [email or web address]. By joining [facility]’s FollowMyHealth portal, you will also be able to view your health information using other applications (apps) compatible with our system.

## 2022-03-17 NOTE — CDI QUERY NOTE - NSCDIOTHERTXTBX_GEN_ALL_CORE_HH
-----------------------------------------------------------------------------------------------------------------------------------    56 M with Diagnosis:   Aspiration Pneumonia, Sacral Decubitus Ulcer Stg lV, Lactic Acidosis  Clinical Indicators:   BMI=  15.2,  Wt= 118lbs    Ht=6'2"  Documentation :  3/17/2022:  Upon Nutritional Assessment by the Registered Dietitian Your Patient was Determined to Meet Criteria/Has Evidence of the Following Diagnosis:	   Severe protein-calorie malnutrition  Other Risk Factors:  	Underweight (BMI < 19)  Etiology-Basis	:               Chronic illness  Malnutrition Evaluation as Demonstrated by (Adults > 20 years of age):  Loss of subcutaneous fat...  Loss of muscle...  Body Fat (loss of subcutaneous fat)	Orbital...Orbital Depletion is	severe  Muscle Mass (Loss of Muscle)	Temples...   Temples Depletion is	severe  Nutrition Recommendations:   Continue Current Nutrition Care Plan:  CONTINUE: NPO pending follow up speech evaluation. As able advance diet texture per SLP. Add low sodium modifier with diet advancement  · Oral Nutrition Supplements	RECOMMEND: Ensure Enlive 3x/day (350kcal, 20 g pro/serving) to optimize nutrition status  · Feeding Assistance: 	1:1 feeding assistance required  · RD to monitor diet order, energy intake, food and nutrient intake, body composition, weight  · Additional Recommendations	1. RECOMMEND: Add MVI daily, vitamin C 500 mg q 12 hrs, ZnS04- 220 mg daily x 10 days for wound healing 2. CONTINUE: Bowel regimen 3. FOLLOW up and obtain subjective information as able    Based on your professional judgment and clinical indicators, can the Registered Dietitian's evaluation be further specified as:       --- Severe protein-calorie malnutrition   --- Other (please specify)  --- Clinically unable to determine

## 2022-03-17 NOTE — DIETITIAN INITIAL EVALUATION ADULT. - OTHER INFO
MEDICAL:  57 yo M with PMHx of CVA x2 with residual L sided deficits, wheelchair bound, DM, HTN, Seizure Disorder brought in from NH after pt found to have been "staring off", not responding for over 10 minutes.  #Breakthrough Seizure likely due to underlying infection  #HAGMA due to lactic acidosis - resolved  #Aspiration PNA  #Hx of Chronic rectosigmoid dilation, constipation  - C/w bowel regimen    NUTRITION PTA:  -Attempted to contact emergency # Nicole ([phone]) unable to get through  -In Jan 2022 during previous admission patient was on puree with moderate thick fluids, ensure enlive 3x/day  -No known food allergies    NUTRITION IN-HOUSE:  -SLP evaluation 3/15 --> recommendations for minced & moist with mild thickened liquids -- suspected pharyngeal dysphagia with small sips of thin liquids. Mild oral dysphagia with minced & moist, + tolerance for puree, minced & moist w/ mildly thick liquids w/o overt s/s of aspiration/penetration.  -This morning patient was coughing with PO intake and was made NPO due to concern for aspiration. Yesterday consumed % of meal trays per documentation  -Patient requires 1:1 feeding assistance    WEIGHTS:  53.7kg --> Current admit wt 3/16  56.2kg --> 1/29/22  66.9kg --> prev documented weight 1/22/20  Overall 19.7% wt loss within 2 years, most recently with 4.4% wt loss x1tdlbwk (clinically insignificant)

## 2022-03-17 NOTE — CONSULT NOTE ADULT - SUBJECTIVE AND OBJECTIVE BOX
Patient is a 55 YO M w/ a PMH of 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 Described as pt was "staring off" for ~ 10 minutes and not responsive, eyes rolling into the back of head. Unable to obtain ROS.     PAST MEDICAL & SURGICAL HISTORY:  - CVA (cerebral vascular accident) 1980&#x27;s x2   - Residual left sided weakness  - CAD (coronary artery disease)  - Retinal detachment  - Hypertension  - High cholesterol  - Diabetes  - Anxiety  - Seizure  - History of corneal transplant, right , 4/13/17    Vital Signs Last 24 Hrs  T(C): 36.6 (17 Mar 2022 12:31), Max: 36.7 (16 Mar 2022 19:50)  T(F): 97.9 (17 Mar 2022 12:31), Max: 98.1 (16 Mar 2022 19:50)  HR: 61 (17 Mar 2022 12:31) (61 - 86)  BP: 148/66 (17 Mar 2022 12:31) (148/66 - 181/90)  RR: 18 (17 Mar 2022 12:31) (18 - 18)    PHYSICAL EXAM:  GENERAL: Patient lying in bed in NAD, well-developed  LUNG: b/l chest rise appreciated, breathing normally on room air  HEART: In no cardiopulmonary distress  SKIN:   Sacrum: ~5cm x 4cm full thickness wound with some pink granulation tissue. Yellow necrotic tissue centrally located. Bone exposed and some undermining from12 to 3 o'clock No active bleeding, malodor or purulence noted. 
CC:Unresponsiveness      HPI:   55 YO M w/ a PMH of CVA x 2 (w/ residual left sided deficits; wheelchair bound), DM2, HTN, and SZ disorder on tegretol presented s/p an episode of unresponsiveness. Described as pt was "staring off" for ~ 10 minutes and not responsive, with eye rolling. As per sister at bedside patient has had few episodes in the past 2 months.  Reports that patient needs help with all ADLs and she gives him all his routine medications daily.    ED COURSE: imaging revealed CT a/p large stool burden and colonic dilation (which is chornic/stable) and   CT chest  tree in bud opacification pattern potentially compatible with aspiration.   Pt given ABX (Ceftr/Azithro/Flagyl) by ED,   labs sig for lactate 4 > 2.6 after 1L LR bolus, K 5.4 (grossly hemolyzed) 4.1 on VBG thereafter, WBC 16.5K w/neutrophilic predominance, hgb 11.4 (higher than baseline ) AG 16, Na+Cl low, AlkPhos 200, ALT 46.           Home Medications:  Aspirin 81 oral delayed release tablet: 1 tab(s) orally once a day (28 Jan 2022 15:48)  Baclofen 10 mg oral tablet: 1 tab(s) orally once a day (28 Jan 2022 15:48)  RisperiDONE 1 mg oral tablet: 1 tab(s) orally once a day, As Needed for agitation (28 Jan 2022 15:48)  Simvastatin 10 mg oral tablet: 1 tab(s) orally once a day (at bedtime) (28 Jan 2022 15:48)      Social History:  Family denies h/o smoking alcohol or drug use    Neuro Exam:  Orientation: Patient is awake and alert, not verbal, following simple commands.   Cranial Nerves: Dysconjugate gaze, Not verbal, no obvious facial asymmetry  Motor: RUE 3/5   RLE 3/5              LUE  0/5  (Contracted)  LLE  (0/5)  left foot drop             Spastic tone throughout             No abnormal involuntary mvmts  Sensory exam: Responds to pain on the right side  Coordination:. SIM   Gait: unable     NIHSS:       Allergies    No Known Allergies    Intolerances      MEDICATIONS  (STANDING):  ampicillin/sulbactam  IVPB 3 Gram(s) IV Intermittent every 6 hours  aspirin enteric coated 81 milliGRAM(s) Oral daily  baclofen 10 milliGRAM(s) Oral daily  carBAMazepine Suspension 200 milliGRAM(s) Oral every 12 hours  enoxaparin Injectable 40 milliGRAM(s) SubCutaneous every 24 hours  lactulose Syrup 10 Gram(s) Oral two times a day  lisinopril 10 milliGRAM(s) Oral daily  mineral oil 30 milliLiter(s) Oral daily  polyethylene glycol 3350 17 Gram(s) Oral two times a day  senna 2 Tablet(s) Oral at bedtime  simvastatin 10 milliGRAM(s) Oral at bedtime      MEDICATIONS  (PRN):  morphine  - Injectable 2 milliGRAM(s) IV Push every 4 hours PRN Moderate Pain (4 - 6)  risperiDONE   Tablet 1 milliGRAM(s) Oral daily PRN Agitation      LABS:                        11.4   16.57 )-----------( 276      ( 14 Mar 2022 13:38 )             35.1     03-14    131<L>  |  93<L>  |  13  ----------------------------<  106<H>  5.6<H>   |  22  |  0.5<L>    Ca    8.9      14 Mar 2022 13:38    TPro  8.2<H>  /  Alb  3.8  /  TBili  0.2  /  DBili  x   /  AST  46<H>  /  ALT  38  /  AlkPhos  200<H>  03-14    COVID-19 PCR . (03.14.22 @ 18:03)   COVID-19 PCR: NotDetec: Methodology:         Neuro Imaging:  < from: CT Head No Cont (03.14.22 @ 15:01) >  Findings:    The study is motion limited.    There is stable mild ventricular prominence superimposed upon a mild   degree of parenchymal volume loss.    There are stable chronic lacunar infarcts within the left corona radiata   and internal capsule.    There is no acute intracranial hemorrhage, extra-axial fluid collection   or midline shift.  Gray-white matter differentiation is maintained.    The visualized paranasal sinuses are clear. There is sclerosis and/or   underpneumatization of the mastoid complexes. Soft tissue opacity within   the left external auditory canal likely reflecting cerumen.    IMPRESSION:    Motion limitedstudy. No evidence of acute intracranial pathology. Stable   exam since 1/28/2022.      --- End of Report ---        SOFI MILLER MD; Attending Radiologist  This document has been electronically signed. Mar 14 2022  3:05PM    < end of copied text >    EEG:     Echo:   Carotid Doppler: N/A  Telemetry:

## 2022-03-17 NOTE — DIETITIAN INITIAL EVALUATION ADULT. - PERTINENT LABORATORY DATA
3/17  WBC:14.39 (H)  RBC: 2.89 (L)  Hgb: 8.6 (L)  Hct: 26.4 (L)  POCT Glucosse: 95 (WNL)    3/15  Vitamin B12: 1479 (H)  A1c: 5.1  TSH:1.49 (WNL)  Phosphorus: 3.0 (WNL)

## 2022-03-17 NOTE — DIETITIAN INITIAL EVALUATION ADULT. - OTHER CALCULATIONS
using 53.7kg -- ENERGY: 1717-2146kcal/day (MSJ x1.2-1.5) PROTEIN: 64-81g/day (1.2-1.5g/kg) FLUIDS: 1611mL/day (30mL/kg) with consideration for underweight BMI, severe depletions, wound healing

## 2022-03-17 NOTE — DIETITIAN INITIAL EVALUATION ADULT. - ADD RECOMMEND
1. RECOMMEND: Add MVI daily, vitamin C 500 mg q 12 hrs, ZnS04- 220 mg daily x 10 days for wound healing 2. CONTINUE: Bowel regimen 3. FOLLOW up and obtain subjective information as able

## 2022-03-17 NOTE — DIETITIAN INITIAL EVALUATION ADULT. - CONTINUE CURRENT NUTRITION CARE PLAN
CONTINUE: NPO pending follow up speech evaluation. As able advance diet texture per SLP CONTINUE: NPO pending follow up speech evaluation. As able advance diet texture per SLP. Add low sodium modifier with diet advancement

## 2022-03-18 NOTE — DISCHARGE NOTE PROVIDER - CARE PROVIDERS DIRECT ADDRESSES
[email or web address]

## 2022-03-18 NOTE — DISCHARGE NOTE PROVIDER - HOSPITAL COURSE
HPI:  HPI: 55 YO M w/ a PMH of 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 Described as pt was "staring off" for ~ 10 minutes and not responsive, eyes rolling into the back of head. Unable to obtain ROS.     ED COURSE: imaging revealed CT a/p large stool burden and colonic dilation (which is chornic/stable) and CT chest  tree in bud opacification pattern potentially compatible with aspiration. Pt given ABX (Ceftr/Azithro/Flagyl) by ED, labs sig for lactate 4 > 2.6 after 1L LR bolus, K 5.4 (grossly hemolyzed) 4.1 on VBG thereafter, WBC 16.5K w/neutrophilic predominance, hgb 11.4 (higher than baseline ) AG 16, Na+Cl low, AlkPhos 200, ALT 46.     ED VITALS:  on arrival, /104, T98.1.  (14 Mar 2022 19:06)    #Breakthrough Seizure likely due to underlying infection  #HAGMA due to lactic acidosis - resolved  #Aspiration PNA  - CT Head unremarkable;  REEG - generalized slowing  - Neurology onboard ---- Now on Carbamezapine 300 mg BID  - TTT EF 55-60%; non-valvular pathalogy  - Now off telemonitoring  - s/p Unasyn now on Augmentin 875mg Q12  - S&S modify diet. Now Puree w/mildly thicken liquids. ----- D/c IVF  - PT for discharge planning      #Hx of CVA x2 w/ residual deficits   #Fxn Quad and Wheelchair bound  #Stage III DTI to L Buttock  - Burn team c/sv--- bedside debridement of sacral Ulcer -- follow wound care recs  - Frequent positioning     #HTN  #Hx of CAD  - C/w Lisinopril  - C/w ASA, statin    #DMII - A1c 5%    #Hx of Chronic rectosigmoid dilation, constipation  - C/w bowel regimen   57 YO M w/ a PMH of 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 Described as pt was "staring off" for ~ 10 minutes and not responsive, eyes rolling into the back of head. Unable to obtain ROS.     Imaging revealed CT a/p large stool burden and colonic dilation (which is chronic/stable) and CT chest  tree in bud opacification pattern potentially compatible with aspiration. Pt given ABX (Ceftr/Azithro/Flagyl) by ED, labs sig for lactate 4 > 2.6 after 1L LR bolus, K 5.4 (grossly hemolyzed) 4.1 on VBG; thereafter, WBC 16.5K w/neutrophilic predominance, hgb 11.4 (higher than baseline ) AG 16, Na+Cl low, AlkPhos 200, ALT 46.     ED VITALS:  on arrival, /104, T98.1.  (14 Mar 2022 19:06)    #Breakthrough Seizure likely due to underlying infection  #HAGMA due to lactic acidosis - resolved  #Aspiration PNA  - CT Head unremarkable;  REEG - generalized slowing  - Neurology onboard and they increased carbamezapine to 300 mg BID  - TTT EF 55-60%; non-valvular pathology  - s/p Unasyn and patient is now on Augmentin 875mg Q12  - S&S modify diet and he is now Puree w/mildly thicken liquids which he should continue at home      #Hx of CVA x2 w/ residual deficits   #Fxn Quad and Wheelchair bound  #Stage III DTI to L Buttock  - Burn team c/sv--- bedside debridement of sacral Ulcer -- follow wound care recs, with frequent positioning     #HTN  #Hx of CAD  - C/w Lisinopril  - C/w ASA, statin    #DMII - A1c 5%    #Hx of Chronic rectosigmoid dilation, constipation  - C/w bowel regimen

## 2022-03-18 NOTE — PROGRESS NOTE ADULT - ASSESSMENT
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    #Breakthrough Seizure likely due to underlying infection  #HAGMA due to lactic acidosis - resolved  #Aspiration PNA  - CT Head unremarkable;  REEG - generalized slowing  - Neurology onboard ---- Increase Carbamezapine to 300 mg BID  - TTT EF 55-60%; non-valvular pathalogy  - s/p PT and S+S evaluation  - s/p Unasyn now on Augmentin 875mg Q12  -  speech and swallow- eval noted.     #Hx of CVA x2 w/ residual deficits   #Fxn Quad and Wheelchair bound  #Stage III DTI to L Buttock  - Burn team c/sv--- pending  - Frequent positioning     #HTN  #Hx of CAD  - C/w Lisinopril  - C/w ASA, statin    #DMII - A1c 5%    #Hx of Chronic rectosigmoid dilation, constipation  - C/w bowel regimen    DVT ppx: Lovenox SC  Diet: NPO x/meds for now pending speech reassessment  Activity: High fall Risk -- IAT  Full Code    D/C Planning.   #Progress Note Handoff  Pending (specify):  Family discussion:  Disposition: Home with CDPAP  55 yo M with PMHx of CVA x2 with residual L sided deficits, wheelchair bound, DM, HTN, Seizure Disorder brought in from NH after pt found to have been "staring off", not responding for over 10 minutes.    #Breakthrough Seizure likely due to underlying infection  #HAGMA due to lactic acidosis - resolved  #Aspiration PNA  - CT Head unremarkable;  REEG - generalized slowing  - Neurology onboard ---- Increase Carbamezapine to 300 mg BID  - TTT EF 55-60%; non-valvular pathalogy  - s/p PT and S+S evaluation  - s/p Unasyn now on Augmentin 875mg Q12  -  speech and swallow- eval noted.     #Hx of CVA x2 w/ residual deficits   #Fxn Quad and Wheelchair bound  #Stage III DTI to L Buttock  - Burn team c/sv--- pending  - Frequent positioning     # BMI 15.2 >> Severe Protein Calorie Malnutrition.     #HTN  #Hx of CAD  - C/w Lisinopril  - C/w ASA, statin    #DMII - A1c 5%    #Hx of Chronic rectosigmoid dilation, constipation  - C/w bowel regimen    DVT ppx: Lovenox SC  Diet: NPO x/meds for now pending speech reassessment  Activity: High fall Risk -- IAT  Full Code    D/C Planning.   #Progress Note Handoff  Pending (specify):  Family discussion:  Disposition: Home with CDPAP

## 2022-03-18 NOTE — DISCHARGE NOTE PROVIDER - NSDCCPTREATMENT_GEN_ALL_CORE_FT
PRINCIPAL PROCEDURE  Procedure: Intramuscular administration of first dose of rosina-sucrose formulation of Pfizer-Naviswiss COVID-19 vaccine  Findings and Treatment: You recieved your first dose of the COVID 19 Pfizer vaccine. Please follow up with your primary care provide to schedule for your 2nd dose of the Pfizer vaccine.

## 2022-03-18 NOTE — CHART NOTE - NSCHARTNOTEFT_GEN_A_CORE
Spoke to sister over phone in length. Sister expressed that Pts seizures are only triggered when he visits the clinic. Sister expressed that Pt was seen by psychotherapist as he is unable to verbalized emotions. Will discussed w/ Neurology team that Pt may need anxiolytic prior to clinic appointments.

## 2022-03-18 NOTE — PROGRESS NOTE ADULT - ASSESSMENT
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    #Breakthrough Seizure likely due to underlying infection  #HAGMA due to lactic acidosis - resolved  #Aspiration PNA  - CT Head unremarkable;  REEG - generalized slowing  - Neurology onboard ---- Now on Carbamezapine 300 mg BID  - TTT EF 55-60%; non-valvular pathalogy  - Now off telemonitoring  - s/p Unasyn now on Augmentin 875mg Q12  - S&S modify diet. Now Puree w/mildly thicken liquids. ----- D/c IVF  - PT for discharge planning      #Hx of CVA x2 w/ residual deficits   #Fxn Quad and Wheelchair bound  #Stage III DTI to L Buttock  - Burn team c/sv--- bedside debridement of sacral Ulcer -- follow wound care recs  - Frequent positioning     #HTN  #Hx of CAD  - C/w Lisinopril  - C/w ASA, statin    #DMII - A1c 5%    #Hx of Chronic rectosigmoid dilation, constipation  - C/w bowel regimen    DVT ppx: Lovenox SC  Diet: NPO x/meds for now pending speech reassessment  Activity: High fall Risk -- IAT  Full Code  Dispo: Acute

## 2022-03-18 NOTE — DISCHARGE NOTE PROVIDER - PROVIDER TOKENS
PROVIDER:[TOKEN:[[id number]],FOLLOWUP:[1 month],ESTABLISHEDPATIENT:[T]] PROVIDER:[TOKEN:[[id number]],FOLLOWUP:[1 month],ESTABLISHEDPATIENT:[T]],PROVIDER:[TOKEN:[[id number]],FOLLOWUP:[2 weeks],ESTABLISHEDPATIENT:[T]]

## 2022-03-18 NOTE — DISCHARGE NOTE PROVIDER - CARE PROVIDER_API CALL
Dominik Menon)  Neurology  [address] [place] [address], [address]  [place] [address]  Phone: [phone]  Fax: [phone]  Established Patient  Follow Up Time: 1 month   Dominik Menon)  Neurology  [address] [facility], [address]  [place] [address]  Phone: [phone]  Fax: [phone]  Established Patient  Follow Up Time: 1 month    Armando Ramos)  Internal Medicine  [address] [facility], [address] [place] [address]  Phone: [phone]  Fax: [phone]  Established Patient  Follow Up Time: 2 weeks

## 2022-03-18 NOTE — DISCHARGE NOTE PROVIDER - NSDCMRMEDTOKEN_GEN_ALL_CORE_FT
Aspir 81 oral delayed release tablet: 1 tab(s) orally once a day  baclofen 10 mg oral tablet: 1 tab(s) orally once a day  carBAMazepine 100 mg/5 mL oral suspension: 10 milliliter(s) orally every 12 hours   lactulose 10 g/15 mL oral syrup: 15 milliliter(s) orally 2 times a day  lisinopril 10 mg oral tablet: 1 tab(s) orally once a day   mineral oil oral liquid: 30 milliliter(s) orally once a day  polyethylene glycol 3350 oral powder for reconstitution: 17 gram(s) orally 2 times a day  risperiDONE 1 mg oral tablet: 1 tab(s) orally once a day, As Needed for agitation  senna oral tablet: 2 tab(s) orally once a day (at bedtime)  simvastatin 10 mg oral tablet: 1 tab(s) orally once a day (at bedtime)   Aspir 81 oral delayed release tablet: 1 tab(s) orally once a day  baclofen 10 mg oral tablet: 1 tab(s) orally once a day  carBAMazepine 100 mg/5 mL oral suspension: 10 milliliter(s) orally every 12 hours   collagenase 250 units/g topical ointment: 1 application topically 2 times a day  lactulose 10 g/15 mL oral syrup: 15 milliliter(s) orally 2 times a day  lisinopril 10 mg oral tablet: 1 tab(s) orally once a day   mineral oil oral liquid: 30 milliliter(s) orally once a day  polyethylene glycol 3350 oral powder for reconstitution: 17 gram(s) orally 2 times a day  risperiDONE 1 mg oral tablet: 1 tab(s) orally once a day, As Needed for agitation  senna oral tablet: 2 tab(s) orally once a day (at bedtime)  simvastatin 10 mg oral tablet: 1 tab(s) orally once a day (at bedtime)  sodium hypochlorite 0.25% topical solution: 1 application topically 2 times a day   Adult Aspirin Regimen 81 mg oral delayed release tablet: 1 tab(s) orally once a day  amoxicillin-clavulanate 875 mg-125 mg oral tablet: 1 tab(s) orally every 12 hours  baclofen 10 mg oral tablet: 1 tab(s) orally once a day  collagenase 250 units/g topical ointment: 1 application topically 2 times a day  lactulose 10 g/15 mL oral syrup: 15 milliliter(s) orally 2 times a day  lisinopril 10 mg oral tablet: 1 tab(s) orally once a day   mineral oil oral liquid: 30 milliliter(s) orally once a day  polyethylene glycol 3350 oral powder for reconstitution: 17 gram(s) orally 2 times a day  RisperDAL 1 mg oral tablet: 1 tab(s) orally once a day, As needed, Agitation  senna oral tablet: 2 tab(s) orally once a day (at bedtime)  simvastatin 10 mg oral tablet: 1 tab(s) orally once a day (at bedtime)  sodium hypochlorite 0.25% topical solution: 1 application topically 2 times a day  TEGretol 100 mg/5 mL oral suspension: 15 milliliter(s) orally 2 times a day

## 2022-03-18 NOTE — DISCHARGE NOTE PROVIDER - NSDCFUADDINST_GEN_ALL_CORE_FT
Sacral Wound care: please wash wound with soap and water. Apply santyl, Dakin WTD, cover with ABD pad and secure with tape. - Offloading/Positioning     Sacral Wound care: please wash wound with soap and water. Apply santyl, Dakin WTD, cover with ABD pad and secure with tape. - Offloading/Positioning    You received your first dose of the Pfizer vaccine during this hospital visit. Please make an appointment with your care provider for the second dose or go to nearest vaccination site.

## 2022-03-18 NOTE — DISCHARGE NOTE PROVIDER - NSDCFUADDAPPT_GEN_ALL_CORE_FT
We recommend scheduling video appointments with your care providers as much as possible to prevent triggers to social episodic seizures.

## 2022-03-18 NOTE — DISCHARGE NOTE PROVIDER - NSDCCPCAREPLAN_GEN_ALL_CORE_FT
PRINCIPAL DISCHARGE DIAGNOSIS  Diagnosis: Provoked seizures  Assessment and Plan of Treatment: You experienced a seizure episode most likely triggered by you clinic environment. You antiseizure medication was low which may you very susceptible to an acute siezure event. You are also on a medication to help relaxed your muscles which also carries the sideffect of lowering the seizure episode which also put at risk of seizure activity. You were seen by a neurologist who observed bor any brain patholgy as well as evaluate your brain reactivity. Your antisiezure medication was increased as your levels were low. Please continue to take your medicationa s prescribed. Please follow up with the neurologist for further manangement of your seizure disorder.      SECONDARY DISCHARGE DIAGNOSES  Diagnosis: Pneumonia, aspiration  Assessment and Plan of Treatment: Your hospital course was complicated by an acute infection of your lungs possible due to you having difficulty clearing your airway appropriately as well as mild to moderate odynophagia/dysphagia. You were treated with antibiotics for your acute lung infection. Please return to the ED if you developed a fever with cough and difficulty breathing.    Diagnosis: Dysphagia  Assessment and Plan of Treatment: You experienced some difficulty swallowing. This was most likely to your recent seeizure episode or a long term complication of your past stroke. You were seen by the speech and swallow speciatly team who reccomends a pureed diet with mildly thicken liquids for you. this will help appropriate movement of food down you throat and reduce your chance of aspiration.

## 2022-03-18 NOTE — DISCHARGE NOTE PROVIDER - NSDCHHNEEDSERVICE_GEN_ALL_CORE
Catheter insertion/Medication teaching and assessment/Rehabilitation services/Wound care and assessment

## 2022-03-19 NOTE — PROGRESS NOTE ADULT - ASSESSMENT
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    #Breakthrough Seizure likely due to underlying infection  #HAGMA due to lactic acidosis - resolved  #Aspiration PNA  - CT Head unremarkable;  REEG - generalized slowing  - Neurology onboard ---- Increase Carbamezapine to 300 mg BID  - TTT EF 55-60%; non-valvular pathalogy  - s/p PT and S+S evaluation  - s/p Unasyn now on Augmentin 875mg Q12  -  speech and swallow- eval noted.     #Hx of CVA x2 w/ residual deficits   #Fxn Quad and Wheelchair bound  #Stage III DTI to L Buttock  - Burn team c/sv--- pending  - Frequent positioning     # BMI 15.2 >> Severe Protein Calorie Malnutrition.     #HTN  #Hx of CAD  - C/w Lisinopril  - C/w ASA, statin    #DMII - A1c 5%    #Hx of Chronic rectosigmoid dilation, constipation  - C/w bowel regimen    DVT ppx: Lovenox SC  Diet: NPO x/meds for now pending speech reassessment  Activity: High fall Risk -- IAT  Full Code    D/C Planning.   #Progress Note Handoff  Pending (specify):  Family discussion:  Disposition: Home with CDPAP

## 2022-03-20 NOTE — PROGRESS NOTE ADULT - ASSESSMENT
57 yo M with PMHx of CVA x2 with residual L sided deficits, wheelchair bound, DM, HTN, Seizure Disorder brought in from NH after pt found to have been "staring off", not responding for over 10 minutes.    #Breakthrough Seizure likely due to underlying infection  #HAGMA due to lactic acidosis - resolved  #Aspiration PNA  - CT Head unremarkable;  REEG - generalized slowing  - Neurology onboard ---- Increase Carbamezapine to 300 mg BID  - TTT EF 55-60%; non-valvular pathology  - s/p PT and S+S evaluation  - s/p Unasyn now on Augmentin 875mg Q12  -  speech and swallow- eval noted.  - Awaiting final Neuro recs re:  future breakthrough sz.     #Hx of CVA x2 w/ residual deficits   #Fxn Quad and Wheelchair bound  #Stage III DTI to L Buttock  - Burn team c/sv--- pending  - Frequent positioning     # BMI 15.2 >> Severe Protein Calorie Malnutrition.     #HTN  #Hx of CAD  - C/w Lisinopril  - C/w ASA, statin    #DMII - A1c 5%    #Hx of Chronic rectosigmoid dilation, constipation  - C/w bowel regimen    DVT ppx: Lovenox SC  Diet: NPO x/meds for now pending speech reassessment  Activity: High fall Risk -- IAT  Full Code    D/C Planning.   #Progress Note Handoff  Pending (specify): Neuro recs   Family discussion:  Disposition: Home with CDPAP

## 2022-03-21 NOTE — PROGRESS NOTE ADULT - ASSESSMENT
55 yo M with PMHx of CVA x2 with residual L sided deficits, wheelchair bound, DM, HTN, Seizure Disorder brought in from NH after pt found to have been "staring off", not responding for over 10 minutes, might have been due to breakthrough seizure with associated aspiration pneumonia.    #Breakthrough Seizure likely due to underlying infection  #HAGMA due to lactic acidosis - resolved  #Aspiration PNA  - CT Head unremarkable;  REEG - generalized slowing  - Neurology onboard ---- Increased Carbamezapine to 300 mg BID  - TTT EF 55-60%; non-valvular pathology  - s/p PT and S+S evaluation: pureed, mildly thick meals  - s/p Unasyn now on Augmentin 875mg Q12  - Awaiting final Neuro recs re:  future breakthrough sz.     #Hx of CVA x2 w/ residual deficits   #Fxn Quad and Wheelchair bound  #Stage III DTI to L Buttock  - Burn team c/sv--- pending  - Frequent positioning     # BMI 15.2 >> Severe Protein Calorie Malnutrition; we consulted dietary again to discuss enteral supplementation    #HTN  #Hx of CAD  - C/w Lisinopril  - C/w ASA, statin    #DMII - A1c 5%    #Hx of Chronic rectosigmoid dilation, constipation  - C/w bowel regimen    DVT ppx: Lovenox SC  Diet: Pureed, mildly thick meals  Activity: High fall Risk -- IAT  Full Code

## 2022-03-21 NOTE — SWALLOW BEDSIDE ASSESSMENT ADULT - NS SPL SWALLOW CLINIC TRIAL FT
Suspected pharyngeal dysphagia with small sips of thin liquids. Mild oral dysphagia with minced & moist, + tolerance for puree, minced & moist w/ mildly thick liquids w/o overt s/s of aspiration/penetration.
moderate oral dysphagia  +cough w/thins
moderate oral dysphagia

## 2022-03-21 NOTE — PROGRESS NOTE ADULT - PROVIDER SPECIALTY LIST ADULT
Hospitalist
Internal Medicine
Internal Medicine
Hospitalist
Internal Medicine
Internal Medicine
Hospitalist
Internal Medicine

## 2022-03-21 NOTE — PROGRESS NOTE ADULT - ASSESSMENT
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    #Breakthrough Seizure likely due to underlying infection  #HAGMA due to lactic acidosis - resolved  #Aspiration PNA  - CT Head unremarkable;  REEG - generalized slowing  - Neurology onboard ---- Increase Carbamezapine to 300 mg BID  - TTT EF 55-60%; non-valvular pathology  - s/p PT and S+S evaluation  - s/p Unasyn now on Augmentin 875mg Q12  -  speech and swallow- eval noted.  - Awaiting final Neuro recs re:  future breakthrough sz.     #Hx of CVA x2 w/ residual deficits   #Fxn Quad and Wheelchair bound  #Stage III DTI to L Buttock  - Burn team c/sv--- pending  - Frequent positioning     # BMI 15.2 >> Severe Protein Calorie Malnutrition.     #HTN  #Hx of CAD  - C/w Lisinopril  - C/w ASA, statin    #DMII - A1c 5%    #Hx of Chronic rectosigmoid dilation, constipation  - C/w bowel regimen    DVT ppx: Lovenox SC  Diet: NPO x/meds for now pending speech reassessment  Activity: High fall Risk -- IAT  Full Code    D/C Planning.   #Progress Note Handoff  Pending (specify): Home care referral   Family discussion:  Disposition: Home with CDPAP

## 2022-03-21 NOTE — SWALLOW BEDSIDE ASSESSMENT ADULT - SWALLOW EVAL: DIAGNOSIS
moderate oral dysphagia for puree, mildly thick w/no overt s/s of aspiration/penetration
moderate oral dysphagia for puree, mildly thick w/no overt s/s of aspiration/penetration
Suspected pharyngeal dysphagia with small sips of thin liquids. Mild oral dysphagia with minced & moist, + tolerance for puree, minced & moist w/ mildly thick liquids w/o overt s/s of aspiration/penetration.

## 2022-03-21 NOTE — SWALLOW BEDSIDE ASSESSMENT ADULT - DIET PRIOR TO ADMI
minced & moist, milldly thick
Minced/ground diet with thick liquids as per Pt's sister.
minced & moist, milldly thick

## 2022-03-21 NOTE — SWALLOW BEDSIDE ASSESSMENT ADULT - ORAL PHASE
Decreased anterior-posterior movement of the bolus
Delayed oral transit time
Delayed oral transit time

## 2022-03-21 NOTE — SWALLOW BEDSIDE ASSESSMENT ADULT - NS ASR SWALLOW FINDINGS DISCUS
MD Fernández/Physician/Nursing
MD/Physician/Nursing
VALENTINO Garduno MD made aware./Physician/Nursing

## 2022-03-21 NOTE — SWALLOW BEDSIDE ASSESSMENT ADULT - SLP PERTINENT HISTORY OF CURRENT PROBLEM
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 Described as pt was "staring off" for ~ 10 minutes and not responsive, eyes rolling into the back of head.
55 YO M w/ a PMH of 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 Described as pt was "staring off" for ~ 10 minutes and not responsive, eyes rolling into the back of head.
56 y.o Male w/ a PMH of CVA x 2 (w/ residual left sided deficits; wheelchair bound), DM2, HTN, and Seizure disorder on tegretol presented s/p an episode of unresponsiveness. CTH : Negative for acute findings. CT chest  tree in bud opacification pattern potentially compatible with aspiration.

## 2022-03-21 NOTE — PROGRESS NOTE ADULT - NUTRITIONAL ASSESSMENT
This patient has been assessed with a concern for Malnutrition and has been determined to have a diagnosis/diagnoses of Severe protein-calorie malnutrition and Underweight (BMI < 19).    This patient is being managed with:   Diet Pureed-  Mildly Thick Liquids (MILDTHICKLIQS)  Entered: Mar 18 2022 10:24AM      This patient has been assessed with a concern for Malnutrition and has been determined to have a diagnosis/diagnoses of Severe protein-calorie malnutrition and Underweight (BMI < 19).    This patient is being managed with:   Diet Pureed-  Mildly Thick Liquids (MILDTHICKLIQS)  Entered: Mar 18 2022 10:24AM    
This patient has been assessed with a concern for Malnutrition and has been determined to have a diagnosis/diagnoses of Severe protein-calorie malnutrition and Underweight (BMI < 19).    This patient is being managed with:   Diet Pureed-  Mildly Thick Liquids (MILDTHICKLIQS)  Entered: Mar 18 2022 10:24AM    

## 2022-03-21 NOTE — PROGRESS NOTE ADULT - SUBJECTIVE AND OBJECTIVE BOX
Hospital Day:  4d    Subjective: Patient is a 56y old  Male who presents with a chief complaint of AMS (17 Mar 2022 19:17)      Pt seen and evaluated at bedside.   Complaints:  Over the night Events:    Past Medical Hx:   CVA (cerebral vascular accident)    CAD (coronary artery disease)    Retinal detachment    Hypertension    Hypertension    High cholesterol    Diabetes    Eczema    Anxiety    Diarrhea    Weakness    Seizure    Aphasia      Past Sx:  History of corneal transplant      Allergies:  No Known Allergies    Current Meds:   Standng Meds:  amoxicillin  875 milliGRAM(s)/clavulanate 1 Tablet(s) Oral every 12 hours  aspirin enteric coated 81 milliGRAM(s) Oral daily  baclofen 10 milliGRAM(s) Oral daily  carBAMazepine Suspension 300 milliGRAM(s) Oral two times a day  collagenase Ointment 1 Application(s) Topical two times a day  Dakins Solution - 1/2 Strength 1 Application(s) Topical two times a day  dextrose 5% + sodium chloride 0.45%. 1000 milliLiter(s) (75 mL/Hr) IV Continuous <Continuous>  enoxaparin Injectable 40 milliGRAM(s) SubCutaneous every 24 hours  lactulose Syrup 10 Gram(s) Oral two times a day  lisinopril 10 milliGRAM(s) Oral daily  mineral oil 30 milliLiter(s) Oral daily  polyethylene glycol 3350 17 Gram(s) Oral two times a day  senna 2 Tablet(s) Oral at bedtime  simvastatin 10 milliGRAM(s) Oral at bedtime    PRN Meds:  morphine  - Injectable 2 milliGRAM(s) IV Push every 4 hours PRN Moderate Pain (4 - 6)  risperiDONE   Tablet 1 milliGRAM(s) Oral daily PRN Agitation      Vital Signs:   T(F): 97.1 (03-18-22 @ 05:00), Max: 97.9 (03-17-22 @ 12:31)  HR: 58 (03-18-22 @ 07:59) (50 - 65)  BP: 141/65 (03-18-22 @ 07:59) (141/65 - 174/82)  RR: 18 (03-18-22 @ 07:59) (18 - 18)  SpO2: 100% (03-18-22 @ 07:59) (100% - 100%)    Physical Exam:   GENERAL: NAD, Resting in bed  HEENT: NCAT  CHEST/LUNG: Clear to auscultation bilaterally; No wheezing or rubs.   HEART: Regular rate and rhythm; No murmurs, rubs, or gallops  ABDOMEN: Bowel sounds present; Soft, Nontender, Nondistended.   EXTREMITIES:  No clubbing, cyanosis, or edema  NERVOUS SYSTEM:  Alert & Oriented X3    FLUID BALANCE    03-16-22 @ 07:01  -  03-17-22 @ 07:00  --------------------------------------------------------  IN: 118 mL / OUT: 0 mL / NET: 118 mL    03-17-22 @ 07:01  -  03-18-22 @ 07:00  --------------------------------------------------------  IN: 1225 mL / OUT: 0 mL / NET: 1225 mL        Labs:                         9.3    10.98 )-----------( 328      ( 18 Mar 2022 04:30 )             28.6     Neutophil% 80.2, Lymphocyte% 12.8, Monocyte% 5.6, Bands% 0.2 03-18-22 @ 04:30    18 Mar 2022 04:30    135    |  100    |  5      ----------------------------<  96     4.0     |  23     |  <0.5     Ca    8.9        18 Mar 2022 04:30  Mg     2.2       18 Mar 2022 04:30    TPro  6.5    /  Alb  3.1    /  TBili  0.2    /  DBili  x      /  AST  17     /  ALT  26     /  AlkPhos  153    18 Mar 2022 04:30              Troponin 0.02, CKMB --, CK -- 03-16-22 @ 06:20  Troponin 0.02, CKMB --, CK -- 03-15-22 @ 11:00  Troponin 0.02, CKMB --, CK 86 03-15-22 @ 05:40  Troponin --, CKMB --,  03-15-22 @ 01:00  Troponin 0.01, CKMB --, CK -- 03-14-22 @ 13:38                Procalcitonin, Serum: 0.06 ng/mL (03-15-22 @ 01:00)    Ferritin, Serum: 263 ng/mL (03-15-22 @ 02:10)    C-Reactive Protein, Serum: 98 mg/L (03-15-22 @ 01:00)        Radiology: 
  SILVANO CALIXTO  56y  Male      Patient is a 56y old  Male who presents with a chief complaint of AMS.       INTERVAL HPI/OVERNIGHT EVENTS:      ******************************* REVIEW OF SYSTEMS:*********************************************    All other review of systems negative    *********************** VITALS ******************************************    T(F): 97.9 (03-17-22 @ 12:31)  HR: 61 (03-17-22 @ 12:31) (61 - 86)  BP: 148/66 (03-17-22 @ 12:31) (139/68 - 181/90)  RR: 18 (03-17-22 @ 12:31) (18 - 18)  SpO2: 98% (03-16-22 @ 15:42) (98% - 98%)    03-16-22 @ 07:01  -  03-17-22 @ 07:00  --------------------------------------------------------  IN: 118 mL / OUT: 0 mL / NET: 118 mL            03-16-22 @ 07:01  -  03-17-22 @ 07:00  --------------------------------------------------------  IN: 118 mL / OUT: 0 mL / NET: 118 mL        ******************************** PHYSICAL EXAM:**************************************************  GENERAL: NAD    PSYCH: no agitation, baseline mentation  HEENT:     NERVOUS SYSTEM:  Alert & awake, non verbal,   PULMONARY: CANDACE, CTA    CARDIOVASCULAR: S1S2 RRR    GI: Soft, NT, ND; BS present.    EXTREMITIES:  2+ Peripheral Pulses, No clubbing, cyanosis, or edema    LYMPH: No lymphadenopathy noted    SKIN: sacral decubitus       **************************** LABS *******************************************************                          8.6    14.39 )-----------( 280      ( 17 Mar 2022 04:30 )             26.4     03-17    140  |  104  |  10  ----------------------------<  85  4.3   |  26  |  <0.5<L>    Ca    8.7      17 Mar 2022 04:30  Mg     2.1     03-17    TPro  6.1  /  Alb  3.1<L>  /  TBili  0.2  /  DBili  x   /  AST  18  /  ALT  24  /  AlkPhos  161<H>  03-17          Lactate Trend  03-15 @ 11:00 Lactate:1.4   03-15 @ 01:00 Lactate:2.3   03-14 @ 13:38 Lactate:4.0     CARDIAC MARKERS ( 16 Mar 2022 06:20 )  x     / 0.02 ng/mL / x     / x     / x          CAPILLARY BLOOD GLUCOSE      POCT Blood Glucose.: 124 mg/dL (17 Mar 2022 11:25)          **************************Active Medications *******************************************  No Known Allergies      amoxicillin  875 milliGRAM(s)/clavulanate 1 Tablet(s) Oral every 12 hours  aspirin enteric coated 81 milliGRAM(s) Oral daily  baclofen 10 milliGRAM(s) Oral daily  carBAMazepine Suspension 300 milliGRAM(s) Oral two times a day  enoxaparin Injectable 40 milliGRAM(s) SubCutaneous every 24 hours  lactulose Syrup 10 Gram(s) Oral two times a day  lisinopril 10 milliGRAM(s) Oral daily  mineral oil 30 milliLiter(s) Oral daily  morphine  - Injectable 2 milliGRAM(s) IV Push every 4 hours PRN  polyethylene glycol 3350 17 Gram(s) Oral two times a day  risperiDONE   Tablet 1 milliGRAM(s) Oral daily PRN  senna 2 Tablet(s) Oral at bedtime  simvastatin 10 milliGRAM(s) Oral at bedtime      ***************************************************  RADIOLOGY & ADDITIONAL TESTS:    Imaging Personally Reviewed:  [ ] YES  [ ] NO    HEALTH ISSUES - PROBLEM Dx:      
  SILVANO CALIXTO  56y  Male      Patient is a 56y old  Male who presents with a chief complaint of AMS.      INTERVAL HPI/OVERNIGHT EVENTS:      ******************************* REVIEW OF SYSTEMS:**********************************************    All other review of systems negative    *********************** VITALS ******************************************    T(F): 97 (03-18-22 @ 12:50)  HR: 60 (03-18-22 @ 12:50) (50 - 65)  BP: 152/73 (03-18-22 @ 12:50) (141/65 - 174/82)  RR: 18 (03-18-22 @ 12:50) (18 - 18)  SpO2: 100% (03-18-22 @ 12:50) (100% - 100%)    03-17-22 @ 07:01  -  03-18-22 @ 07:00  --------------------------------------------------------  IN: 1225 mL / OUT: 0 mL / NET: 1225 mL    03-18-22 @ 07:01  -  03-18-22 @ 14:01  --------------------------------------------------------  IN: 0 mL / OUT: 900 mL / NET: -900 mL            03-17-22 @ 07:01  -  03-18-22 @ 07:00  --------------------------------------------------------  IN: 1225 mL / OUT: 0 mL / NET: 1225 mL    03-18-22 @ 07:01  -  03-18-22 @ 14:01  --------------------------------------------------------  IN: 0 mL / OUT: 900 mL / NET: -900 mL        ******************************** PHYSICAL EXAM:**************************************************  GENERAL: NAD    PSYCH: no agitation, baseline mentation  HEENT:     NERVOUS SYSTEM:  Alert & awake x2, Non verbal, Left sided hemiparesis     PULMONARY: CANDACE, CTA    CARDIOVASCULAR: S1S2 RRR    GI: Soft, NT, ND; BS present.    EXTREMITIES:  2+ Peripheral Pulses, No clubbing, cyanosis, or edema    LYMPH: No lymphadenopathy noted    SKIN: No rashes or lesions      **************************** LABS *******************************************************                          9.3    10.98 )-----------( 328      ( 18 Mar 2022 04:30 )             28.6     03-18    135  |  100  |  5<L>  ----------------------------<  96  4.0   |  23  |  <0.5<L>    Ca    8.9      18 Mar 2022 04:30  Mg     2.2     03-18    TPro  6.5  /  Alb  3.1<L>  /  TBili  0.2  /  DBili  x   /  AST  17  /  ALT  26  /  AlkPhos  153<H>  03-18          Lactate Trend  03-15 @ 11:00 Lactate:1.4   03-15 @ 01:00 Lactate:2.3   03-14 @ 13:38 Lactate:4.0         CAPILLARY BLOOD GLUCOSE      POCT Blood Glucose.: 97 mg/dL (18 Mar 2022 12:00)          **************************Active Medications *******************************************  No Known Allergies      amoxicillin  875 milliGRAM(s)/clavulanate 1 Tablet(s) Oral every 12 hours  aspirin enteric coated 81 milliGRAM(s) Oral daily  baclofen 10 milliGRAM(s) Oral daily  carBAMazepine Suspension 300 milliGRAM(s) Oral two times a day  collagenase Ointment 1 Application(s) Topical two times a day  Dakins Solution - 1/2 Strength 1 Application(s) Topical two times a day  enoxaparin Injectable 40 milliGRAM(s) SubCutaneous every 24 hours  lactulose Syrup 10 Gram(s) Oral two times a day  lisinopril 10 milliGRAM(s) Oral daily  mineral oil 30 milliLiter(s) Oral daily  morphine  - Injectable 2 milliGRAM(s) IV Push every 4 hours PRN  polyethylene glycol 3350 17 Gram(s) Oral two times a day  risperiDONE   Tablet 1 milliGRAM(s) Oral daily PRN  senna 2 Tablet(s) Oral at bedtime  simvastatin 10 milliGRAM(s) Oral at bedtime      ***************************************************  RADIOLOGY & ADDITIONAL TESTS:    Imaging Personally Reviewed:  [ ] YES  [ ] NO    HEALTH ISSUES - PROBLEM Dx:      
  SILVANO CALIXTO  56y  Male      Patient is a 56y old  Male who presents with a chief complaint of AMS.       INTERVAL HPI/OVERNIGHT EVENTS:      ******************************* REVIEW OF SYSTEMS:**********************************************    All other review of systems negative    *********************** VITALS ******************************************    T(F): 98 (03-19-22 @ 05:00)  HR: 74 (03-19-22 @ 05:00) (74 - 78)  BP: 163/89 (03-19-22 @ 05:00) (163/89 - 176/90)  RR: 18 (03-19-22 @ 05:00) (18 - 18)  SpO2: 98% (03-19-22 @ 07:55) (98% - 99%)    03-18-22 @ 07:01  -  03-19-22 @ 07:00  --------------------------------------------------------  IN: 0 mL / OUT: 1900 mL / NET: -1900 mL            03-18-22 @ 07:01  -  03-19-22 @ 07:00  --------------------------------------------------------  IN: 0 mL / OUT: 1900 mL / NET: -1900 mL        ******************************** PHYSICAL EXAM:**************************************************  GENERAL: NAD    PSYCH: no agitation, baseline mentation  HEENT:     NERVOUS SYSTEM:  Alert & awake x2, nonverbal  PULMONARY: CANDACE, CTA    CARDIOVASCULAR: S1S2 RRR    GI: Soft, NT, ND; BS present.    EXTREMITIES:  2+ Peripheral Pulses, No clubbing, cyanosis, or edema    LYMPH: No lymphadenopathy noted    SKIN: sacral decubitus     **************************** LABS *******************************************************                          10.3   15.42 )-----------( 346      ( 19 Mar 2022 07:16 )             31.3     03-19    136  |  98  |  9<L>  ----------------------------<  80  4.0   |  24  |  <0.5<L>    Ca    9.0      19 Mar 2022 07:16  Mg     2.3     03-19    TPro  7.3  /  Alb  3.3<L>  /  TBili  0.3  /  DBili  x   /  AST  16  /  ALT  26  /  AlkPhos  176<H>  03-19          Lactate Trend  03-15 @ 11:00 Lactate:1.4   03-15 @ 01:00 Lactate:2.3   03-14 @ 13:38 Lactate:4.0         CAPILLARY BLOOD GLUCOSE      POCT Blood Glucose.: 148 mg/dL (19 Mar 2022 11:43)          **************************Active Medications *******************************************  No Known Allergies      amoxicillin  875 milliGRAM(s)/clavulanate 1 Tablet(s) Oral every 12 hours  aspirin enteric coated 81 milliGRAM(s) Oral daily  baclofen 10 milliGRAM(s) Oral daily  carBAMazepine Suspension 300 milliGRAM(s) Oral two times a day  collagenase Ointment 1 Application(s) Topical two times a day  Dakins Solution - 1/2 Strength 1 Application(s) Topical two times a day  enoxaparin Injectable 40 milliGRAM(s) SubCutaneous every 24 hours  lactulose Syrup 10 Gram(s) Oral two times a day  lisinopril 10 milliGRAM(s) Oral daily  mineral oil 30 milliLiter(s) Oral daily  morphine  - Injectable 2 milliGRAM(s) IV Push every 4 hours PRN  polyethylene glycol 3350 17 Gram(s) Oral two times a day  risperiDONE   Tablet 1 milliGRAM(s) Oral daily PRN  senna 2 Tablet(s) Oral at bedtime  simvastatin 10 milliGRAM(s) Oral at bedtime      ***************************************************  RADIOLOGY & ADDITIONAL TESTS:    Imaging Personally Reviewed:  [ ] YES  [ ] NO    HEALTH ISSUES - PROBLEM Dx:      
  Patient Information:  SILVANO CALIXTO / 56y / Male / MRN#:[id number]    Hospital Day: 2d    Interval History:  Patient seen and examined at bedside. No acute events overnight.    Past Medical History:  CVA (cerebral vascular accident)    CAD (coronary artery disease)    Retinal detachment    Hypertension    Hypertension    High cholesterol    Diabetes    Eczema    Anxiety    Diarrhea    Weakness    Seizure    Aphasia      Past Surgical History:  History of corneal transplant      Allergies:  No Known Allergies    Medications:  PRN:  morphine  - Injectable 2 milliGRAM(s) IV Push every 4 hours PRN Moderate Pain (4 - 6)  risperiDONE   Tablet 1 milliGRAM(s) Oral daily PRN Agitation    Standing:  ampicillin/sulbactam  IVPB 3 Gram(s) IV Intermittent every 6 hours  aspirin enteric coated 81 milliGRAM(s) Oral daily  baclofen 10 milliGRAM(s) Oral daily  carBAMazepine Suspension 300 milliGRAM(s) Oral two times a day  enoxaparin Injectable 40 milliGRAM(s) SubCutaneous every 24 hours  lactulose Syrup 10 Gram(s) Oral two times a day  lisinopril 10 milliGRAM(s) Oral daily  mineral oil 30 milliLiter(s) Oral daily  polyethylene glycol 3350 17 Gram(s) Oral two times a day  senna 2 Tablet(s) Oral at bedtime  simvastatin 10 milliGRAM(s) Oral at bedtime    Home:  Aspir 81 oral delayed release tablet: 1 tab(s) orally once a day  baclofen 10 mg oral tablet: 1 tab(s) orally once a day  risperiDONE 1 mg oral tablet: 1 tab(s) orally once a day, As Needed for agitation  simvastatin 10 mg oral tablet: 1 tab(s) orally once a day (at bedtime)    Vitals:  T(C): 35.7, Max: 36.6 (03-15-22 @ 13:58)  T(F): 96.2, Max: 97.9 (03-15-22 @ 13:58)  HR: 71 (71 - 85)  BP: 151/73 (142/72 - 151/73)  RR: 18 (18 - 18)  SpO2: --    Physical Exam:  General: Awake, alert, NAD, resting comfortably in bed, on RA  HEENT: Head NC/AT  Heart: RRR; S1/S2; no rubs, murmurs appreciated  Lungs: Clear to auscultation bilaterally, no wheezing, rales or rhonchi  Abdomen: Soft, nontender, nondistended, BS+  Extremities: No edema, clubbing, cyanosis in upper or lower extremities.  Neuro: AAOx3, NFD.    Labs:  CBC (03-16 @ 06:20)                        Hgb: 8.5    WBC: 13.24 )-----------------( Plts: 321                              Hct: 26.0     Chem (03-16 @ 06:20)  Na: 137  |     Cl: 102     |  BUN: 12  -----------------------------------------< Gluc: 105    K: 4.0   |    HCO3: 24  |  Cr: <0.5    Ca 8.4 (03-16 @ 06:20)  Phos 3.0 (03-15 @ 05:40)  Mg 2.1 (03-15 @ 05:40)    LFTs (03-16 @ 06:20)  TPro 6.0  /  Alb 3.2  TBili <0.2  /  DBili     AST 14  /  ALT 22  /  AlkPhos 157    Cardiac Markers (03-16 @ 06:20)  Troponin I X  Troponin T 0.02  CK X  CKMB X  CKMB Units X  Myoglobin X  Lactate X  ESR X    Cardiac Markers (03-15 @ 11:00)  Troponin I X  Troponin T 0.02  CK X  CKMB X  CKMB Units X  Myoglobin X  Lactate 1.4  ESR X    Cardiac Markers (03-15 @ 05:40)  Troponin I X  Troponin T 0.02  CK 86  CKMB X  CKMB Units X  Myoglobin X  Lactate X  ESR X    Cardiac Markers (03-15 @ 01:00)  Troponin I X  Troponin T X    CKMB X  CKMB Units X  Myoglobin X  Lactate 2.3  ESR X    Cardiac Markers (03-14 @ 13:38)  Troponin I X  Troponin T 0.01  CK X  CKMB X  CKMB Units X  Myoglobin X  Lactate 4.0  ESR X          
  SILVANO CALIXTO  56y  Male      Patient is a 56y old  Male who presents with a chief complaint of AMS.       INTERVAL HPI/OVERNIGHT EVENTS:      ******************************* REVIEW OF SYSTEMS:**********************************************    All other review of systems negative    *********************** VITALS ******************************************    T(F): 97.5 (03-21-22 @ 12:46)  HR: 71 (03-21-22 @ 12:46) (64 - 125)  BP: 130/65 (03-21-22 @ 12:46) (130/65 - 224/102)  RR: 18 (03-21-22 @ 12:46) (16 - 18)  SpO2: 98% (03-21-22 @ 08:05) (98% - 100%)            ******************************** PHYSICAL EXAM:**************************************************  GENERAL: NAD    PSYCH: no agitation, baseline mentation  HEENT:     NERVOUS SYSTEM:  Alert & Oriented X3,   PULMONARY: CANDACE, CTA    CARDIOVASCULAR: S1S2 RRR    GI: Soft, NT, ND; BS present.    EXTREMITIES:  2+ Peripheral Pulses, No clubbing, cyanosis, or edema    LYMPH: No lymphadenopathy noted    SKIN: No rashes or lesions      **************************** LABS *******************************************************                  Lactate Trend        CAPILLARY BLOOD GLUCOSE      POCT Blood Glucose.: 168 mg/dL (21 Mar 2022 11:17)          **************************Active Medications *******************************************  No Known Allergies      amoxicillin  875 milliGRAM(s)/clavulanate 1 Tablet(s) Oral every 12 hours  aspirin enteric coated 81 milliGRAM(s) Oral daily  baclofen 10 milliGRAM(s) Oral daily  carBAMazepine Suspension 300 milliGRAM(s) Oral two times a day  collagenase Ointment 1 Application(s) Topical two times a day  Dakins Solution - 1/2 Strength 1 Application(s) Topical two times a day  enoxaparin Injectable 40 milliGRAM(s) SubCutaneous every 24 hours  lactulose Syrup 10 Gram(s) Oral two times a day  lisinopril 10 milliGRAM(s) Oral daily  mineral oil 30 milliLiter(s) Oral daily  morphine  - Injectable 2 milliGRAM(s) IV Push every 4 hours PRN  polyethylene glycol 3350 17 Gram(s) Oral two times a day  risperiDONE   Tablet 1 milliGRAM(s) Oral daily PRN  senna 2 Tablet(s) Oral at bedtime  simvastatin 10 milliGRAM(s) Oral at bedtime      ***************************************************  RADIOLOGY & ADDITIONAL TESTS:    Imaging Personally Reviewed:  [ ] YES  [ ] NO    HEALTH ISSUES - PROBLEM Dx:      
  SILVANO CALIXTO  56y  Male      Patient is a 56y old  Male who presents with a chief complaint of AMS.      INTERVAL HPI/OVERNIGHT EVENTS:      ******************************* REVIEW OF SYSTEMS:**********************************************    All other review of systems negative    *********************** VITALS ******************************************    T(F): 97 (03-20-22 @ 05:12)  HR: 62 (03-20-22 @ 05:12) (62 - 86)  BP: 142/69 (03-20-22 @ 05:12) (136/89 - 152/75)  RR: 18 (03-20-22 @ 05:12) (18 - 18)  SpO2: 99% (03-19-22 @ 20:33) (99% - 100%)    03-19-22 @ 07:01  -  03-20-22 @ 07:00  --------------------------------------------------------  IN: 0 mL / OUT: 300 mL / NET: -300 mL            03-19-22 @ 07:01  -  03-20-22 @ 07:00  --------------------------------------------------------  IN: 0 mL / OUT: 300 mL / NET: -300 mL        ******************************** PHYSICAL EXAM:**************************************************  GENERAL: NAD    PSYCH: no agitation, baseline mentation  HEENT:     NERVOUS SYSTEM:  Alert & awake x2 , Non verbal , Left hemiparesis/ contracture      PULMONARY: CANDACE, CTA    CARDIOVASCULAR: S1S2 RRR    GI: Soft, NT, ND; BS present.    EXTREMITIES:  2+ Peripheral Pulses, No clubbing, cyanosis, or edema    LYMPH: No lymphadenopathy noted    SKIN: No rashes or lesions      **************************** LABS *******************************************************                          10.3   15.42 )-----------( 346      ( 19 Mar 2022 07:16 )             31.3     03-19    136  |  98  |  9<L>  ----------------------------<  80  4.0   |  24  |  <0.5<L>    Ca    9.0      19 Mar 2022 07:16  Mg     2.3     03-19    TPro  7.3  /  Alb  3.3<L>  /  TBili  0.3  /  DBili  x   /  AST  16  /  ALT  26  /  AlkPhos  176<H>  03-19          Lactate Trend        CAPILLARY BLOOD GLUCOSE      POCT Blood Glucose.: 115 mg/dL (20 Mar 2022 11:18)          **************************Active Medications *******************************************  No Known Allergies      amoxicillin  875 milliGRAM(s)/clavulanate 1 Tablet(s) Oral every 12 hours  aspirin enteric coated 81 milliGRAM(s) Oral daily  baclofen 10 milliGRAM(s) Oral daily  carBAMazepine Suspension 300 milliGRAM(s) Oral two times a day  collagenase Ointment 1 Application(s) Topical two times a day  coronavirus Vaccine (PFIZER) 0.3 milliLiter(s) IntraMuscular once  Dakins Solution - 1/2 Strength 1 Application(s) Topical two times a day  enoxaparin Injectable 40 milliGRAM(s) SubCutaneous every 24 hours  lactulose Syrup 10 Gram(s) Oral two times a day  lisinopril 10 milliGRAM(s) Oral daily  mineral oil 30 milliLiter(s) Oral daily  morphine  - Injectable 2 milliGRAM(s) IV Push every 4 hours PRN  polyethylene glycol 3350 17 Gram(s) Oral two times a day  risperiDONE   Tablet 1 milliGRAM(s) Oral daily PRN  senna 2 Tablet(s) Oral at bedtime  simvastatin 10 milliGRAM(s) Oral at bedtime      ***************************************************  RADIOLOGY & ADDITIONAL TESTS:    Imaging Personally Reviewed:  [ ] YES  [ ] NO    HEALTH ISSUES - PROBLEM Dx:      
Hospital Day:  3d    Subjective: Patient is a 56y old  Male who presents with a chief complaint of AMS (16 Mar 2022 10:47)      Pt seen and evaluated at bedside.   Complaints:  Over the night Events:    Past Medical Hx:   CVA (cerebral vascular accident)    CAD (coronary artery disease)    Retinal detachment    Hypertension    Hypertension    High cholesterol    Diabetes    Eczema    Anxiety    Diarrhea    Weakness    Seizure    Aphasia      Past Sx:  History of corneal transplant      Allergies:  No Known Allergies    Current Meds:   Standng Meds:  amoxicillin  875 milliGRAM(s)/clavulanate 1 Tablet(s) Oral every 12 hours  aspirin enteric coated 81 milliGRAM(s) Oral daily  baclofen 10 milliGRAM(s) Oral daily  carBAMazepine Suspension 300 milliGRAM(s) Oral two times a day  enoxaparin Injectable 40 milliGRAM(s) SubCutaneous every 24 hours  lactulose Syrup 10 Gram(s) Oral two times a day  lisinopril 10 milliGRAM(s) Oral daily  mineral oil 30 milliLiter(s) Oral daily  polyethylene glycol 3350 17 Gram(s) Oral two times a day  senna 2 Tablet(s) Oral at bedtime  simvastatin 10 milliGRAM(s) Oral at bedtime    PRN Meds:  morphine  - Injectable 2 milliGRAM(s) IV Push every 4 hours PRN Moderate Pain (4 - 6)  risperiDONE   Tablet 1 milliGRAM(s) Oral daily PRN Agitation      Vital Signs:   T(F): 97.1 (03-17-22 @ 05:00), Max: 98.1 (03-16-22 @ 19:50)  HR: 67 (03-17-22 @ 05:00) (67 - 86)  BP: 168/81 (03-17-22 @ 05:00) (139/68 - 181/90)  RR: 18 (03-17-22 @ 05:00) (18 - 18)  SpO2: 98% (03-16-22 @ 15:42) (98% - 98%)    Physical Exam:   GENERAL: NAD, Resting in bed  HEENT: NCAT  CHEST/LUNG: Clear to auscultation bilaterally; No wheezing or rubs.   HEART: Regular rate and rhythm; No murmurs, rubs, or gallops  ABDOMEN: Bowel sounds present; Soft, Nontender, Nondistended.   EXTREMITIES:  No clubbing, cyanosis, or edema  NERVOUS SYSTEM:  Alert & Oriented X3    FLUID BALANCE    03-16-22 @ 07:01  -  03-17-22 @ 07:00  --------------------------------------------------------  IN: 118 mL / OUT: 0 mL / NET: 118 mL        Labs:                         8.6    14.39 )-----------( 280      ( 17 Mar 2022 04:30 )             26.4     Neutophil% 81.7, Lymphocyte% 12.2, Monocyte% 5.0, Bands% 0.3 03-17-22 @ 04:30    17 Mar 2022 04:30    140    |  104    |  10     ----------------------------<  85     4.3     |  26     |  <0.5     Ca    8.7        17 Mar 2022 04:30  Mg     2.1       17 Mar 2022 04:30    TPro  6.1    /  Alb  3.1    /  TBili  0.2    /  DBili  x      /  AST  18     /  ALT  24     /  AlkPhos  161    17 Mar 2022 04:30              Troponin 0.02, CKMB --, CK -- 03-16-22 @ 06:20  Troponin 0.02, CKMB --, CK -- 03-15-22 @ 11:00  Troponin 0.02, CKMB --, CK 86 03-15-22 @ 05:40  Troponin --, CKMB --,  03-15-22 @ 01:00  Troponin 0.01, CKMB --, CK -- 03-14-22 @ 13:38    Iron --, TIBC --, %Sat -- Ferritin 263 03-15-22 @ 02:10              Procalcitonin, Serum: 0.06 ng/mL (03-15-22 @ 01:00)    Ferritin, Serum: 263 ng/mL (03-15-22 @ 02:10)    C-Reactive Protein, Serum: 98 mg/L (03-15-22 @ 01:00)        Radiology: 
SILVANO CALIXTO 56y Male  MRN#: [id number]   CODE STATUS:________    SUBJECTIVE  Patient is a 56y old Male who presents with a chief complaint of AMS (15 Mar 2022 00:16)  Currently admitted to medicine with the primary diagnosis of Syncope     Today is hospital day 1d, and this morning he is resting comfortably      OBJECTIVE  PAST MEDICAL & SURGICAL HISTORY  CVA (cerebral vascular accident)  1980&#x27;s x2   Residual left sided weakness    CAD (coronary artery disease)    Retinal detachment    Hypertension    High cholesterol    Diabetes    Anxiety    Seizure    History of corneal transplant  right   4/13/17      ALLERGIES:  No Known Allergies    MEDICATIONS:  STANDING MEDICATIONS  ampicillin/sulbactam  IVPB 3 Gram(s) IV Intermittent every 6 hours  aspirin enteric coated 81 milliGRAM(s) Oral daily  baclofen 10 milliGRAM(s) Oral daily  carBAMazepine Suspension 200 milliGRAM(s) Oral every 12 hours  enoxaparin Injectable 40 milliGRAM(s) SubCutaneous every 24 hours  lactulose Syrup 10 Gram(s) Oral two times a day  lisinopril 10 milliGRAM(s) Oral daily  mineral oil 30 milliLiter(s) Oral daily  polyethylene glycol 3350 17 Gram(s) Oral two times a day  senna 2 Tablet(s) Oral at bedtime  simvastatin 10 milliGRAM(s) Oral at bedtime    PRN MEDICATIONS  morphine  - Injectable 2 milliGRAM(s) IV Push every 4 hours PRN  risperiDONE   Tablet 1 milliGRAM(s) Oral daily PRN      VITAL SIGNS: Last 24 Hours  T(C): 36.7 (15 Mar 2022 07:50), Max: 36.9 (14 Mar 2022 15:40)  T(F): 98 (15 Mar 2022 07:50), Max: 98.5 (14 Mar 2022 15:40)  HR: 88 (15 Mar 2022 07:50) (86 - 131)  BP: 120/65 (15 Mar 2022 07:50) (120/65 - 177/93)  BP(mean): --  RR: 17 (15 Mar 2022 07:50) (16 - 18)  SpO2: 99% (15 Mar 2022 07:50) (99% - 100%)    LABS:                        9.8    13.31 )-----------( 276      ( 15 Mar 2022 05:40 )             29.4     03-15    133<L>  |  98  |  9<L>  ----------------------------<  89  4.4   |  22  |  <0.5<L>    Ca    8.6      15 Mar 2022 05:40  Phos  3.0     03-15  Mg     2.1     03-15    TPro  6.6  /  Alb  3.0<L>  /  TBili  0.3  /  DBili  x   /  AST  15  /  ALT  25  /  AlkPhos  161<H>  03-15          Troponin T, Serum: 0.02 ng/mL *H* (03-15-22 @ 05:40)  Creatine Kinase, Serum: 86 U/L (03-15-22 @ 05:40)  Lactate, Blood: 2.3 mmol/L *H* (03-15-22 @ 01:00)  Creatine Kinase, Serum: 118 U/L (03-15-22 @ 01:00)  Lactate, Blood: 4.0 mmol/L *HH* (03-14-22 @ 13:38)  Troponin T, Serum: 0.01 ng/mL (03-14-22 @ 13:38)      CARDIAC MARKERS ( 15 Mar 2022 05:40 )  x     / 0.02 ng/mL / 86 U/L / x     / x      CARDIAC MARKERS ( 15 Mar 2022 01:00 )  x     / x     / 118 U/L / x     / x      CARDIAC MARKERS ( 14 Mar 2022 13:38 )  x     / 0.01 ng/mL / x     / x     / x          RADIOLOGY:    PHYSICAL EXAM:    GENERAL: NAD  HEENT:  Atraumatic, Normocephalic.  PULMONARY: Clear to auscultation bilaterally; No wheeze  CARDIOVASCULAR: Regular rate and rhythm; No murmurs  GASTROINTESTINAL: Soft, Nontender, Nondistended; Bowel sounds present  MUSCULOSKELETAL:  No clubbing, cyanosis, or edema  NEUROLOGY: non-focal  SKIN: No rashes or lesions  
SUBJECTIVE:    Patient is a 56y old Male who presents with a chief complaint of AMS (21 Mar 2022 10:52)    Overnight Events: No overnight events; no active issues.   Patient is still on carbamezepine 300 BID, receiving augmentin for the aspiration pneumonia.    PAST MEDICAL & SURGICAL HISTORY  CVA (cerebral vascular accident)  1980&#x27;s x2   Residual left sided weakness    CAD (coronary artery disease)    Retinal detachment    Hypertension    High cholesterol    Diabetes    Anxiety    Seizure    History of corneal transplant  right   4/13/17      SOCIAL HISTORY:  Negative for smoking/alcohol/drug use.     ALLERGIES:  No Known Allergies    MEDICATIONS:  STANDING MEDICATIONS  amoxicillin  875 milliGRAM(s)/clavulanate 1 Tablet(s) Oral every 12 hours  aspirin enteric coated 81 milliGRAM(s) Oral daily  baclofen 10 milliGRAM(s) Oral daily  carBAMazepine Suspension 300 milliGRAM(s) Oral two times a day  collagenase Ointment 1 Application(s) Topical two times a day  Dakins Solution - 1/2 Strength 1 Application(s) Topical two times a day  enoxaparin Injectable 40 milliGRAM(s) SubCutaneous every 24 hours  lactulose Syrup 10 Gram(s) Oral two times a day  lisinopril 10 milliGRAM(s) Oral daily  mineral oil 30 milliLiter(s) Oral daily  polyethylene glycol 3350 17 Gram(s) Oral two times a day  senna 2 Tablet(s) Oral at bedtime  simvastatin 10 milliGRAM(s) Oral at bedtime    PRN MEDICATIONS  morphine  - Injectable 2 milliGRAM(s) IV Push every 4 hours PRN  risperiDONE   Tablet 1 milliGRAM(s) Oral daily PRN    VITALS:   T(F): 96.9, Max: 98.6 (03-20-22 @ 20:00)  HR: 64 (64 - 125)  BP: 131/64 (131/64 - 224/102)  RR: 16 (16 - 18)  SpO2: 98% (98% - 100%)    LABS:                                IMAGING/EKG:    No recent imaging    PHYSICAL EXAM:  GEN: Non verbal  LUNGS: CTAB  HEART: RRR  ABD: soft, NT/ND, +BS  EXT: no edema, PP b/l  NEURO: Non verbal

## 2022-03-21 NOTE — SWALLOW BEDSIDE ASSESSMENT ADULT - SLP GENERAL OBSERVATIONS
pt received alert, on RA. +global weakness w/dysarthria.
pt received alert, on RA. +global weakness w/dysarthria.
Pt. received awake, alert, non verbal, room air.

## 2022-03-21 NOTE — SWALLOW BEDSIDE ASSESSMENT ADULT - CONSISTENCIES ADMINISTERED
2oz water, 3ox mildly thick, 2oz puree, 3oz minced & moist./thin liquid/mildly thick/pureed/minced & moist
6 oz/thin liquid/mildly thick/pureed/minced & moist
6 oz/mildly thick/pureed/minced & moist

## 2022-03-21 NOTE — SWALLOW BEDSIDE ASSESSMENT ADULT - COMMENTS
pt received alert, on RA. +global weakness w/dysarthria.
pt received alert, on RA. +global weakness w/dysarthria.

## 2022-04-01 PROBLEM — T17.928A ASPIRATION OF FOOD: Status: ACTIVE | Noted: 2022-01-01

## 2022-04-01 NOTE — PLAN
[FreeTextEntry1] : #Breakthrough Seizure likely due to underlying infection #HAGMA due to lactic acidosis - resolved #Aspiration PNA - CT Head unremarkable; REEG - generalized slowing - Neurology onboard ---- Increased Carbamezapine to 300 mg BID - TTT EF 55-60%; non-valvular pathology - s/p PT and S+S evaluation: pureed, mildly thick meals - s/p Unasyn now on Augmentin 875mg Q12 - Awaiting final Neuro recs re: future breakthrough sz.  #Hx of CVA x2 w/ residual deficits #Fxn Quad and Wheelchair bound #Stage III DTI to L Buttock - Burn team c/sv--- pending - Frequent positioning  # BMI 15.2 >> Severe Protein Calorie Malnutrition; we consulted dietary again to discuss enteral supplementation  #HTN #Hx of CAD - C/w Lisinopril - C/w ASA, statin  #DMII - A1c 5%  #Hx of Chronic rectosigmoid dilation, constipation - C/w bowel regimen  DVT ppx: Lovenox SC Diet: Pureed, mildly thick meals Activity: High fall Risk -- IAT Full Code  Nutritional Assessment: - Nutritional Assessment This patient has been assessed with a concern for Malnutrition and has been determined to have a diagnosis/diagnoses of Severe protein-calorie malnutrition and Underweight (BMI < 19).

## 2022-04-01 NOTE — REVIEW OF SYSTEMS
[Cough] : cough [Fever] : no fever [Chills] : no chills [de-identified] : Seizure earlier this month

## 2022-04-01 NOTE — HISTORY OF PRESENT ILLNESS
[Home] : at home, [unfilled] , at the time of the visit. [Medical Office: ([facility])___] : at the medical office located in  [Verbal consent obtained from patient] : the patient, [unfilled] [FreeTextEntry1] : Telephonic visit f/u  [de-identified] : Spoke to sister \par \par 57 yo M with PMHx of CVA x2 with residual L sided deficits, wheelchair bound, DM, HTN, Seizure Disorder was recently admitted to hospital for breakthrough seizure. Neurology saw patient and  Increased Carbamezapine to 300 mg BID. Patient was also to found to have findings consistent with aspiration pneumonia and was treated with unasyn and augmentin. Speech and swallow saw patient and recommended pureed diet with mildly thick liquids. As per sister patient is constantly coughing, especially when eating . Per sister patient has been afebrile . \par \par While in the hospital patient was also evaluated by BURN  for Stage III DTI of L buttock . \par \par \par \par \par

## 2022-04-01 NOTE — ASSESSMENT
[FreeTextEntry1] : \par Known to have a history of ischemic CVA (1980s with residual left sided weakness and aphasia), hx of seizure disorder, CAD, Right retinal detachment s/p corneal transplant, tardive dyskinesia, HTN, HLD, is here for follow up \par after a a hospitalization.\par \par  #cough \par -likely secondary to aspiration pneumonia \par -s/p tx w/ Unasyn and Augmentin \par s&s- pureed diet and mildly thick liquids with no improvement \par -GI referral \par \par #Stage III DTI\par -Burn referral \par \par # Hx of HTN\par - cont lisinopril 10 mg\par \par # Hx of CVA with spastic left hemiparesis/ hx of seizure disorder\par - c/w aspirin and statins\par - cont baclofen \par -  carbamazepine increased to 300 \par -f/u neuro \par \par \par # Hx of CAD\par - c/w aspirin and statins\par \par # Hx of mood disorder/anxiety\par - c/w resperidone 1mg BID, buspirone 5mg qhs, monitor QTc\par \par # HCM\par f/u in 2 months and prn

## 2022-04-29 NOTE — DIETITIAN INITIAL EVALUATION ADULT. - DOB: +DATEOFBIRTH
SUBJECTIVE:   40 y.o. female for annual routine Pap and checkup.  Current Outpatient Medications   Medication Sig Dispense Refill    amLODIPine (NORVASC) 10 MG tablet       butalbital-acetaminophen-caffeine -Statement Selected

## 2022-05-19 NOTE — REASON FOR VISIT
[Initial] : initial visit [Were you seen in the Emergency Room?] : seen in the emergency room [Were you admitted to the burn center at [facility]?] : admitted to the burn center at [facility] [Family Member] : family member

## 2022-06-08 NOTE — PHYSICAL EXAM
[Size%: ______] : Size: [unfilled]% [3] : 3 out of 10 [Abnormal] : abnormal [Large] : medium [] : yes [de-identified] : pressure sores bilateral medial knees, sacrum, right buttock/ ischium--> debrided --> local wound care --> culture sent  [TWNoteComboBox1] : wet to dry

## 2022-06-08 NOTE — ASSESSMENT
[Wound Care] : wound care [FreeTextEntry1] : pressure sores bilateral medial knees, sacrum, right buttock/ ischium--> debrided --> local wound care --> culture sent

## 2022-06-21 NOTE — ED PROVIDER NOTE - CLINICAL SUMMARY MEDICAL DECISION MAKING FREE TEXT BOX
Caller: Byrson Baker    Relationship: Self    Best call back number: [phone]      What specialty or service is being requested: [facility] AND SAID [facility] NEEDS OFFICE TO REQUEST NEW AUTHORIZATION FOR PT TO SEE DR LUIS. PT HAS A FOLLOW UP VISIT ON 8-15-22  Any additional details: [facility] PHONE NUMBER [phone]      
THE NUMBER -[phone] BELONGS TO A DIFFERENT PT. PER DEMO PAGE, PT CELL [phone]. I UPDATED ON REG PAGE.  
Labs reviewed. Tegretol level therapeutic. Electrolytes WNL. Tn negative. Head CT unchanged. CXR with no acute abnormality.  Imp: Syncope, etiology unclear. Patient has had syncope before and work-up has so far been negative. Patient must see cardiology for a syncope work-up. Discussed with sister at bedside. Prefers out-patient work-up. Will give referral to cardiology.

## 2022-07-05 NOTE — DIETITIAN INITIAL EVALUATION ADULT. - PROBLEM SELECTOR PROBLEM 4
CBC GROUP    CONSULTING IN BLOOD DISORDERS & CANCER      REASON FOR CONSULTATION/CHIEF COMPLAINT:     Evaluation & management for portal vein thrombosis                             REQUESTING PHYSICIAN: No ref. provider found  RECORDS OBTAINED:  Records of the patients history including those from the electronic medical record were reviewed and summarized in detail.    HISTORY OF PRESENT ILLNESS:    The patient is a 65 y.o. year old male with medical issues comprising alcoholic liver disease, cirrhosis, esophageal varices s/p banding recurrent ascites & HTN who was admitted to the [facility] on 2/4/22 with hepatic encephalopathy.      2/5/22: A US abdomen Cirrhosis with findings of portosystemic shunting including mild to moderate splenomegaly and ascites. There also appears to be hepatofugal in the main portal vein with concern for thrombus formation as well.     2/6/22: A doppler portal vein revealed acute thrombus in the main portal vein, right intrahepatic portal vein, along with abnormal flow in the extrahepatic portal vein and left intrahepatic portal vein.     Of note, a MRI abdomen from 1/7/22 done at [facility] had demonstrated peripheral, chronic nonocclusive thrombus of the main portal vein.     He was seen by inpatient hem/onc team. Given unusual location, hypercoagulable state work up was performed  on 2/7/22. . No prior history of VTEs. No family history of thrombosis.     -  Factor V leiden mutation negative. Factor II & AT-III activity low (likely due to liver dysfunction). Mildly elevated ACL, IgM (unclear significance). LA & b2-GP negative.  JAK2 mutation negative. [facility] flow cytometry negative. Protein C & S levels  not done as they would be altered due to liver disease.     Patient has chronic thrombocytopenia with platelets in 40-50,000s range. The CBC from 2/7/22 showed platelet count of 40,000. No c/o bleed or bruise.   -Given risk for intestinal infarction with PV thrombus and likely prothrombotic state,   started him on low dose lovenox on 2/7/22.   - Tolerating well.   - Platelets dropped from 42,000 on 2/8/22 to 36,000 on 2/9 to 34,000 on 2/10. Lovenox stopped. HIT Ab sent - which later came back negative.   - 2/11: Seen by vascular surgery. No interventions planned.   2/13: HIT ab came back negative. Platelets improved to 48,000. Was resumed on lovenox 40 mg sq daily & discharged home.     Patient first seen in the hematology clinic on 2/15/22. He has been tolerating lovenox 40 mg daily well.  Denies any bleed/bruise. Saw , GI in March 2022. Discussed plan for TIPSS procedure. A CT a/p performed 3/22/22 showed nonocclusive hypodense thrombus within the main portal vein at the nayeli hepatis.     4/14/22: Underwent TIPSS procedure at [facility]. Tolerated well.    INTERIM HISTORY:  Patient returns to the clinic for a f/u visit. Says he had TIPSS done in April 2022. Reports of feeling better since then. Mentation more clearer.  He has not needed paracentesis since March 2022.  Continues lactulose, rifaximin, Docusate senna.  He has been taking Lovenox subcutaneous injections as instructed.  Reports of minimal bruises around injection sites.  No other major issues with lovenox.     Past Medical History:   Diagnosis Date   • Alcoholism (HCC)    • Anemia    • Cirrhosis (HCC)    • Clot    • COVID-19 03/17/2022   • Encephalopathy    • Hypertension    • Liver disease      Past Surgical History:   Procedure Laterality Date   • COLONOSCOPY     • ENDOSCOPY         MEDICATIONS    Current Outpatient Medications:   •  amitriptyline (ELAVIL) 10 MG tablet, Take 1 tablet by mouth Every Night., Disp: 90 tablet, Rfl: 1  •  Enoxaparin Sodium (Lovenox) 40 MG/0.4ML solution prefilled syringe syringe, Inject 0.4 mL under the skin into the appropriate area as directed Daily. Indications: DVT/PE (active thrombosis), Disp: 11.2 mL, Rfl: 3  •  furosemide (LASIX) 40 MG tablet, TAKE 1 TABLET BY MOUTH DAILY, Disp: 30 tablet, Rfl: 2  •   lactulose (CHRONULAC) 10 GM/15ML solution, TAKE 30MLS BY MOUTH ONCE TO TWICE DAILY UNTIL 3-4 BOWEL MOVEMENTS ARE MADE, Disp: 5400 mL, Rfl: 2  •  lactulose (CHRONULAC) 10 GM/15ML solution, TAKE 30MLS BY MOUTH ONCE TO TWICE DAILY UNTIL 3-4 BOWEL MOVEMENTS ARE MADE, Disp: 5400 mL, Rfl: 2  •  MAGnesium-Oxide 400 (241.3 Mg) MG tablet tablet, Take 400 mg by mouth 2 (Two) Times a Day., Disp: , Rfl:   •  midodrine (PROAMATINE) 5 MG tablet, Take 0.5 tablets by mouth Every 8 (Eight) Hours. (Patient taking differently: Take 5 mg by mouth Daily.), Disp: 45 tablet, Rfl: 3  •  multivitamin (multivitamin) tablet tablet, Take 1 tablet by mouth Daily., Disp: 30 tablet, Rfl: 3  •  nadolol (CORGARD) 20 MG tablet, Take 1 tablet by mouth Daily., Disp: 30 tablet, Rfl: 3  •  omeprazole (priLOSEC) 40 MG capsule, Take 40 mg by mouth., Disp: , Rfl:   •  pantoprazole (PROTONIX) 40 MG EC tablet, Take 1 tablet by mouth Every Morning., Disp: 30 tablet, Rfl: 3  •  riFAXIMin (XIFAXAN) 550 MG tablet, Take 1 tablet by mouth Every 12 (Twelve) Hours., Disp: 14 tablet, Rfl: 0  •  sennosides-docusate (PERICOLACE) 8.6-50 MG per tablet, Take 1 tablet by mouth Daily., Disp: 30 tablet, Rfl: 3  •  spironolactone (ALDACTONE) 50 MG tablet, Take 1 tablet by mouth Daily., Disp: 90 tablet, Rfl: 3  •  spironolactone (ALDACTONE) 100 MG tablet, 100 mg., Disp: , Rfl:     ALLERGIES:     Allergies   Allergen Reactions   • Iron GI Intolerance   • Zinc GI Intolerance     vomiting       SOCIAL HISTORY:       Social History     Socioeconomic History   • Marital status:    Tobacco Use   • Smoking status: Never Smoker   • Smokeless tobacco: Never Used   Vaping Use   • Vaping Use: Never used   Substance and Sexual Activity   • Alcohol use: Not Currently     Comment: No ETOH on 2.5 yrs   • Drug use: No   • Sexual activity: Defer         FAMILY HISTORY:  Family History   Problem Relation Age of Onset   • Diabetes Mother    • Hypertension Father      REVIEW OF  "SYSTEMS:  Constitutional: [No fevers, chills, sweats]  Eye: [No recent visual problems]  ENMT: [No ear pain, nasal congestion, sore throat]  Respiratory: [No shortness of breath, cough]  Cardiovascular: [No Chest pain, palpitations, syncope]  Gastrointestinal: [No nausea, vomiting, diarrhea]  Genitourinary: [No hematuria]  Hema/Lymph: [Negative for bruising tendency, swollen lymph glands]  Endocrine: [Negative for excessive thirst, excessive hunger]  Musculoskeletal: [Denies any musculoskeletal pain or swelling]  Integumentary: [No rash, pruritus, abrasions]  Neurologic: [ No weakness or numbness, Alert & oriented X 4]       Vitals:    07/05/22 1515   BP: 149/68   Pulse: 97   Resp: 16   Temp: 98.4 °F (36.9 °C)   TempSrc: Temporal   SpO2: 100%   Weight: 76.2 kg (167 lb 14.4 oz)   Height: 180 cm (70.87\")   PainSc: 0-No pain     Current Status 7/5/2022   ECOG score 0      PHYSICAL EXAM:    CONSTITUTIONAL:  Vital signs reviewed.  No distress, looks comfortable.  EYES:  Conjunctiva and lids unremarkable.   EARS,NOSE,MOUTH,THROAT:  Ears and nose appear unremarkable.  RESPIRATORY:  Normal respiratory effort.  Lungs clear to auscultation bilaterally.  CARDIOVASCULAR:  Normal S1, S2.  No murmurs rubs or gallops.  No significant lower extremity edema.  GASTROINTESTINAL: Abdomen appears unremarkable.  Nontender.  Mild distension  LYMPHATIC:  No cervical, supraclavicular lymphadenopathy.  NEURO: AAO x 3, No focal deficits.  Appears to have equal strength all 4 extremities.  MUSCULOSKELETAL:  Unremarkable digits/nails.  No cyanosis or clubbing.  No apparent joint deformities.  SKIN:  Warm.  No rashes. Bruises on arms and abdominal injection sites  PSYCHIATRIC:  Normal judgment and insight.  Normal mood and affect.     RECENT LABS:      Recent labs reviewed    ASSESSMENT:   is a pleasant 66 y/o WM with medical issues comprising alcoholic liver disease, cirrhosis, esophageal varices s/p banding recurrent ascites & HTN " who comes for portal vein thrombosis evaluation & management.      # Portal vein thrombosis:  · Acute on chronic. MRI abdomen from 1/7/22 done at [facility] had demonstrated peripheral, chronic nonocclusive thrombus of the main portal vein. The doppler from 2/6/22 shows acute thrombus in the main and right intrahepatic portal veins.   · - This is likely due to altered coagulation due to hepatic dysfunction.  No prior history of VTEs. No family history of thrombosis. However given unusual location, hypercoagulable state work up was sent on 2/7/22 - which was negative.   · Given risk for intestinal infarction with PV thrombus and likely prothrombotic state,  started him on low dose lovenox on 2/7/22.   · Platelets dropped from 42,000 on 2/8/22 to 36,000 on 2/9 to 34,000 on 2/10. Lovenox stopped. HIT Ab sent - which later came back negative. 2/11: Seen by vascular surgery. No interventions recommended.  · 2/15/22: Pt returns to the clinic for a f/u visit. Is tolerating lovenox well. No bleed/bruise. No abd pain or distension. Plan to check CBC weekly. If improving platelets, will consider increasing the dose. F/u in a month.   · 3/22/22: Pt doing well on lovenox 40 mg daily. Platelets remain low but stable in 60-70,000 range. Will continue current dose and consider CBC checks to every other week. Advised to maintain close f/u with GI.   · 4/22/22: S/p TIPSS on 4/14/22 by Dr. Jeronimo with [facility]. Doing well. Plan to continue lovenox for now. Consider stopping after 3-6 months. CBC every other week.   · 7/5/2022: Reports of doing well on Lovenox 40 mg daily.  Has not needed paracentesis since March.  Labs overall stable with persistent anemia and thrombocytopenia in 70-80,000 range.  Says he is planned to see  this month.  Discussed plan to repeat hepatic Doppler next month.  If resolution of portal vein thrombosis, will discuss stopping anticoagulation.     # Thrombocytopenia:  - Acute on chronic. Likely liver  disease related.   - No bleed/bruise     # Leukopenia:  Likely liver disease related.   Monitor     # Coagulopathy:  PT/INR elevated.   No bleed/bruise. Monitor     # Anemia:  Likely anemia of chronic disease. Baseline Hb in 8-9 gm/dl range since 2020.  Iron panel with no deficiency.   Hb stable.     # Hepatic encephalopathy: Per GI  # Decompensated liver cirrhosis  # Ascites: Was getting paracentesis every 2-3 months prior to TIPSS in April 2022.  None since then.    PLAN:   - Portal vein thrombosis in the setting of liver-disease related thrombocytopenia and coagulopathy. Will continue Lovenox 40 mg sq daily.   - Check CBC every other week with RN review.  -Hepatic Doppler next month.  Consider stopping anticoagulation if PV thrombus resolution  - S/p TIPSS procedure on 4/14/22. Advised to maintain close f/u with GI.  - F/u in 2 months or sooner if needed    Orders Placed This Encounter   Procedures   • CBC & Differential     Standing Status:   Future     Number of Occurrences:   1     Standing Expiration Date:   6/29/2023   Total time spent during this patient encounter is 45 minutes. The total time spent with the patient includes at least one or more of the following: preparing to see the patient by reviewing of tests, prior notes or other relevant information, performing appropriate independent examination & evaluation, counseling, ordering of medications, tests or procedures, communicating with other healthcare professionals, when appropriate to coordinate care, documenting clinic information in the electronic medical records or other health records, independently interpreting results of tests and communicating the results to the patient/family or caregiver.             Hypokalemia

## 2022-07-22 NOTE — ED PROVIDER NOTE - CLINICAL SUMMARY MEDICAL DECISION MAKING FREE TEXT BOX
57 y.o. M, PMH of HTN, HLD, DM, CAD, CVA (x2 w residual weakness), s/p trach and peg presents from [facility] for placement at new facility. Patient's sister (HCP) states pt was discharged today to [facility], she did not like care at [facility], describing it as unsafe d/t no side rails on bed, brought pt back to ED. No other complains. On exam, pt in NAD, awake, chronically ill appearing, (+) temporal wasting, trach in place,  lungs CTA B/L, CV S1S2 regular, abdomen soft/NT/ND/(+)BS/(+)PEG tube in place, ext (-) edema, contracted extremities, multiple pressure ulcers. Will admit pt for placement in different facility. Sister signed DNR/DNI and wants palliative care consult.

## 2022-07-22 NOTE — ED PROVIDER NOTE - ATTENDING APP SHARED VISIT CONTRIBUTION OF CARE
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 M, PMH of HTN, HLD, DM, CAD, CVA (x2 w residual weakness), s/p trach and peg presents from [facility] for placement at new facility. Patient's sister (HCP) states pt was discharged today to [facility], she did not like care at [facility], describing it as unsafe d/t no side rails on bed, brought pt back to ED. No other complains. On exam, pt in NAD, awake, chronically ill appearing, (+) temporal wasting, trach in place,  lungs CTA B/L, CV S1S2 regular, abdomen soft/NT/ND/(+)BS/(+)PEG tube in place, ext (-) edema, contracted extremities, multiple pressure ulcers. Will admit pt for placement in different facility. Sister signed DNR/DNI and wants palliative care consult.

## 2022-07-22 NOTE — ED PROVIDER NOTE - NS ED ATTENDING STATEMENT MOD
This was a shared visit with the [facility]. I reviewed and verified the documentation and independently performed the documented:

## 2022-07-22 NOTE — ED PROVIDER NOTE - OBJECTIVE STATEMENT
57 M DNR/ DNI history of CAD, CVA W/residual left-sided weakness, DM, high cholesterol, HTN, seizures presents to the ED for evaluation.  Patient here with his sister who is his healthcare proxy.  Proxy states she brought patient to ED because he was at nursing home and nursing home refused to put guardrails on patient's bed she no longer wanted patient to stay at facility.  Denies patient having any medical complaints today.  Patient is nonverbal and trached but responds with yes and no head shakes.

## 2022-07-22 NOTE — ED PROVIDER NOTE - PHYSICAL EXAMINATION
VITAL SIGNS: I have reviewed nursing notes and confirm.  CONSTITUTIONAL:  in no acute distress.  SKIN: Skin exam is warm and dry, pt has skin breakdown to b/l knees and ankles  HEAD: Normocephalic  EYES: EOM intact; conjunctiva and sclera clear.  ENT: No nasal discharge; airway clear  NECK: Supple; non tender.  CARD: S1, S2 normal; no murmurs, gallops, or rubs. Regular rate and rhythm.  RESP: No wheezes, rales or rhonchi.  ABD: ; firm; non-distended; non-tender; No rebound or guarding. right sided  inguinal hernia as per sister the hernia is not new.   EXT:  ROM. No clubbing, cyanosis or edema.

## 2022-07-22 NOTE — ED ADULT NURSE NOTE - CHIEF COMPLAINT QUOTE
Cristofer MICHEL from [facility] sister thinks it is unsafe living conditions for him there because there are no side rains

## 2022-07-22 NOTE — ED ADULT TRIAGE NOTE - CHIEF COMPLAINT QUOTE
Cristofer MICHEL from [facility] sister thinks it is unsafe living conditions for him there because there are no side rains

## 2022-07-22 NOTE — ED ADULT NURSE NOTE - INTERVENTIONS DEFINITIONS
Physically safe environment: no spills, clutter or unnecessary equipment/Stretcher in lowest position, wheels locked, appropriate side rails in place/Monitor gait and stability

## 2022-07-22 NOTE — ED ADULT NURSE NOTE - OBJECTIVE STATEMENT
Patient sanya from [facility], discharged from [facility] today, send to [facility] and sister thinks it is unsafe for patient to live due to no side rails on the bed. Patient was in [facility] because he aspirated and has a newly placed trach to vent and peg. Patient has pressure ulcers to his b/l heel, b/l inner knee, right shin, right back thigh and sacrum. Patient has a condom catheter. Patient is nonverbal but can answer with nodding or shaking head. Patient sanya from [facility], discharged from [facility] today, send to [facility] and sister thinks it is unsafe for patient to live due to no side rails on the bed. Patient was in [facility] because he aspirated and has a newly placed trach to vent and peg. Patient has stage 2 pressure ulcer to his right laterl lower leg, stage 3 pressure ulcer to right heel, b/l inner knee, unstageable pressure ulcer to left heel, stage 4 pressure ulcer to sacrum and right back thigh. Patient has a condom catheter. Patient is nonverbal but can answer with nodding or shaking head. Patient sanya from [facility], discharged from [facility] today, send to [facility] and sister thinks it is unsafe for patient to live due to no side rails on the bed. Patient was in [facility] because he aspirated and has a newly placed trach to vent and peg. Patient has stage 2 pressure ulcer to his right laterl lower leg, stage 3 pressure ulcer to right heel, b/l inner knee, unstageable pressure ulcer to left heel, stage 4 pressure ulcer to sacrum and right buttock. Patient has a condom catheter. Patient is nonverbal but can answer with nodding or shaking head.

## 2022-07-23 NOTE — H&P ADULT - NSHPPHYSICALEXAM_GEN_ALL_CORE
Vital Signs Last 24 Hrs  T(C): 36.7 (22 Jul 2022 22:09), Max: 36.7 (22 Jul 2022 22:09)  T(F): 98.1 (22 Jul 2022 22:09), Max: 98.1 (22 Jul 2022 22:09)  HR: 87 (22 Jul 2022 22:21) (84 - 87)  BP: 131/88 (22 Jul 2022 22:09) (131/88 - 131/88)  RR: 18 (22 Jul 2022 22:09) (18 - 18)  SpO2: 99% (22 Jul 2022 22:21) (99% - 100%)    Parameters below as of 22 Jul 2022 22:09  Patient On (Oxygen Delivery Method): ventilator    PHYSICAL EXAM  GENERAL: NAD  HEAD:  NCAT  NECK: + trach  NERVOUS SYSTEM:   CHEST/LUNG: + bs b/l  HEART: +s1s2 RRR  ABDOMEN: soft, + peg  EXTREMITIES:  pp, no edema  SKIN: No rashes or lesions Vital Signs Last 24 Hrs  T(C): 36.7 (22 Jul 2022 22:09), Max: 36.7 (22 Jul 2022 22:09)  T(F): 98.1 (22 Jul 2022 22:09), Max: 98.1 (22 Jul 2022 22:09)  HR: 87 (22 Jul 2022 22:21) (84 - 87)  BP: 131/88 (22 Jul 2022 22:09) (131/88 - 131/88)  RR: 18 (22 Jul 2022 22:09) (18 - 18)  SpO2: 99% (22 Jul 2022 22:21) (99% - 100%)    Parameters below as of 22 Jul 2022 22:09  Patient On (Oxygen Delivery Method): ventilator    PHYSICAL EXAM  GENERAL: NAD, frail/emaciated appearing  HEAD:  NCAT, EOMI  NECK: + trach  NERVOUS SYSTEM: awake and alert, responds to questions w blinking  CHEST/LUNG: + bs b/l  HEART: +s1s2 RRR  ABDOMEN: hard, + peg  EXTREMITIES:  pp, no edema, LUE contrated; b/l ankles/heels wrapped  SKIN: dry w age related skin changes Vital Signs Last 24 Hrs  T(C): 36.7 (22 Jul 2022 22:09), Max: 36.7 (22 Jul 2022 22:09)  T(F): 98.1 (22 Jul 2022 22:09), Max: 98.1 (22 Jul 2022 22:09)  HR: 87 (22 Jul 2022 22:21) (84 - 87)  BP: 131/88 (22 Jul 2022 22:09) (131/88 - 131/88)  RR: 18 (22 Jul 2022 22:09) (18 - 18)  SpO2: 99% (22 Jul 2022 22:21) (99% - 100%)    Parameters below as of 22 Jul 2022 22:09  Patient On (Oxygen Delivery Method): ventilator    PHYSICAL EXAM  GENERAL: NAD, frail appearing  HEAD:  NCAT, EOMI  NECK: + trach  NERVOUS SYSTEM: awake and alert, responds to questions w blinking  CHEST/LUNG: + bs b/l  HEART: +s1s2 RRR  ABDOMEN: hard, + peg  EXTREMITIES:  pp, no edema, LUE contrated; b/l ankles/heels wrapped  SKIN: dry w age related skin changes

## 2022-07-23 NOTE — H&P ADULT - HISTORY OF PRESENT ILLNESS
58 yo M w h/o HTN, HLD, DM, CAD, CVA (x2 w residual weakness), s/p trach and peg presents from [facility] for placement at new facility. Patient's sister (HCP) apparently did not like care at [facility] - describing it as unsafe d/t no side rails on bed.    In ED, vitals stable.      consulted and patient admitted until adequate facility established.

## 2022-07-23 NOTE — H&P ADULT - ASSESSMENT
58 yo M w h/o HTN, HLD, DM, CAD, CVA (x2 w residual weakness), s/p trach and peg presents from [facility] for placement at new facility. Patient's sister (HCP) apparently did not like care at [facility] - describing it as unsafe d/t no side rails on bed.  consulted and patient admitted until adequate facility established.    #Dispo  - HCP unhappy w [facility] > f/u SW/CM on adequate dispo   56 yo M w h/o HTN, HLD, DM, CAD, CVA (x2 w residual weakness), s/p trach and peg presents from [facility] for placement at new facility. Patient's sister (HCP) apparently did not like care at [facility] - describing it as unsafe d/t no side rails on bed.  consulted and patient admitted until adequate facility established.    #Disposition in trach/peg frail patient  - HCP unhappy w [facility] > f/u SW/CM on adequate dispo    DVT ppx: heparin sub q  GI ppx: ni  Diet: NPO - f/u nutrition for tube feeds  Activity: bedbound    DNR/DNI - molst in chart   56 yo M w h/o HTN, HLD, DM, CAD, CVA (x2 w residual weakness), s/p trach and peg presents from [facility] for placement at new facility. Patient's sister (HCP) apparently did not like care at [facility] - describing it as unsafe d/t no side rails on bed.  consulted and patient admitted until adequate facility established.    #Disposition in trach/peg frail patient  - HCP unhappy w [facility] > f/u SW/CM on adequate dispo    #HTN  - on lisinopril 30mg qd and amlodipine 10mg qd    #CAD / HLD  - on simvastatin 10mg qhs and asa 81mg qd    #Seziure disorder  - on carbemazepine 100mg/5ml syrup bid    #Anxiety / Agitation  - on buspirone 5mg qd, risperidone 1mg bid prn    #GERD  - on esomeprazole suspension 40mg bid, sulcrafate 1g bid    #Constipation  - on docusate 100mg qd and lactulosee 10g/5ml tid prn    #Folate and vitamin deficiency  - on folic acid and mvi    DVT ppx: heparin sub q  GI ppx: ni  Diet: NPO - f/u nutrition for tube feeds  Activity: bedbound    DNR/DNI - molst in chart    MED REC COMPLETED WITH HCP

## 2022-07-23 NOTE — H&P ADULT - ATTENDING COMMENTS
56 yo M w h/o HTN, HLD, DM, CAD, CVA (x2 w residual weakness), s/p trach and peg presents from [facility] for placement at new facility. Patient's sister (HCP) apparently did not like care at [facility] - describing it as unsafe d/t no side rails on bed.  consulted and patient admitted until adequate facility established.    #Disposition in trach/peg frail patient  - SW/CM notified about placement to different facility  - Pt is medically stable  - Vent management and changes per RT    #HTN  - on lisinopril 30mg qd and amlodipine 10mg qd    #CAD / HLD  - on simvastatin 10mg qhs and asa 81mg qd    #Seziure disorder  - on carbemazepine 100mg/5ml syrup bid    #Anxiety / Agitation  - on buspirone 5mg qd, risperidone 1mg bid prn    #GERD  - on esomeprazole suspension 40mg bid, sulcrafate 1g bid    #Constipation  - on docusate 100mg qd and lactulosee 10g/5ml tid prn    #Folate and vitamin deficiency  - on folic acid and mvi 56 yo M w h/o HTN, HLD, DM, CAD, CVA (x2 w residual weakness), s/p trach and peg presents from [facility] for placement at new facility. Patient's sister (HCP) apparently did not like care at [facility] - describing it as unsafe d/t no side rails on bed.  consulted and patient admitted until adequate facility established.    #Disposition in trach/peg frail patient  - SW/CM notified about placement to different facility  - Pt is medically stable  - Vent management and changes per pulm/crit    #HTN  - on lisinopril 30mg qd and amlodipine 10mg qd    #CAD / HLD  - on simvastatin 10mg qhs and asa 81mg qd    #Seziure disorder  - on carbemazepine 100mg/5ml syrup bid    #Anxiety / Agitation  - on buspirone 5mg qd, risperidone 1mg bid prn    #GERD  - on esomeprazole suspension 40mg bid, sulcrafate 1g bid    #Constipation  - on docusate 100mg qd and lactulosee 10g/5ml tid prn    #Folate and vitamin deficiency  - on folic acid and mvi 56 yo M w h/o HTN, HLD, DM, CAD, CVA (x2 w residual weakness), s/p trach and peg presents from [facility] for placement at new facility. Patient's sister (HCP) apparently did not like care at [facility] - describing it as unsafe d/t no side rails on bed.  consulted and patient admitted until adequate facility established.    #Disposition in trach/peg frail patient  #Chronic respiratory failure s/p trach/peg   - SW/CM notified about placement to different facility  - Pt is medically stable  - Vent management and changes per pulm/crit    #HTN  - on lisinopril 30mg qd and amlodipine 10mg qd    #CAD / HLD  - on simvastatin 10mg qhs and asa 81mg qd    #Seziure disorder  - on carbemazepine 100mg/5ml syrup bid    #Anxiety / Agitation  - on buspirone 5mg qd, risperidone 1mg bid prn    #GERD  - on esomeprazole suspension 40mg bid, sulcrafate 1g bid    #Constipation  - on docusate 100mg qd and lactulosee 10g/5ml tid prn    #Folate and vitamin deficiency  - on folic acid and mvi 56 yo M w h/o HTN, HLD, DM, CAD, CVA (x2 w residual weakness), s/p trach and peg presents from [facility] for placement at new facility. Patient's sister (HCP) apparently did not like care at [facility] - describing it as unsafe d/t no side rails on bed.  consulted and patient admitted until adequate facility established.    #Disposition in trach/peg frail patient  #Chronic respiratory failure s/p trach/peg   [facility]  - STEVEN/DARIO notified about placement to different facility  - Pt is medically stable  - Vent management and changes per pulm/crit    #HTN  - on lisinopril 30mg qd and amlodipine 10mg qd    #CAD / HLD  - on simvastatin 10mg qhs and asa 81mg qd    #Seziure disorder  - on carbemazepine 100mg/5ml syrup bid    #Anxiety / Agitation  - on buspirone 5mg qd, risperidone 1mg bid prn    #GERD  - on esomeprazole suspension 40mg bid, sulcrafate 1g bid    #Constipation  - on docusate 100mg qd and lactulosee 10g/5ml tid prn    #Folate and vitamin deficiency  - on folic acid and mvi 56 yo M w h/o HTN, HLD, DM, CAD, CVA (x2 w residual weakness), s/p trach and peg presents from [facility] for placement at new facility. Patient's sister (HCP) apparently did not like care at [facility] - describing it as unsafe d/t no side rails on bed.  consulted and patient admitted until adequate facility established.    #Disposition in trach/peg frail patient  #Chronic respiratory failure s/p trach/peg   #Hx of CVA  Pt was at [facility] 05/2022 for aspiration PNA where he got trach/peg. Discharged 2 days prior to admission to [facility]. HCP disagreed with their care and brought him to ED.  - SW/CM notified about placement to different facility, requesting [place] if possible.  - Pt is medically stable  - Vent management and changes per pulm/crit    #HTN  - on lisinopril 30mg qd and amlodipine 10mg qd    #CAD / HLD  - on simvastatin 10mg qhs and asa 81mg qd    #Seziure disorder  - on carbemazepine 100mg/5ml syrup bid    #Anxiety / Agitation  - on buspirone 5mg qd, risperidone 1mg bid prn    #GERD  - on esomeprazole suspension 40mg bid, sulcrafate 1g bid    #Constipation  - on docusate 100mg qd and lactulosee 10g/5ml tid prn    #Folate and vitamin deficiency  - on folic acid and mvi    DVT PPX, heparin    #Progress Note Handoff  Pending (specify): NH placement  Family discussion: d/w pt's sister regarding SW f/u  Disposition: Home 58 yo M w h/o HTN, HLD, DM, CAD, CVA (x2 w residual weakness), s/p trach and peg presents from [facility] for placement at new facility. Patient's sister (HCP) apparently did not like care at [facility] - describing it as unsafe d/t no side rails on bed.  consulted and patient admitted until adequate facility established.    #Disposition in trach/peg frail patient  #Chronic respiratory failure s/p trach/peg   #Hx of CVA  #Functional quadriplegia  Pt was at [facility] 05/2022 for aspiration PNA where he got trach/peg. Discharged 2 days prior to admission to [facility]. HCP disagreed with their care and brought him to ED.  - SW/CM notified about placement to different facility, requesting [place] if possible.  - Pt is medically stable  - Vent management and changes per pulm/crit    #HTN  - on lisinopril 30mg qd and amlodipine 10mg qd    #CAD / HLD  - on simvastatin 10mg qhs and asa 81mg qd    #Seziure disorder  - on carbemazepine 100mg/5ml syrup bid    #Anxiety / Agitation  - on buspirone 5mg qd, risperidone 1mg bid prn    #GERD  - on esomeprazole suspension 40mg bid, sulcrafate 1g bid    #Constipation  - on docusate 100mg qd and lactulosee 10g/5ml tid prn    #Folate and vitamin deficiency  - on folic acid and mvi    DVT PPX, heparin    #Progress Note Handoff  Pending (specify): NH placement  Family discussion: d/w pt's sister regarding SW f/u  Disposition: Home

## 2022-07-24 NOTE — PATIENT PROFILE ADULT - FALL HARM RISK - HARM RISK INTERVENTIONS

## 2022-07-24 NOTE — CONSULT NOTE ADULT - SUBJECTIVE AND OBJECTIVE BOX
Patient is a 57y old  Male who presents with a chief complaint of placement (23 Jul 2022 02:47)      HPI:  58 yo M w h/o HTN, HLD, DM, CAD, CVA (x2 w residual weakness), s/p trach and peg presents from [facility] for placement at new facility. Patient's sister (HCP) apparently did not like care at [facility] - describing it as unsafe d/t no side rails on bed.    In ED, vitals stable.      consulted and patient admitted until adequate facility established. admitted to vent unit called to evaluate      PAST MEDICAL & SURGICAL HISTORY:  CVA (cerebral vascular accident)  1980&#x27;s x2   Residual left sided weakness      CAD (coronary artery disease)      Retinal detachment      Hypertension      High cholesterol      Diabetes      Anxiety      Seizure      History of corneal transplant  right   4/13/17          SOCIAL HX:   Smoking    uto    FAMILY HISTORY: uto      REVIEW OF SYSTEMS uto  	    Allergies    No Known Allergies    Intolerances        amLODIPine   Tablet 10 milliGRAM(s) Oral daily  busPIRone 5 milliGRAM(s) Oral <User Schedule>  carBAMazepine  Oral  Liquid - Peds 100 milliGRAM(s) Oral every 12 hours  chlorhexidine 2% Cloths 1 Application(s) Topical <User Schedule>  Dakins Solution - 1/2 Strength 1 Application(s) Topical three times a day  folic acid 1 milliGRAM(s) Oral daily  heparin   Injectable 5000 Unit(s) SubCutaneous every 8 hours  lactulose Syrup 10 Gram(s) Oral three times a day PRN  lisinopril 30 milliGRAM(s) Oral daily  multivitamin 1 Tablet(s) Oral daily  pantoprazole    Tablet 40 milliGRAM(s) Oral before breakfast  risperiDONE   Tablet 1 milliGRAM(s) Oral two times a day PRN  simvastatin 10 milliGRAM(s) Oral at bedtime  sucralfate suspension 1 Gram(s) Oral two times a day  : Home Meds:      PHYSICAL EXAM    ICU Vital Signs Last 24 Hrs    HR: 86 (24 Jul 2022 07:31) (85 - 97)  BP: 122/68 (24 Jul 2022 07:31) (100/60 - 122/68)    RR: 18 (24 Jul 2022 07:31) (18 - 18)  SpO2: 100% (24 Jul 2022 07:31) (99% - 100%)    O2 Parameters below as of 24 Jul 2022 07:31  Patient On (Oxygen Delivery Method): ventilator            General: chronically ill looking  HEENT: trach     Lungs: dec bs both bases  Cardiovascular: JAMES 2.6  Abdomen: Soft, Positive BS  Not following commands    07-23-22 @ 07:01  -  07-24-22 @ 07:00  --------------------------------------------------------  IN:    Jevity 1.2: 350 mL  Total IN: 350 mL    OUT:    Incontinent per Condom Catheter (mL): 800 mL  Total OUT: 800 mL    Total NET: -450 mL          LABS:                          10.2   10.31 )-----------( 239      ( 23 Jul 2022 06:10 )             28.9                                               07-23    126<L>  |  89<L>  |  12  ----------------------------<  66<L>  4.5   |  24  |  <0.5<L>    Ca    8.3<L>      23 Jul 2022 06:10  Phos  3.8     07-23  Mg     1.8     07-23    TPro  6.6  /  Alb  2.7<L>  /  TBili  0.4  /  DBili  x   /  AST  17  /  ALT  23  /  AlkPhos  274<H>  07-23                                                                                           LIVER FUNCTIONS - ( 23 Jul 2022 06:10 )  Alb: 2.7 g/dL / Pro: 6.6 g/dL / ALK PHOS: 274 U/L / ALT: 23 U/L / AST: 17 U/L / GGT: x                                                                                               Mode: AC/ CMV (Assist Control/ Continuous Mandatory Ventilation)  RR (machine): 14  TV (machine): 400  FiO2: 40  PEEP: 5  ITime: 1  MAP: 10  PIP: 17                                      ABG - ( 23 Jul 2022 11:23 )  pH, Arterial: 7.51  pH, Blood: x     /  pCO2: 32    /  pO2: 170   / HCO3: 26    / Base Excess: 2.9   /  SaO2: 99.6                MEDICATIONS  (STANDING):  amLODIPine   Tablet 10 milliGRAM(s) Oral daily  busPIRone 5 milliGRAM(s) Oral <User Schedule>  carBAMazepine  Oral  Liquid - Peds 100 milliGRAM(s) Oral every 12 hours  chlorhexidine 2% Cloths 1 Application(s) Topical <User Schedule>  Dakins Solution - 1/2 Strength 1 Application(s) Topical three times a day  folic acid 1 milliGRAM(s) Oral daily  heparin   Injectable 5000 Unit(s) SubCutaneous every 8 hours  lisinopril 30 milliGRAM(s) Oral daily  multivitamin 1 Tablet(s) Oral daily  pantoprazole    Tablet 40 milliGRAM(s) Oral before breakfast  simvastatin 10 milliGRAM(s) Oral at bedtime  sucralfate suspension 1 Gram(s) Oral two times a day    MEDICATIONS  (PRN):  lactulose Syrup 10 Gram(s) Oral three times a day PRN constipation  risperiDONE   Tablet 1 milliGRAM(s) Oral two times a day PRN agitation

## 2022-07-24 NOTE — PROGRESS NOTE ADULT - NUTRITIONAL ASSESSMENT
This patient has been assessed with a concern for Malnutrition and has been determined to have a diagnosis/diagnoses of Severe protein-calorie malnutrition and Underweight (BMI < 19).    This patient is being managed with:   Diet Pureed-  Mildly Thick Liquids (MILDTHICKLIQS)  Entered: Mar 18 2022 10:24AM

## 2022-07-24 NOTE — CONSULT NOTE ADULT - ASSESSMENT
IMPRESSION:    Chronic resp failure sp PEG/ trach  CVA  seizure  hyponatremia  functional quadreplegia    PLAN:    CNS: Avoid CNS depressant    HEENT:  Oral care    PULMONARY:  HOB @ 45 degrees, aspiration precaution, dec , keep Sao2 92 to 96%    CARDIOVASCULAR: avoid overload    GI: GI prophylaxis                                          Feeding PEG    RENAL:  F/u  lytes.  Correct as needed. accurate I/O, u lytes/ osm    INFECTIOUS DISEASE: prcoal    HEMATOLOGICAL:  DVT prophylaxis.    ENDOCRINE:  Follow up FS.  Insulin protocol if needed.    vent unit  dc planning  keiko

## 2022-07-24 NOTE — PROGRESS NOTE ADULT - ASSESSMENT
58 yo M w h/o HTN, HLD, DM, CAD, CVA (x2 w residual weakness), s/p trach and peg presents from [facility] for placement at new facility. Patient's sister (HCP) apparently did not like care at [facility] - describing it as unsafe d/t no side rails on bed.  consulted and patient admitted until adequate facility established.    #Disposition in trach/peg frail patient  #Chronic respiratory failure s/p trach/peg   #Hx of CVA  #Functional quadriplegia  Pt was at [facility] 05/2022 for aspiration PNA where he got trach/peg. Discharged 2 days prior to admission to [facility]. HCP disagreed with their care and brought him to ED.  - SW/CM notified about placement to different facility, requesting [place] if possible.  - Pt is medically stable  - Vent management and changes per pulm/crit    #HTN  - on lisinopril 30mg qd and amlodipine 10mg qd    #CAD / HLD  - on simvastatin 10mg qhs and asa 81mg qd    #Seziure disorder  - on carbemazepine 100mg/5ml syrup bid    #Anxiety / Agitation  - on buspirone 5mg qd, risperidone 1mg bid prn    #GERD  - on esomeprazole suspension 40mg bid, sulcrafate 1g bid    #Constipation  - on docusate 100mg qd and lactulosee 10g/5ml tid prn    #Folate and vitamin deficiency  - on folic acid and mvi    DVT PPX, heparin    #Progress Note Handoff  Pending (specify): NH placement  Family discussion: d/w pt's sister regarding SW f/u  Disposition: Home.  58 yo M w h/o HTN, HLD, DM, CAD, CVA (x2 w residual weakness), s/p trach and peg presents from [facility] for placement at new facility. Patient's sister (HCP) apparently did not like care at [facility] - describing it as unsafe d/t no side rails on bed.  consulted and patient admitted until adequate facility established.    #Disposition in trach/peg frail patient  #Chronic respiratory failure s/p trach/peg   #Hx of CVA  #Functional quadriplegia  Pt was at [facility] 05/2022 for aspiration PNA where he got trach/peg. Discharged 2 days prior to admission to [facility]. HCP disagreed with their care and brought him to ED.  - SW/CM notified about placement to different facility, requesting no if possible.  - Pt is medically stable  - Vent management and changes per pulm/crit    #Pressure ulcer  in Sacrum and leg  - Pending woundcare eval -- woundcare recs needed for placement to [facility]    #HTN  - on lisinopril 30mg qd and amlodipine 10mg qd    #CAD / HLD  - on simvastatin 10mg qhs and asa 81mg qd    #Seziure disorder  - on carbemazepine 100mg/5ml syrup bid    #Anxiety / Agitation  - on buspirone 5mg qd, risperidone 1mg bid prn    #GERD  - on esomeprazole suspension 40mg bid, sulcrafate 1g bid    #Constipation  - on docusate 100mg qd and lactulosee 10g/5ml tid prn    #Folate and vitamin deficiency  - on folic acid and mvi    DVT PPX, heparin    #Progress Note Handoff  Pending (specify): NH placement  Family discussion: d/w pt's sister regarding SW f/u  Disposition: Home.

## 2022-07-24 NOTE — PROGRESS NOTE ADULT - SUBJECTIVE AND OBJECTIVE BOX
SILVANO CALIXTO  57y, Male  Allergy: No Known Allergies    Hospital Day: 1d    Patient seen and examined. No acute events overnight    PMH/PSH:  PAST MEDICAL & SURGICAL HISTORY:  CVA (cerebral vascular accident)  1980&#x27;s x2   Residual left sided weakness      CAD (coronary artery disease)      Retinal detachment      Hypertension      High cholesterol      Diabetes      Anxiety      Seizure      History of corneal transplant  right   4/13/17    VITALS:  T(F): --  HR: 86 (07-24-22 @ 07:31)  BP: 122/68 (07-24-22 @ 07:31) (100/60 - 122/68)  RR: 18 (07-24-22 @ 07:31)  SpO2: 100% (07-24-22 @ 07:31)    TESTS & MEASUREMENTS:  Weight (Kg):       07-23-22 @ 07:01  -  07-24-22 @ 07:00  --------------------------------------------------------  IN: 350 mL / OUT: 800 mL / NET: -450 mL    07-24-22 @ 07:01  -  07-24-22 @ 12:46  --------------------------------------------------------  IN: 450 mL / OUT: 0 mL / NET: 450 mL                       10.2   10.31 )-----------( 239      ( 23 Jul 2022 06:10 )             28.9     07-23    126<L>  |  89<L>  |  12  ----------------------------<  66<L>  4.5   |  24  |  <0.5<L>    Ca    8.3<L>      23 Jul 2022 06:10  Phos  3.8     07-23  Mg     1.8     07-23    TPro  6.6  /  Alb  2.7<L>  /  TBili  0.4  /  DBili  x   /  AST  17  /  ALT  23  /  AlkPhos  274<H>  07-23    LIVER FUNCTIONS - ( 23 Jul 2022 06:10 )  Alb: 2.7 g/dL / Pro: 6.6 g/dL / ALK PHOS: 274 U/L / ALT: 23 U/L / AST: 17 U/L / GGT: x           RECENT DIAGNOSTIC ORDERS:  Diet, NPO with Tube Feed:   Tube Feeding Modality: Gastrostomy  Jevity 1.2 Chris  Total Volume for 24 Hours (mL): 1050  Bolus  Total Volume of Bolus (mL):  350  Tube Feed Frequency: Every 8 hours   Tube Feed Start Time: 14:00  Bolus Feed Rate (mL per Hour): 500   Bolus Feed Duration (in Hours): 0.5  Bolus Feed Instructions:  Give feeds three times a day to match with breakfast/lunch/dinnertime  Free Water Flush  Bolus   Total Volume per Flush (mL): 100   Frequency: Every 8 Hours  Free Water Flush Instructions:  50 CC before and after meal (07-23-22 @ 15:09)    MEDICATIONS:  MEDICATIONS  (STANDING):  amLODIPine   Tablet 10 milliGRAM(s) Oral daily  busPIRone 5 milliGRAM(s) Oral <User Schedule>  carBAMazepine  Oral  Liquid - Peds 100 milliGRAM(s) Oral every 12 hours  chlorhexidine 2% Cloths 1 Application(s) Topical <User Schedule>  Dakins Solution - 1/2 Strength 1 Application(s) Topical three times a day  folic acid 1 milliGRAM(s) Oral daily  heparin   Injectable 5000 Unit(s) SubCutaneous every 8 hours  lisinopril 30 milliGRAM(s) Oral daily  multivitamin 1 Tablet(s) Oral daily  pantoprazole    Tablet 40 milliGRAM(s) Oral before breakfast  simvastatin 10 milliGRAM(s) Oral at bedtime  sucralfate suspension 1 Gram(s) Oral two times a day    MEDICATIONS  (PRN):  lactulose Syrup 10 Gram(s) Oral three times a day PRN constipation  risperiDONE   Tablet 1 milliGRAM(s) Oral two times a day PRN agitation    HOME MEDICATIONS:  Adult Aspirin Regimen 81 mg oral delayed release tablet (03-21)  amLODIPine 10 mg oral tablet (07-23)  busPIRone 5 mg oral tablet (07-23)  carBAMazepine 100 mg/5 mL oral suspension (07-23)  docusate potassium 100 mg oral capsule (07-23)  esomeprazole 40 mg oral powder for reconstitution, delayed release (07-23)  folic acid 0.4 mg oral tablet (07-23)  lactulose 10 g/15 mL oral syrup (07-23)  lisinopril 30 mg oral tablet (07-23)  Multiple Vitamins oral capsule (07-23)  RisperDAL 1 mg oral tablet (07-23)  simvastatin 10 mg oral tablet (01-28)  sodium hypochlorite 0.25% topical solution (07-23)  sucralfate 1 g/10 mL oral suspension (07-23)      REVIEW OF SYSTEMS:  All other review of systems is negative unless indicated above.     PHYSICAL EXAM:  PHYSICAL EXAM:  GENERAL: NAD, Trach to vent  HEAD:  Atraumatic, Normocephalic  NECK: Supple, No JVD  CHEST/LUNG: Clear to auscultation bilaterally; No wheeze  HEART: Regular rate and rhythm; No murmurs, rubs, or gallops  ABDOMEN: Soft, Nontender, Nondistended; Bowel sounds present  EXTREMITIES:  2+ Peripheral Pulses, No clubbing, cyanosis, or edema  SKIN: No rashes or lesions

## 2022-07-24 NOTE — ED ADULT NURSE REASSESSMENT NOTE - NS ED NURSE REASSESS COMMENT FT1
Patient received in room at start of shift. Patient needs assessed and met at this time. Updated on plan of care regarding admission to inpatient unit upstairs, family verbalized understanding.

## 2022-07-25 NOTE — DISCHARGE NOTE PROVIDER - INSTRUCTIONS
Jevity 1.2 360 ml over 40 min x 3 feeds/d  - poc glucose prior to each feed  - add Isrrael twice a day (each packet in 4 oz water) via GT  - f/u in next day or 2 and increase feeds - current Rx meets caloric needs of current weight, but will need to gradually increase intake  - add zinc and vit C x 2 weeks

## 2022-07-25 NOTE — DISCHARGE NOTE PROVIDER - HOSPITAL COURSE
HPI:  56 yo M w h/o HTN, HLD, DM, CAD, CVA (x2 w residual weakness), s/p trach and peg presents from [facility] for placement at new facility. Patient's sister (HCP) apparently did not like care at [facility] - describing it as unsafe d/t no side rails on bed.    In ED, vitals stable.      consulted and patient admitted until adequate facility established. (23 Jul 2022 02:47)    56 yo M w h/o HTN, HLD, DM, CAD, CVA (x2 w residual weakness), s/p trach and peg presents from [facility] for placement at new facility. Patient's sister (HCP) apparently did not like care at [facility] - describing it as unsafe d/t no side rails on bed.  consulted and patient admitted until adequate facility established.    #Disposition in trach/peg frail patient  #Chronic respiratory failure s/p trach/peg   #Hx of CVA  #Quadriplegia  Pt was at [facility] 05/2022 for aspiration PNA where he got trach/peg. Discharged 2 days prior to admission to [facility]. HCP disagreed with their care and brought him to ED.  - SW/CM notified about placement to different facility, requesting no if possible.  - Pt is medically stable  - Vent management and changes per pulm/crit    #Multiple Pressure ulcers  in Sacrum and leg  - Pending woundcare eval -- woundcare recs needed for placement to [facility]    #HTN  - on lisinopril 30mg qd and amlodipine 10mg qd    #CAD / HLD  - on simvastatin 10mg qhs and asa 81mg qd    #Seziure disorder  - on carbemazepine 100mg/5ml syrup bid    #Anxiety / Agitation  - on buspirone 5mg qd, risperidone 1mg bid prn    #GERD  - on esomeprazole suspension 40mg bid, sulcrafate 1g bid    #Constipation  - on docusate 100mg qd and lactulosee 10g/5ml tid prn    #Folate and vitamin deficiency  - on folic acid and mvi    # Chronic Hyponatremia- cont. close outpt. BMP monitoring     56 yo M w h/o HTN, HLD, DM, CAD, CVA (x2 w residual weakness), s/p trach and peg presents from [facility] for placement at new facility. Patient's sister (HCP) apparently did not like care at [facility] - describing it as unsafe d/t no side rails on bed.  consulted and patient admitted until adequate facility established.    #Disposition in trach/peg frail patient  #Chronic respiratory failure s/p trach/peg   #Hx of CVA  #Quadriplegia  Pt was at [facility] 05/2022 for aspiration PNA where he got trach/peg. Discharged 2 days prior to admission to [facility]. HCP disagreed with their care and brought him to ED.  - SW/CM notified about placement to different facility, requesting [place] if possible.  - Pt is medically stable  - Vent management and changes per pulm/crit    #Multiple Pressure ulcers  in Sacrum and leg  - Pending woundcare eval -- woundcare recs needed for placement to [facility]    #HTN  - on lisinopril 30mg qd and amlodipine 10mg qd    #CAD / HLD  - on simvastatin 10mg qhs and asa 81mg qd    #Seziure disorder  - on carbemazepine 100mg/5ml syrup bid    #Anxiety / Agitation  - on buspirone 5mg qd, risperidone 1mg bid prn    #GERD  - on esomeprazole suspension 40mg bid, sulcrafate 1g bid    #Constipation  - on docusate 100mg qd and lactulosee 10g/5ml tid prn    #Folate and vitamin deficiency  - on folic acid and mvi    # Chronic Hyponatremia- cont. close outpt. BMP monitoring     56 yo M w h/o HTN, HLD, DM, CAD, CVA (x2 w residual weakness), s/p trach and peg presents from [facility] for placement at new facility. Patient's sister (HCP) apparently did not like care at [facility] - describing it as unsafe d/t no side rails on bed.  consulted and patient admitted until adequate facility established.    #Disposition in trach/peg frail patient  #Chronic respiratory failure s/p trach/peg   #Hx of CVA  #Quadriplegia  Pt was at [facility] 05/2022 for aspiration PNA where he got trach/peg. Discharged 2 days prior to admission to [facility]. HCP disagreed with their care and brought him to ED.  - SW/CM notified about placement to different facility, requesting no if possible.  - Pt is medically stable  - Vent management and changes per pulm/crit    #Multiple Pressure ulcers  in Sacrum and leg  - Pending woundcare eval -- woundcare recs needed for placement to [facility]    #HTN  - on lisinopril 30mg qd and amlodipine 10mg qd    #CAD / HLD  - on simvastatin 10mg qhs and asa 81mg qd    #Seziure disorder  - on carbemazepine 100mg/5ml syrup bid    #Anxiety / Agitation  - on buspirone 5mg qd, risperidone 1mg bid prn    #GERD  - on esomeprazole suspension 40mg bid, sulcrafate 1g bid    #Constipation  - on docusate 100mg qd and lactulosee 10g/5ml tid prn    #Folate and vitamin deficiency  - on folic acid and mvi    # Chronic Hyponatremia- cont. close outpt. BMP monitoring    Attending NOte:  Patient seen and examined independently. I personally had a face-to-face encounter with the patient, examined the patient myself, personally reviewed labs & Radiology images,  and reviewed the plan of care with the housestaff. Agree with resident's note but my note supersedes that of the resident in the matters hereby listed.   D/c to [facility] for hospice.

## 2022-07-25 NOTE — DISCHARGE NOTE PROVIDER - CARE PROVIDER_API CALL
Armando Ramos)  Internal Medicine  [address] [place] [address], [address]st [place] [address]  Phone: [phone]  Fax: [phone]  Follow Up Time: 2 weeks   Armando Ramos)  Internal Medicine  [address] [place] [address], [address]st [place] [address]  Phone: [phone]  Fax: [phone]  Follow Up Time: 1 week

## 2022-07-25 NOTE — CONSULT NOTE ADULT - SUBJECTIVE AND OBJECTIVE BOX
NUTRITION SUPPORT CONSULTATION    HPI:  56 yo M w h/o HTN, HLD, DM, CAD, CVA (x2 w residual weakness), s/p trach and peg presents from [facility] for placement at new facility. Patient's sister (HCP) apparently did not like care at [facility] - describing it as unsafe d/t no side rails on bed.    In ED, vitals stable.    consulted and patient admitted until adequate facility established. (23 Jul 2022 02:47)  no information in transfer chart regarding feeds etc given at NH      PAST MEDICAL & SURGICAL HISTORY:  CVA (cerebral vascular accident) 1980  Residual left sided weakness  CAD (coronary artery disease)  Retinal detachment  Hypertension  High cholesterol  Diabetes  Anxiety  Seizure  History of corneal transplant right  4/13/17  No Known Allergies    MEDICATIONS  (STANDING):  amLODIPine   Tablet 10 milliGRAM(s) Oral daily  busPIRone 5 milliGRAM(s) Oral <User Schedule>  carBAMazepine  Oral  Liquid - Peds 100 milliGRAM(s) Oral every 12 hours  chlorhexidine 2% Cloths 1 Application(s) Topical <User Schedule>  Dakins Solution - 1/2 Strength 1 Application(s) Topical three times a day  folic acid 1 milliGRAM(s) Oral daily  heparin   Injectable 5000 Unit(s) SubCutaneous every 8 hours  lisinopril 30 milliGRAM(s) Oral daily  multivitamin 1 Tablet(s) Oral daily  pantoprazole    Tablet 40 milliGRAM(s) Oral before breakfast  simvastatin 10 milliGRAM(s) Oral at bedtime  sucralfate suspension 1 Gram(s) Oral two times a day    MEDICATIONS  (PRN):  lactulose Syrup 10 Gram(s) Oral three times a day PRN constipation  risperiDONE   Tablet 1 milliGRAM(s) Oral two times a day PRN agitation      ICU Vital Signs Last 24 Hrs  T(C): 37.7 (25 Jul 2022 11:00), Max: 37.7 (25 Jul 2022 11:00)  T(F): 99.8 (25 Jul 2022 11:00), Max: 99.8 (25 Jul 2022 11:00)  HR: 91 (25 Jul 2022 11:00) (80 - 91)  BP: 107/58 (25 Jul 2022 11:00) (101/62 - 114/64)  BP(mean): --  ABP: --  ABP(mean): --  RR: 19 (25 Jul 2022 11:00) (19 - 19)  SpO2: 100% (25 Jul 2022 11:00) (100% - 100%)    O2 Parameters below as of 24 Jul 2022 21:18  Patient On (Oxygen Delivery Method): ventilator    eyes open, not interactive  severe facial, temporal, infraclavicular wasting  arms and legs contracted  skin turgor fair  marked loss of both muscle and sc fat mass throughout body - in extremities loss of LBM r/t bedbound state, but pt has no sc fat either, and abd/pelvic/thoracic wasting is severe  abd soft, ND, GT site c/d/no leak or erythema  + trach, vent  multiple areas of skin breakdown:    sacral stage 4 (5x6x2 cm), L buttock (6x5x1), R buttock (3.5x2) as well as knees and ankles    Drug Dosing Weight  Height (cm): 188 (22 Jul 2022 22:09)  Weight (kg): 53.7 (16 Mar 2022 19:50)  BMI (kg/m2): 15.2 (22 Jul 2022 22:09)  BSA (m2): 1.74 (22 Jul 2022 22:09)    LABS  07-25    125<L>  |  91<L>  |  16  ----------------------------<  105<H>  4.3   |  23  |  <0.5<L>    Ca    7.7<L>      25 Jul 2022 07:45  Phos  3.4     07-25  Mg     1.9     07-25    TPro  6.0  /  Alb  2.3<L>  /  TBili  0.3  /  DBili  x   /  AST  15  /  ALT  18  /  AlkPhos  247<H>  07-25                            8.4    10.44 )-----------( 241      ( 25 Jul 2022 07:45 )             23.9       LIVER FUNCTIONS - ( 25 Jul 2022 07:45 )  Alb: 2.3 g/dL / Pro: 6.0 g/dL / ALK PHOS: 247 U/L / ALT: 18 U/L / AST: 15 U/L / GGT: x           POCT Blood Glucose.: 86 mg/dL (25 Jul 2022 02:54)  POCT Blood Glucose.: 162 mg/dL (24 Jul 2022 19:03)      Diet, NPO with Tube Feed:   Tube Feeding Modality: Gastrostomy  Jevity 1.2 Chris  Total Volume for 24 Hours (mL): 1050  Bolus (mL):  350  Tube Feed Frequency: Every 8 hours     Bolus Feed Rate (mL per Hour): 500   Bolus Feed Duration (in Hours): 0.5  Bolus Feed Instructions:  Give feeds three times a day to match with breakfast/lunch/dinnertime  Free Water Flush (mL): 100   Frequency: Every 8 Hours  Free Water Flush Instructions:  50 CC before and after meal (07-23-22 @ 15:09)

## 2022-07-25 NOTE — PROGRESS NOTE ADULT - ASSESSMENT
IMPRESSION:    Chronic resp failure sp PEG/ trach  CVA  seizure  hyponatremia  functional quadreplegia    PLAN:    CNS: Avoid CNS depressant    HEENT:  Oral care    PULMONARY:  HOB @ 45 degrees, aspiration precaution, dec , keep Sao2 92 to 96%    CARDIOVASCULAR: avoid overload    GI: GI prophylaxis                                          Feeding PEG    RENAL:  F/u  lytes.  Correct as needed. accurate I/O    INFECTIOUS DISEASE: prcoal    HEMATOLOGICAL:  DVT prophylaxis.    ENDOCRINE:  Follow up FS.  Insulin protocol if needed.    vent unit  dc planning  keiko

## 2022-07-25 NOTE — PROGRESS NOTE ADULT - ASSESSMENT
58 yo M w h/o HTN, HLD, DM, CAD, CVA (x2 w residual weakness), s/p trach and peg presents from [facility] for placement at new facility. Patient's sister (HCP) apparently did not like care at [facility] - describing it as unsafe d/t no side rails on bed.  consulted and patient admitted until adequate facility established.      #Hyponatremia  - Could be hypovolemic hyponatremia (patient on the dry side on physical exam), workup ordered (serum osm and urine studies and CXR)  - will monitor      #Disposition in trach/peg frail patient  - HCP unhappy w [facility] > f/u SW/CM on adequate dispo    #HTN  - on lisinopril 30mg qd and amlodipine 10mg qd    #CAD / HLD  - on simvastatin 10mg qhs and asa 81mg qd    #Seziure disorder  - on carbemazepine 100mg/5ml syrup bid    #Anxiety / Agitation  - on buspirone 5mg qd, risperidone 1mg bid prn    #GERD  - on esomeprazole suspension 40mg bid, sulcrafate 1g bid    #Folate and vitamin deficiency  - on folic acid and mvi    DVT ppx: heparin sub q  GI ppx: ni  Diet: NPO - nutrition recs for feeds  Activity: bedbound    DNR/DNI - molst in chart

## 2022-07-25 NOTE — DISCHARGE NOTE PROVIDER - PROVIDER TOKENS
PROVIDER:[TOKEN:[[id number]],FOLLOWUP:[2 weeks]] PROVIDER:[TOKEN:[[id number]],FOLLOWUP:[1 week]]

## 2022-07-25 NOTE — CONSULT NOTE ADULT - ASSESSMENT
IMP:  - severe protein calorie malnutrition  - multiple areas of stage 3 and 4 skin breakdown present on admission  - chronic trach dependence, functional quad. -- ? since 5/2022  - chronic dysphagia, +GT  - h/o DM, CVA, constipation, seizures, HTN    SUGGEST:  - send folic acid level, then can likely d/c both folate and MV  - Jevity 1.2 360 ml over 40 min x 3 feeds/d  - poc glucose prior to each feed  - after 2-3 days will r-evaluate need for this at all  - add Isrrael twice a day (each packet in 4 oz water) via GT  - f/u in next day or 2 and increase feeds - current Rx meets caloric needs of current weight, but will need to gradually increase intake  - add zinc and vit C x 2 weeks

## 2022-07-25 NOTE — DISCHARGE NOTE PROVIDER - NSDCCPCAREPLAN_GEN_ALL_CORE_FT
PRINCIPAL DISCHARGE DIAGNOSIS  Diagnosis: General medical exam  Assessment and Plan of Treatment: You were admitted for placement. You were assessed by our teams and you will be discharged to the proper facility.       PRINCIPAL DISCHARGE DIAGNOSIS  Diagnosis: General medical exam  Assessment and Plan of Treatment: You were admitted for placement. You were assessed by our teams and you will be discharged to the proper facility. D/c for hospice.

## 2022-07-25 NOTE — DISCHARGE NOTE PROVIDER - NSDCMRMEDTOKEN_GEN_ALL_CORE_FT
Adult Aspirin Regimen 81 mg oral delayed release tablet: 1 tab(s) orally once a day  amLODIPine 10 mg oral tablet: 1 tab(s) orally once a day  ascorbic acid 500 mg oral tablet: 1 tab(s) orally once a day  busPIRone 5 mg oral tablet: 1 tab(s) orally once a day  carBAMazepine 100 mg/5 mL oral suspension: 5 milliliter(s) orally every 12 hours  docusate potassium 100 mg oral capsule: 1 cap(s) orally once a day  esomeprazole 40 mg oral powder for reconstitution, delayed release: 1 each orally 2 times a day  folic acid 0.4 mg oral tablet: 1 tab(s) orally once a day  lactulose 10 g/15 mL oral syrup: 15 milliliter(s) orally 3 times a day, As Needed  lisinopril 30 mg oral tablet: 1 tab(s) orally once a day  Multiple Vitamins oral capsule: 1 cap(s) orally once a day  RisperDAL 1 mg oral tablet: 1 tab(s) orally 2 times a day, As Needed  simvastatin 10 mg oral tablet: 1 tab(s) orally once a day (at bedtime)  sodium hypochlorite 0.25% topical solution: Apply topically to affected area 3 times a day  sucralfate 1 g/10 mL oral suspension: 10 milliliter(s) orally 2 times a day  zinc sulfate 220 mg oral capsule: 1 cap(s) orally once a day   busPIRone 5 mg oral tablet: 1 tab(s) orally once a day  lactulose 10 g/15 mL oral syrup: 15 milliliter(s) orally 2 times a day, As Needed  levETIRAcetam 100 mg/mL oral solution: 5 milliliter(s) orally 2 times a day  morphine 10 mg/5 mL oral solution: 2.5 milliliter(s) by gastrostomy tube every 3 hours, As Needed for pain/dyspnea/cough  RisperDAL 1 mg oral tablet: 1 tab(s) orally 2 times a day, As Needed  scopolamine: Apply topically to affected area every 72 hours   busPIRone 5 mg oral tablet: 1 tab(s) orally once a day  haloperidol 2 mg/mL oral concentrate: 0.25 milliliter(s) orally every 6 hours, As needed, Agitation  lactulose 10 g/15 mL oral syrup: 15 milliliter(s) orally 2 times a day, As Needed  levETIRAcetam 100 mg/mL oral solution: 5 milliliter(s) orally 2 times a day  RisperDAL 1 mg oral tablet: 1 tab(s) orally 2 times a day, As Needed  Roxanol 20 mg/mL oral concentrate: 0.25 milliliter(s) sublingual every 1 hours PRN for pain/dyspnea  scopolamine: Apply topically to affected area every 72 hours

## 2022-07-25 NOTE — PROGRESS NOTE ADULT - SUBJECTIVE AND OBJECTIVE BOX
Over Night Events: events noted, vent dependant, afebrile    PHYSICAL EXAM    ICU Vital Signs Last 24 Hrs  T(C): 36.1 (24 Jul 2022 21:18), Max: 36.1 (24 Jul 2022 21:18)  T(F): 96.9 (24 Jul 2022 21:18), Max: 96.9 (24 Jul 2022 21:18)  HR: 88 (25 Jul 2022 08:00) (80 - 88)  BP: 110/60 (25 Jul 2022 07:57) (101/62 - 114/64)  RR: 19 (24 Jul 2022 21:18) (19 - 19)  SpO2: 100% (25 Jul 2022 08:00) (100% - 100%)    O2 Parameters below as of 24 Jul 2022 21:18  Patient On (Oxygen Delivery Method): ventilator            General: ill looking  HEENT: trach        Lungs: dec bs both bases  Cardiovascular: JAMES 2.6  Abdomen: Soft, Positive BS  Extremities: No clubbing   Contracted      07-24-22 @ 07:01  -  07-25-22 @ 07:00  --------------------------------------------------------  IN:    Enteral Tube Flush: 100 mL    Jevity 1.2: 350 mL  Total IN: 450 mL    OUT:  Total OUT: 0 mL    Total NET: 450 mL          LABS:                                                                                                                                                                                                                                   Mode: AC/ CMV (Assist Control/ Continuous Mandatory Ventilation)  RR (machine): 14  TV (machine): 400  FiO2: 40  PEEP: 5  ITime: 1  MAP: 5  PIP: 12                                      ABG - ( 23 Jul 2022 11:23 )  pH, Arterial: 7.51  pH, Blood: x     /  pCO2: 32    /  pO2: 170   / HCO3: 26    / Base Excess: 2.9   /  SaO2: 99.6                MEDICATIONS  (STANDING):  amLODIPine   Tablet 10 milliGRAM(s) Oral daily  busPIRone 5 milliGRAM(s) Oral <User Schedule>  carBAMazepine  Oral  Liquid - Peds 100 milliGRAM(s) Oral every 12 hours  chlorhexidine 2% Cloths 1 Application(s) Topical <User Schedule>  Dakins Solution - 1/2 Strength 1 Application(s) Topical three times a day  folic acid 1 milliGRAM(s) Oral daily  heparin   Injectable 5000 Unit(s) SubCutaneous every 8 hours  lisinopril 30 milliGRAM(s) Oral daily  multivitamin 1 Tablet(s) Oral daily  pantoprazole    Tablet 40 milliGRAM(s) Oral before breakfast  simvastatin 10 milliGRAM(s) Oral at bedtime  sucralfate suspension 1 Gram(s) Oral two times a day    MEDICATIONS  (PRN):  lactulose Syrup 10 Gram(s) Oral three times a day PRN constipation  risperiDONE   Tablet 1 milliGRAM(s) Oral two times a day PRN agitation

## 2022-07-25 NOTE — PROGRESS NOTE ADULT - SUBJECTIVE AND OBJECTIVE BOX
SILVANO CALIXTO  [facility]      Patient was evaluated and examined  by bedside, no active events as per covering nurse report        REVIEW OF SYSTEMS: unable to obtain, patient is confused         T(C): , Max: 37.7 (07-25-22 @ 11:00)  HR: 111 (07-25-22 @ 12:31)  BP: 97/57 (07-25-22 @ 12:31)  RR: 19 (07-25-22 @ 12:31)  SpO2: 100% (07-25-22 @ 11:00)  CAPILLARY BLOOD GLUCOSE      POCT Blood Glucose.: 117 mg/dL (25 Jul 2022 11:56)  POCT Blood Glucose.: 86 mg/dL (25 Jul 2022 02:54)  POCT Blood Glucose.: 162 mg/dL (24 Jul 2022 19:03)      PHYSICAL EXAM:  General: NAD, Awake, patient is laying comfortably in bed  HEENT: AT, NC, Supple, NO JVD, NO CB, trach present  Lungs: mild decreased breath sounds  B/L, no wheezing, no rhonchi  CVS: normal S1, S2, RRR, NO M/G/R  Abdomen: soft, bowel sounds present, non-tender, non-distended, peg present  Extremities: no edema, no clubbing, no cyanosis, positive peripheral pulses b/l  Neuro: quadriplegia, spasticity, all ext. contractures  non-verbal   Skin: multiple skin wounds present diffusely with dressing      LAB  CBC  Date: 07-25-22 @ 07:45  Mean cell [place]46.0  Mean cell Hemoglobin Conc35.1  Mean cell Volum 85.4  Platelet count-Automate 241  RBC Count 2.80  Red Cell Distrib Width16.6  WBC Count10.44  % Albumin, Urine--  Hematocrit 23.9  Hemoglobin 8.4        BMP  07-25-22 @ 07:45  Blood Gas Arterial-Calcium,Ionized--  Blood Urea Nitrogen, Serum 16 mg/dL [10 - 20]  Carbon Dioxide, Serum23 mmol/L [17 - 32]  Chloride, Serum91 mmol/L<L> [98 - 110]  Creatinie, Serum<0.5 mg/dL<L> [0.7 - 1.5]  Glucose, Gxkbg588 mg/dL<H> [70 - 99]  Potassium, Serum4.3 mmol/L [3.5 - 5.0]  Sodium, Serum 125 mmol/L<L> [135 - 146]            Medications:  amLODIPine   Tablet 10 milliGRAM(s) Oral daily  ascorbic acid 500 milliGRAM(s) Oral daily  busPIRone 5 milliGRAM(s) Oral <User Schedule>  carBAMazepine  Oral  Liquid - Peds 100 milliGRAM(s) Oral every 12 hours  chlorhexidine 2% Cloths 1 Application(s) Topical <User Schedule>  Dakins Solution - 1/2 Strength 1 Application(s) Topical three times a day  folic acid 1 milliGRAM(s) Oral daily  heparin   Injectable 5000 Unit(s) SubCutaneous every 8 hours  lactulose Syrup 10 Gram(s) Oral three times a day PRN  lisinopril 30 milliGRAM(s) Oral daily  multivitamin 1 Tablet(s) Oral daily  pantoprazole    Tablet 40 milliGRAM(s) Oral before breakfast  risperiDONE   Tablet 1 milliGRAM(s) Oral two times a day PRN  simvastatin 10 milliGRAM(s) Oral at bedtime  sucralfate suspension 1 Gram(s) Oral two times a day  zinc sulfate 220 milliGRAM(s) Oral daily        Assessment and Plan:  56 yo M w h/o HTN, HLD, DM, CAD, CVA (x2 w residual weakness), s/p trach and peg presents from [facility] for placement at new facility. Patient's sister (HCP) apparently did not like care at [facility] - describing it as unsafe d/t no side rails on bed.  consulted and patient admitted until adequate facility established.    #Disposition in trach/peg frail patient  #Chronic respiratory failure s/p trach/peg   #Hx of CVA  #Quadriplegia  Pt was at [facility] 05/2022 for aspiration PNA where he got trach/peg. Discharged 2 days prior to admission to [facility]. HCP disagreed with their care and brought him to ED.  - SW/CM notified about placement to different facility, requesting no if possible.  - Pt is medically stable  - Vent management and changes per pulm/crit    #Multiple Pressure ulcers  in Sacrum and leg  - Pending woundcare eval -- woundcare recs needed for placement to [facility]    #HTN  - on lisinopril 30mg qd and amlodipine 10mg qd    #CAD / HLD  - on simvastatin 10mg qhs and asa 81mg qd    #Seziure disorder  - on carbemazepine 100mg/5ml syrup bid    #Anxiety / Agitation  - on buspirone 5mg qd, risperidone 1mg bid prn    #GERD  - on esomeprazole suspension 40mg bid, sulcrafate 1g bid    #Constipation  - on docusate 100mg qd and lactulosee 10g/5ml tid prn    #Folate and vitamin deficiency  - on folic acid and mvi    # Chronic Hyponatremia- cont. close outpt. BMP monitoring    DVT PPX, heparin    #Progress Note Handoff  Pending (specify): NH placement  Family discussion:  sister is in agreement with patient's d/c plan, waiting for ins. auth.     Total time spent to complete patient's bedside assessment, review medical chart, discuss medical plan of care with covering medical team was more than 35 minutes

## 2022-07-25 NOTE — PROGRESS NOTE ADULT - SUBJECTIVE AND OBJECTIVE BOX
SILVANO CALIXTO 57y Male  MRN#: [id number]   Hospital Day: 2d    SUBJECTIVE  Patient is a 57y old Male who presents with a chief complaint of placement (25 Jul 2022 11:34)  Currently admitted to medicine with the primary diagnosis of General medical exam      INTERVAL HPI AND OVERNIGHT EVENTS:  Patient was examined and seen at bedside. This morning he is resting comfortably in bed and reports no issues or overnight events.    OBJECTIVE  PAST MEDICAL & SURGICAL HISTORY  CVA (cerebral vascular accident)  1980&#x27;s x2   Residual left sided weakness    CAD (coronary artery disease)    Retinal detachment    Hypertension    High cholesterol    Diabetes    Anxiety    Seizure    History of corneal transplant  right   4/13/17      ALLERGIES:  No Known Allergies    MEDICATIONS:  STANDING MEDICATIONS  amLODIPine   Tablet 10 milliGRAM(s) Oral daily  ascorbic acid 500 milliGRAM(s) Oral daily  busPIRone 5 milliGRAM(s) Oral <User Schedule>  carBAMazepine  Oral  Liquid - Peds 100 milliGRAM(s) Oral every 12 hours  chlorhexidine 2% Cloths 1 Application(s) Topical <User Schedule>  Dakins Solution - 1/2 Strength 1 Application(s) Topical three times a day  folic acid 1 milliGRAM(s) Oral daily  heparin   Injectable 5000 Unit(s) SubCutaneous every 8 hours  lisinopril 30 milliGRAM(s) Oral daily  multivitamin 1 Tablet(s) Oral daily  pantoprazole    Tablet 40 milliGRAM(s) Oral before breakfast  simvastatin 10 milliGRAM(s) Oral at bedtime  sucralfate suspension 1 Gram(s) Oral two times a day  zinc sulfate 220 milliGRAM(s) Oral daily    PRN MEDICATIONS  lactulose Syrup 10 Gram(s) Oral three times a day PRN  risperiDONE   Tablet 1 milliGRAM(s) Oral two times a day PRN      VITAL SIGNS: Last 24 Hours  T(C): 37.3 (25 Jul 2022 12:31), Max: 37.7 (25 Jul 2022 11:00)  T(F): 99.1 (25 Jul 2022 12:31), Max: 99.8 (25 Jul 2022 11:00)  HR: 111 (25 Jul 2022 12:31) (80 - 111)  BP: 97/57 (25 Jul 2022 12:31) (97/57 - 114/64)  BP(mean): --  RR: 19 (25 Jul 2022 12:31) (19 - 19)  SpO2: 100% (25 Jul 2022 11:00) (100% - 100%)    LABS:                        8.4    10.44 )-----------( 241      ( 25 Jul 2022 07:45 )             23.9     07-25    125<L>  |  91<L>  |  16  ----------------------------<  105<H>  4.3   |  23  |  <0.5<L>    Ca    7.7<L>      25 Jul 2022 07:45  Phos  3.4     07-25  Mg     1.9     07-25    TPro  6.0  /  Alb  2.3<L>  /  TBili  0.3  /  DBili  x   /  AST  15  /  ALT  18  /  AlkPhos  247<H>  07-25                  RADIOLOGY:      PHYSICAL EXAM:  CONSTITUTIONAL: No acute distress, on trach and peg, does not communicate so exam limited  PULMONARY: Clear to auscultation bilaterally;  CARDIOVASCULAR: Regular rate and rhythm; no murmurs, rubs, or gallops  GASTROINTESTINAL: Soft, non-distended; bowel sounds present  MUSCULOSKELETAL: 2+ peripheral pulses; no clubbing, no cyanosis, no edema  NEUROLOGY: non-focal (patient responds to basic commands)  SKIN: Diffuse dry and scaly skin with superficial injuries

## 2022-07-26 NOTE — ADVANCED PRACTICE NURSE CONSULT - RECOMMEDATIONS
1. Stage 3 R heel & lateral RLE pressure injuries, Stage 4 R/L medial knee, sacrococcygeal & R/L ischial pressure injuries- Cleanse wound bed w/ normal saline, gently pat dry. Apply Cavilon no-sting barrier film to wound edges and periwound to prevent maceration. Apply Medihoney to wound to maintain clean wound bed, promote moist wound healing, and assist w/ autolytic debridement of devitalized tissue. Cover/pack wound beds w/ dry gauze and cover w/ foam Allevyn dressing. Apply Medihoney and change dressings daily and prn for strike-through drainage or soiling. *Please note-given it's osmotic effect, increased wound drainage may be seen upon initiation of Medihoney usage requiring more frequent dressing changes initially.  2. DTPI L heel- Cleanse wound bed w/ normal saline. Gently pat dry. Swab w/ Betadine 10% to maintain clean, dry wound bed and allow devitalized tissue to uproof naturally. Leave open to air.  -Assess skin/wound qshift, report changes to primary provider.    Additional recs:  -Continue turning/positioning patient from side-to-side q2h while in bed, or in accordance w/ pt's plan of care. Continue utilizing pillows to assist w/ turning/positioning.  -Offload heels from bed surface by applying offloading boots to BLEs.   -Continue applying Coloplast Soledad Protect moisture barrier cream to buttock and perineal area daily and prn after each incontinent episode.    -Continue utilizing one underpad underneath patient to contain incontinence episodes; change pad when saturated/soiled.   -Continue utilizing condom catheter (if patient candidate) to contain any urinary incontinence.   -Continue nutrition consult for optimal wound healing & nutritional status.     Plan of Care: Primary RN Caty made aware of above recs. Spoke w/ covering MD Gallego in regards to above. No further needs/recs from [facility] service at this time. Staff RN to perform routine skin/wound assessment and manage wound care. Questions or concerns or if wound worsens and reconsult needed, please contact [facility], [phone].

## 2022-07-26 NOTE — PROGRESS NOTE ADULT - SUBJECTIVE AND OBJECTIVE BOX
Over Night Events: events noted, vent dependant, afebrile    PHYSICAL EXAM    ICU Vital Signs Last 24 Hrs  T(C): 37 (2022 05:59), Max: 37.7 (2022 11:00)  T(F): 98.6 (2022 05:59), Max: 99.8 (2022 11:00)  HR: 118 (2022 05:59) (87 - 118)  BP: 113/76 (2022 05:59) (97/57 - 115/72)  RR: 16 (2022 05:59) (16 - 19)  SpO2: 100% (2022 05:59) (100% - 100%)        General: ill looking  HEENT: trach        Lungs: dec bs both bases  Cardiovascular: Regular   Abdomen: Soft, Positive B  contracted      22 @ 07:01  -  22 @ 07:00  --------------------------------------------------------  IN:    Enteral Tube Flush: 100 mL    Jevity 1.2: 350 mL  Total IN: 450 mL    OUT:  Total OUT: 0 mL    Total NET: 450 mL      22 @ 07:01  -  22 @ 06:46  --------------------------------------------------------  IN:    Enteral Tube Flush: 200 mL    Jevity 1.2: 720 mL  Total IN: 920 mL    OUT:    Voided (mL): 500 mL  Total OUT: 500 mL    Total NET: 420 mL          LABS:                          8.4    10.44 )-----------( 241      ( 2022 07:45 )             23.9                                               07-25    125<L>  |  91<L>  |  16  ----------------------------<  105<H>  4.3   |  23  |  <0.5<L>    Ca    7.7<L>      2022 07:45  Phos  3.4     07-  Mg     1.9     -    TPro  6.0  /  Alb  2.3<L>  /  TBili  0.3  /  DBili  x   /  AST  15  /  ALT  18  /  AlkPhos  247<H>                                               Urinalysis Basic - ( 2022 15:12 )    Color: Amrita / Appearance: Turbid / S.024 / pH: x  Gluc: x / Ketone: Negative  / Bili: Small / Urobili: 6 mg/dL   Blood: x / Protein: 100 mg/dL / Nitrite: Positive   Leuk Esterase: Negative / RBC: 1 /HPF / WBC 1 /HPF   Sq Epi: x / Non Sq Epi: 3 /HPF / Bacteria: Moderate                                                  LIVER FUNCTIONS - ( 2022 07:45 )  Alb: 2.3 g/dL / Pro: 6.0 g/dL / ALK PHOS: 247 U/L / ALT: 18 U/L / AST: 15 U/L / GGT: x                                                                                               Mode: AC/ CMV (Assist Control/ Continuous Mandatory Ventilation)  RR (machine): 14  TV (machine): 400  FiO2: 40  PEEP: 5  ITime: 1  MAP: 11  PIP: 28                                          MEDICATIONS  (STANDING):  amLODIPine   Tablet 10 milliGRAM(s) Oral daily  ascorbic acid 500 milliGRAM(s) Oral daily  busPIRone 5 milliGRAM(s) Oral <User Schedule>  carBAMazepine  Oral  Liquid - Peds 100 milliGRAM(s) Oral every 12 hours  chlorhexidine 2% Cloths 1 Application(s) Topical <User Schedule>  Dakins Solution - 1/2 Strength 1 Application(s) Topical three times a day  folic acid 1 milliGRAM(s) Oral daily  heparin   Injectable 5000 Unit(s) SubCutaneous every 8 hours  lisinopril 30 milliGRAM(s) Oral daily  multivitamin 1 Tablet(s) Oral daily  pantoprazole    Tablet 40 milliGRAM(s) Oral before breakfast  simvastatin 10 milliGRAM(s) Oral at bedtime  sucralfate suspension 1 Gram(s) Oral two times a day  zinc sulfate 220 milliGRAM(s) Oral daily    MEDICATIONS  (PRN):  lactulose Syrup 10 Gram(s) Oral three times a day PRN constipation  risperiDONE   Tablet 1 milliGRAM(s) Oral two times a day PRN agitation

## 2022-07-26 NOTE — PROGRESS NOTE ADULT - ATTENDING COMMENTS
Patient is a 58 y/o Male w h/o HTN, HLD, DM, CAD, CVA (x2 w residual weakness), s/p trach and peg presents from [facility] for placement at new facility. Patient's sister (HCP) apparently did not like care at [facility] - describing it as unsafe d/t no side rails on bed.  consulted and patient admitted until adequate facility established.    #Disposition in trach/peg frail patient  #Chronic respiratory failure s/p trach/peg   #Hx of CVA  #Quadriplegia  Pt was at [facility] 05/2022 for aspiration PNA where he got trach/peg. Discharged 2 days prior to admission to [facility]. HCP disagreed with their care and brought him to ED.  - SW/CM notified about placement to different facility, requesting no if possible.  - Pt is medically stable  - Vent management and changes per pulm/crit    #Multiple Pressure ulcers  in Sacrum and leg  - Pending woundcare eval -- woundcare recs needed for placement to [facility]    #HTN  - on lisinopril 30mg qd and amlodipine 10mg qd    #CAD / HLD  - on simvastatin 10mg qhs and asa 81mg qd    #Seziure disorder  - on carbemazepine 100mg/5ml syrup bid    #Anxiety / Agitation  - on buspirone 5mg qd, risperidone 1mg bid prn    #GERD  - on esomeprazole suspension 40mg bid, sulcrafate 1g bid    #Constipation  - on docusate 100mg qd and lactulosee 10g/5ml tid prn    #Folate and vitamin deficiency  - on folic acid and mvi    # Chronic Hyponatremia- IVF meanwhile pt. remains in the hospital, cont. close outpt. BMP monitoring    DVT PPX, heparin    #Progress Note Handoff  Pending (specify): NH placement  Family discussion:  sister is in agreement with patient's d/c plan, waiting for ins. auth.     Total time spent to complete patient's bedside assessment, review medical chart, discuss medical plan of care with covering medical team was more than 35 minutes

## 2022-07-26 NOTE — PROGRESS NOTE ADULT - ASSESSMENT
IMPRESSION:    Chronic resp failure sp PEG/ trach  CVA  seizure  hyponatremia  functional quadreplegia    PLAN:    CNS: Avoid CNS depressant    HEENT:  Oral care    PULMONARY:  HOB @ 45 degrees, aspiration precaution, dec , keep Sao2 92 to 96%    CARDIOVASCULAR: avoid overload, NS 60 CC/H    GI: GI prophylaxis                                          Feeding PEG    RENAL:  F/u  lytes.  Correct as needed. accurate I/O, urine lytes, u osm    HEMATOLOGICAL:  DVT prophylaxis.    ENDOCRINE:  Follow up FS.  Insulin protocol if needed.    vent unit  dc planning  keiko

## 2022-07-26 NOTE — PROGRESS NOTE ADULT - ASSESSMENT
56 yo M w h/o HTN, HLD, DM, CAD, CVA (x2 w residual weakness), s/p trach and peg presents from [facility] for placement at new facility. Patient's sister (HCP) apparently did not like care at [facility] - describing it as unsafe d/t no side rails on bed.  consulted and patient admitted until adequate facility established.      #Hyponatremia, sodium 122  - Workup going with hypovolemic vs SIADH  - Started on NSS at 100cc/hour on 07/26  - Nephrology consulted      #Disposition in trach/peg frail patient  - HCP unhappy w [facility] > f/u SW/CM on adequate dispo    #HTN  - on lisinopril 30mg qd and amlodipine 10mg qd    #CAD / HLD  - on simvastatin 10mg qhs and asa 81mg qd    #Seziure disorder  - on carbemazepine 100mg/5ml syrup bid    #Anxiety / Agitation  - on buspirone 5mg qd, risperidone 1mg bid prn    #GERD  - on esomeprazole suspension 40mg bid, sulcrafate 1g bid    #Folate and vitamin deficiency  - on folic acid and mvi    DVT ppx: heparin sub q  GI ppx: ni  Diet: NPO - nutrition recs for feeds  Activity: bedbound    DNR/DNI - molst in chart

## 2022-07-26 NOTE — ADVANCED PRACTICE NURSE CONSULT - ASSESSMENT
56 yo M w h/o HTN, HLD, DM, CAD, CVA (x2 w residual weakness), s/p trach and peg presents from [facility] (7/23)  for placement at new facility. Patient's sister (HCP) apparently did not like care at [facility] - describing it as unsafe d/t no side rails on bed.  In ED, vitals stable.  consulted and patient admitted until adequate facility established.    Currently admitted to medicine with the primary diagnosis of General medical exam; today is hospital day-3d; on 3A, being managed for Hyponatremia, sodium 122; Disposition in trach/peg frail patient; HTN; CAD / HLD; Seziure disorder; anxiety/agitation; GERD; Folate and vitamin deficiency.     Received patient on 3A, laying in bed, turned to right side (pillow under left side & underneath BLEs elevating heels), HOB elevated 30 degrees. Pt awake, eyes open, not attentive, non-verbal, vented via trach, contracted LUE, cachetic- bones visibily protruding underneath skin. Primary RN Caty made aware of purpose of WOCN visit, agreeable to consult.     Kerlex wrap dressings to b/l feet in place, foam Allevyn dressings to b/l medial knees & lateral LLE- all dressings removed for assessment.      1.Type of wound: Stage 3 pressure injury; POA  Location: right heel   Wound measurements: 3.5cm x 2.5cm x 0.3cm Tunneling/Undermining: none  Wound bed: 75% dark red tissue to periphery, 25% black necrotic tissue to center  Wound edges: attached, flush, irregular  Periwound: healed light pink tissue circumferentially with outer halo of dry crusting skin  Wound exudate: minimal amount of serosanguinous drainage present on removed dressing  Wound odor: none  Induration, erythema, crepitus, warmth:  none  Wound pain: no s/s pain present on assessment     2. Type of wound: Deep tissue pressure injury/DTPI; POA  Location: left heel  Wound measurements: 3cm x 2cm x 0cm; true depth indeterminable at this time Tunneling/Undermining: none  Wound bed: dry, intact resolving reabsorbing blood blister  Wound edges: attached, flush, Periwound: intact  Wound exudate: none  Wound odor: none  Induration, erythema crepitus, warmth:  none  Wound pain: no s/s pain present on assessment     3. Type of wound: Stage 4 pressure injury; POA  Location: right medial knee Wound measurements: 3cm x 4cm x 1cm; Tunneling/Undermining: none  Wound bed: 75% dark pink-red tissue, 25% yellow slough tissue Wound edges: attached, flush, Periwound: intact Wound exudate: minimal amount of serosanguinous drainage present on removed dressing  Wound odor: none Induration, erythema crepitus, warmth:  none  Wound pain: no s/s pain present on assessment     4. Type of wound: Stage 4 pressure injury; POA  Location: left medial knee Wound measurements: 2.5cm x 3cm x 0.3cm; Tunneling/Undermining: none  Wound bed: 50% dark pink tissue, 40% yellow-blake slough tissue  Wound edges: attached, flush, Periwound: intact Wound exudate: minimal amount of serosanguinous drainage present on removed dressing  Wound odor: none Induration, erythema crepitus, warmth:  none  Wound pain: no s/s pain present on assessment     5. Type of wound: Stage 3 pressure injury; POA, healing Location: lateral RLE Wound measurements: 1cm x 1cm x 0.2cm; Tunneling/Undermining: none  Wound bed: marbleized w/ dark pink & yellow subcutaneous tissue Wound edges: attached, flush, Periwound: healed light pink tissue circumferentially Wound exudate: none Wound odor: none Induration, erythema crepitus, warmth:  none  Wound pain: no s/s pain present on assessment     With assistance from PCA, turned patient to left side. Packing dressing secured w/ ABD pad to sacrococcygeal,  foam Allevyn dressings to b/l ischiums- all dressings removed.     6. Type of wound: Stage 4 pressure injury; POA Location: sacrococcygeal Wound measurements: 5.5cm x 8cm x 2cm; Tunneling: none Undermining: from 9-4 o'clock at 5cm deep  Wound bed: dark pink-red nongranular tissue, scattered areas of purple devitalized tissue Wound edges: open, irregular Periwound: scattered areas of healed light pink tissue circumferentially Wound exudate: removed dressing moderately saturated w/ serosanguinous drainage Wound odor: none Induration, erythema crepitus, warmth:  none  Wound pain: no s/s pain present on assessment     7. Type of wound: Stage 4 pressure injury; POA Location: right ischium Wound measurements: 2.5cm x 3cm x 0.3cm; Tunneling/Undermining: none Wound bed: dark pink-red nongranular tissue, scattered areas of yellow devitalized tissue Wound edges: attached, flush, irregular Periwound:  healed light pink tissue circumferentially Wound exudate: none Wound odor: none Induration, erythema crepitus, warmth:  none  Wound pain: no s/s pain present on assessment     8. Type of wound: Stage 4 pressure injury; POA Location: left ischium Wound measurements: 7cm x 5cm x 2.5cm; Tunneling: none Undermining: from 9-3 o'clock at 2.5cm deep Wound bed: dark red nongranular tissue, scattered areas of white-yellow devitalized tissue Wound edges: open Periwound:  small areas of healed light pink tissue circumferentially Wound exudate: moderate amount of serosanguinous drainage present on removed dressing Wound odor: none Induration, erythema crepitus, warmth:  none  Wound pain: no s/s pain present on assessment    Patient bedbound, immobile, incontinent of urine and stool + condom catheter. Receiving TF via G tube.

## 2022-07-26 NOTE — PROGRESS NOTE ADULT - SUBJECTIVE AND OBJECTIVE BOX
SILVANO CALIXTO 57y Male  MRN#: [id number]   Hospital Day: 3d    SUBJECTIVE  Patient is a 57y old Male who presents with a chief complaint of placement (2022 06:46)  Currently admitted to medicine with the primary diagnosis of General medical exam      INTERVAL HPI AND OVERNIGHT EVENTS:  Patient was examined and seen at bedside. This morning he is resting comfortably in bed and reports no issues or overnight events.    OBJECTIVE  PAST MEDICAL & SURGICAL HISTORY  CVA (cerebral vascular accident)  &#x27;s x2   Residual left sided weakness    CAD (coronary artery disease)    Retinal detachment    Hypertension    High cholesterol    Diabetes    Anxiety    Seizure    History of corneal transplant  right   17      ALLERGIES:  No Known Allergies    MEDICATIONS:  STANDING MEDICATIONS  amLODIPine   Tablet 10 milliGRAM(s) Oral daily  ascorbic acid 500 milliGRAM(s) Oral daily  busPIRone 5 milliGRAM(s) Oral <User Schedule>  carBAMazepine  Oral  Liquid - Peds 100 milliGRAM(s) Oral every 12 hours  chlorhexidine 2% Cloths 1 Application(s) Topical <User Schedule>  Dakins Solution - 1/2 Strength 1 Application(s) Topical three times a day  folic acid 1 milliGRAM(s) Oral daily  heparin   Injectable 5000 Unit(s) SubCutaneous every 8 hours  lisinopril 30 milliGRAM(s) Oral daily  multivitamin 1 Tablet(s) Oral daily  pantoprazole    Tablet 40 milliGRAM(s) Oral before breakfast  simvastatin 10 milliGRAM(s) Oral at bedtime  sodium chloride 0.9%. 1000 milliLiter(s) IV Continuous <Continuous>  sucralfate suspension 1 Gram(s) Oral two times a day  zinc sulfate 220 milliGRAM(s) Oral daily    PRN MEDICATIONS  lactulose Syrup 10 Gram(s) Oral three times a day PRN  risperiDONE   Tablet 1 milliGRAM(s) Oral two times a day PRN      VITAL SIGNS: Last 24 Hours  T(C): 37 (2022 05:59), Max: 37 (2022 05:59)  T(F): 98.6 (2022 05:59), Max: 98.6 (2022 05:59)  HR: 103 (2022 06:49) (89 - 118)  BP: 113/76 (2022 05:59) (113/76 - 115/72)  BP(mean): --  RR: 16 (2022 06:49) (16 - 19)  SpO2: 98% (2022 08:28) (98% - 100%)    LABS:                        8.2    10.79 )-----------( 293      ( 2022 10:23 )             23.5     07-    124<L>  |  90<L>  |  15  ----------------------------<  156<H>  4.2   |  24  |  <0.5<L>    Ca    7.6<L>      2022 13:03  Phos  3.4     07-  Mg     1.9     -    TPro  5.9<L>  /  Alb  2.2<L>  /  TBili  0.3  /  DBili  x   /  AST  12  /  ALT  15  /  AlkPhos  226<H>        Urinalysis Basic - ( 2022 15:12 )    Color: Amrita / Appearance: Turbid / S.024 / pH: x  Gluc: x / Ketone: Negative  / Bili: Small / Urobili: 6 mg/dL   Blood: x / Protein: 100 mg/dL / Nitrite: Positive   Leuk Esterase: Negative / RBC: 1 /HPF / WBC 1 /HPF   Sq Epi: x / Non Sq Epi: 3 /HPF / Bacteria: Moderate                RADIOLOGY:      PHYSICAL EXAM:  CONSTITUTIONAL: No acute distress, on trach and peg, does not communicate so exam limited  PULMONARY: Clear to auscultation bilaterally;  CARDIOVASCULAR: Regular rate and rhythm; no murmurs, rubs, or gallops  GASTROINTESTINAL: Soft, non-distended; bowel sounds present  MUSCULOSKELETAL: 2+ peripheral pulses; no clubbing, no cyanosis, no edema  NEUROLOGY: non-focal (patient responds to basic commands)  SKIN: dry and scaly skin diffusely

## 2022-07-27 NOTE — PROGRESS NOTE ADULT - ASSESSMENT
56 yo M w h/o HTN, HLD, DM, CAD, CVA (x2 w residual weakness), s/p trach and peg presents from [facility] for placement at new facility. Patient's sister (HCP) apparently did not like care at [facility] - describing it as unsafe d/t no side rails on bed.  consulted and patient admitted until adequate facility established.      #Hyponatremia, sodium 124  - Workup going with hypovolemic vs SIADH  - Started on NSS at 100cc/hour on 07/26  - Nephrology consulted, pending their note      #Disposition in trach/peg frail patient  - HCP unhappy w [facility] > f/u SW/CM on adequate dispo    #HTN  - on lisinopril 30mg qd and amlodipine 10mg qd    #CAD / HLD  - on simvastatin 10mg qhs and asa 81mg qd    #Seziure disorder  - on carbemazepine 100mg/5ml syrup bid    #Anxiety / Agitation  - on buspirone 5mg qd, risperidone 1mg bid prn    #GERD  - on esomeprazole suspension 40mg bid, sulcrafate 1g bid    #Folate and vitamin deficiency  - on folic acid and mvi    DVT ppx: heparin sub q  GI ppx: ni  Diet: NPO - nutrition recs for feeds  Activity: bedbound    DNR/DNI - molst in chart

## 2022-07-27 NOTE — PROGRESS NOTE ADULT - SUBJECTIVE AND OBJECTIVE BOX
Over Night Events: events noted, vent dependant, afebrile    PHYSICAL EXAM    ICU Vital Signs Last 24 Hrs  T(C): 36.1 (2022 05:56), Max: 36.7 (2022 20:57)  T(F): 97 (2022 05:56), Max: 98.1 (2022 20:57)  HR: 91 (2022 05:56) (74 - 103)  BP: 126/67 (2022 05:56) (121/56 - 126/67)  RR: 18 (2022 05:56) (16 - 18)  SpO2: 100% (2022 05:56) (98% - 100%)    O2 Parameters below as of 2022 07:30  Patient On (Oxygen Delivery Method): ventilator            General: ill looking  HEENT: trach            Lungs: dec bs both bases  Cardiovascular: Regular   Abdomen: Soft, Positive BS  contracted  sacral ulcer  not following commands      22 @ 07:01  -  22 @ 07:00  --------------------------------------------------------  IN:    Enteral Tube Flush: 200 mL    Jevity 1.2: 720 mL  Total IN: 920 mL    OUT:    Voided (mL): 500 mL  Total OUT: 500 mL    Total NET: 420 mL      22 @ 07:01  -  22 @ 06:19  --------------------------------------------------------  IN:    Enteral Tube Flush: 100 mL    Jevity 1.2: 360 mL  Total IN: 460 mL    OUT:    Incontinent per Condom Catheter (mL): 150 mL  Total OUT: 150 mL    Total NET: 310 mL          LABS:                          8.2    10.79 )-----------( 293      ( 2022 10:23 )             23.5                                                   124<L>  |  90<L>  |  15  ----------------------------<  156<H>  4.2   |  24  |  <0.5<L>    Ca    7.6<L>      2022 13:03  Phos  3.4       Mg     1.9         TPro  5.9<L>  /  Alb  2.2<L>  /  TBili  0.3  /  DBili  x   /  AST  12  /  ALT  15  /  AlkPhos  226<H>                                               Urinalysis Basic - ( 2022 15:12 )    Color: Amrita / Appearance: Turbid / S.024 / pH: x  Gluc: x / Ketone: Negative  / Bili: Small / Urobili: 6 mg/dL   Blood: x / Protein: 100 mg/dL / Nitrite: Positive   Leuk Esterase: Negative / RBC: 1 /HPF / WBC 1 /HPF   Sq Epi: x / Non Sq Epi: 3 /HPF / Bacteria: Moderate                                                  LIVER FUNCTIONS - ( 2022 10:23 )  Alb: 2.2 g/dL / Pro: 5.9 g/dL / ALK PHOS: 226 U/L / ALT: 15 U/L / AST: 12 U/L / GGT: x                                                                                               Mode: AC/ CMV (Assist Control/ Continuous Mandatory Ventilation)  RR (machine): 14  TV (machine): 400  FiO2: 40  PEEP: 5  ITime: 1  MAP: 10  PIP: 16                                          MEDICATIONS  (STANDING):  amLODIPine   Tablet 10 milliGRAM(s) Oral daily  ascorbic acid 500 milliGRAM(s) Oral daily  busPIRone 5 milliGRAM(s) Oral <User Schedule>  carBAMazepine  Oral  Liquid - Peds 100 milliGRAM(s) Oral every 12 hours  chlorhexidine 2% Cloths 1 Application(s) Topical <User Schedule>  Dakins Solution - 1/2 Strength 1 Application(s) Topical three times a day  folic acid 1 milliGRAM(s) Oral daily  heparin   Injectable 5000 Unit(s) SubCutaneous every 8 hours  lisinopril 30 milliGRAM(s) Oral daily  multivitamin 1 Tablet(s) Oral daily  pantoprazole    Tablet 40 milliGRAM(s) Oral before breakfast  simvastatin 10 milliGRAM(s) Oral at bedtime  sodium chloride 0.9%. 1000 milliLiter(s) (100 mL/Hr) IV Continuous <Continuous>  sucralfate suspension 1 Gram(s) Oral two times a day  zinc sulfate 220 milliGRAM(s) Oral daily    MEDICATIONS  (PRN):  lactulose Syrup 10 Gram(s) Oral three times a day PRN constipation  risperiDONE   Tablet 1 milliGRAM(s) Oral two times a day PRN agitation

## 2022-07-27 NOTE — PROGRESS NOTE ADULT - SUBJECTIVE AND OBJECTIVE BOX
SILVANO CALIXTO 57y Male  MRN#: [id number]   Hospital Day: 4d    SUBJECTIVE  Patient is a 57y old Male who presents with a chief complaint of placement (27 Jul 2022 14:43)  Currently admitted to medicine with the primary diagnosis of General medical exam      INTERVAL HPI AND OVERNIGHT EVENTS:  Patient was examined and seen at bedside. This morning he is resting comfortably in bed and reports no issues or overnight events.    OBJECTIVE  PAST MEDICAL & SURGICAL HISTORY  CVA (cerebral vascular accident)  1980&#x27;s x2   Residual left sided weakness    CAD (coronary artery disease)    Retinal detachment    Hypertension    High cholesterol    Diabetes    Anxiety    Seizure    History of corneal transplant  right   4/13/17      ALLERGIES:  No Known Allergies    MEDICATIONS:  STANDING MEDICATIONS  amLODIPine   Tablet 10 milliGRAM(s) Oral daily  ascorbic acid 500 milliGRAM(s) Oral daily  busPIRone 5 milliGRAM(s) Oral <User Schedule>  carBAMazepine  Oral  Liquid - Peds 100 milliGRAM(s) Oral every 12 hours  chlorhexidine 2% Cloths 1 Application(s) Topical <User Schedule>  Dakins Solution - 1/2 Strength 1 Application(s) Topical three times a day  folic acid 1 milliGRAM(s) Oral daily  heparin   Injectable 5000 Unit(s) SubCutaneous every 8 hours  lisinopril 30 milliGRAM(s) Oral daily  multivitamin 1 Tablet(s) Oral daily  pantoprazole    Tablet 40 milliGRAM(s) Oral before breakfast  simvastatin 10 milliGRAM(s) Oral at bedtime  sodium chloride 0.9%. 1000 milliLiter(s) IV Continuous <Continuous>  sucralfate suspension 1 Gram(s) Oral two times a day  zinc sulfate 220 milliGRAM(s) Oral daily    PRN MEDICATIONS  lactulose Syrup 10 Gram(s) Oral three times a day PRN  risperiDONE   Tablet 1 milliGRAM(s) Oral two times a day PRN      VITAL SIGNS: Last 24 Hours  T(C): 36.4 (27 Jul 2022 12:28), Max: 36.7 (26 Jul 2022 20:57)  T(F): 97.5 (27 Jul 2022 12:28), Max: 98.1 (26 Jul 2022 20:57)  HR: 87 (27 Jul 2022 12:28) (74 - 91)  BP: 127/65 (27 Jul 2022 12:28) (121/56 - 127/65)  BP(mean): --  RR: 18 (27 Jul 2022 12:28) (18 - 18)  SpO2: 100% (27 Jul 2022 12:28) (98% - 100%)    LABS:                        8.6    11.30 )-----------( 239      ( 27 Jul 2022 09:31 )             25.8     07-27    124<L>  |  90<L>  |  15  ----------------------------<  154<H>  3.9   |  22  |  <0.5<L>    Ca    7.5<L>      27 Jul 2022 09:31    TPro  5.9<L>  /  Alb  2.4<L>  /  TBili  0.2  /  DBili  x   /  AST  15  /  ALT  16  /  AlkPhos  222<H>  07-27                  RADIOLOGY:      PHYSICAL EXAM:  CONSTITUTIONAL: No acute distress, on trach and peg, does not communicate so exam limited  PULMONARY: Clear to auscultation bilaterally;  CARDIOVASCULAR: Regular rate and rhythm; no murmurs, rubs, or gallops  GASTROINTESTINAL: Soft, non-distended; bowel sounds present  MUSCULOSKELETAL: 2+ peripheral pulses; no clubbing, no cyanosis, no edema  SKIN: dry and scaly skin diffusely

## 2022-07-27 NOTE — CONSULT NOTE ADULT - ASSESSMENT
58 yo M w h/o HTN, HLD, DM, CAD, CVA (x2 w residual weakness), s/p trach and peg presents from [facility] for placement at new facility. Patient's sister (HCP) apparently did not like care at [facility] - describing it as unsafe d/t no side rails on bed.  consulted and patient admitted until adequate facility established. Nephro consult for hyponatremia.    Assessment and plan  Hyponatremia/ seizure/ hx of CVA  Hyponatremia likely secondary to SIADH due to antipsychotic and AED medications  urine studies suggests hypovolemia vs SIADH  patient Na did not improve with NS infusion suggestive more of SIADH picture  Stop IVF  Fluid restriction < 800 cc/ day  If Na level does not improve with fluid restriction in 48 hrs decrease lisinopril to 20 mg daily and add torsemide 20 mg/ day  continue to monitor BMP  will follow 58 yo M w h/o HTN, HLD, DM, CAD, CVA (x2 w residual weakness), s/p trach and peg presents from [facility] for placement at new facility. Patient's sister (HCP) apparently did not like care at [facility] - describing it as unsafe d/t no side rails on bed.  consulted and patient admitted until adequate facility established. Nephro consult for hyponatremia.    Assessment and plan  Hyponatremia/ seizure/ hx of CVA  Hyponatremia likely secondary to SIADH due to antipsychotic and AED medications  urine studies suggests hypovolemia vs SIADH  patient Na did not improve with NS infusion suggestive more of SIADH picture  Stop IVF  Fluid restriction < 800 cc/ day  If Na level does not improve with fluid restriction in 48 hrs decrease lisinopril to 20 mg daily and add torsemide 10 mg/ day  continue to monitor BMP  will follow

## 2022-07-27 NOTE — PROGRESS NOTE ADULT - ASSESSMENT
IMPRESSION:    Chronic resp failure sp PEG/ trach  CVA  seizure  hyponatremia/ hypovolemic  functional quadreplegia    PLAN:    CNS: Avoid CNS depressant    HEENT:  Oral care    PULMONARY:  HOB @ 45 degrees, aspiration precaution, dec , keep Sao2 92 to 96%    CARDIOVASCULAR: avoid overload, NS 75 CC/H    GI: GI prophylaxis                                          Feeding PEG    RENAL:  F/u  lytes.  Correct as needed. accurate I/O, urine lytes, u osm REVIEWED    HEMATOLOGICAL:  DVT prophylaxis.    ENDOCRINE:  Follow up FS.  Insulin protocol if needed.    vent unit  dc planning  keiko

## 2022-07-27 NOTE — CONSULT NOTE ADULT - SUBJECTIVE AND OBJECTIVE BOX
NEPHROLOGY CONSULTATION NOTE    58 yo M w h/o HTN, HLD, DM, CAD, CVA (x2 w residual weakness), s/p trach and peg presents from [facility] for placement at new facility. Patient's sister (HCP) apparently did not like care at [facility] - describing it as unsafe d/t no side rails on bed.  consulted and patient admitted until adequate facility established.    PAST MEDICAL & SURGICAL HISTORY:  CVA (cerebral vascular accident)  &#x27;s x2   Residual left sided weakness      CAD (coronary artery disease)      Retinal detachment      Hypertension      High cholesterol      Diabetes      Anxiety      Seizure      History of corneal transplant  right   17        Allergies:  No Known Allergies    Home Medications Reviewed  Hospital Medications:   MEDICATIONS  (STANDING):  amLODIPine   Tablet 10 milliGRAM(s) Oral daily  ascorbic acid 500 milliGRAM(s) Oral daily  busPIRone 5 milliGRAM(s) Oral <User Schedule>  carBAMazepine  Oral  Liquid - Peds 100 milliGRAM(s) Oral every 12 hours  chlorhexidine 2% Cloths 1 Application(s) Topical <User Schedule>  Dakins Solution - 1/2 Strength 1 Application(s) Topical three times a day  folic acid 1 milliGRAM(s) Oral daily  heparin   Injectable 5000 Unit(s) SubCutaneous every 8 hours  lisinopril 30 milliGRAM(s) Oral daily  multivitamin 1 Tablet(s) Oral daily  pantoprazole    Tablet 40 milliGRAM(s) Oral before breakfast  simvastatin 10 milliGRAM(s) Oral at bedtime  sodium chloride 0.9%. 1000 milliLiter(s) (100 mL/Hr) IV Continuous <Continuous>  sucralfate suspension 1 Gram(s) Oral two times a day  zinc sulfate 220 milliGRAM(s) Oral daily    SOCIAL HISTORY:  Denies ETOH,Smoking,   FAMILY HISTORY:    Yes      REVIEW OF SYSTEMS:  CONSTITUTIONAL: No weakness, fevers or chills  EYES/ENT: No visual changes;  No vertigo or throat pain   NECK: No pain or stiffness  RESPIRATORY: No cough, wheezing, hemoptysis; No shortness of breath  CARDIOVASCULAR: No chest pain or palpitations.  GASTROINTESTINAL: No abdominal or epigastric pain. No nausea, vomiting, or hematemesis; No diarrhea or constipation. No melena or hematochezia.  GENITOURINARY: No dysuria, frequency, foamy urine, urinary urgency, incontinence or hematuria  NEUROLOGICAL: No numbness or weakness  SKIN: No itching, burning, rashes, or lesions   VASCULAR: No bilateral lower extremity edema.   All other review of systems is negative unless indicated above.    VITALS:  Vital Signs Last 24 Hrs  T(C): 36.4 (2022 12:28), Max: 36.7 (2022 20:57)  T(F): 97.5 (2022 12:28), Max: 98.1 (2022 20:57)  HR: 87 (:) (74 - 91)  BP: 127/65 (:28) (121/56 - 127/65)  BP(mean): --  RR: 18 (:) (18 - 18)  SpO2: 100% (:) (98% - 100%)         @ : @ 07:00  --------------------------------------------------------  IN: 920 mL / OUT: 150 mL / NET: 770 mL     @ : @ 14:44  --------------------------------------------------------  IN: 0 mL / OUT: 100 mL / NET: -100 mL        PHYSICAL EXAM:  CONSTITUTIONAL: No acute distress, on trach and peg, does not communicate so exam limited  PULMONARY: Clear to auscultation bilaterally;  CARDIOVASCULAR: Regular rate and rhythm; no murmurs, rubs, or gallops  GASTROINTESTINAL: Soft, non-distended; bowel sounds present  MUSCULOSKELETAL: 2+ peripheral pulses; no clubbing, no cyanosis, no edema  NEUROLOGY: non-focal (patient responds to basic commands)  SKIN: dry and scaly skin diffusely    LABS:      124<L>  |  90<L>  |  15  ----------------------------<  154<H>  3.9   |  22  |  <0.5<L>    Ca    7.5<L>      2022 09:31    TPro  5.9<L>  /  Alb  2.4<L>  /  TBili  0.2  /  DBili      /  AST  15  /  ALT  16  /  AlkPhos  222<H>      Creatinine Trend: <0.5 <--, <0.5 <--, <0.5 <--, <0.5 <--, <0.5 <--, <0.5 <--                        8.6    11.30 )-----------( 239      ( 2022 09:31 )             25.8     Urine Studies:  Urinalysis Basic - ( 2022 15:12 )    Color: Amrita / Appearance: Turbid / S.024 / pH:   Gluc:  / Ketone: Negative  / Bili: Small / Urobili: 6 mg/dL   Blood:  / Protein: 100 mg/dL / Nitrite: Positive   Leuk Esterase: Negative / RBC: 1 /HPF / WBC 1 /HPF   Sq Epi:  / Non Sq Epi: 3 /HPF / Bacteria: Moderate      Chloride, Random Urine: 40 ( @ 15:12)  Sodium, Random Urine: <20.0 mmoL/L ( @ 15:12)  Creatinine, Random Urine: 75 mg/dL ( @ 15:12)  Protein/Creatinine Ratio Calculation: 0.7 Ratio ( @ 15:12)  Osmolality, Random Urine: 826 mos/kg ( @ 15:12)  Potassium, Random Urine: 34 mmol/L ( @ 15:12)            RADIOLOGY & ADDITIONAL STUDIES:                 NEPHROLOGY CONSULTATION NOTE    58 yo M w h/o HTN, HLD, DM, CAD, CVA (x2 w residual weakness), s/p trach and peg presents from [facility] for placement at new facility. Patient's sister (HCP) apparently did not like care at [facility] - describing it as unsafe d/t no side rails on bed.  consulted and patient admitted until adequate facility established.    PAST MEDICAL & SURGICAL HISTORY:  CVA (cerebral vascular accident)  &#x27;s x2   Residual left sided weakness      CAD (coronary artery disease)      Retinal detachment      Hypertension      High cholesterol      Diabetes      Anxiety      Seizure      History of corneal transplant  right   17        Allergies:  No Known Allergies    Home Medications Reviewed  Hospital Medications:   MEDICATIONS  (STANDING):  amLODIPine   Tablet 10 milliGRAM(s) Oral daily  ascorbic acid 500 milliGRAM(s) Oral daily  busPIRone 5 milliGRAM(s) Oral <User Schedule>  carBAMazepine  Oral  Liquid - Peds 100 milliGRAM(s) Oral every 12 hours  chlorhexidine 2% Cloths 1 Application(s) Topical <User Schedule>  Dakins Solution - 1/2 Strength 1 Application(s) Topical three times a day  folic acid 1 milliGRAM(s) Oral daily  heparin   Injectable 5000 Unit(s) SubCutaneous every 8 hours  lisinopril 30 milliGRAM(s) Oral daily  multivitamin 1 Tablet(s) Oral daily  pantoprazole    Tablet 40 milliGRAM(s) Oral before breakfast  simvastatin 10 milliGRAM(s) Oral at bedtime  sodium chloride 0.9%. 1000 milliLiter(s) (100 mL/Hr) IV Continuous <Continuous>  sucralfate suspension 1 Gram(s) Oral two times a day  zinc sulfate 220 milliGRAM(s) Oral daily    SOCIAL HISTORY:  Denies ETOH,Smoking,   FAMILY HISTORY:    Yes      REVIEW OF SYSTEMS:  Unable to obtain d/t pt non-verbal    VITALS:  Vital Signs Last 24 Hrs  T(C): 36.4 (2022 12:28), Max: 36.7 (2022 20:57)  T(F): 97.5 (2022 12:28), Max: 98.1 (2022 20:57)  HR: 87 (2022 12:28) (74 - 91)  BP: 127/65 (2022 12:28) (121/56 - 127/65)  BP(mean): --  RR: 18 (2022 12:28) (18 - 18)  SpO2: 100% (2022 12:28) (98% - 100%)         @ :  -   @ 07:00  --------------------------------------------------------  IN: 920 mL / OUT: 150 mL / NET: 770 mL     @ :  -   @ 14:44  --------------------------------------------------------  IN: 0 mL / OUT: 100 mL / NET: -100 mL        PHYSICAL EXAM:  CONSTITUTIONAL: No acute distress, on trach and peg, does not communicate so exam limited  PULMONARY: Clear to auscultation bilaterally;  CARDIOVASCULAR: Regular rate and rhythm; no murmurs, rubs, or gallops  GASTROINTESTINAL: Soft, non-distended; bowel sounds present  MUSCULOSKELETAL: 2+ peripheral pulses; no clubbing, no cyanosis, no edema  NEUROLOGY: non-focal (patient responds to basic commands)  SKIN: dry and scaly skin diffusely    LABS:      124<L>  |  90<L>  |  15  ----------------------------<  154<H>  3.9   |  22  |  <0.5<L>    Ca    7.5<L>      2022 09:31    TPro  5.9<L>  /  Alb  2.4<L>  /  TBili  0.2  /  DBili      /  AST  15  /  ALT  16  /  AlkPhos  222<H>      Creatinine Trend: <0.5 <--, <0.5 <--, <0.5 <--, <0.5 <--, <0.5 <--, <0.5 <--                        8.6    11.30 )-----------( 239      ( 2022 09:31 )             25.8     Urine Studies:  Urinalysis Basic - ( 2022 15:12 )    Color: Amrita / Appearance: Turbid / S.024 / pH:   Gluc:  / Ketone: Negative  / Bili: Small / Urobili: 6 mg/dL   Blood:  / Protein: 100 mg/dL / Nitrite: Positive   Leuk Esterase: Negative / RBC: 1 /HPF / WBC 1 /HPF   Sq Epi:  / Non Sq Epi: 3 /HPF / Bacteria: Moderate      Chloride, Random Urine: 40 ( @ 15:12)  Sodium, Random Urine: <20.0 mmoL/L ( @ 15:12)  Creatinine, Random Urine: 75 mg/dL ( @ 15:12)  Protein/Creatinine Ratio Calculation: 0.7 Ratio ( @ 15:12)  Osmolality, Random Urine: 826 mos/kg ( @ 15:12)  Potassium, Random Urine: 34 mmol/L ( @ 15:12)            RADIOLOGY & ADDITIONAL STUDIES:    < from: TTE Echo Complete w/ Contrast w/ Doppler (22 @ 11:23) >  Summary:   1. Left ventricular ejection fraction, by visual estimation, is 55 to   60%.   2. Normal global left ventricular systolic function.   3. Normal left ventricular internal cavity size.   4. Endocardial borders are poorly visualized. Limited ability to   evaluate for size, thickness, function and regional wall motion. Consider   repeat study with echo enhancement agent to better visualize if   clinically indicated.   5. Trace mitral valve regurgitation.    < end of copied text >  < from: Xray Chest 1 View- PORTABLE-Routine (Xray Chest 1 View- PORTABLE-Routine .) (22 @ 10:21) >    ACC: [id number] EXAM:  XR CHEST PORTABLE ROUTINE 1V                          PROCEDURE DATE:  2022          INTERPRETATION:  Clinical History / Reason for exam: SOB    Comparison : Chest radiograph:  3/14/2022    Technique/Positioning: Frontal view of the chest    Findings:    Support devices: A tracheostomy tube is present.    Cardiac/mediastinum/hilum: The cardiac silhouette is normal in size.    Lung parenchyma/Pleura: Right basilar opacity/effusion    Skeleton/soft tissues: No evidence of acute osseous abnormality.    Impression:  Right basilar opacity/effusion. Follow-up is recommended.            --- End of Report ---            LINDA GUO MD; Attending Radiologist  This document has been electronically signed. 2022  8:41AM    < end of copied text >

## 2022-07-28 NOTE — PROGRESS NOTE ADULT - SUBJECTIVE AND OBJECTIVE BOX
Nephrology progress note    Patient is seen and examined, events over the last 24 h noted .    Allergies:  No Known Allergies    Hospital Medications:   MEDICATIONS  (STANDING):  amLODIPine   Tablet 10 milliGRAM(s) Oral daily  ascorbic acid 500 milliGRAM(s) Oral daily  bisacodyl Suppository 10 milliGRAM(s) Rectal once  busPIRone 5 milliGRAM(s) Oral <User Schedule>  carBAMazepine  Oral  Liquid - Peds 100 milliGRAM(s) Oral every 12 hours  chlorhexidine 2% Cloths 1 Application(s) Topical <User Schedule>  Dakins Solution - 1/2 Strength 1 Application(s) Topical three times a day  diatrizoate meglumine/diatrizoate sodium. 30 milliLiter(s) Oral once  folic acid 1 milliGRAM(s) Oral daily  heparin   Injectable 5000 Unit(s) SubCutaneous every 8 hours  lisinopril 30 milliGRAM(s) Oral daily  multivitamin 1 Tablet(s) Oral daily  pantoprazole    Tablet 40 milliGRAM(s) Oral before breakfast  simvastatin 10 milliGRAM(s) Oral at bedtime  sodium chloride 0.9%. 1000 milliLiter(s) (50 mL/Hr) IV Continuous <Continuous>  sucralfate suspension 1 Gram(s) Oral two times a day  zinc sulfate 220 milliGRAM(s) Oral daily        VITALS:  T(F): 96.1 (22 @ 05:50), Max: 97.5 (22 @ 12:28)  HR: 95 (22 @ 05:50)  BP: 90/55 (22 @ 05:50)  RR: 14 (22 @ 05:50)  SpO2: 100% (22 @ 05:50)  Wt(kg): --     @ :  -   @ 07:00  --------------------------------------------------------  IN: 920 mL / OUT: 150 mL / NET: 770 mL     @ 07:  -   @ 07:00  --------------------------------------------------------  IN: 1460 mL / OUT: 400 mL / NET: 1060 mL          PHYSICAL EXAM:  Constitutional: NAD  HEENT: anicteric sclera, MMM  Neck: trach  Respiratory: CTA  Cardiovascular: S1, S2, RRR  Gastrointestinal: BS+, distended  Extremities: No peripheral edema  Neurological: A/wake  : +flor.   Skin: No rashes  Vascular Access:    LABS:      124<L>  |  90<L>  |  15  ----------------------------<  154<H>  3.9   |  22  |  <0.5<L>  SODIUM TREND:  Sodium 124 [ @ 09:31]  Sodium 124 [ @ 13:03]  Sodium 122 [ @ 10:23]  Sodium 125 [ @ 07:45]  Sodium 126 [ @ 06:10]  Sodium 126 [ @ 02:40]    Ca    7.5<L>      2022 09:31    TPro  5.9<L>  /  Alb  2.4<L>  /  TBili  0.2  /  DBili      /  AST  15  /  ALT  16  /  AlkPhos  222<H>                            8.6    11.30 )-----------( 239      ( 2022 09:31 )             25.8       Urine Studies:  Urinalysis Basic - ( 2022 15:12 )    Color: Amrita / Appearance: Turbid / S.024 / pH:   Gluc:  / Ketone: Negative  / Bili: Small / Urobili: 6 mg/dL   Blood:  / Protein: 100 mg/dL / Nitrite: Positive   Leuk Esterase: Negative / RBC: 1 /HPF / WBC 1 /HPF   Sq Epi:  / Non Sq Epi: 3 /HPF / Bacteria: Moderate      Chloride, Random Urine: 40 ( @ 15:12)  Sodium, Random Urine: <20.0 mmoL/L ( @ 15:12)  Creatinine, Random Urine: 75 mg/dL ( @ 15:12)  Protein/Creatinine Ratio Calculation: 0.7 Ratio ( @ 15:12)  Osmolality, Random Urine: 826 mos/kg ( @ 15:12)  Potassium, Random Urine: 34 mmol/L ( @ 15:12)    RADIOLOGY & ADDITIONAL STUDIES:  < from: Xray Chest 1 View- PORTABLE-Routine (Xray Chest 1 View- PORTABLE-Routine .) (22 @ 10:21) >  Impression:  Right basilar opacity/effusion. Follow-up is recommended.    < end of copied text >

## 2022-07-28 NOTE — CHART NOTE - NSCHARTNOTEFT_GEN_A_CORE
I was called in for an abdominal distention of unknown duration. On my examination patient was stable, vitals stable, and abdomen was distended, tympanic, + bowel sounds. I ordered an abdominal x-ray; radiology contacted for urgent read to rule out SBO. Pending read. I was called in for an abdominal distention of unknown duration. On my examination patient was stable, vitals stable, and abdomen was distended, tympanic, + bowel sounds. I ordered an abdominal x-ray;   Abdominal x-ray showed:  Dilated air-filled loops of small and large bowel, primarily confined to the left hemiabdomen. Large volume stool within the right colon. No definite free air visualized. I was called in for an abdominal distention of unknown duration. On my examination patient was stable, vitals stable, and abdomen was distended, tympanic, + bowel sounds.   Abdominal x-ray showed:  Dilated air-filled loops of small and large bowel, primarily confined to the left hemiabdomen. Large volume stool within the right colon. No definite free air visualized.

## 2022-07-28 NOTE — PROGRESS NOTE ADULT - SUBJECTIVE AND OBJECTIVE BOX
SILVANO CALIXTO 57y Male  MRN#: [id number]   Hospital Day: 5d    SUBJECTIVE  Patient is a 57y old Male who presents with a chief complaint of placement (28 Jul 2022 10:23)  Currently admitted to medicine with the primary diagnosis of General medical exam      INTERVAL HPI AND OVERNIGHT EVENTS:  Patient was examined and seen at bedside.   Overnight, abdomen distended, KUB ordered suggestive of distended small and large bowel loops. Vitals stable. Passing bowel movements.    OBJECTIVE  PAST MEDICAL & SURGICAL HISTORY  CVA (cerebral vascular accident)  1980&#x27;s x2   Residual left sided weakness    CAD (coronary artery disease)    Retinal detachment    Hypertension    High cholesterol    Diabetes    Anxiety    Seizure    History of corneal transplant  right   4/13/17      ALLERGIES:  No Known Allergies    MEDICATIONS:  STANDING MEDICATIONS  amLODIPine   Tablet 10 milliGRAM(s) Oral daily  ascorbic acid 500 milliGRAM(s) Oral daily  bisacodyl Suppository 10 milliGRAM(s) Rectal once  busPIRone 5 milliGRAM(s) Oral <User Schedule>  carBAMazepine  Oral  Liquid - Peds 100 milliGRAM(s) Oral every 12 hours  chlorhexidine 2% Cloths 1 Application(s) Topical <User Schedule>  Dakins Solution - 1/2 Strength 1 Application(s) Topical three times a day  diatrizoate meglumine/diatrizoate sodium. 30 milliLiter(s) Oral once  folic acid 1 milliGRAM(s) Oral daily  heparin   Injectable 5000 Unit(s) SubCutaneous every 8 hours  lisinopril 30 milliGRAM(s) Oral daily  multivitamin 1 Tablet(s) Oral daily  pantoprazole    Tablet 40 milliGRAM(s) Oral before breakfast  simvastatin 10 milliGRAM(s) Oral at bedtime  sodium chloride 0.9%. 1000 milliLiter(s) IV Continuous <Continuous>  sucralfate suspension 1 Gram(s) Oral two times a day  zinc sulfate 220 milliGRAM(s) Oral daily    PRN MEDICATIONS  lactulose Syrup 10 Gram(s) Oral three times a day PRN  risperiDONE   Tablet 1 milliGRAM(s) Oral two times a day PRN      VITAL SIGNS: Last 24 Hours  T(C): 35.6 (28 Jul 2022 05:50), Max: 36.4 (27 Jul 2022 12:28)  T(F): 96.1 (28 Jul 2022 05:50), Max: 97.5 (27 Jul 2022 12:28)  HR: 95 (28 Jul 2022 05:50) (86 - 95)  BP: 90/55 (28 Jul 2022 05:50) (90/55 - 127/65)  BP(mean): --  RR: 14 (28 Jul 2022 05:50) (14 - 18)  SpO2: 100% (28 Jul 2022 05:50) (100% - 100%)    LABS:                        8.4    11.54 )-----------( 296      ( 28 Jul 2022 09:36 )             24.8     07-28    129<L>  |  96<L>  |  12  ----------------------------<  86  4.3   |  24  |  <0.5<L>    Ca    8.0<L>      28 Jul 2022 09:36    TPro  6.3  /  Alb  2.2<L>  /  TBili  0.3  /  DBili  x   /  AST  12  /  ALT  15  /  AlkPhos  219<H>  07-28                  RADIOLOGY:      PHYSICAL EXAM:  CONSTITUTIONAL: No acute distress, on trach and peg, does not communicate so exam limited  PULMONARY: Clear to auscultation bilaterally;  CARDIOVASCULAR: Regular rate and rhythm; no murmurs, rubs, or gallops  GASTROINTESTINAL: Rigid, mildly-distended; bowel sounds present, patient does not grimace upon palpation  MUSCULOSKELETAL: 2+ peripheral pulses; no clubbing, no cyanosis, no edema  SKIN: dry and scaly skin diffusely

## 2022-07-28 NOTE — PROGRESS NOTE ADULT - ATTENDING COMMENTS
#Fecolith: 7/28 CT abdo - based on my reading: Left colon, rectosigmoid distended with lots of stool. ?Gaseous distension of right colon.   Will need manual disempaction, High water enema. Surgery consult.   f/u official CT Abdo report    #Hyopnatremia: NS @ 50. FW restriction.     LWC per RN    Chronic VDRF #Fecolith: 7/28 CT abdo - based on my reading: Left colon, rectosigmoid distended with lots of stool. ?Gaseous distension of right colon.   Will need manual disempaction, High water enema. c/w lactulose. Surgery consult.   f/u official CT Abdo report    Had one BM after the enema on 7/28 PM.     #Hyopnatremia: NS @ 50. FW restriction.     LWC per RN    Chronic VDRF

## 2022-07-28 NOTE — PROGRESS NOTE ADULT - ASSESSMENT
58 yo M w h/o HTN, HLD, DM, CAD, CVA (x2 w residual weakness), s/p trach and peg presents from [facility] for placement at new facility. Patient's sister (HCP) apparently did not like care at [facility] - describing it as unsafe d/t no side rails on bed.  consulted and patient admitted until adequate facility established.      #Hyponatremia, sodium 129  - Workup going with hypovolemic vs SIADH  - 07/28: NSS was discontinued last night as per nephro note and restarted today at a rate of 50cc per hour, repeat urine studies and will re-assess     #Abdominal distention  - Could be an element of partial SBO, patient passing BM  - CT with contrast per PEG ordered for assessment=> to be followed    #Disposition in trach/peg frail patient  - HCP unhappy w [facility] > f/u SW/CM on adequate dispo    #HTN  - on lisinopril 30mg qd and amlodipine 10mg qd    #CAD / HLD  - on simvastatin 10mg qhs and asa 81mg qd    #Seziure disorder  - on carbemazepine 100mg/5ml syrup bid    #Anxiety / Agitation  - on buspirone 5mg qd, risperidone 1mg bid prn    #GERD  - on esomeprazole suspension 40mg bid, sulcrafate 1g bid    #Folate and vitamin deficiency  - on folic acid and mvi    DVT ppx: heparin sub q  GI ppx: ni  Diet: NPO - nutrition recs for feeds  Activity: bedbound    DNR/DNI - molst in chart 56 yo M w h/o HTN, HLD, DM, CAD, CVA (x2 w residual weakness), s/p trach and peg presents from [facility] for placement at new facility. Patient's sister (HCP) apparently did not like care at [facility] - describing it as unsafe d/t no side rails on bed.  consulted and patient admitted until adequate facility established.      #Hyponatremia, sodium 129  - Workup going with hypovolemic vs SIADH  - 07/28: NSS was discontinued last night as per nephro note and restarted today at a rate of 50cc per hour, repeat urine studies going with SIADH => fluids stopped and water intake restricted    #Abdominal distention  - Could be an element of partial SBO vs distention by stool, patient passing BM  - CT with contrast per PEG ordered for assessment=> to be followed and enema to be given after imaging is done    #Disposition in trach/peg frail patient  - HCP unhappy w [facility] > f/u SW/CM on adequate dispo    #HTN  - on lisinopril 30mg qd and amlodipine 10mg qd    #CAD / HLD  - on simvastatin 10mg qhs and asa 81mg qd    #Seziure disorder  - on carbemazepine 100mg/5ml syrup bid    #Anxiety / Agitation  - on buspirone 5mg qd, risperidone 1mg bid prn    #GERD  - on esomeprazole suspension 40mg bid, sulcrafate 1g bid    #Folate and vitamin deficiency  - on folic acid and mvi    DVT ppx: heparin sub q  GI ppx: ni  Diet: NPO - nutrition recs for feeds  Activity: bedbound    DNR/DNI - molst in chart

## 2022-07-28 NOTE — PROGRESS NOTE ADULT - ASSESSMENT
56 yo M w h/o HTN, HLD, DM, CAD, CVA (x2 w residual weakness), s/p trach and peg presents from [facility] for placement at new facility. Patient's sister (HCP) apparently did not like care at [facility] - describing it as unsafe d/t no side rails on bed.  consulted and patient admitted until adequate facility established. Nephro consult for hyponatremia.    Assessment and plan  Hyponatremia/ seizure/ hx of CVA  ROCKY <20 Urine OSm 800 c/w volume depletion  start NS 50 cc/hr  Abd distended, check KUB,   if no concern for ileus, restart feeds on min free water with feeds  Hypotension HOLD LISINOPRIL  check carbamazepine level  BMP q8h  - avoid rise in NA>8 mE a day    will follow

## 2022-07-28 NOTE — PROGRESS NOTE ADULT - SUBJECTIVE AND OBJECTIVE BOX
Over Night Events: events noted, vent dependant, afebrile    PHYSICAL EXAM    ICU Vital Signs Last 24 Hrs  T(C): 35.6 (28 Jul 2022 05:50), Max: 36.4 (27 Jul 2022 12:28)  T(F): 96.1 (28 Jul 2022 05:50), Max: 97.5 (27 Jul 2022 12:28)  HR: 95 (28 Jul 2022 05:50) (86 - 95)  BP: 90/55 (28 Jul 2022 05:50) (90/55 - 127/65)  RR: 14 (28 Jul 2022 05:50) (14 - 18)  SpO2: 100% (28 Jul 2022 05:50) (98% - 100%)    O2 Parameters below as of 27 Jul 2022 21:56  Patient On (Oxygen Delivery Method): ventilator            General: ill looking  HEENT: trach          Lungs: dec bs both bases  Cardiovascular: Regular   Abdomen: Soft, Positive BS  track with his eyes      07-26-22 @ 07:01  -  07-27-22 @ 07:00  --------------------------------------------------------  IN:    Enteral Tube Flush: 200 mL    Jevity 1.2: 720 mL  Total IN: 920 mL    OUT:    Incontinent per Condom Catheter (mL): 150 mL  Total OUT: 150 mL    Total NET: 770 mL      07-27-22 @ 07:01  -  07-28-22 @ 06:58  --------------------------------------------------------  IN:    Enteral Tube Flush: 100 mL    Jevity 1.2: 360 mL    sodium chloride 0.9%: 1000 mL  Total IN: 1460 mL    OUT:    Incontinent per Condom Catheter (mL): 200 mL    Voided (mL): 100 mL  Total OUT: 300 mL    Total NET: 1160 mL          LABS:                          8.6    11.30 )-----------( 239      ( 27 Jul 2022 09:31 )             25.8                                               07-27    124<L>  |  90<L>  |  15  ----------------------------<  154<H>  3.9   |  22  |  <0.5<L>    Ca    7.5<L>      27 Jul 2022 09:31    TPro  5.9<L>  /  Alb  2.4<L>  /  TBili  0.2  /  DBili  x   /  AST  15  /  ALT  16  /  AlkPhos  222<H>  07-27                                                                                           LIVER FUNCTIONS - ( 27 Jul 2022 09:31 )  Alb: 2.4 g/dL / Pro: 5.9 g/dL / ALK PHOS: 222 U/L / ALT: 16 U/L / AST: 15 U/L / GGT: x                                                                                               Mode: AC/ CMV (Assist Control/ Continuous Mandatory Ventilation)  RR (machine): 14  TV (machine): 400  FiO2: 40  PEEP: 5  ITime: 1  MAP: 12  PIP: 22                                          MEDICATIONS  (STANDING):  amLODIPine   Tablet 10 milliGRAM(s) Oral daily  ascorbic acid 500 milliGRAM(s) Oral daily  busPIRone 5 milliGRAM(s) Oral <User Schedule>  carBAMazepine  Oral  Liquid - Peds 100 milliGRAM(s) Oral every 12 hours  chlorhexidine 2% Cloths 1 Application(s) Topical <User Schedule>  Dakins Solution - 1/2 Strength 1 Application(s) Topical three times a day  folic acid 1 milliGRAM(s) Oral daily  heparin   Injectable 5000 Unit(s) SubCutaneous every 8 hours  lisinopril 30 milliGRAM(s) Oral daily  multivitamin 1 Tablet(s) Oral daily  pantoprazole    Tablet 40 milliGRAM(s) Oral before breakfast  simvastatin 10 milliGRAM(s) Oral at bedtime  sucralfate suspension 1 Gram(s) Oral two times a day  zinc sulfate 220 milliGRAM(s) Oral daily    MEDICATIONS  (PRN):  lactulose Syrup 10 Gram(s) Oral three times a day PRN constipation  risperiDONE   Tablet 1 milliGRAM(s) Oral two times a day PRN agitation      Xrays:                                                                                     ECHO     Over Night Events: events noted, vent dependant, afebrile    PHYSICAL EXAM    ICU Vital Signs Last 24 Hrs  T(C): 35.6 (28 Jul 2022 05:50), Max: 36.4 (27 Jul 2022 12:28)  T(F): 96.1 (28 Jul 2022 05:50), Max: 97.5 (27 Jul 2022 12:28)  HR: 95 (28 Jul 2022 05:50) (86 - 95)  BP: 90/55 (28 Jul 2022 05:50) (90/55 - 127/65)  RR: 14 (28 Jul 2022 05:50) (14 - 18)  SpO2: 100% (28 Jul 2022 05:50) (98% - 100%)    O2 Parameters below as of 27 Jul 2022 21:56  Patient On (Oxygen Delivery Method): ventilator            General: ill looking  HEENT: trach          Lungs: dec bs both bases  Cardiovascular: Regular   Abdomen: Soft, Positive BS, distended  track with his eyes      07-26-22 @ 07:01  -  07-27-22 @ 07:00  --------------------------------------------------------  IN:    Enteral Tube Flush: 200 mL    Jevity 1.2: 720 mL  Total IN: 920 mL    OUT:    Incontinent per Condom Catheter (mL): 150 mL  Total OUT: 150 mL    Total NET: 770 mL      07-27-22 @ 07:01  -  07-28-22 @ 06:58  --------------------------------------------------------  IN:    Enteral Tube Flush: 100 mL    Jevity 1.2: 360 mL    sodium chloride 0.9%: 1000 mL  Total IN: 1460 mL    OUT:    Incontinent per Condom Catheter (mL): 200 mL    Voided (mL): 100 mL  Total OUT: 300 mL    Total NET: 1160 mL          LABS:                          8.6    11.30 )-----------( 239      ( 27 Jul 2022 09:31 )             25.8                                               07-27    124<L>  |  90<L>  |  15  ----------------------------<  154<H>  3.9   |  22  |  <0.5<L>    Ca    7.5<L>      27 Jul 2022 09:31    TPro  5.9<L>  /  Alb  2.4<L>  /  TBili  0.2  /  DBili  x   /  AST  15  /  ALT  16  /  AlkPhos  222<H>  07-27                                                                                           LIVER FUNCTIONS - ( 27 Jul 2022 09:31 )  Alb: 2.4 g/dL / Pro: 5.9 g/dL / ALK PHOS: 222 U/L / ALT: 16 U/L / AST: 15 U/L / GGT: x                                                                                               Mode: AC/ CMV (Assist Control/ Continuous Mandatory Ventilation)  RR (machine): 14  TV (machine): 400  FiO2: 40  PEEP: 5  ITime: 1  MAP: 12  PIP: 22                                          MEDICATIONS  (STANDING):  amLODIPine   Tablet 10 milliGRAM(s) Oral daily  ascorbic acid 500 milliGRAM(s) Oral daily  busPIRone 5 milliGRAM(s) Oral <User Schedule>  carBAMazepine  Oral  Liquid - Peds 100 milliGRAM(s) Oral every 12 hours  chlorhexidine 2% Cloths 1 Application(s) Topical <User Schedule>  Dakins Solution - 1/2 Strength 1 Application(s) Topical three times a day  folic acid 1 milliGRAM(s) Oral daily  heparin   Injectable 5000 Unit(s) SubCutaneous every 8 hours  lisinopril 30 milliGRAM(s) Oral daily  multivitamin 1 Tablet(s) Oral daily  pantoprazole    Tablet 40 milliGRAM(s) Oral before breakfast  simvastatin 10 milliGRAM(s) Oral at bedtime  sucralfate suspension 1 Gram(s) Oral two times a day  zinc sulfate 220 milliGRAM(s) Oral daily    MEDICATIONS  (PRN):  lactulose Syrup 10 Gram(s) Oral three times a day PRN constipation  risperiDONE   Tablet 1 milliGRAM(s) Oral two times a day PRN agitation      Xrays:                                                                                     ECHO

## 2022-07-28 NOTE — PROGRESS NOTE ADULT - ASSESSMENT
IMPRESSION:    Chronic resp failure sp PEG/ trach  CVA  seizure  hyponatremia/ hypovolemic  functional quadreplegia    PLAN:    CNS: Avoid CNS depressant    HEENT:  Oral care    PULMONARY:  HOB @ 45 degrees, aspiration precaution, dec , keep Sao2 92 to 96%    CARDIOVASCULAR: avoid overload, NS 75 CC/H, dc lisinopril    GI: GI prophylaxis                                          Feeding PEG    RENAL:  F/u  lytes.  Correct as needed. accurate I/O, urine lytes, u osm REVIEWED    HEMATOLOGICAL:  DVT prophylaxis.    ENDOCRINE:  Follow up FS.  Insulin protocol if needed.    vent unit  dc planning  keiko     IMPRESSION:    Chronic resp failure sp PEG/ trach  CVA  seizure  hyponatremia/ hypovolemic  functional quadreplegia  constipation    PLAN:    CNS: Avoid CNS depressant    HEENT:  Oral care    PULMONARY:  HOB @ 45 degrees, aspiration precaution, keep Sao2 92 to 96%    CARDIOVASCULAR: avoid overload, NS 75 CC/H, dc lisinopril    GI: GI prophylaxis                                          Feeding PEG, rectal exam disimpaction, fleet enema    RENAL:  F/u  lytes.  Correct as needed. accurate I/O, urine lytes, u osm REVIEWED    HEMATOLOGICAL:  DVT prophylaxis.    ENDOCRINE:  Follow up FS.  Insulin protocol if needed.    vent unit  dc planning  keiko

## 2022-07-29 NOTE — PROGRESS NOTE ADULT - ASSESSMENT
IMPRESSION:    Chronic resp failure sp PEG/ trach  CVA  seizure  hyponatremia/ hypovolemic  functional quadreplegia  constipation    PLAN:    CNS: Avoid CNS depressant    HEENT:  Oral care    PULMONARY:  HOB @ 45 degrees, aspiration precaution, keep Sao2 92 to 96%    CARDIOVASCULAR: avoid overload, NS     GI: GI prophylaxis                                          Feeding PEG, rectal exam disimpaction, fleet enema    RENAL:  F/u  lytes.  Correct as needed. accurate I/O, urine lytes, u osm REVIEWED    HEMATOLOGICAL:  DVT prophylaxis.    ENDOCRINE:  Follow up FS.  Insulin protocol if needed.    vent unit

## 2022-07-29 NOTE — PROGRESS NOTE ADULT - ASSESSMENT
58 yo M w h/o HTN, HLD, DM, CAD, CVA (x2 w residual weakness), s/p trach and peg presents from [facility] for placement at new facility. Patient's sister (HCP) apparently did not like care at [facility] - describing it as unsafe d/t no side rails on bed.  consulted and patient admitted until adequate facility established. Nephro consult for hyponatremia.    Assessment and plan  Hyponatremia/ seizure/ hx of CVA  ROCKY <20 Urine OSm 800 c/w volume depletion  Sodium better with IVF at near normal values   check carbamazepine level  follow bMP q48h     will sign off recall PRN / call using TEAMS or on [phone]

## 2022-07-29 NOTE — PROGRESS NOTE ADULT - SUBJECTIVE AND OBJECTIVE BOX
SILVANO CALIXTO 57y Male  MRN#: [id number]   Hospital Day: 6d    SUBJECTIVE  Patient is a 57y old Male who presents with a chief complaint of placement (29 Jul 2022 08:51)  Currently admitted to medicine with the primary diagnosis of General medical exam      INTERVAL HPI AND OVERNIGHT EVENTS:  Patient was examined and seen at bedside. This morning he is resting comfortably in bed and reports no issues or overnight events.    OBJECTIVE  PAST MEDICAL & SURGICAL HISTORY  CVA (cerebral vascular accident)  1980&#x27;s x2   Residual left sided weakness    CAD (coronary artery disease)    Retinal detachment    Hypertension    High cholesterol    Diabetes    Anxiety    Seizure    History of corneal transplant  right   4/13/17      ALLERGIES:  No Known Allergies    MEDICATIONS:  STANDING MEDICATIONS  ascorbic acid 500 milliGRAM(s) Oral daily  busPIRone 5 milliGRAM(s) Oral <User Schedule>  carBAMazepine  Oral  Liquid - Peds 100 milliGRAM(s) Oral every 12 hours  chlorhexidine 2% Cloths 1 Application(s) Topical <User Schedule>  Dakins Solution - 1/2 Strength 1 Application(s) Topical three times a day  folic acid 1 milliGRAM(s) Oral daily  heparin   Injectable 5000 Unit(s) SubCutaneous every 8 hours  multivitamin 1 Tablet(s) Oral daily  pantoprazole    Tablet 40 milliGRAM(s) Oral before breakfast  saline laxative (FLEET) Rectal Enema 1 Enema Rectal once  simvastatin 10 milliGRAM(s) Oral at bedtime  sucralfate suspension 1 Gram(s) Oral two times a day  zinc sulfate 220 milliGRAM(s) Oral daily    PRN MEDICATIONS  lactulose Syrup 10 Gram(s) Oral three times a day PRN  risperiDONE   Tablet 1 milliGRAM(s) Oral two times a day PRN      VITAL SIGNS: Last 24 Hours  T(C): 36.7 (29 Jul 2022 05:37), Max: 37.5 (28 Jul 2022 21:26)  T(F): 98 (29 Jul 2022 05:37), Max: 99.5 (28 Jul 2022 21:26)  HR: 85 (29 Jul 2022 05:37) (80 - 89)  BP: 131/67 (29 Jul 2022 05:37) (99/67 - 145/68)  BP(mean): --  RR: 18 (29 Jul 2022 05:37) (18 - 20)  SpO2: 100% (29 Jul 2022 04:33) (100% - 100%)    LABS:                        8.6    12.47 )-----------( 317      ( 29 Jul 2022 08:16 )             25.1     07-29    131<L>  |  98  |  16  ----------------------------<  108<H>  4.6   |  24  |  <0.5<L>    Ca    7.7<L>      29 Jul 2022 08:16    TPro  5.8<L>  /  Alb  2.2<L>  /  TBili  0.2  /  DBili  x   /  AST  14  /  ALT  15  /  AlkPhos  218<H>  07-29                  RADIOLOGY:      PHYSICAL EXAM:  CONSTITUTIONAL: No acute distress, on trach and peg, does not communicate so exam limited  PULMONARY: Clear to auscultation bilaterally;  CARDIOVASCULAR: Regular rate and rhythm; no murmurs, rubs, or gallops  GASTROINTESTINAL: Rigid, mildly-distended; bowel sounds present, patient does not grimace upon palpation  MUSCULOSKELETAL: 2+ peripheral pulses; no clubbing, no cyanosis, no edema  SKIN: dry and scaly skin diffusely (improving on wound care)

## 2022-07-29 NOTE — PROGRESS NOTE ADULT - SUBJECTIVE AND OBJECTIVE BOX
Nephrology progress note    THIS IS AN INCOMPLETE NOTE . FULL NOTE TO FOLLOW SHORTLY    Patient is seen and examined, events over the last 24 h noted .    Allergies:  No Known Allergies    Hospital Medications:   MEDICATIONS  (STANDING):  ascorbic acid 500 milliGRAM(s) Oral daily  busPIRone 5 milliGRAM(s) Oral <User Schedule>  carBAMazepine  Oral  Liquid - Peds 100 milliGRAM(s) Oral every 12 hours  chlorhexidine 2% Cloths 1 Application(s) Topical <User Schedule>  Dakins Solution - 1/2 Strength 1 Application(s) Topical three times a day  folic acid 1 milliGRAM(s) Oral daily  heparin   Injectable 5000 Unit(s) SubCutaneous every 8 hours  multivitamin 1 Tablet(s) Oral daily  pantoprazole    Tablet 40 milliGRAM(s) Oral before breakfast  simvastatin 10 milliGRAM(s) Oral at bedtime  sucralfate suspension 1 Gram(s) Oral two times a day  zinc sulfate 220 milliGRAM(s) Oral daily        VITALS:  T(F): 98 (22 @ 05:37), Max: 99.5 (22 @ 21:26)  HR: 85 (22 @ 05:37)  BP: 131/67 (22 @ 05:37)  RR: 18 (22 @ 05:37)  SpO2: 100% (22 @ 04:33)  Wt(kg): --     @ 07:01  -   @ 07:00  --------------------------------------------------------  IN: 1460 mL / OUT: 400 mL / NET: 1060 mL          PHYSICAL EXAM:  Constitutional: NAD  HEENT: anicteric sclera, oropharynx clear, MMM  Neck: No JVD  Respiratory: CTAB, no wheezes, rales or rhonchi  Cardiovascular: S1, S2, RRR  Gastrointestinal: BS+, soft, NT/ND  Extremities: No cyanosis or clubbing. No peripheral edema  :  No flor.   Skin: No rashes    LABS:      131<L>  |  98  |  16  ----------------------------<  84  4.4   |  24  |  <0.5<L>    SODIUM TREND:  Sodium 131 [ @ 19:22]  Sodium 129 [ @ 09:36]  Sodium 124 [ @ 09:31]  Sodium 124 [ @ 13:03]  Sodium 122 [ @ 10:23]  Sodium 125 [ @ 07:45]  Sodium 126 [ @ 06:10]  Sodium 126 [ @ 02:40]    Ca    8.1<L>      2022 19:22    TPro  6.3  /  Alb  2.2<L>  /  TBili  0.3  /  DBili      /  AST  12  /  ALT  15  /  AlkPhos  219<H>                            8.4    11.54 )-----------( 296      ( 2022 09:36 )             24.8       Urine Studies:  Urinalysis Basic - ( 2022 15:12 )    Color: Amrita / Appearance: Turbid / S.024 / pH:   Gluc:  / Ketone: Negative  / Bili: Small / Urobili: 6 mg/dL   Blood:  / Protein: 100 mg/dL / Nitrite: Positive   Leuk Esterase: Negative / RBC: 1 /HPF / WBC 1 /HPF   Sq Epi:  / Non Sq Epi: 3 /HPF / Bacteria: Moderate      Sodium, Random Urine: 52.0 mmoL/L ( @ 09:42)  Osmolality, Random Urine: 795 mos/kg ( @ 09:42)  Chloride, Random Urine: 40 ( @ 15:12)  Sodium, Random Urine: <20.0 mmoL/L ( @ 15:12)  Creatinine, Random Urine: 75 mg/dL ( @ 15:12)  Protein/Creatinine Ratio Calculation: 0.7 Ratio ( 15:12)  Osmolality, Random Urine: 826 mos/kg ( @ 15:12)  Potassium, Random Urine: 34 mmol/L ( @ 15:12)      Ferritin 263      [03-15-22 @ 02:10]  TSH 1.49      [03-15-22 @ 05:40]  Lipid: chol 109, TG 35, HDL 71, LDL --      [22 @ 09:00]    HCV 0.11, Nonreact      [20 @ 04:30]        RADIOLOGY & ADDITIONAL STUDIES:   Nephrology progress note  Patient is seen and examined, events over the last 24 h noted .  contracted trached on MV     Allergies:  No Known Allergies    Hospital Medications:   MEDICATIONS  (STANDING):  ascorbic acid 500 milliGRAM(s) Oral daily  busPIRone 5 milliGRAM(s) Oral <User Schedule>  carBAMazepine  Oral  Liquid - Peds 100 milliGRAM(s) Oral every 12 hours  Dakins Solution - 1/2 Strength 1 Application(s) Topical three times a day  folic acid 1 milliGRAM(s) Oral daily  heparin   Injectable 5000 Unit(s) SubCutaneous every 8 hours  multivitamin 1 Tablet(s) Oral daily  pantoprazole    Tablet 40 milliGRAM(s) Oral before breakfast  simvastatin 10 milliGRAM(s) Oral at bedtime  sucralfate suspension 1 Gram(s) Oral two times a day  zinc sulfate 220 milliGRAM(s) Oral daily        VITALS:  T(F): 98 (22 @ 05:37), Max: 99.5 (22 @ 21:26)  HR: 85 (22 @ 05:37)  BP: 131/67 (22 @ 05:37)  RR: 18 (22 @ 05:37)  SpO2: 100% (22 @ 04:33)       @ 07:01  -   @ 07:00  --------------------------------------------------------  IN: 1460 mL / OUT: 400 mL / NET: 1060 mL          PHYSICAL EXAM:    Constitutional: contracted / trached   Neck: No JVD  Respiratory: CTAB,  Cardiovascular: S1, S2, RRR  Gastrointestinal: BS+, soft, NT/ND  Extremities: No cyanosis or clubbing. No peripheral edema  :  No flor.   Skin: No rashes    LABS:      131<L>  |  98  |  16  ----------------------------<  84  4.4   |  24  |  <0.5<L>    SODIUM TREND:  Sodium 131 [ @ 19:22]  Sodium 129 [ @ 09:36]  Sodium 124 [ @ 09:31]  Sodium 124 [ @ 13:03]  Sodium 122 [ @ 10:23]  Sodium 125 [ @ 07:45]  Sodium 126 [ @ 06:10]  Sodium 126 [ @ 02:40]    Ca    8.1<L>      2022 19:22    TPro  6.3  /  Alb  2.2<L>  /  TBili  0.3  /  DBili      /  AST  12  /  ALT  15  /  AlkPhos  219<H>                            8.4    11.54 )-----------( 296      ( 2022 09:36 )             24.8       Urine Studies:  Urinalysis Basic - ( 2022 15:12 )    Color: Amrita / Appearance: Turbid / S.024 / pH:   Gluc:  / Ketone: Negative  / Bili: Small / Urobili: 6 mg/dL   Blood:  / Protein: 100 mg/dL / Nitrite: Positive   Leuk Esterase: Negative / RBC: 1 /HPF / WBC 1 /HPF   Sq Epi:  / Non Sq Epi: 3 /HPF / Bacteria: Moderate      Sodium, Random Urine: 52.0 mmoL/L ( @ 09:42)  Osmolality, Random Urine: 795 mos/kg ( @ 09:42)  Chloride, Random Urine: 40 ( @ 15:12)  Sodium, Random Urine: <20.0 mmoL/L ( @ 15:12)  Creatinine, Random Urine: 75 mg/dL ( @ 15:12)  Protein/Creatinine Ratio Calculation: 0.7 Ratio ( 15:12)  Osmolality, Random Urine: 826 mos/kg ( @ 15:12)  Potassium, Random Urine: 34 mmol/L ( @ 15:12)      Ferritin 263      [03-15-22 @ 02:10]  TSH 1.49      [03-15-22 @ 05:40]  Lipid: chol 109, TG 35, HDL 71, LDL --      [22 @ 09:00]    HCV 0.11, Nonreact      [20 @ 04:30]        RADIOLOGY & ADDITIONAL STUDIES:

## 2022-07-29 NOTE — PROGRESS NOTE ADULT - SUBJECTIVE AND OBJECTIVE BOX
Over Night Events: events noted, vent dependant, ct ap dome    PHYSICAL EXAM    ICU Vital Signs Last 24 Hrs  T(C): 36.7 (29 Jul 2022 05:37), Max: 37.5 (28 Jul 2022 21:26)  T(F): 98 (29 Jul 2022 05:37), Max: 99.5 (28 Jul 2022 21:26)  HR: 85 (29 Jul 2022 05:37) (80 - 89)  BP: 131/67 (29 Jul 2022 05:37) (99/67 - 145/68)  RR: 18 (29 Jul 2022 05:37) (18 - 20)  SpO2: 100% (29 Jul 2022 04:33) (100% - 100%)    O2 Parameters below as of 28 Jul 2022 12:50  Patient On (Oxygen Delivery Method): ventilator            General: ill looking  HEENT: trach  Lungs: Dec bs both base  Cardiovascular: JAMES 2.6  Abdomen: Soft, mildly distended  Sacral ulcer  not following commands    07-27-22 @ 07:01  -  07-28-22 @ 07:00  --------------------------------------------------------  IN:    Enteral Tube Flush: 100 mL    Jevity 1.2: 360 mL    sodium chloride 0.9%: 1000 mL  Total IN: 1460 mL    OUT:    Incontinent per Condom Catheter (mL): 300 mL    Voided (mL): 100 mL  Total OUT: 400 mL    Total NET: 1060 mL          LABS:                          8.4    11.54 )-----------( 296      ( 28 Jul 2022 09:36 )             24.8                                               07-28    131<L>  |  98  |  16  ----------------------------<  84  4.4   |  24  |  <0.5<L>    Ca    8.1<L>      28 Jul 2022 19:22    TPro  6.3  /  Alb  2.2<L>  /  TBili  0.3  /  DBili  x   /  AST  12  /  ALT  15  /  AlkPhos  219<H>  07-28                                                                                           LIVER FUNCTIONS - ( 28 Jul 2022 09:36 )  Alb: 2.2 g/dL / Pro: 6.3 g/dL / ALK PHOS: 219 U/L / ALT: 15 U/L / AST: 12 U/L / GGT: x                                                                                               Mode: AC/ CMV (Assist Control/ Continuous Mandatory Ventilation)  RR (machine): 14  TV (machine): 400  FiO2: 40  PEEP: 5  ITime: 1  MAP: 7  PIP: 22                                          MEDICATIONS  (STANDING):  ascorbic acid 500 milliGRAM(s) Oral daily  busPIRone 5 milliGRAM(s) Oral <User Schedule>  carBAMazepine  Oral  Liquid - Peds 100 milliGRAM(s) Oral every 12 hours  chlorhexidine 2% Cloths 1 Application(s) Topical <User Schedule>  Dakins Solution - 1/2 Strength 1 Application(s) Topical three times a day  folic acid 1 milliGRAM(s) Oral daily  heparin   Injectable 5000 Unit(s) SubCutaneous every 8 hours  multivitamin 1 Tablet(s) Oral daily  pantoprazole    Tablet 40 milliGRAM(s) Oral before breakfast  simvastatin 10 milliGRAM(s) Oral at bedtime  sucralfate suspension 1 Gram(s) Oral two times a day  zinc sulfate 220 milliGRAM(s) Oral daily    MEDICATIONS  (PRN):  lactulose Syrup 10 Gram(s) Oral three times a day PRN constipation  risperiDONE   Tablet 1 milliGRAM(s) Oral two times a day PRN agitation

## 2022-07-29 NOTE — PROGRESS NOTE ADULT - ASSESSMENT
56 yo M w h/o HTN, HLD, DM, CAD, CVA (x2 w residual weakness), s/p trach and peg presents from [facility] for placement at new facility. Patient's sister (HCP) apparently did not like care at [facility] - describing it as unsafe d/t no side rails on bed.  consulted and patient admitted until adequate facility established.      #Hyponatremia, sodium 131  - Workup going with hypovolemic vs SIADH  - 07/28: NSS was discontinued last night as per nephro note and restarted today at a rate of 50cc per hour, repeat urine studies going with SIADH => fluids stopped and water intake restricted  - 07/29: will keep off fluids and monitor as per nephro    #Abdominal distention  - Could be an element of partial SBO vs distention by stool, patient passing BM  - CT with contrast per PEG ordered for assessment=> to be followed and enema to be given after imaging is done  -Severe rectosigmoid fecal impaction. The rectum is distended to 12.8 cm. Disimpaction and enemas to be done    #CT scan findings:  Patchy left lower lobe pulmonary opacities, compatible with pneumonia.    Lower back/buttock ulceration with gas. Erosive changes of the underlying sacrum and coccyx suggestive of osteomyelitis. => ID and Burn consulted      #Disposition in trach/peg frail patient  - HCP unhappy w [facility] > f/u SW/CM on adequate dispo    #HTN  - on lisinopril 30mg qd and amlodipine 10mg qd    #CAD / HLD  - on simvastatin 10mg qhs and asa 81mg qd    #Seziure disorder  - on carbemazepine 100mg/5ml syrup bid    #Anxiety / Agitation  - on buspirone 5mg qd, risperidone 1mg bid prn    #GERD  - on esomeprazole suspension 40mg bid, sulcrafate 1g bid    #Folate and vitamin deficiency  - on folic acid and mvi    DVT ppx: heparin sub q  GI ppx: ni  Diet: NPO - nutrition recs for feeds  Activity: bedbound    DNR/DNI - molst in chart 58 yo M w h/o HTN, HLD, DM, CAD, CVA (x2 w residual weakness), s/p trach and peg presents from [facility] for placement at new facility. Patient's sister (HCP) apparently did not like care at [facility] - describing it as unsafe d/t no side rails on bed.  consulted and patient admitted until adequate facility established.      #Hyponatremia, sodium 131  - Workup going with hypovolemic vs SIADH  - 07/28: NSS was discontinued last night as per nephro note and restarted today at a rate of 50cc per hour, repeat urine studies going with SIADH => fluids stopped and water intake restricted  - 07/29: will keep off fluids and monitor as per nephro    #Abdominal distention  - Could be an element of partial SBO vs distention by stool, patient passing BM  - CT with contrast per PEG ordered for assessment=> to be followed and enema to be given after imaging is done  - Severe rectosigmoid fecal impaction. The rectum is distended to 12.8 cm. Disimpaction done on 07/29 => lactulose dose increased, will follow     #CT scan findings:  - Patchy left lower lobe pulmonary opacities, compatible with pneumonia.  - Lower back/buttock ulceration with gas. Erosive changes of the underlying sacrum and coccyx suggestive of osteomyelitis. => ID and Burn consulted      #Disposition in trach/peg frail patient  - HCP unhappy w [facility] > f/u SW/CM on adequate dispo    #HTN  - on lisinopril 30mg qd and amlodipine 10mg qd    #CAD / HLD  - on simvastatin 10mg qhs and asa 81mg qd    #Seziure disorder  - on carbemazepine 100mg/5ml syrup bid    #Anxiety / Agitation  - on buspirone 5mg qd, risperidone 1mg bid prn    #GERD  - on esomeprazole suspension 40mg bid, sulcrafate 1g bid    #Folate and vitamin deficiency  - on folic acid and mvi    DVT ppx: heparin sub q  GI ppx: ni  Diet: NPO - nutrition recs for feeds  Activity: bedbound    DNR/DNI - molst in chart

## 2022-07-29 NOTE — PROGRESS NOTE ADULT - ATTENDING COMMENTS
#Fecolith: 7/28 CT abdo -Left colon, rectosigmoid distended with lots of stool. ?Gaseous distension of right colon.   c/w  manual disempaction, High water enema. c/w lactulose Q6H. Surgery consult.     #New finding of suspected Sacral/Coccyx OM with gas on CT: ID, burn consult. Has WBC elevation but not febrile.     #Hyopnatremia: Improved to 131. D/c IVFs. FW restriction. Monitor    LWC per RN    Chronic VDRF.

## 2022-07-30 NOTE — CHART NOTE - NSCHARTNOTEFT_GEN_A_CORE
RD Consult/Brief Nutrition Note    -An RD consult for feeding optimization in setting of hyponatremia and stage 4 sacral ulcer was ordered (7/30). However, the nutrition support team has been following the patient since (7/25). Please refer to the nutrition support team for nutritional recommendations.

## 2022-07-30 NOTE — PROGRESS NOTE ADULT - SUBJECTIVE AND OBJECTIVE BOX
Patient is a 57y old  Male who presents with a chief complaint of placement (30 Jul 2022 11:14)        Over Night Events:  On MV.  Off pressors.          ROS:     All ROS are negative except HPI         PHYSICAL EXAM    ICU Vital Signs Last 24 Hrs  T(C): 36.7 (30 Jul 2022 12:00), Max: 37.1 (29 Jul 2022 19:55)  T(F): 98 (30 Jul 2022 12:00), Max: 98.8 (29 Jul 2022 19:55)  HR: 90 (30 Jul 2022 16:40) (84 - 91)  BP: 122/72 (30 Jul 2022 12:00) (98/64 - 122/72)  BP(mean): 89 (30 Jul 2022 12:00) (89 - 89)  ABP: --  ABP(mean): --  RR: 19 (30 Jul 2022 12:00) (18 - 19)  SpO2: 100% (30 Jul 2022 16:40) (98% - 100%)    O2 Parameters below as of 30 Jul 2022 12:00  Patient On (Oxygen Delivery Method): ventilator            CONSTITUTIONAL:  IN NAD    ENT:   Airway patent,   Mouth with normal mucosa.   No thrush    EYES:   Pupils equal,   Round and reactive to light.    CARDIAC:   Normal rate,   Regular rhythm.        RESPIRATORY:   No wheezing  Bilateral BS  Normal chest expansion  Not tachypneic,  No use of accessory muscles    GASTROINTESTINAL:  Abdomen soft,   Non-tender,   No guarding,       MUSCULOSKELETAL:   Contracted  No clubbing, cyanosis    NEUROLOGICAL:   Opens eyes.       SKIN:   Skin normal color for race,   No evidence of rash.    PSYCHIATRIC:   No apparent risk to self or others.          LABS:                            9.1    17.88 )-----------( 373      ( 30 Jul 2022 07:09 )             27.4                                               07-30    134<L>  |  100  |  22<H>  ----------------------------<  148<H>  4.5   |  22  |  <0.5<L>    Ca    7.8<L>      30 Jul 2022 07:09    TPro  6.0  /  Alb  2.3<L>  /  TBili  0.3  /  DBili  x   /  AST  11  /  ALT  14  /  AlkPhos  213<H>  07-30                                                                                           LIVER FUNCTIONS - ( 30 Jul 2022 07:09 )  Alb: 2.3 g/dL / Pro: 6.0 g/dL / ALK PHOS: 213 U/L / ALT: 14 U/L / AST: 11 U/L / GGT: x                                                                                               Mode: AC/ CMV (Assist Control/ Continuous Mandatory Ventilation)  RR (machine): 14  TV (machine): 400  FiO2: 40  PEEP: 5  ITime: 1  MAP: 11  PIP: 21                                          MEDICATIONS  (STANDING):  ascorbic acid 500 milliGRAM(s) Oral daily  busPIRone 5 milliGRAM(s) Oral <User Schedule>  carBAMazepine  Oral  Liquid - Peds 100 milliGRAM(s) Oral every 12 hours  cefepime   IVPB      cefepime   IVPB 2000 milliGRAM(s) IV Intermittent every 8 hours  chlorhexidine 2% Cloths 1 Application(s) Topical <User Schedule>  collagenase Ointment 1 Application(s) Topical two times a day  Dakins Solution - 1/2 Strength 1 Application(s) Topical two times a day  folic acid 1 milliGRAM(s) Oral daily  heparin   Injectable 5000 Unit(s) SubCutaneous every 8 hours  lactulose Syrup 10 Gram(s) Oral every 4 hours  multivitamin 1 Tablet(s) Oral daily  pantoprazole    Tablet 40 milliGRAM(s) Oral before breakfast  simvastatin 10 milliGRAM(s) Oral at bedtime  sucralfate suspension 1 Gram(s) Oral two times a day  zinc sulfate 220 milliGRAM(s) Oral daily    MEDICATIONS  (PRN):  [email or web address]   Tablet 1 milliGRAM(s) Oral two times a day PRN agitation      New X-rays reviewed:                                                                                  ECHO

## 2022-07-30 NOTE — CONSULT NOTE ADULT - SUBJECTIVE AND OBJECTIVE BOX
57y  Male  HPI:  56 yo M w h/o HTN, HLD, DM, CAD, CVA (x2 w residual weakness), s/p trach and peg presents from [facility] for placement at new facility. Patient's sister (HCP) apparently did not like care at [facility] - describing it as unsafe d/t no side rails on bed.    In ED, vitals stable.      consulted and patient admitted until adequate facility established. (23 Jul 2022 02:47)    Hospital course***  Allergies    No Known Allergies    Intolerances      PAST MEDICAL & SURGICAL HISTORY:  CVA (cerebral vascular accident)  1980&#x27;s x2   Residual left sided weakness      CAD (coronary artery disease)      Retinal detachment      Hypertension      High cholesterol      Diabetes      Anxiety      Seizure      History of corneal transplant  right   4/13/17          Labs:                        9.1    17.88 )-----------( 373      ( 30 Jul 2022 07:09 )             27.4     07-30    134<L>  |  100  |  22<H>  ----------------------------<  148<H>  4.5   |  22  |  <0.5<L>    Ca    7.8<L>      30 Jul 2022 07:09    TPro  6.0  /  Alb  2.3<L>  /  TBili  0.3  /  DBili  x   /  AST  11  /  ALT  14  /  AlkPhos  213<H>  07-30        PE:  PHYSICAL EXAM: Awake alert  Full thickness wound to sacrum 5x6 cm with 20% slough and 80n % granulation tissue, undermining+ and bone exposed  serosang dc  no erythema, no odor  full thickness wound to Lt Ischium with granulation tissue, serosang dc, no erythema, no odor

## 2022-07-30 NOTE — CONSULT NOTE ADULT - ASSESSMENT
ASSESSMENT:  Stage 4   pressure ulcer sacrum and left Ischium    RECOMMENDATION:  Wound care - Santyl/WEt Kerlex Packing with 1/4 Dakins/DPD BID  Offloading/ positional changes

## 2022-07-30 NOTE — PROGRESS NOTE ADULT - ASSESSMENT
IMPRESSION:    Chronic resp failure sp PEG/ trach  HO CVA  HO seizure  hyponatremia/ hypovolemic improved.    functional quadriplegia  Stool impaction / Constipation     PLAN:    CNS: Avoid CNS depressant    HEENT:  Oral care    PULMONARY:  HOB @ 45 degrees, aspiration precaution, keep Sao2 92 to 96%    CARDIOVASCULAR: avoid overload,    GI: GI prophylaxis                          Bowel regimen disimpaction.  GI eval / FU     RENAL:  F/u  lytes.  Correct as needed.    INFECTIOUS DISEASE:  ABX per ID     HEMATOLOGICAL:  DVT prophylaxis.    ENDOCRINE:  Follow up FS.  Insulin protocol if needed.    Vent unit

## 2022-07-30 NOTE — PROGRESS NOTE ADULT - ATTENDING COMMENTS
FW restriction  Enema QD. lacutlose q6. dietitian c/s.   monitor KUB. abdo softer now.   Sacral OM: s/p bedside dbx by burn ? Cefepime per ID. f/u cultures? LWC

## 2022-07-30 NOTE — CONSULT NOTE ADULT - SUBJECTIVE AND OBJECTIVE BOX
SILVANO CALIXTO  57y, Male  Allergy: No Known Allergies      CHIEF COMPLAINT: placement (29 Jul 2022 11:56)      HPI:  56 yo M w h/o HTN, HLD, DM, CAD, CVA (x2 w residual weakness), s/p trach and peg presents from [facility] for placement at new facility. Patient's sister (HCP) apparently did not like care at [facility] - describing it as unsafe d/t no side rails on bed.    In ED, vitals stable.      consulted and patient admitted until adequate facility established. (23 Jul 2022 02:47)    FAMILY HISTORY:    PAST MEDICAL & SURGICAL HISTORY:  CVA (cerebral vascular accident)  1980&#x27;s x2   Residual left sided weakness      CAD (coronary artery disease)      Retinal detachment      Hypertension      High cholesterol      Diabetes      Anxiety      Seizure      History of corneal transplant  right   4/13/17          SOCIAL HISTORY  Social History:  Former smoker.   Denies drinking and drug use. (28 Jan 2022 15:49)        ROS  General: Denies fevers, chills, nightsweats, weight loss  HEENT: Denies headache, rhinorrhea, sore throat, eye pain  CV: Denies CP, palpitations  PULM: Denies SOB, cough  GI: Denies abdominal pain, diarrhea  : Denies dysuria, hematuria  MSK: Denies arthralgias  NEURO: Denies paresthesias, weakness  PSYCH: Denies depression    VITALS:  T(F): 98.8, Max: 98.8 (07-29-22 @ 19:55)  HR: 91  BP: 98/64  RR: 18Vital Signs Last 24 Hrs  T(C): 37.1 (29 Jul 2022 19:55), Max: 37.1 (29 Jul 2022 19:55)  T(F): 98.8 (29 Jul 2022 19:55), Max: 98.8 (29 Jul 2022 19:55)  HR: 91 (30 Jul 2022 02:43) (85 - 91)  BP: 98/64 (29 Jul 2022 19:55) (98/64 - 129/75)  BP(mean): --  RR: 18 (29 Jul 2022 19:55) (18 - 18)  SpO2: 98% (30 Jul 2022 02:43) (98% - 100%)    Parameters below as of 29 Jul 2022 12:43  Patient On (Oxygen Delivery Method): ventilator        PHYSICAL EXAM:  Vent dependent  HEENT: Normocephalic, atraumatic  Neck: supple, no lymphadenopathy  CV: Regular rate & regular rhythm  Lungs: decreased BS at bases, no fremitus  Abdomen: Soft, BS present  Ext: Warm, well perfused  Neuro: non focal, awake  Skin: sacral ulcer    TESTS & MEASUREMENTS:                        9.1    17.88 )-----------( 373      ( 30 Jul 2022 07:09 )             27.4     07-30    134<L>  |  100  |  22<H>  ----------------------------<  148<H>  4.5   |  22  |  <0.5<L>    Ca    7.8<L>      30 Jul 2022 07:09    TPro  6.0  /  Alb  2.3<L>  /  TBili  0.3  /  DBili  x   /  AST  11  /  ALT  14  /  AlkPhos  213<H>  07-30      LIVER FUNCTIONS - ( 30 Jul 2022 07:09 )  Alb: 2.3 g/dL / Pro: 6.0 g/dL / ALK PHOS: 213 U/L / ALT: 14 U/L / AST: 11 U/L / GGT: x                     INFECTIOUS DISEASES TESTING  MRSA PCR Result.: Negative (03-15-22 @ 23:29)      RADIOLOGY & ADDITIONAL TESTS:  I have personally reviewed the last Chest xray  CXR      CT      CARDIOLOGY TESTING      MEDICATIONS  ascorbic acid 500  busPIRone 5  carBAMazepine  Oral  Liquid - Peds 100  chlorhexidine 2% Cloths 1  folic acid 1  heparin   Injectable 5000  lactulose Syrup 10  morphine  - Injectable 2  multivitamin 1  pantoprazole    Tablet 40  simvastatin 10  sucralfate suspension 1  zinc sulfate 220      ANTIBIOTICS:      All available historical data has been reviewed

## 2022-07-30 NOTE — PROGRESS NOTE ADULT - SUBJECTIVE AND OBJECTIVE BOX
SILVANO CALIXTO 57y Male  MRN#: [id number]   Hospital Day: 7d    SUBJECTIVE  Patient is a 57y old Male who presents with a chief complaint of placement (30 Jul 2022 10:25)  Currently admitted to medicine with the primary diagnosis of General medical exam      INTERVAL HPI AND OVERNIGHT EVENTS:  Patient was examined and seen at bedside. This morning he is resting comfortably in bed and reports no issues or overnight events.    OBJECTIVE  PAST MEDICAL & SURGICAL HISTORY  CVA (cerebral vascular accident)  1980&#x27;s x2   Residual left sided weakness    CAD (coronary artery disease)    Retinal detachment    Hypertension    High cholesterol    Diabetes    Anxiety    Seizure    History of corneal transplant  right   4/13/17      ALLERGIES:  No Known Allergies    MEDICATIONS:  STANDING MEDICATIONS  ascorbic acid 500 milliGRAM(s) Oral daily  busPIRone 5 milliGRAM(s) Oral <User Schedule>  carBAMazepine  Oral  Liquid - Peds 100 milliGRAM(s) Oral every 12 hours  cefepime   IVPB      chlorhexidine 2% Cloths 1 Application(s) Topical <User Schedule>  folic acid 1 milliGRAM(s) Oral daily  heparin   Injectable 5000 Unit(s) SubCutaneous every 8 hours  lactulose Syrup 10 Gram(s) Oral every 4 hours  multivitamin 1 Tablet(s) Oral daily  pantoprazole    Tablet 40 milliGRAM(s) Oral before breakfast  simvastatin 10 milliGRAM(s) Oral at bedtime  sucralfate suspension 1 Gram(s) Oral two times a day  zinc sulfate 220 milliGRAM(s) Oral daily    PRN MEDICATIONS  risperiDONE   Tablet 1 milliGRAM(s) Oral two times a day PRN      VITAL SIGNS: Last 24 Hours  T(C): 37.1 (29 Jul 2022 19:55), Max: 37.1 (29 Jul 2022 19:55)  T(F): 98.8 (29 Jul 2022 19:55), Max: 98.8 (29 Jul 2022 19:55)  HR: 91 (30 Jul 2022 02:43) (85 - 91)  BP: 98/64 (29 Jul 2022 19:55) (98/64 - 129/75)  BP(mean): --  RR: 18 (29 Jul 2022 19:55) (18 - 18)  SpO2: 98% (30 Jul 2022 02:43) (98% - 100%)    LABS:                        9.1    17.88 )-----------( 373      ( 30 Jul 2022 07:09 )             27.4     07-30    134<L>  |  100  |  22<H>  ----------------------------<  148<H>  4.5   |  22  |  <0.5<L>    Ca    7.8<L>      30 Jul 2022 07:09    TPro  6.0  /  Alb  2.3<L>  /  TBili  0.3  /  DBili  x   /  AST  11  /  ALT  14  /  AlkPhos  213<H>  07-30                  RADIOLOGY:      PHYSICAL EXAM:  CONSTITUTIONAL: No acute distress, on trach and peg, does not communicate so exam limited  PULMONARY: Clear to auscultation bilaterally;  CARDIOVASCULAR: Regular rate and rhythm; no murmurs, rubs, or gallops  GASTROINTESTINAL: Rigid, mildly-distended; bowel sounds present, patient does not grimace upon palpation  MUSCULOSKELETAL: 2+ peripheral pulses; no clubbing, no cyanosis, no edema  SKIN: dry and scaly skin diffusely (improving on wound care), sacral bed ulcers

## 2022-07-30 NOTE — CONSULT NOTE ADULT - ASSESSMENT
HPI:  56 yo M w h/o HTN, HLD, DM, CAD, CVA (x2 w residual weakness), s/p trach and peg presents from [facility] for placement at new facility. Patient's sister (HCP) apparently did not like care at [facility] - describing it as unsafe d/t no side rails on bed. 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 Erosive changes of the underlying sacrum and coccyx suggestive of osteomyelitis.     wbc 17.88  Hyponatremia    New problem with additional W/U   acute illness with systemic symptoms     Not currently receiving antibiotics    Also Dxed with fecal impaction & pneumonitis  7/28 CT with patchy left lower lobe pulmonary opacities, compatible with pneumonia.      PLAN  - Await Burn consult for path and cultures. Will then advise Repeat D-dimer, ferritin, & CRP in 2 days antibiotic Rx  - Repeat sodium, wbc  - Pulmonary F/U as to whether patient has active pneumonia that needs treatment

## 2022-07-31 NOTE — PROGRESS NOTE ADULT - SUBJECTIVE AND OBJECTIVE BOX
Over Night Events: events noted, vent dependant, CT AP reviewed, afebrile  PHYSICAL EXAM    ICU Vital Signs Last 24 Hrs  T(C): 36.1 (31 Jul 2022 05:50), Max: 36.9 (30 Jul 2022 20:50)  T(F): 97 (31 Jul 2022 05:50), Max: 98.5 (30 Jul 2022 20:50)  HR: 104 (31 Jul 2022 08:31) (84 - 105)  BP: 120/74 (31 Jul 2022 05:50) (100/69 - 122/72)  RR: 14 (31 Jul 2022 05:50) (14 - 19)  SpO2: 98% (31 Jul 2022 08:31) (98% - 100%)    O2 Parameters below as of 30 Jul 2022 12:00  Patient On (Oxygen Delivery Method): ventilator            General: chronically ill looking  HEENT: ETT  Lungs: Dec bs r basr  Cardiovascular: Regular   Abdomen: Soft, Positive BS  sacral ulcer  not following commands    07-30-22 @ 07:01  -  07-31-22 @ 07:00  --------------------------------------------------------  IN:  Total IN: 0 mL    OUT:    Incontinent per Condom Catheter (mL): 500 mL  Total OUT: 500 mL    Total NET: -500 mL          LABS:                          9.1    17.88 )-----------( 373      ( 30 Jul 2022 07:09 )             27.4                                               07-30    134<L>  |  100  |  22<H>  ----------------------------<  148<H>  4.5   |  22  |  <0.5<L>    Ca    7.8<L>      30 Jul 2022 07:09    TPro  6.0  /  Alb  2.3<L>  /  TBili  0.3  /  DBili  x   /  AST  11  /  ALT  14  /  AlkPhos  213<H>  07-30                                                                                           LIVER FUNCTIONS - ( 30 Jul 2022 07:09 )  Alb: 2.3 g/dL / Pro: 6.0 g/dL / ALK PHOS: 213 U/L / ALT: 14 U/L / AST: 11 U/L / GGT: x                                                                                               Mode: AC/ CMV (Assist Control/ Continuous Mandatory Ventilation)  RR (machine): 14  TV (machine): 400  FiO2: 40  PEEP: 5  ITime: 1  MAP: 10  PIP: 27                                          MEDICATIONS  (STANDING):  ascorbic acid 500 milliGRAM(s) Oral daily  busPIRone 5 milliGRAM(s) Oral <User Schedule>  carBAMazepine  Oral  Liquid - Peds 100 milliGRAM(s) Oral every 12 hours  cefepime   IVPB      cefepime   IVPB 2000 milliGRAM(s) IV Intermittent every 8 hours  chlorhexidine 2% Cloths 1 Application(s) Topical <User Schedule>  collagenase Ointment 1 Application(s) Topical two times a day  Dakins Solution - 1/2 Strength 1 Application(s) Topical two times a day  folic acid 1 milliGRAM(s) Oral daily  heparin   Injectable 5000 Unit(s) SubCutaneous every 8 hours  lactulose Syrup 10 Gram(s) Oral every 4 hours  multivitamin 1 Tablet(s) Oral daily  pantoprazole    Tablet 40 milliGRAM(s) Oral before breakfast  simvastatin 10 milliGRAM(s) Oral at bedtime  sucralfate suspension 1 Gram(s) Oral two times a day  zinc sulfate 220 milliGRAM(s) Oral daily    MEDICATIONS  (PRN):  [email or web address]   Tablet 1 milliGRAM(s) Oral two times a day PRN agitation

## 2022-07-31 NOTE — PROGRESS NOTE ADULT - SUBJECTIVE AND OBJECTIVE BOX
S: follows commands  having BM.      All other pertinent ROS negative.      07-30-22 @ 07:01  -  07-31-22 @ 07:00  --------------------------------------------------------  IN: 0 mL / OUT: 500 mL / NET: -500 mL      Vital Signs Last 24 Hrs  T(C): 37.1 (31 Jul 2022 20:08), Max: 37.1 (31 Jul 2022 20:08)  T(F): 98.7 (31 Jul 2022 20:08), Max: 98.7 (31 Jul 2022 20:08)  HR: 97 (31 Jul 2022 20:08) (89 - 107)  BP: 111/58 (31 Jul 2022 20:08) (106/61 - 120/74)  BP(mean): --  RR: 18 (31 Jul 2022 20:08) (14 - 18)  SpO2: 99% (31 Jul 2022 20:08) (98% - 100%)      PHYSICAL EXAM:    Constitutional: trach/PEG  HEENT: right eye enucleated   Neck: Soft and supple, No LAD, No JVD  Respiratory: mildly junky  Cardiovascular: S1 and S2, regular rate and rhythm, no Murmurs, gallops or rubs  Gastrointestinal: fecal mass improved.   Extremities: No peripheral edema      MEDICATIONS:  MEDICATIONS  (STANDING):  ascorbic acid 500 milliGRAM(s) Oral daily  busPIRone 5 milliGRAM(s) Oral <User Schedule>  carBAMazepine  Oral  Liquid - Peds 100 milliGRAM(s) Oral every 12 hours  cefepime   IVPB      cefepime   IVPB 2000 milliGRAM(s) IV Intermittent every 8 hours  chlorhexidine 2% Cloths 1 Application(s) Topical <User Schedule>  collagenase Ointment 1 Application(s) Topical two times a day  Dakins Solution - 1/2 Strength 1 Application(s) Topical two times a day  folic acid 1 milliGRAM(s) Oral daily  heparin   Injectable 5000 Unit(s) SubCutaneous every 8 hours  lactulose Syrup 10 Gram(s) Oral every 4 hours  multivitamin 1 Tablet(s) Oral daily  pantoprazole    Tablet 40 milliGRAM(s) Oral before breakfast  simvastatin 10 milliGRAM(s) Oral at bedtime  sucralfate suspension 1 Gram(s) Oral two times a day  zinc sulfate 220 milliGRAM(s) Oral daily      LABS: All Labs Reviewed:                        9.1    17.88 )-----------( 373      ( 30 Jul 2022 07:09 )             27.4     07-30    134<L>  |  100  |  22<H>  ----------------------------<  148<H>  4.5   |  22  |  <0.5<L>    Ca    7.8<L>      30 Jul 2022 07:09    TPro  6.0  /  Alb  2.3<L>  /  TBili  0.3  /  DBili  x   /  AST  11  /  ALT  14  /  AlkPhos  213<H>  07-30          Blood Culture:     Radiology: reviewed

## 2022-07-31 NOTE — PROGRESS NOTE ADULT - ASSESSMENT
58 yo M w h/o HTN, HLD, DM, CAD, CVA (x2 w residual weakness), s/p trach and peg presents from [facility] for placement at new facility. Patient's sister (HCP) apparently did not like care at [facility] - describing it as unsafe d/t no side rails on bed.  consulted and patient admitted until adequate facility established.      #Hyponatremia, sodium 134  - improved. c/w FW restriction. off IVFs    #Abdominal distention  -- Severe rectosigmoid fecal impaction. The rectum is distended to 12.8 cm.   Disimpaction done on 07/29  Improving. Now  lactulose BID. enema QD.     #CT scan findings:  - Patchy left lower lobe pulmonary opacities, compatible with pneumonia.  - Lower back/buttock ulceration with gas. Erosive changes of the underlying sacrum and coccyx suggestive of osteomyelitis. => ID and Burn consulted  cefepime per ID  s/p bedside dbx by burn 7/29. c/w LWC.     #Disposition in trach/peg frail patient  - HCP unhappy w [facility] > f/u SW/CM on adequate dispo. ?LTAC    #Trach inserted in June at [facility] (for aspiration PNA)  NOTE: patient not chronically on Vent. START PS TRIALS from 8/1.    #HTN  - on lisinopril 30mg qd and amlodipine 10mg qd    #CAD / HLD  - on simvastatin 10mg qhs and asa 81mg qd    #Seziure disorder  - on carbemazepine 100mg/5ml syrup bid    #Anxiety / Agitation  - on buspirone 5mg qd, risperidone 1mg bid prn    #GERD  - on esomeprazole suspension 40mg bid, sulcrafate 1g bid    #Folate and vitamin deficiency  - on folic acid and mvi    DVT ppx: heparin sub q  GI ppx: ni  Diet: NPO - nutrition recs for feeds  Activity: bedbound    DNR/DNI - molst in chart    start PS trials/. c/w cefepime. LWC

## 2022-07-31 NOTE — PROGRESS NOTE ADULT - ASSESSMENT
IMPRESSION:    Chronic resp failure sp PEG/ trach  HO CVA  HO seizure  hyponatremia/ hypovolemic improved.    functional quadriplegia  Stool impaction / Constipation   increase WBC/ LLL opacity, possible pneumonia    PLAN:    CNS: Avoid CNS depressant    HEENT:  Oral care    PULMONARY:  HOB @ 45 degrees, aspiration precaution, keep Sao2 92 to 96%, DTA    CARDIOVASCULAR: avoid overload,    GI: GI prophylaxis                          Bowel regimen fleet enema    RENAL:  F/u  lytes.  Correct as needed.    INFECTIOUS DISEASE: swab nose for MRSA, trend WBC, keep cefepime    HEMATOLOGICAL:  DVT prophylaxis.    ENDOCRINE:  Follow up FS.  Insulin protocol if needed.    Vent unit     DNR

## 2022-08-01 NOTE — PROGRESS NOTE ADULT - ATTENDING COMMENTS
56 yo M w h/o HTN, HLD, DM, CAD, CVA (x2 w residual weakness), s/p trach and peg presents from [facility] for placement at new facility. Patient's sister (HCP) apparently did not like care at [facility] - describing it as unsafe d/t no side rails on bed.  consulted and patient admitted until adequate facility established.      #Hyponatremia, sodium 134  - improved. c/w FW restriction. off IVFs    #Abdominal distention  -- Severe rectosigmoid fecal impaction. The rectum is distended to 12.8 cm.   Disimpaction done on 07/29  Improving. Now  lactulose BID. enema QD.     #CT scan findings:  - Patchy left lower lobe pulmonary opacities, compatible with pneumonia.  - Lower back/buttock ulceration with gas. Erosive changes of the underlying sacrum and coccyx suggestive of osteomyelitis. => ID and Burn consulted  cefepime per ID  s/p bedside dbx by burn 7/29. c/w LWC.     #Disposition in trach/peg frail patient  - HCP unhappy w [facility] > f/u SW/CM on adequate dispo. ?LTAC    #Trach inserted in June at [facility] (for aspiration PNA)  NOTE: patient not chronically on Vent. START PS TRIALS from 8/1.    #HTN  - on lisinopril 30mg qd and amlodipine 10mg qd    #CAD / HLD  - on simvastatin 10mg qhs and asa 81mg qd    #Seziure disorder  - on carbemazepine 100mg/5ml syrup bid    #Anxiety / Agitation  - on buspirone 5mg qd, risperidone 1mg bid prn    #GERD  - on esomeprazole suspension 40mg bid, sulcrafate 1g bid    #Folate and vitamin deficiency  - on folic acid and mvi    DVT ppx: heparin sub q  GI ppx: ni  Diet: NPO - nutrition recs for feeds  Activity: bedbound    DNR/DNI - molst in chart    start PS trials/. c/w cefepime. LWC

## 2022-08-01 NOTE — PROGRESS NOTE ADULT - ASSESSMENT
56 yo M w h/o HTN, HLD, DM, CAD, CVA (x2 w residual weakness), s/p trach and peg presents from [facility] for placement at new facility. Patient's sister (HCP) apparently did not like care at [facility] - describing it as unsafe d/t no side rails on bed.  consulted and patient admitted until adequate facility established.      #Hyponatremia, sodium 134  - improved. c/w FW restriction. off IVFs    #Abdominal distention  - Severe rectosigmoid fecal impaction. The rectum is distended to 12.8 cm => disimpaction on 07/29  Improving. Now  lactulose BID. enema QD. and passing BM    #CT scan findings:  - Patchy left lower lobe pulmonary opacities, compatible with pneumonia.  - Lower back/buttock ulceration with gas. Erosive changes of the underlying sacrum and coccyx suggestive of osteomyelitis. => ID and Burn consulted  - ID plan: cefepime, cultures from sacral wound  - Burn plan: s/p bedside debridement on 07/29, continue with LWC    #Disposition in trach/peg frail patient  - HCP unhappy w [facility] > f/u SW/CM on adequate dispo. ?LTAC    #Trach inserted in June at [facility] (for aspiration PNA)  NOTE: patient not chronically on Vent. START PS TRIALS from 8/1.    #HTN  - on lisinopril 30mg qd and amlodipine 10mg qd    #CAD / HLD  - on simvastatin 10mg qhs and asa 81mg qd    #Seziure disorder  - on carbemazepine 100mg/5ml syrup bid    #Anxiety / Agitation  - on buspirone 5mg qd, risperidone 1mg bid prn    #GERD  - on esomeprazole suspension 40mg bid, sulcrafate 1g bid    #Folate and vitamin deficiency  - on folic acid and mvi    DVT ppx: heparin sub q  GI ppx: ni  Diet: NPO - nutrition recs for feeds  Activity: bedbound    DNR/DNI - molst in chart 56 yo M w h/o HTN, HLD, DM, CAD, CVA (x2 w residual weakness), s/p trach and peg presents from [facility] for placement at new facility. Patient's sister (HCP) apparently did not like care at [facility] - describing it as unsafe d/t no side rails on bed.  consulted and patient admitted until adequate facility established.      #Hyponatremia, sodium 134  - Working Dx is SIADH  - stable. c/w FW restriction. off IVFs    #Abdominal distention  - Severe rectosigmoid fecal impaction. The rectum is distended to 12.8 cm => disimpaction on 07/29  Improving. Now  lactulose BID. enema QD. and passing BM    #CT scan findings:  - Patchy left lower lobe pulmonary opacities, compatible with pneumonia.  - Lower back/buttock ulceration with gas. Erosive changes of the underlying sacrum and coccyx suggestive of osteomyelitis. => ID and Burn consulted  - ID plan: cefepime, cultures from sacral wound  - Burn plan: s/p bedside debridement on 07/29, continue with LWC    #Disposition in trach/peg frail patient  - HCP unhappy w [facility] > f/u SW/CM on adequate dispo. ?LTAC    #Trach inserted in June at [facility] (for aspiration PNA)  NOTE: patient not chronically on Vent. START PS TRIALS from 8/1.    #HTN  - on lisinopril 30mg qd and amlodipine 10mg qd    #CAD / HLD  - on simvastatin 10mg qhs and asa 81mg qd    #Seziure disorder  - on carbemazepine 100mg/5ml syrup bid    #Anxiety / Agitation  - on buspirone 5mg qd, risperidone 1mg bid prn    #GERD  - on esomeprazole suspension 40mg bid, sulcrafate 1g bid    #Folate and vitamin deficiency  - on folic acid and mvi    DVT ppx: heparin sub q  GI ppx: ni  Diet: NPO - nutrition recs for feeds  Activity: bedbound    DNR/DNI - molst in chart

## 2022-08-01 NOTE — PROGRESS NOTE ADULT - ASSESSMENT
IMPRESSION:    Chronic resp failure sp PEG/ trach  HO CVA  HO seizure  hyponatremia/ hypovolemic improved.    functional quadriplegia  Stool impaction / Constipation   increase WBC/ LLL opacity, possible aspiration     PLAN:    CNS: Avoid CNS depressant    HEENT:  Oral care    PULMONARY:  HOB @ 45 degrees, aspiration precaution, keep Sao2 92 to 96%, FU DTA    CARDIOVASCULAR: avoid overload,    GI: GI prophylaxis                          Bowel regimen fleet enema    RENAL:  F/u  lytes.  Correct as needed.    INFECTIOUS DISEASE: FU nose for MRSA, trend WBC.  Continue Cefepime for now     HEMATOLOGICAL:  DVT prophylaxis.    ENDOCRINE:  Follow up FS.  Insulin protocol if needed.    Vent unit     DNR

## 2022-08-01 NOTE — PROGRESS NOTE ADULT - SUBJECTIVE AND OBJECTIVE BOX
Patient is a 57y old  Male who presents with a chief complaint of placement (31 Jul 2022 22:23)        Over Night Events:  on MV.  Off pressors.         ROS:     All ROS are negative except HPI         PHYSICAL EXAM    ICU Vital Signs Last 24 Hrs  T(C): 36.8 (01 Aug 2022 05:00), Max: 37.1 (31 Jul 2022 20:08)  T(F): 98.2 (01 Aug 2022 05:00), Max: 98.7 (31 Jul 2022 20:08)  HR: 90 (01 Aug 2022 05:00) (89 - 107)  BP: 117/68 (01 Aug 2022 05:00) (106/61 - 117/68)  BP(mean): --  ABP: --  ABP(mean): --  RR: 19 (01 Aug 2022 05:00) (18 - 19)  SpO2: 100% (01 Aug 2022 05:00) (98% - 100%)    O2 Parameters below as of 01 Aug 2022 05:00  Patient On (Oxygen Delivery Method): ventilator            CONSTITUTIONAL:  In NAD    ENT:   Airway patent,   Mouth with normal mucosa.   Trach     EYES:   Pupils equal,   Round and reactive to light.    CARDIAC:   Normal rate,   Regular rhythm.        RESPIRATORY:   No wheezing  Bilateral BS  Normal chest expansion  Not tachypneic,  No use of accessory muscles    GASTROINTESTINAL:  Abdomen soft,   Non-tender,   No guarding,   + BS    MUSCULOSKELETAL:   COntracted   No clubbing, cyanosis    NEUROLOGICAL:   Opnes eyes   Does not follow commands       SKIN:   Skin normal color for race,   No evidence of rash.    PSYCHIATRIC:   No apparent risk to self or others.        07-31-22 @ 07:01  -  08-01-22 @ 07:00  --------------------------------------------------------  IN:  Total IN: 0 mL    OUT:    Incontinent per Condom Catheter (mL): 800 mL  Total OUT: 800 mL    Total NET: -800 mL          LABS:                                                                                                                                                                                                                                     Mode: AC/ CMV (Assist Control/ Continuous Mandatory Ventilation)  RR (machine): 14  TV (machine): 400  FiO2: 40  PEEP: 5  ITime: 1  MAP: 12  PIP: 32                                          MEDICATIONS  (STANDING):  ascorbic acid 500 milliGRAM(s) Oral daily  busPIRone 5 milliGRAM(s) Oral <User Schedule>  carBAMazepine  Oral  Liquid - Peds 100 milliGRAM(s) Oral every 12 hours  cefepime   IVPB      cefepime   IVPB 2000 milliGRAM(s) IV Intermittent every 8 hours  chlorhexidine 2% Cloths 1 Application(s) Topical <User Schedule>  collagenase Ointment 1 Application(s) Topical two times a day  Dakins Solution - 1/2 Strength 1 Application(s) Topical two times a day  folic acid 1 milliGRAM(s) Oral daily  heparin   Injectable 5000 Unit(s) SubCutaneous every 8 hours  lactulose Syrup 10 Gram(s) Oral two times a day  multivitamin 1 Tablet(s) Oral daily  pantoprazole    Tablet 40 milliGRAM(s) Oral before breakfast  simvastatin 10 milliGRAM(s) Oral at bedtime  sucralfate suspension 1 Gram(s) Oral two times a day  zinc sulfate 220 milliGRAM(s) Oral daily    MEDICATIONS  (PRN):  risperiDONE   Tablet 1 milliGRAM(s) Oral two times a day PRN agitation      New X-rays reviewed:                                                                                  ECHO

## 2022-08-02 NOTE — PROGRESS NOTE ADULT - ASSESSMENT
IMPRESSION:    Chronic resp failure sp PEG/ trach  HO CVA  HO seizure  Hyponatremia/ hypovolemic resolved   Functional quadriplegia  Stool impaction / Constipation   LLL opacity, possible aspiration     PLAN:    CNS: Avoid CNS depressant    HEENT:  Oral care    PULMONARY:  HOB @ 45 degrees, aspiration precaution, keep Sao2 92 to 96%.  FU DTA     CARDIOVASCULAR: avoid overload,    GI: GI prophylaxis                          Bowel regimen fleet enema.  FOr surgery efval     RENAL:  F/u  lytes.  Correct as needed.    INFECTIOUS DISEASE: FU nose for MRSA, trend WBC.  Continue Cefepime for now     HEMATOLOGICAL:  DVT prophylaxis.    ENDOCRINE:  Follow up FS.  Insulin protocol if needed.    Vent unit     DNR

## 2022-08-02 NOTE — PROGRESS NOTE ADULT - SUBJECTIVE AND OBJECTIVE BOX
Patient is a 57y old  Male who presents with a chief complaint of placement (03 Aug 2022 06:27)        Over Night Events:  on MV>  Off pressors         ROS:     All ROS are negative except HPI         PHYSICAL EXAM    ICU Vital Signs Last 24 Hrs  T(C): 36.9 (03 Aug 2022 05:28), Max: 37.1 (02 Aug 2022 12:59)  T(F): 98.5 (03 Aug 2022 05:28), Max: 98.8 (02 Aug 2022 12:59)  HR: 94 (03 Aug 2022 05:28) (94 - 105)  BP: 110/62 (03 Aug 2022 05:28) (109/60 - 113/60)  BP(mean): --  ABP: --  ABP(mean): --  RR: 18 (03 Aug 2022 05:28) (18 - 20)  SpO2: 100% (02 Aug 2022 21:39) (100% - 100%)    O2 Parameters below as of 02 Aug 2022 12:59  Patient On (Oxygen Delivery Method): ventilator            CONSTITUTIONAL:  ILl appearing in NAD    ENT:   Airway patent,   Mouth with normal mucosa.   No thrush    EYES:   Pupils equal,   Round and reactive to light.    CARDIAC:   Normal rate,   Regular rhythm.        RESPIRATORY:   No wheezing  Bilateral BS  Normal chest expansion  Not tachypneic,  No use of accessory muscles    GASTROINTESTINAL:  Abdomen soft,   Non-tender,   No guarding,   + BS    MUSCULOSKELETAL:   Contracted   No clubbing, cyanosis    NEUROLOGICAL:   Opens eyes    SKIN:   Skin normal color for race,     PSYCHIATRIC:   No apparent risk to self or others.                                                                                                       Mode: AC/ CMV (Assist Control/ Continuous Mandatory Ventilation)  RR (machine): 14  TV (machine): 400  FiO2: 40  PEEP: 5  ITime: 1  MAP: 12  PIP: 20                                          MEDICATIONS  (STANDING):  ascorbic acid 500 milliGRAM(s) Oral daily  busPIRone 5 milliGRAM(s) Oral <User Schedule>  carBAMazepine  Oral  Liquid - Peds 100 milliGRAM(s) Oral every 12 hours  cefepime   IVPB      cefepime   IVPB 2000 milliGRAM(s) IV Intermittent every 8 hours  chlorhexidine 2% Cloths 1 Application(s) Topical <User Schedule>  collagenase Ointment 1 Application(s) Topical two times a day  Dakins Solution - 1/2 Strength 1 Application(s) Topical two times a day  dextrose 5%. 1000 milliLiter(s) (50 mL/Hr) IV Continuous <Continuous>  dextrose 5%. 1000 milliLiter(s) (100 mL/Hr) IV Continuous <Continuous>  dextrose 50% Injectable 25 Gram(s) IV Push once  dextrose 50% Injectable 12.5 Gram(s) IV Push once  dextrose 50% Injectable 25 Gram(s) IV Push once  diatrizoate meglumine/diatrizoate sodium. 30 milliLiter(s) Oral once  folic acid 1 milliGRAM(s) Oral daily  glucagon  Injectable 1 milliGRAM(s) IntraMuscular once  heparin   Injectable 5000 Unit(s) SubCutaneous every 8 hours  insulin lispro (ADMELOG) corrective regimen sliding scale   SubCutaneous every 6 hours  iohexol 300 mG (iodine)/mL Oral Solution 50 milliLiter(s) Oral once  iohexol 300 mG (iodine)/mL Oral Solution 30 milliLiter(s) Oral once  lactulose Syrup 10 Gram(s) Oral two times a day  levETIRAcetam  IVPB 500 milliGRAM(s) IV Intermittent every 12 hours  multivitamin 1 Tablet(s) Oral daily  pantoprazole    Tablet 40 milliGRAM(s) Oral before breakfast  simvastatin 10 milliGRAM(s) Oral at bedtime  sucralfate suspension 1 Gram(s) Oral two times a day  zinc sulfate 220 milliGRAM(s) Oral daily    MEDICATIONS  (PRN):  dextrose Oral Gel 15 Gram(s) Oral once PRN Blood Glucose LESS THAN 70 milliGRAM(s)/deciliter  risperiDONE   Tablet 1 milliGRAM(s) Oral two times a day PRN agitation      New X-rays reviewed:                                                                                  ECHO

## 2022-08-02 NOTE — PROGRESS NOTE ADULT - ASSESSMENT
56 yo M w h/o HTN, HLD, DM, CAD, CVA (x2 w residual weakness), s/p trach and peg presents from [facility] for placement at new facility. Patient's sister (HCP) apparently did not like care at [facility] - describing it as unsafe d/t no side rails on bed.  consulted and patient admitted until adequate facility established.      #Hyponatremia- resolved sodium 137  - Working Dx is SIADH  - stable. c/w FW restriction. off IVFs    #Abdominal distention  - Severe rectosigmoid fecal impaction. The rectum is distended to 12.8 cm => disimpaction on 07/29  Improving. Now  lactulose BID. enema QD. and passing BM  - 08/02: Fecalith material seen out of PEG tube, stopped peg feedings and all meds, surgery consulted, plan is to assess for possible diversion colostomy, sister contacted, wishes to discuss plan in person tomorrow    #CT scan findings:  - Patchy left lower lobe pulmonary opacities, compatible with pneumonia.  - Lower back/buttock ulceration with gas. Erosive changes of the underlying sacrum and coccyx suggestive of osteomyelitis. => ID and Burn consulted  - ID plan: cefepime, cultures from sacral wound  - Burn plan: s/p bedside debridement on 07/29, continue with LWC    #Disposition in trach/peg frail patient  - HCP unhappy w [facility] > f/u SW/CM on adequate dispo. ?LTAC    #Trach inserted in June at [facility] (for aspiration PNA)  NOTE: patient not chronically on Vent. START PS TRIALS from 8/1.    #HTN  - on lisinopril 30mg qd and amlodipine 10mg qd at home  - Meds on hold and BP controlled    #CAD / HLD  - on simvastatin 10mg qhs and asa 81mg qd    #Seziure disorder  - on carbemazepine 100mg/5ml syrup bid  - 08/02: switched to IV keppra since peg tube usage on hold    #Anxiety / Agitation  - on buspirone 5mg qd, risperidone 1mg bid prn    #GERD  - on esomeprazole suspension 40mg bid, sulcrafate 1g bid    #Folate and vitamin deficiency  - on folic acid and mvi    DVT ppx: heparin sub q  GI ppx: ni  Diet: NPO - tube feeds and meds on hold  Activity: bedbound    DNR/DNI - molst in chart

## 2022-08-02 NOTE — CONSULT NOTE ADULT - SUBJECTIVE AND OBJECTIVE BOX
COLORECTAL SURGERY CONSULT NOTE    Patient: SILVANO CALIXTO , 57y ([birth year])Male   MRN: [id number]  Location: [address]  Visit: 07-23-22 Inpatient  Date: 08-02-22 @ 10:24    "HPI:  56 yo M w h/o HTN, HLD, DM, CAD, CVA (x2 w residual weakness), s/p trach and peg presents from [facility] for placement at new facility. Patient's sister (HCP) apparently did not like care at [facility] - describing it as unsafe d/t no side rails on bed.    In ED, vitals stable.      consulted and patient admitted until adequate facility established. (23 Jul 2022 02:47)"    Reason for Surgical Consult: Evaluation for diverting colostomy.      PAST MEDICAL & SURGICAL HISTORY:  CVA (cerebral vascular accident)  1980&#x27;s x2   Residual left sided weakness      CAD (coronary artery disease)      Retinal detachment      Hypertension      High cholesterol      Diabetes      Anxiety      Seizure      History of corneal transplant  right   4/13/17          Home Medications:  Adult Aspirin Regimen 81 mg oral delayed release tablet: 1 tab(s) orally once a day (21 Mar 2022 15:22)  amLODIPine 10 mg oral tablet: 1 tab(s) orally once a day (23 Jul 2022 03:40)  ascorbic acid 500 mg oral tablet: 1 tab(s) orally once a day (25 Jul 2022 15:53)  busPIRone 5 mg oral tablet: 1 tab(s) orally once a day (23 Jul 2022 03:41)  carBAMazepine 100 mg/5 mL oral suspension: 5 milliliter(s) orally every 12 hours (23 Jul 2022 03:41)  docusate potassium 100 mg oral capsule: 1 cap(s) orally once a day (23 Jul 2022 03:40)  esomeprazole 40 mg oral powder for reconstitution, delayed release: 1 each orally 2 times a day (23 Jul 2022 03:43)  folic acid 0.4 mg oral tablet: 1 tab(s) orally once a day (23 Jul 2022 03:42)  lactulose 10 g/15 mL oral syrup: 15 milliliter(s) orally 3 times a day, As Needed (23 Jul 2022 03:39)  lisinopril 30 mg oral tablet: 1 tab(s) orally once a day (23 Jul 2022 03:38)  Multiple Vitamins oral capsule: 1 cap(s) orally once a day (23 Jul 2022 03:44)  RisperDAL 1 mg oral tablet: 1 tab(s) orally 2 times a day, As Needed (23 Jul 2022 03:38)  simvastatin 10 mg oral tablet: 1 tab(s) orally once a day (at bedtime) (28 Jan 2022 15:48)  sodium hypochlorite 0.25% topical solution: Apply topically to affected area 3 times a day (23 Jul 2022 03:39)  sucralfate 1 g/10 mL oral suspension: 10 milliliter(s) orally 2 times a day (23 Jul 2022 03:43)  zinc sulfate 220 mg oral capsule: 1 cap(s) orally once a day (25 Jul 2022 15:53)        VITALS:  T(F): 98 (08-02-22 @ 05:38), Max: 98.3 (08-01-22 @ 12:12)  HR: 104 (08-02-22 @ 05:38) (92 - 104)  BP: 103/62 (08-02-22 @ 05:38) (97/66 - 103/62)  RR: 14 (08-02-22 @ 05:38) (14 - 18)  SpO2: 100% (08-02-22 @ 08:07) (99% - 100%)    PHYSICAL EXAM:  General: NAD, Non-verbal baseline, contracted appearing.  Cardiac: RRR  Respiratory: trached, on pressure support  Abdomen: Soft, mildly distended, non-tender.  PEG tube in place without any discharge or erythema noted around insertion site.  Bumper hubbed at 2cm at skin.  Skin: Stage 4 sacral pressure ulcer, clean wound, bone exposed.      MEDICATIONS  (STANDING):  ascorbic acid 500 milliGRAM(s) Oral daily  busPIRone 5 milliGRAM(s) Oral <User Schedule>  carBAMazepine  Oral  Liquid - Peds 100 milliGRAM(s) Oral every 12 hours  cefepime   IVPB      cefepime   IVPB 2000 milliGRAM(s) IV Intermittent every 8 hours  chlorhexidine 2% Cloths 1 Application(s) Topical <User Schedule>  collagenase Ointment 1 Application(s) Topical two times a day  Dakins Solution - 1/2 Strength 1 Application(s) Topical two times a day  dextrose 5%. 1000 milliLiter(s) (50 mL/Hr) IV Continuous <Continuous>  dextrose 5%. 1000 milliLiter(s) (100 mL/Hr) IV Continuous <Continuous>  dextrose 50% Injectable 25 Gram(s) IV Push once  dextrose 50% Injectable 12.5 Gram(s) IV Push once  dextrose 50% Injectable 25 Gram(s) IV Push once  folic acid 1 milliGRAM(s) Oral daily  glucagon  Injectable 1 milliGRAM(s) IntraMuscular once  heparin   Injectable 5000 Unit(s) SubCutaneous every 8 hours  insulin lispro (ADMELOG) corrective regimen sliding scale   SubCutaneous every 6 hours  lactulose Syrup 10 Gram(s) Oral two times a day  multivitamin 1 Tablet(s) Oral daily  pantoprazole    Tablet 40 milliGRAM(s) Oral before breakfast  simvastatin 10 milliGRAM(s) Oral at bedtime  sucralfate suspension 1 Gram(s) Oral two times a day  zinc sulfate 220 milliGRAM(s) Oral daily    MEDICATIONS  (PRN):  dextrose Oral Gel 15 Gram(s) Oral once PRN Blood Glucose LESS THAN 70 milliGRAM(s)/deciliter  risperiDONE   Tablet 1 milliGRAM(s) Oral two times a day PRN agitation      LAB/STUDIES:                        8.6    15.80 )-----------( 418      ( 02 Aug 2022 09:16 )             26.1     08-01    134<L>  |  101  |  32<H>  ----------------------------<  144<H>  4.9   |  23  |  <0.5<L>    Ca    7.8<L>      01 Aug 2022 08:00    TPro  5.9<L>  /  Alb  2.0<L>  /  TBili  0.2  /  DBili  x   /  AST  13  /  ALT  15  /  AlkPhos  183<H>  08-01      LIVER FUNCTIONS - ( 01 Aug 2022 08:00 )  Alb: 2.0 g/dL / Pro: 5.9 g/dL / ALK PHOS: 183 U/L / ALT: 15 U/L / AST: 13 U/L / GGT: x                         Culture - Sputum (collected 01 Aug 2022 20:35)  Source: Trach Asp Tracheal Aspirate  Gram Stain (02 Aug 2022 06:45):    Numerous polymorphonuclear leukocytes seen per low power field    Rare Squamous epithelial cells seen per low power field    Moderate Gram Negative Rods seen per oil power field

## 2022-08-02 NOTE — PROGRESS NOTE ADULT - ATTENDING COMMENTS
56 yo M w h/o HTN, HLD, DM, CAD, CVA (x2 w residual weakness), s/p trach and peg presents from [facility] for placement at new facility. Patient's sister (HCP) apparently did not like care at [facility] - describing it as unsafe d/t no side rails on bed.  consulted and patient admitted until adequate facility established.      #Hyponatremia, sodium 134  - improved. c/w FW restriction. off IVFs    #Abdominal distention  -- Severe rectosigmoid fecal impaction. The rectum is distended to 12.8 cm.   Disimpaction done on 07/29  Improved slightly with  lactulose BID & enema QD.     But on 8/2, RN noticed Feculent output from PEG tube  Per surgery, probably d/t the colonic impaction, stool may have backtracked through small bowel?   Made Nothing Per PEG for now. Pending a gastrografin KUB study. May then need a repeat another CT Abdomen.  Also discuss with GI when PEG may be ok to use??? (PEG is old and from prior this admission. Probably placed outside [facility])  Surgery will meet with family along with medical team to review surgical options on 8/3. AS of now surgery considers that risks of maybe a Diverting colostomy (to also help his sacral wounds) outweigh the benefits?     (f/u nutrition support recs for supplements whenever patient gets back on his PEG feeds)  (Changed his Old PO Carbamazepine to IV Keppra until the time we fix PEG issues)    #CT scan findings:  - Patchy left lower lobe pulmonary opacities, compatible with pneumonia.  - Lower back/buttock ulceration with gas. Erosive changes of the underlying sacrum and coccyx suggestive of osteomyelitis. => ID and Burn consulted  cefepime per ID  s/p bedside dbx by burn 7/29. c/w LWC. f/u wound cultures.    #Disposition in trach/peg frail patient  - HCP unhappy w [facility] > f/u SW/CM on adequate dispo. ?LTAC    #Trach inserted in June at [facility] (for aspiration PNA)  NOTE: patient not chronically on Vent. STARTED PS TRIALS from 8/1.    #HTN  - on lisinopril 30mg qd and amlodipine 10mg qd    #CAD / HLD  - on simvastatin 10mg qhs and asa 81mg qd    #Seziure disorder  - on carbemazepine 100mg/5ml syrup bid > Changed to IV Keppra until PEG fixed.     #Anxiety / Agitation  - on buspirone 5mg qd, risperidone 1mg bid prn. (on hold until PEG fixed)    #GERD  - on esomeprazole suspension 40mg bid, sulcrafate 1g bid    #Folate and vitamin deficiency  - on folic acid and mvi    DVT ppx: heparin sub q  GI ppx: ni  Diet: NPO - nutrition recs for feeds  Activity: bedbound    DNR/DNI - molst in chart

## 2022-08-02 NOTE — CONSULT NOTE ADULT - ASSESSMENT
57M w/ CVA hx, non-verbal baseline, DM, HTN, s/p trach/PEG at outside hospital, admitted to medicine for social dispo issues, currently being treated for sacral osteomyelitis and followed by ID, Burn team.  Noted to have large stool burden on admission imaging s/p fecal disimpaction with clinical improvement, currently +BM daily.  Previously patient was planned for diverting colostomy, however patient family refused intervention 2/2 no clinical obstruction and +BM.      Plan:   -Follow up family discussion regarding goals of care, wishes for surgical intervention vs continued non-operative management.   -Will discuss with attending 57M w/ CVA hx, non-verbal baseline, DM, HTN, s/p trach/PEG at outside hospital, admitted to medicine for social dispo issues, currently being treated for sacral osteomyelitis and followed by ID, Burn team.  Noted to have large stool burden on admission imaging s/p fecal disimpaction with clinical improvement, currently +BM daily.  Previously patient was planned for diverting colostomy, however patient family refused intervention 2/2 no clinical obstruction and +BM.      Plan:   -Patient with chronically dilated colon but +BM, poor surgical candidate and risks of intervention greater than any potential benefits, do not recommend surgical intervention as it would not improve quality of life.  Family meeting 8/3 @1pm and requesting updates on surgical options.   -Continue local wound care for sacral decubitus ulcers   -Continue bowel regiment and medical care per primary   -Discussed with surgical attending.

## 2022-08-02 NOTE — PROGRESS NOTE ADULT - SUBJECTIVE AND OBJECTIVE BOX
SILVANO CALIXTO 57y Male  MRN#: [id number]   Hospital Day: 10d    SUBJECTIVE  Patient is a 57y old Male who presents with a chief complaint of placement (02 Aug 2022 10:24)  Currently admitted to medicine with the primary diagnosis of General medical exam      INTERVAL HPI AND OVERNIGHT EVENTS:  Patient was examined and seen at bedside. This morning he is resting comfortably in bed and reports no issues or overnight events.    OBJECTIVE  PAST MEDICAL & SURGICAL HISTORY  CVA (cerebral vascular accident)  1980&#x27;s x2   Residual left sided weakness    CAD (coronary artery disease)    Retinal detachment    Hypertension    High cholesterol    Diabetes    Anxiety    Seizure    History of corneal transplant  right   4/13/17      ALLERGIES:  No Known Allergies    MEDICATIONS:  STANDING MEDICATIONS  ascorbic acid 500 milliGRAM(s) Oral daily  busPIRone 5 milliGRAM(s) Oral <User Schedule>  carBAMazepine  Oral  Liquid - Peds 100 milliGRAM(s) Oral every 12 hours  cefepime   IVPB      cefepime   IVPB 2000 milliGRAM(s) IV Intermittent every 8 hours  chlorhexidine 2% Cloths 1 Application(s) Topical <User Schedule>  collagenase Ointment 1 Application(s) Topical two times a day  Dakins Solution - 1/2 Strength 1 Application(s) Topical two times a day  dextrose 5%. 1000 milliLiter(s) IV Continuous <Continuous>  dextrose 5%. 1000 milliLiter(s) IV Continuous <Continuous>  dextrose 50% Injectable 25 Gram(s) IV Push once  dextrose 50% Injectable 12.5 Gram(s) IV Push once  dextrose 50% Injectable 25 Gram(s) IV Push once  diatrizoate meglumine/diatrizoate sodium. 50 milliLiter(s) Oral once  folic acid 1 milliGRAM(s) Oral daily  glucagon  Injectable 1 milliGRAM(s) IntraMuscular once  heparin   Injectable 5000 Unit(s) SubCutaneous every 8 hours  insulin lispro (ADMELOG) corrective regimen sliding scale   SubCutaneous every 6 hours  lactulose Syrup 10 Gram(s) Oral two times a day  levETIRAcetam  IVPB 500 milliGRAM(s) IV Intermittent every 12 hours  multivitamin 1 Tablet(s) Oral daily  pantoprazole    Tablet 40 milliGRAM(s) Oral before breakfast  simvastatin 10 milliGRAM(s) Oral at bedtime  sucralfate suspension 1 Gram(s) Oral two times a day  zinc sulfate 220 milliGRAM(s) Oral daily    PRN MEDICATIONS  dextrose Oral Gel 15 Gram(s) Oral once PRN  risperiDONE   Tablet 1 milliGRAM(s) Oral two times a day PRN      VITAL SIGNS: Last 24 Hours  T(C): 36.7 (02 Aug 2022 05:38), Max: 36.7 (02 Aug 2022 05:38)  T(F): 98 (02 Aug 2022 05:38), Max: 98 (02 Aug 2022 05:38)  HR: 104 (02 Aug 2022 05:38) (92 - 104)  BP: 103/62 (02 Aug 2022 05:38) (97/66 - 103/62)  BP(mean): --  RR: 14 (02 Aug 2022 05:38) (14 - 18)  SpO2: 100% (02 Aug 2022 08:07) (99% - 100%)    LABS:                        8.6    15.80 )-----------( 418      ( 02 Aug 2022 09:16 )             26.1     08-02    137  |  104  |  44<H>  ----------------------------<  127<H>  5.0   |  22  |  <0.5<L>    Ca    8.0<L>      02 Aug 2022 09:16    TPro  5.9<L>  /  Alb  2.2<L>  /  TBili  0.3  /  DBili  x   /  AST  18  /  ALT  21  /  AlkPhos  196<H>  08-02              Culture - Sputum (collected 01 Aug 2022 20:35)  Source: Trach Asp Tracheal Aspirate  Gram Stain (02 Aug 2022 06:45):    Numerous polymorphonuclear leukocytes seen per low power field    Rare Squamous epithelial cells seen per low power field    Moderate Gram Negative Rods seen per oil power field          RADIOLOGY:      PHYSICAL EXAM:  CONSTITUTIONAL: No acute distress, on trach and peg, does not communicate so exam limited  PULMONARY: decreased air entry bilaterally, scattered rhonchi  CARDIOVASCULAR: Regular rate and rhythm; no murmurs, rubs, or gallops  GASTROINTESTINAL: soft, mildly-distended; bowel sounds present, patient does not grimace upon palpation (abdominal exam stable)  MUSCULOSKELETAL: 2+ peripheral pulses; no clubbing, no cyanosis, no edema  SKIN: dry and scaly skin diffusely (improving on wound care), sacral bed ulcers

## 2022-08-03 NOTE — PROGRESS NOTE ADULT - ASSESSMENT
58 yo M w h/o HTN, HLD, DM, CAD, CVA (x2 w residual weakness), s/p trach and peg presents from [facility] for placement at new facility. Patient's sister (HCP) apparently did not like care at [facility] - describing it as unsafe d/t no side rails on bed.  consulted and patient admitted until adequate facility established.      #Hyponatremia- stable/resolving sodium 134  - Working Dx is SIADH  - stable. c/w FW restriction. off IVFs    #Abdominal distention  - Severe rectosigmoid fecal impaction. The rectum is distended to 12.8 cm => disimpaction on 07/29  Improving. Now  lactulose BID. enema QD. and passing BM  - 08/02: Fecalith material seen out of PEG tube, stopped peg feedings and all meds, surgery consulted, plan is to assess for possible diversion colostomy, sister contacted, wishes to discuss plan in person  - 08/02: KUB with peg studies pending to assess the tube    #CT scan findings:  - Patchy left lower lobe pulmonary opacities, compatible with pneumonia.  - Lower back/buttock ulceration with gas. Erosive changes of the underlying sacrum and coccyx suggestive of osteomyelitis. => ID and Burn consulted  - ID plan: cefepime, cultures from sacral wound showing enterococcus and from DTA showing pseudomonas => pending full plan from ID  - Burn plan: s/p bedside debridement on 07/29, continue with LWC    #Disposition in trach/peg frail patient  - HCP unhappy w [facility] > f/u SW/CM on adequate dispo. ?LTAC    #Trach inserted in June at [facility] (for aspiration PNA)  NOTE: patient not chronically on Vent. START PS TRIALS from 8/1.    #HTN  - on lisinopril 30mg qd and amlodipine 10mg qd at home  - Meds on hold and BP controlled    #CAD / HLD  - on simvastatin 10mg qhs and asa 81mg qd    #Seziure disorder  - on carbemazepine 100mg/5ml syrup bid  - 08/02: switched to IV keppra since peg tube usage on hold    #Anxiety / Agitation  - on buspirone 5mg qd, risperidone 1mg bid prn    #GERD  - on esomeprazole suspension 40mg bid, sulcrafate 1g bid    #Folate and vitamin deficiency  - on folic acid and mvi    DVT ppx: heparin sub q  GI ppx: ni  Diet: NPO - tube feeds and meds on hold  Activity: bedbound    DNR/DNI - molst in chart 56 yo M w h/o HTN, HLD, DM, CAD, CVA (x2 w residual weakness), s/p trach and peg presents from [facility] for placement at new facility. Patient's sister (HCP) apparently did not like care at [facility] - describing it as unsafe d/t no side rails on bed.  consulted and patient admitted until adequate facility established.      #Hyponatremia- stable/resolving sodium 134  - Working Dx is SIADH  - stable. c/w FW restriction. off IVFs    #Abdominal distention  - Severe rectosigmoid fecal impaction. The rectum is distended to 12.8 cm => disimpaction on 07/29  Improving. Now  lactulose BID. enema QD. and passing BM  - 08/02: Fecalith material seen out of PEG tube, stopped peg feedings and all meds, surgery consulted, plan is to assess for possible diversion colostomy, sister contacted, wishes to discuss plan in person  - 08/02: No fecalith material seen from peg today, KUB with peg studies showing patet peg tube with no extravasation, surgery plan is no surgery at the moment as the patient is passing BM    #CT scan findings:  - Patchy left lower lobe pulmonary opacities, compatible with pneumonia.  - Lower back/buttock ulceration with gas. Erosive changes of the underlying sacrum and coccyx suggestive of osteomyelitis. => ID and Burn consulted  - ID plan: cefepime, cultures from sacral wound showing enterococcus and from DTA showing pseudomonas => pending full plan from ID  - Burn plan: s/p bedside debridement on 07/29, continue with LWC    #Disposition in trach/peg frail patient  - HCP unhappy w [facility] > f/u SW/CM on adequate dispo. ?LTAC    #Trach inserted in June at [facility] (for aspiration PNA)  NOTE: patient not chronically on Vent. START PS TRIALS from 8/1.    #HTN  - on lisinopril 30mg qd and amlodipine 10mg qd at home  - Meds on hold and BP controlled    #CAD / HLD  - on simvastatin 10mg qhs and asa 81mg qd    #Seziure disorder  - on carbemazepine 100mg/5ml syrup bid  - 08/02: switched to IV keppra since peg tube usage on hold    #Anxiety / Agitation  - on buspirone 5mg qd, risperidone 1mg bid prn    #GERD  - on esomeprazole suspension 40mg bid, sulcrafate 1g bid    #Folate and vitamin deficiency  - on folic acid and mvi    DVT ppx: heparin sub q  GI ppx: ni  Diet: NPO - tube feeds and meds on hold  Activity: bedbound    DNR/DNI - molst in chart 58 yo M w h/o HTN, HLD, DM, CAD, CVA (x2 w residual weakness), s/p trach and peg presents from [facility] for placement at new facility. Patient's sister (HCP) apparently did not like care at [facility] - describing it as unsafe d/t no side rails on bed.  consulted and patient admitted until adequate facility established.      #Hyponatremia- stable/resolving sodium 134  - Working Dx is SIADH  - stable. c/w FW restriction. off IVFs    #Abdominal distention  - Severe rectosigmoid fecal impaction. The rectum is distended to 12.8 cm => disimpaction on 07/29  Improving. Now  lactulose BID. enema QD. and passing BM  - 08/02: Fecalith material seen out of PEG tube, stopped peg feedings and all meds, surgery consulted, plan is to assess for possible diversion colostomy, sister contacted, wishes to discuss plan in person  - 08/02: No fecalith material seen from peg today, KUB with peg studies showing patet peg tube with no extravasation, surgery plan is no surgery at the moment as the patient is passing BM    #CT scan findings:  - Patchy left lower lobe pulmonary opacities, compatible with pneumonia.  - Lower back/buttock ulceration with gas. Erosive changes of the underlying sacrum and coccyx suggestive of osteomyelitis. => ID and Burn consulted  - ID plan: cefepime, cultures from sacral wound showing enterococcus and from DTA showing pseudomonas => pending full plan from ID  - Burn plan: s/p bedside debridement on 07/29, continue with LWC    #Disposition in trach/peg frail patient  - HCP unhappy w [facility] > f/u SW/CM on adequate dispo. ?LTAC    #Trach inserted in June at [facility] (for aspiration PNA)  NOTE: patient not chronically on Vent. START PS TRIALS from 8/1.    #HTN  - on lisinopril 30mg qd and amlodipine 10mg qd at home  - Meds on hold and BP controlled    #CAD / HLD  - on simvastatin 10mg qhs and asa 81mg qd    #Seziure disorder  - on carbemazepine 100mg/5ml syrup bid  - 08/02: switched to IV keppra since peg tube usage on hold    #Anxiety / Agitation  - on buspirone 5mg qd, risperidone 1mg bid prn    #GERD  - on esomeprazole suspension 40mg bid, sulcrafate 1g bid    #Folate and vitamin deficiency  - on folic acid and mvi    DVT ppx: heparin sub q  GI ppx: ni  Diet: NPO - tube feeds and meds on hold  Activity: bedbound    DNR/DNI - molst in chart 56 yo M w h/o HTN, HLD, DM, CAD, CVA (x2 w residual weakness), s/p trach and peg presents from [facility] for placement at new facility. Patient's sister (HCP) apparently did not like care at [facility] - describing it as unsafe d/t no side rails on bed.  consulted and patient admitted until adequate facility established.      #Hyponatremia- stable/resolving sodium 134  - Working Dx is SIADH  - stable. c/w FW restriction. off IVFs    #Abdominal distention  - Severe rectosigmoid fecal impaction. The rectum is distended to 12.8 cm => disimpaction on 07/29  Improving. Now  lactulose BID. enema QD. and passing BM  - 08/02: Fecalith material seen out of PEG tube, stopped peg feedings and all meds, surgery consulted, plan is to assess for possible diversion colostomy, sister contacted, wishes to discuss plan in person  - 08/02: No fecalith material seen from peg today, KUB with peg studies showing patet peg tube with no extravasation, surgery plan is no surgery at the moment as the patient is passing BM    #CT scan findings:  - Patchy left lower lobe pulmonary opacities, compatible with pneumonia.  - Lower back/buttock ulceration with gas. Erosive changes of the underlying sacrum and coccyx suggestive of osteomyelitis. => ID and Burn consulted  - ID plan: cefepime, cultures from sacral wound showing enterococcus and from DTA showing pseudomonas => pending full plan from ID  - Burn plan: s/p bedside debridement on 07/29, continue with LWC.    #Disposition in trach/peg frail patient  - HCP unhappy w [facility] > f/u SW/CM on adequate dispo. ?LTAC    #Trach inserted in June at [facility] (for aspiration PNA)  NOTE: patient not chronically on Vent. START PS TRIALS from 8/1.    #HTN  - on lisinopril 30mg qd and amlodipine 10mg qd at home  - Meds on hold and BP controlled    #CAD / HLD  - on simvastatin 10mg qhs and asa 81mg qd    #Seziure disorder  - on carbemazepine 100mg/5ml syrup bid  - 08/02: switched to IV keppra since peg tube usage on hold    #Anxiety / Agitation  - on buspirone 5mg qd, risperidone 1mg bid prn    #GERD  - on esomeprazole suspension 40mg bid, sulcrafate 1g bid    #Folate and vitamin deficiency  - on folic acid and mvi    DVT ppx: heparin sub q  GI ppx: ni  Diet: NPO - tube feeds and meds on hold  Activity: bedbound    DNR/DNI - molst in chart

## 2022-08-03 NOTE — CONSULT NOTE ADULT - ASSESSMENT
58 yo M w h/o HTN, HLD, DM, CAD, CVA (x2 w residual weakness), s/p trach and peg presents from [facility] for placement at new facility. Patient's sister (HCP) apparently did not like care at [facility] - describing it as unsafe d/t no side rails on bed. We are consulted for fecalith material around peg tube. PAtient has chronic severe rectosigmoid fecal impaction with rectum distended to 12.8 cm. This issue has been present since early 2022 and surgery discussed possible colostomy back in feb with family but they refused. Patient CT scan in late july confirmed presence of this distention and fecal impaction. He had manual disimpaction by primary team and since then has been having daily bowel movements. He is on lactulose BID and enemas PRN. Yesterday, nurse noticed stools leaking around the peg tube, so patient was made NPO. Today there is minimal blackish material around peg site. Surgery were consulted again and diversion colostomy is gonna be discussed with family today even though surgery team thinks quality of life will not improve. to note that PEG tube was placed mid june at [facility]. no hematochezia, melena, hematemesis, abd distention, or any other symptoms. PAtient has functional quadriplegia and cannot give history. History taken from chart and sister.    # Fecalith material around PEG  - probably due to the colonic impaction, stool may have backtracked through small bowel  - PEG placed mid june 2022 at [facility]  - Patient with chronically dilated colon but +BM,   - Per surgery team --> poor surgical candidate and risks of intervention greater than any potential benefits, do not recommend surgical intervention as it would not improve quality of life.  Family meeting 8/3 @1pm and requesting updates on surgical options.  - CT abdomen with PO contrast --> severe rectosigmoid fecal impaction with rectum distended to 12.8 cm  - KUB --> PEG tube stable    recs  - Keep NPO  - do Gastrografin KUB study  - continue laxatives and bowel regimen. avoid constipation  - local care around PEG site  - f/u with surgery team for possible diversion colostomy  - will reevaluate PEG tube after gastrografin study is done    ----- incomplete note --------   58 yo M w h/o HTN, HLD, DM, CAD, CVA (x2 w residual weakness), s/p trach and peg presents from [facility] for placement at new facility. Patient's sister (HCP) apparently did not like care at [facility] - describing it as unsafe d/t no side rails on bed. We are consulted for fecalith material around peg tube. PAtient has chronic severe rectosigmoid fecal impaction with rectum distended to 12.8 cm. This issue has been present since early 2022 and surgery discussed possible colostomy back in feb with family but they refused. Patient CT scan in late july confirmed presence of this distention and fecal impaction. He had manual disimpaction by primary team and since then has been having daily bowel movements. He is on lactulose BID and enemas PRN. Yesterday, nurse noticed stools leaking around the peg tube, so patient was made NPO. Today there is minimal blackish material around peg site. Surgery were consulted again and diversion colostomy is gonna be discussed with family today even though surgery team thinks quality of life will not improve. to note that PEG tube was placed mid june at [facility]. no hematochezia, melena, hematemesis, abd distention, or any other symptoms. PAtient has functional quadriplegia and cannot give history. History taken from chart and sister.    # Fecalith material around PEG  - probably due to the colonic impaction, stool may have backtracked through small bowel  - PEG placed mid june 2022 at [facility]  - Patient with chronically dilated colon but +BM,   - Per surgery team --> poor surgical candidate and risks of intervention greater than any potential benefits, do not recommend surgical intervention as it would not improve quality of life.  Family meeting 8/3 @1pm and requesting updates on surgical options.  - CT abdomen with PO contrast --> severe rectosigmoid fecal impaction with rectum distended to 12.8 cm  - KUB --> PEG tube stable    recs  - PEG flushed and working well with no fecal material around it  - ok to start feeding per peg as tolerated  - continue laxatives and bowel regimen. avoid constipation  - local care around PEG site  - f/u with surgery team for possible diversion colostomy       56 yo M w h/o HTN, HLD, DM, CAD, CVA (x2 w residual weakness), s/p trach and peg presents from [facility] for placement at new facility. Patient's sister (HCP) apparently did not like care at [facility] - describing it as unsafe d/t no side rails on bed. We are consulted for fecalith material around peg tube. PAtient has chronic severe rectosigmoid fecal impaction with rectum distended to 12.8 cm. This issue has been present since early 2022 and surgery discussed possible colostomy back in feb with family but they refused. Patient CT scan in late july confirmed presence of this distention and fecal impaction. He had manual disimpaction by primary team and since then has been having daily bowel movements. He is on lactulose BID and enemas PRN. Yesterday, nurse noticed stools leaking around the peg tube, so patient was made NPO. Today there is minimal blackish material around peg site. Surgery were consulted again and diversion colostomy is gonna be discussed with family today even though surgery team thinks quality of life will not improve. to note that PEG tube was placed mid june at [facility]. no hematochezia, melena, hematemesis, abd distention, or any other symptoms. PAtient has functional quadriplegia and cannot give history. History taken from chart and sister.    # Fecalith material around PEG  - probably due to the colonic impaction, stool may have backtracked through small bowel  - PEG placed mid june 2022 at [facility]  - Patient with chronically dilated colon but +BM,   - Per surgery team --> poor surgical candidate and risks of intervention greater than any potential benefits, do not recommend surgical intervention as it would not improve quality of life.  Family meeting 8/3 @1pm and requesting updates on surgical options.  - CT abdomen with PO contrast --> severe rectosigmoid fecal impaction with rectum distended to 12.8 cm  - KUB --> PEG tube stable    recs  - PEG flushed and working well with no fecal material around it  - continue laxatives and bowel regimen. avoid constipation  - local care around PEG site  - f/u with surgery team for possible diversion colostomy

## 2022-08-03 NOTE — CONSULT NOTE ADULT - SUBJECTIVE AND OBJECTIVE BOX
Gastroenterology Consultation:    Patient is a 57y old  Male who presents with a chief complaint of placement (03 Aug 2022 09:02)        Admitted on: 07-23-22      HPI:  56 yo M w h/o HTN, HLD, DM, CAD, CVA (x2 w residual weakness), s/p trach and peg presents from [facility] for placement at new facility. Patient's sister (HCP) apparently did not like care at [facility] - describing it as unsafe d/t no side rails on bed. We are consulted for fecalith material around peg tube. PAtient has chronic severe rectosigmoid fecal impaction with rectum distended to 12.8 cm. This issue has been present since early 2022 and surgery discussed possible colostomy back in feb with family but they refused. Patient CT scan in late july confirmed presence of this distention and fecal impaction. He had manual disimpaction by primary team and since then has been having daily bowel movements. He is on lactulose BID and enemas PRN. Yesterday, nurse noticed stools leaking around the peg tube, so patient was made NPO. Today there is minimal blackish material around peg site. Surgery were consulted again and diversion colostomy is gonna be discussed with family today even though surgery team thinks quality of life will not improve. to note that PEG tube was placed mid june at [facility]. no hematochezia, melena, hematemesis, abd distention, or any other symptoms. PAtient has functional quadriplegia and cannot give history. History taken from chart and sister.          Prior EGD: unknown    Prior Colonoscopy: none      PAST MEDICAL & SURGICAL HISTORY:  CVA (cerebral vascular accident)  1980&#x27;s x2   Residual left sided weakness      CAD (coronary artery disease)      Retinal detachment      Hypertension      High cholesterol      Diabetes      Anxiety      Seizure      History of corneal transplant  right   4/13/17            FAMILY HISTORY:      Social History:  Tobacco:  Alcohol:  Drugs:    Home Medications:  Adult Aspirin Regimen 81 mg oral delayed release tablet: 1 tab(s) orally once a day (21 Mar 2022 15:22)  amLODIPine 10 mg oral tablet: 1 tab(s) orally once a day (23 Jul 2022 03:40)  ascorbic acid 500 mg oral tablet: 1 tab(s) orally once a day (25 Jul 2022 15:53)  busPIRone 5 mg oral tablet: 1 tab(s) orally once a day (23 Jul 2022 03:41)  carBAMazepine 100 mg/5 mL oral suspension: 5 milliliter(s) orally every 12 hours (23 Jul 2022 03:41)  docusate potassium 100 mg oral capsule: 1 cap(s) orally once a day (23 Jul 2022 03:40)  esomeprazole 40 mg oral powder for reconstitution, delayed release: 1 each orally 2 times a day (23 Jul 2022 03:43)  folic acid 0.4 mg oral tablet: 1 tab(s) orally once a day (23 Jul 2022 03:42)  lactulose 10 g/15 mL oral syrup: 15 milliliter(s) orally 3 times a day, As Needed (23 Jul 2022 03:39)  lisinopril 30 mg oral tablet: 1 tab(s) orally once a day (23 Jul 2022 03:38)  Multiple Vitamins oral capsule: 1 cap(s) orally once a day (23 Jul 2022 03:44)  RisperDAL 1 mg oral tablet: 1 tab(s) orally 2 times a day, As Needed (23 Jul 2022 03:38)  simvastatin 10 mg oral tablet: 1 tab(s) orally once a day (at bedtime) (28 Jan 2022 15:48)  sodium hypochlorite 0.25% topical solution: Apply topically to affected area 3 times a day (23 Jul 2022 03:39)  sucralfate 1 g/10 mL oral suspension: 10 milliliter(s) orally 2 times a day (23 Jul 2022 03:43)  zinc sulfate 220 mg oral capsule: 1 cap(s) orally once a day (25 Jul 2022 15:53)        MEDICATIONS  (STANDING):  ascorbic acid 500 milliGRAM(s) Oral daily  busPIRone 5 milliGRAM(s) Oral <User Schedule>  carBAMazepine  Oral  Liquid - Peds 100 milliGRAM(s) Oral every 12 hours  cefepime   IVPB      cefepime   IVPB 2000 milliGRAM(s) IV Intermittent every 8 hours  chlorhexidine 2% Cloths 1 Application(s) Topical <User Schedule>  collagenase Ointment 1 Application(s) Topical two times a day  Dakins Solution - 1/2 Strength 1 Application(s) Topical two times a day  dextrose 5%. 1000 milliLiter(s) (50 mL/Hr) IV Continuous <Continuous>  dextrose 5%. 1000 milliLiter(s) (100 mL/Hr) IV Continuous <Continuous>  dextrose 50% Injectable 12.5 Gram(s) IV Push once  dextrose 50% Injectable 25 Gram(s) IV Push once  dextrose 50% Injectable 25 Gram(s) IV Push once  diatrizoate meglumine/diatrizoate sodium. 30 milliLiter(s) Oral once  folic acid 1 milliGRAM(s) Oral daily  glucagon  Injectable 1 milliGRAM(s) IntraMuscular once  heparin   Injectable 5000 Unit(s) SubCutaneous every 8 hours  insulin lispro (ADMELOG) corrective regimen sliding scale   SubCutaneous every 6 hours  iohexol 300 mG (iodine)/mL Oral Solution 30 milliLiter(s) Oral once  iohexol 300 mG (iodine)/mL Oral Solution 50 milliLiter(s) Oral once  lactulose Syrup 10 Gram(s) Oral two times a day  levETIRAcetam  IVPB 500 milliGRAM(s) IV Intermittent every 12 hours  multivitamin 1 Tablet(s) Oral daily  pantoprazole    Tablet 40 milliGRAM(s) Oral before breakfast  simvastatin 10 milliGRAM(s) Oral at bedtime  sucralfate suspension 1 Gram(s) Oral two times a day  zinc sulfate 220 milliGRAM(s) Oral daily    MEDICATIONS  (PRN):  dextrose Oral Gel 15 Gram(s) Oral once PRN Blood Glucose LESS THAN 70 milliGRAM(s)/deciliter  risperiDONE   Tablet 1 milliGRAM(s) Oral two times a day PRN agitation      Allergies  No Known Allergies      Review of Systems:   Constitutional:  No Fever, No Chills  ENT/Mouth:  No Hearing Changes,  No Difficulty Swallowing  Eyes:  No Eye Pain, No Vision Changes  Cardiovascular:  No Chest Pain, No Palpitations  Respiratory:  No Cough, No Dyspnea  Gastrointestinal:  As described in HPI  Musculoskeletal:  No Joint Swelling, No Back Pain  Skin:  No Skin Lesions, No Jaundice  Neuro:  No Syncope, No Dizziness  Heme/Lymph:  No Bruising, No Bleeding.          Physical Examination:  T(C): 36.9 (08-03-22 @ 05:28), Max: 37.1 (08-02-22 @ 12:59)  HR: 94 (08-03-22 @ 05:28) (94 - 105)  BP: 110/62 (08-03-22 @ 05:28) (109/60 - 113/60)  RR: 18 (08-03-22 @ 05:28) (18 - 20)  SpO2: 100% (08-02-22 @ 21:39) (100% - 100%)      08-01-22 @ 07:01  -  08-02-22 @ 07:00  --------------------------------------------------------  IN: 0 mL / OUT: 800 mL / NET: -800 mL          GENERAL:  trach/PEG. functional quadriplegia. AOx0  HEENT:  NC/AT,  conjunctivae clear and pink, no thyromegaly, nodules, adenopathy, sclera -anicteric  CHEST:  poor insp effort  HEART:  Regular rhythm,   ABDOMEN:  Soft, non-tender, non-distended, normoactive bowel sounds, PEG clean with mild blackish discoloration around it  NEURO:  trach/PEG. functional quadriplegia. AOx0          Data:                        8.6    15.80 )-----------( 418      ( 02 Aug 2022 09:16 )             26.1     Hgb Trend:  8.6  08-02-22 @ 09:16  8.3  08-01-22 @ 08:00        08-02    137  |  104  |  44<H>  ----------------------------<  127<H>  5.0   |  22  |  <0.5<L>    Ca    8.0<L>      02 Aug 2022 09:16    TPro  5.9<L>  /  Alb  2.2<L>  /  TBili  0.3  /  DBili  x   /  AST  18  /  ALT  21  /  AlkPhos  196<H>  08-02    Liver panel trend:  TBili 0.3   /   AST 18   /   ALT 21   /   AlkP 196   /   Tptn 5.9   /   Alb 2.2    /   DBili --      08-02  TBili 0.2   /   AST 13   /   ALT 15   /   AlkP 183   /   Tptn 5.9   /   Alb 2.0    /   DBili --      08-01  TBili 0.3   /   AST 11   /   ALT 14   /   AlkP 213   /   Tptn 6.0   /   Alb 2.3    /   DBili --      07-30  TBili 0.2   /   AST 14   /   ALT 15   /   AlkP 218   /   Tptn 5.8   /   Alb 2.2    /   DBili --      07-29  TBili 0.3   /   AST 12   /   ALT 15   /   AlkP 219   /   Tptn 6.3   /   Alb 2.2    /   DBili --      07-28  TBili 0.2   /   AST 15   /   ALT 16   /   AlkP 222   /   Tptn 5.9   /   Alb 2.4    /   DBili --      07-27  TBili 0.3   /   AST 12   /   ALT 15   /   AlkP 226   /   Tptn 5.9   /   Alb 2.2    /   DBili --      07-26  TBili 0.3   /   AST 15   /   ALT 18   /   AlkP 247   /   Tptn 6.0   /   Alb 2.3    /   DBili --      07-25          Culture - Other (collected 01 Aug 2022 20:35)  Source: Wound Wound  Preliminary Report (02 Aug 2022 23:11):    Numerous Enterococcus faecalis    Rare Pseudomonas aeruginosa    Culture - Sputum (collected 01 Aug 2022 20:35)  Source: Trach Asp Tracheal Aspirate  Gram Stain (02 Aug 2022 06:45):    Numerous polymorphonuclear leukocytes seen per low power field    Rare Squamous epithelial cells seen per low power field    Moderate Gram Negative Rods seen per oil power field  Preliminary Report (02 Aug 2022 22:54):    Moderate Pseudomonas aeruginosa          Radiology:

## 2022-08-03 NOTE — PROGRESS NOTE ADULT - ATTENDING COMMENTS
Patient seen at bedside. Changes made within note as well. Discussed with medical team that includes resident(s), medical student(s), nursing staff, case management.   56 yo M w h/o HTN, HLD, DM, CAD, CVA (x2 w residual weakness), s/p trach and peg presents from [facility] for placement at new facility. Patient's sister (HCP) apparently did not like care at [facility] - describing it as unsafe d/t no side rails on bed.  consulted and patient admitted until adequate facility established.  Patient seen at bedside. GI and surgery following. gastrograffin today. GI to give recommendations afterwards. Keep patient NPO until cleared by GI.   Follow surgery recommendations. follow cultures. sick patient with high risk of clinical deterioration.

## 2022-08-03 NOTE — PROGRESS NOTE ADULT - SUBJECTIVE AND OBJECTIVE BOX
SILVANO CALIXTO  57y, Male  Allergy: No Known Allergies      LOS  11d    CHIEF COMPLAINT: placement (03 Aug 2022 06:27)      INTERVAL EVENTS/HPI  - No acute events overnight  - T(F): , Max: 98.8 (08-02-22 @ 12:59)    - WBC Count: 15.80 (08-02-22 @ 09:16)  WBC Count: 14.70 (08-01-22 @ 08:00)     - Creatinine, Serum: <0.5 (08-02-22 @ 09:16)       ROS  General: Denies rigors, nightsweats  HEENT: Denies headache, rhinorrhea, sore throat, eye pain  CV: Denies CP, palpitations  PULM: Denies wheezing, hemoptysis  GI: Denies hematemesis, hematochezia, melena  : Denies discharge, hematuria  MSK: Denies arthralgias, myalgias  SKIN: Denies rash, lesions  NEURO: Denies paresthesias, weakness  PSYCH: Denies depression, anxiety    VITALS:  T(F): 98.5, Max: 98.8 (08-02-22 @ 12:59)  HR: 94  BP: 110/62  RR: 18Vital Signs Last 24 Hrs  T(C): 36.9 (03 Aug 2022 05:28), Max: 37.1 (02 Aug 2022 12:59)  T(F): 98.5 (03 Aug 2022 05:28), Max: 98.8 (02 Aug 2022 12:59)  HR: 94 (03 Aug 2022 05:28) (94 - 105)  BP: 110/62 (03 Aug 2022 05:28) (109/60 - 113/60)  BP(mean): --  RR: 18 (03 Aug 2022 05:28) (18 - 20)  SpO2: 100% (02 Aug 2022 21:39) (100% - 100%)    Parameters below as of 02 Aug 2022 12:59  Patient On (Oxygen Delivery Method): ventilator        PHYSICAL EXAM:  Gen: NAD, resting in bed  HEENT: Normocephalic, atraumatic  Neck: supple, no lymphadenopathy  CV: Regular rate & regular rhythm  Lungs: decreased BS at bases, no fremitus  Abdomen: Soft, BS present  Ext: Warm, well perfused  Neuro: non focal, awake  Skin: no rash, no erythema  Lines: no phlebitis    FH: Non-contributory  Social Hx: Non-contributory    TESTS & MEASUREMENTS:                        8.6    15.80 )-----------( 418      ( 02 Aug 2022 09:16 )             26.1     08-02    137  |  104  |  44<H>  ----------------------------<  127<H>  5.0   |  22  |  <0.5<L>    Ca    8.0<L>      02 Aug 2022 09:16    TPro  5.9<L>  /  Alb  2.2<L>  /  TBili  0.3  /  DBili  x   /  AST  18  /  ALT  21  /  AlkPhos  196<H>  08-02      LIVER FUNCTIONS - ( 02 Aug 2022 09:16 )  Alb: 2.2 g/dL / Pro: 5.9 g/dL / ALK PHOS: 196 U/L / ALT: 21 U/L / AST: 18 U/L / GGT: x               Culture - Other (collected 08-01-22 @ 20:35)  Source: Wound Wound  Preliminary Report (08-02-22 @ 23:11):    Numerous Enterococcus faecalis    Rare Pseudomonas aeruginosa    Culture - Sputum (collected 08-01-22 @ 20:35)  Source: Trach Asp Tracheal Aspirate  Gram Stain (08-02-22 @ 06:45):    Numerous polymorphonuclear leukocytes seen per low power field    Rare Squamous epithelial cells seen per low power field    Moderate Gram Negative Rods seen per oil power field  Preliminary Report (08-02-22 @ 22:54):    Moderate Pseudomonas aeruginosa            INFECTIOUS DISEASES TESTING  Procalcitonin, Serum: 0.06 (07-30-22 @ 11:49)  COVID-19 PCR: NotDetec (07-29-22 @ 07:23)  COVID-19 PCR: NotDetec (07-23-22 @ 02:09)  MRSA PCR Result.: Negative (03-15-22 @ 23:29)  Procalcitonin, Serum: 0.06 (03-15-22 @ 01:00)  COVID-19 PCR: NotDetec (03-14-22 @ 18:03)  COVID-19 PCR: NotDetec (02-03-22 @ 14:40)  Procalcitonin, Serum: 0.16 (01-30-22 @ 08:14)  COVID-19 PCR: NotDetec (01-28-22 @ 11:35)  COVID-19 PCR: NotDetec (08-30-21 @ 11:10)      INFLAMMATORY MARKERS  C-Reactive Protein, Serum: 167.8 mg/L (08-02-22 @ 09:16)  C-Reactive Protein, Serum: 117.5 mg/L (07-30-22 @ 11:49)  C-Reactive Protein, Serum: 98 mg/L (03-15-22 @ 01:00)      RADIOLOGY & ADDITIONAL TESTS:  I have personally reviewed the last available Chest xray  CXR      CT      CARDIOLOGY TESTING      MEDICATIONS  ascorbic acid 500 Oral daily  busPIRone 5 Oral <User Schedule>  carBAMazepine  Oral  Liquid - Peds 100 Oral every 12 hours  cefepime   IVPB     cefepime   IVPB 2000 IV Intermittent every 8 hours  chlorhexidine 2% Cloths 1 Topical <User Schedule>  collagenase Ointment 1 Topical two times a day  Dakins Solution - 1/2 Strength 1 Topical two times a day  dextrose 5%. 1000 IV Continuous <Continuous>  dextrose 5%. 1000 IV Continuous <Continuous>  dextrose 50% Injectable 25 IV Push once  dextrose 50% Injectable 12.5 IV Push once  dextrose 50% Injectable 25 IV Push once  diatrizoate meglumine/diatrizoate sodium. 30 Oral once  folic acid 1 Oral daily  glucagon  Injectable 1 IntraMuscular once  heparin   Injectable 5000 SubCutaneous every 8 hours  insulin lispro (ADMELOG) corrective regimen sliding scale  SubCutaneous every 6 hours  iohexol 300 mG (iodine)/mL Oral Solution 50 Oral once  iohexol 300 mG (iodine)/mL Oral Solution 30 Oral once  lactulose Syrup 10 Oral two times a day  levETIRAcetam  IVPB 500 IV Intermittent every 12 hours  multivitamin 1 Oral daily  pantoprazole    Tablet 40 Oral before breakfast  simvastatin 10 Oral at bedtime  sucralfate suspension 1 Oral two times a day  zinc sulfate 220 Oral daily      WEIGHT  Weight (kg): 53.7 (03-16-22 @ 19:50)  Creatinine, Serum: <0.5 mg/dL (08-02-22 @ 09:16)      ANTIBIOTICS:  cefepime   IVPB      cefepime   IVPB 2000 milliGRAM(s) IV Intermittent every 8 hours      All available historical records have been reviewed       SILVANO CALIXTO  57y, Male  Allergy: No Known Allergies      LOS  11d    CHIEF COMPLAINT: placement (03 Aug 2022 06:27)      INTERVAL EVENTS/HPI  - No acute events overnight  - T(F): , Max: 98.8 (08-02-22 @ 12:59)  - WBC Count: 15.80 (08-02-22 @ 09:16)  WBC Count: 14.70 (08-01-22 @ 08:00)     - Creatinine, Serum: <0.5 (08-02-22 @ 09:16)       ROS  unable to obtain history secondary to patient's mental status and/or sedation      VITALS:  T(F): 98.5, Max: 98.8 (08-02-22 @ 12:59)  HR: 94  BP: 110/62  RR: 18Vital Signs Last 24 Hrs  T(C): 36.9 (03 Aug 2022 05:28), Max: 37.1 (02 Aug 2022 12:59)  T(F): 98.5 (03 Aug 2022 05:28), Max: 98.8 (02 Aug 2022 12:59)  HR: 94 (03 Aug 2022 05:28) (94 - 105)  BP: 110/62 (03 Aug 2022 05:28) (109/60 - 113/60)  BP(mean): --  RR: 18 (03 Aug 2022 05:28) (18 - 20)  SpO2: 100% (02 Aug 2022 21:39) (100% - 100%)    Parameters below as of 02 Aug 2022 12:59  Patient On (Oxygen Delivery Method): ventilator        PHYSICAL EXAM:  Gen: trach/peg  HEENT: Normocephalic, atraumatic  Neck: supple, no lymphadenopathy  CV: Regular rate & regular rhythm  Lungs: decreased BS at bases, no fremitus  Abdomen: Soft, BS present  Ext: Warm, well perfused  Neuro: non focal, awake  Skin: no rash, no erythema  Lines: no phlebitis    FH: Non-contributory  Social Hx: Non-contributory    TESTS & MEASUREMENTS:                        8.6    15.80 )-----------( 418      ( 02 Aug 2022 09:16 )             26.1     08-02    137  |  104  |  44<H>  ----------------------------<  127<H>  5.0   |  22  |  <0.5<L>    Ca    8.0<L>      02 Aug 2022 09:16    TPro  5.9<L>  /  Alb  2.2<L>  /  TBili  0.3  /  DBili  x   /  AST  18  /  ALT  21  /  AlkPhos  196<H>  08-02      LIVER FUNCTIONS - ( 02 Aug 2022 09:16 )  Alb: 2.2 g/dL / Pro: 5.9 g/dL / ALK PHOS: 196 U/L / ALT: 21 U/L / AST: 18 U/L / GGT: x               Culture - Other (collected 08-01-22 @ 20:35)  Source: Wound Wound  Preliminary Report (08-02-22 @ 23:11):    Numerous Enterococcus faecalis    Rare Pseudomonas aeruginosa    Culture - Sputum (collected 08-01-22 @ 20:35)  Source: Trach Asp Tracheal Aspirate  Gram Stain (08-02-22 @ 06:45):    Numerous polymorphonuclear leukocytes seen per low power field    Rare Squamous epithelial cells seen per low power field    Moderate Gram Negative Rods seen per oil power field  Preliminary Report (08-02-22 @ 22:54):    Moderate Pseudomonas aeruginosa            INFECTIOUS DISEASES TESTING  Procalcitonin, Serum: 0.06 (07-30-22 @ 11:49)  COVID-19 PCR: NotDetec (07-29-22 @ 07:23)  COVID-19 PCR: NotDetec (07-23-22 @ 02:09)  MRSA PCR Result.: Negative (03-15-22 @ 23:29)  Procalcitonin, Serum: 0.06 (03-15-22 @ 01:00)  COVID-19 PCR: NotDetec (03-14-22 @ 18:03)  COVID-19 PCR: NotDetec (02-03-22 @ 14:40)  Procalcitonin, Serum: 0.16 (01-30-22 @ 08:14)  COVID-19 PCR: NotDetec (01-28-22 @ 11:35)  COVID-19 PCR: NotDetec (08-30-21 @ 11:10)      INFLAMMATORY MARKERS  C-Reactive Protein, Serum: 167.8 mg/L (08-02-22 @ 09:16)  C-Reactive Protein, Serum: 117.5 mg/L (07-30-22 @ 11:49)  C-Reactive Protein, Serum: 98 mg/L (03-15-22 @ 01:00)      RADIOLOGY & ADDITIONAL TESTS:  I have personally reviewed the last available Chest xray  CXR      CT      CARDIOLOGY TESTING      MEDICATIONS  ascorbic acid 500 Oral daily  busPIRone 5 Oral <User Schedule>  carBAMazepine  Oral  Liquid - Peds 100 Oral every 12 hours  cefepime   IVPB     cefepime   IVPB 2000 IV Intermittent every 8 hours  chlorhexidine 2% Cloths 1 Topical <User Schedule>  collagenase Ointment 1 Topical two times a day  Dakins Solution - 1/2 Strength 1 Topical two times a day  dextrose 5%. 1000 IV Continuous <Continuous>  dextrose 5%. 1000 IV Continuous <Continuous>  dextrose 50% Injectable 25 IV Push once  dextrose 50% Injectable 12.5 IV Push once  dextrose 50% Injectable 25 IV Push once  diatrizoate meglumine/diatrizoate sodium. 30 Oral once  folic acid 1 Oral daily  glucagon  Injectable 1 IntraMuscular once  heparin   Injectable 5000 SubCutaneous every 8 hours  insulin lispro (ADMELOG) corrective regimen sliding scale  SubCutaneous every 6 hours  iohexol 300 mG (iodine)/mL Oral Solution 50 Oral once  iohexol 300 mG (iodine)/mL Oral Solution 30 Oral once  lactulose Syrup 10 Oral two times a day  levETIRAcetam  IVPB 500 IV Intermittent every 12 hours  multivitamin 1 Oral daily  pantoprazole    Tablet 40 Oral before breakfast  simvastatin 10 Oral at bedtime  sucralfate suspension 1 Oral two times a day  zinc sulfate 220 Oral daily      WEIGHT  Weight (kg): 53.7 (03-16-22 @ 19:50)  Creatinine, Serum: <0.5 mg/dL (08-02-22 @ 09:16)      ANTIBIOTICS:  cefepime   IVPB      cefepime   IVPB 2000 milliGRAM(s) IV Intermittent every 8 hours      All available historical records have been reviewed

## 2022-08-03 NOTE — PROGRESS NOTE ADULT - SUBJECTIVE AND OBJECTIVE BOX
SILVANO CALIXTO 57y Male  MRN#: [id number]   Hospital Day: 11d    SUBJECTIVE  Patient is a 57y old Male who presents with a chief complaint of placement (03 Aug 2022 09:52)  Currently admitted to medicine with the primary diagnosis of General medical exam      INTERVAL HPI AND OVERNIGHT EVENTS:  Patient was examined and seen at bedside. This morning he is resting comfortably in bed and reports no issues or overnight events.    OBJECTIVE  PAST MEDICAL & SURGICAL HISTORY  CVA (cerebral vascular accident)  1980&#x27;s x2   Residual left sided weakness    CAD (coronary artery disease)    Retinal detachment    Hypertension    High cholesterol    Diabetes    Anxiety    Seizure    History of corneal transplant  right   4/13/17      ALLERGIES:  No Known Allergies    MEDICATIONS:  STANDING MEDICATIONS  ascorbic acid 500 milliGRAM(s) Oral daily  busPIRone 5 milliGRAM(s) Oral <User Schedule>  carBAMazepine  Oral  Liquid - Peds 100 milliGRAM(s) Oral every 12 hours  cefepime   IVPB      cefepime   IVPB 2000 milliGRAM(s) IV Intermittent every 8 hours  chlorhexidine 2% Cloths 1 Application(s) Topical <User Schedule>  collagenase Ointment 1 Application(s) Topical two times a day  Dakins Solution - 1/2 Strength 1 Application(s) Topical two times a day  dextrose 5%. 1000 milliLiter(s) IV Continuous <Continuous>  dextrose 5%. 1000 milliLiter(s) IV Continuous <Continuous>  dextrose 50% Injectable 12.5 Gram(s) IV Push once  dextrose 50% Injectable 25 Gram(s) IV Push once  dextrose 50% Injectable 25 Gram(s) IV Push once  diatrizoate meglumine/diatrizoate sodium. 30 milliLiter(s) Oral once  folic acid 1 milliGRAM(s) Oral daily  glucagon  Injectable 1 milliGRAM(s) IntraMuscular once  heparin   Injectable 5000 Unit(s) SubCutaneous every 8 hours  insulin lispro (ADMELOG) corrective regimen sliding scale   SubCutaneous every 6 hours  lactulose Syrup 10 Gram(s) Oral two times a day  levETIRAcetam  IVPB 500 milliGRAM(s) IV Intermittent every 12 hours  multivitamin 1 Tablet(s) Oral daily  pantoprazole    Tablet 40 milliGRAM(s) Oral before breakfast  simvastatin 10 milliGRAM(s) Oral at bedtime  sodium zirconium cyclosilicate 10 Gram(s) Oral once  sucralfate suspension 1 Gram(s) Oral two times a day  zinc sulfate 220 milliGRAM(s) Oral daily    PRN MEDICATIONS  dextrose Oral Gel 15 Gram(s) Oral once PRN  risperiDONE   Tablet 1 milliGRAM(s) Oral two times a day PRN      VITAL SIGNS: Last 24 Hours  T(C): 36.9 (03 Aug 2022 05:28), Max: 37.1 (02 Aug 2022 12:59)  T(F): 98.5 (03 Aug 2022 05:28), Max: 98.8 (02 Aug 2022 12:59)  HR: 94 (03 Aug 2022 05:28) (94 - 105)  BP: 110/62 (03 Aug 2022 05:28) (109/60 - 113/60)  BP(mean): --  RR: 18 (03 Aug 2022 05:28) (18 - 20)  SpO2: 100% (02 Aug 2022 21:39) (100% - 100%)    LABS:                        8.4    15.92 )-----------( 421      ( 03 Aug 2022 09:16 )             25.5     08-03    134<L>  |  104  |  44<H>  ----------------------------<  91  5.5<H>   |  19  |  <0.5<L>    Ca    7.9<L>      03 Aug 2022 09:16    TPro  5.7<L>  /  Alb  2.1<L>  /  TBili  0.6  /  DBili  x   /  AST  15  /  ALT  15  /  AlkPhos  160<H>  08-03              Culture - Other (collected 01 Aug 2022 20:35)  Source: Wound Wound  Preliminary Report (02 Aug 2022 23:11):    Numerous Enterococcus faecalis    Rare Pseudomonas aeruginosa    Culture - Sputum (collected 01 Aug 2022 20:35)  Source: Trach Asp Tracheal Aspirate  Gram Stain (02 Aug 2022 06:45):    Numerous polymorphonuclear leukocytes seen per low power field    Rare Squamous epithelial cells seen per low power field    Moderate Gram Negative Rods seen per oil power field  Preliminary Report (02 Aug 2022 22:54):    Moderate Pseudomonas aeruginosa          RADIOLOGY:      PHYSICAL EXAM:  CONSTITUTIONAL: No acute distress, on trach and peg, does not communicate so exam limited  PULMONARY: decreased air entry bilaterally, scattered rhonchi  CARDIOVASCULAR: Regular rate and rhythm; no murmurs, rubs, or gallops  GASTROINTESTINAL: soft, mildly-distended; bowel sounds present, patient does not grimace upon palpation (abdominal exam stable)  MUSCULOSKELETAL: 2+ peripheral pulses; no clubbing, no cyanosis, no edema  SKIN: dry and scaly skin diffusely (improving on wound care), sacral bed ulcers SILVANO CALIXTO 57y Male  MRN#: [id number]   Hospital Day: 11d    SUBJECTIVE  Patient is a 57y old Male who presents with a chief complaint of placement (03 Aug 2022 09:52)  Currently admitted to medicine with the primary diagnosis of General medical exam      INTERVAL HPI AND OVERNIGHT EVENTS:  Patient was examined and seen at bedside. This morning he is resting comfortably in bed and reports no issues or overnight events.    OBJECTIVE  PAST MEDICAL & SURGICAL HISTORY  CVA (cerebral vascular accident)  1980&#x27;s x2   Residual left sided weakness    CAD (coronary artery disease)    Retinal detachment    Hypertension    High cholesterol    Diabetes    Anxiety    Seizure    History of corneal transplant  right   4/13/17      ALLERGIES:  No Known Allergies    MEDICATIONS:  STANDING MEDICATIONS  ascorbic acid 500 milliGRAM(s) Oral daily  busPIRone 5 milliGRAM(s) Oral <User Schedule>  carBAMazepine  Oral  Liquid - Peds 100 milliGRAM(s) Oral every 12 hours  cefepime   IVPB      cefepime   IVPB 2000 milliGRAM(s) IV Intermittent every 8 hours  chlorhexidine 2% Cloths 1 Application(s) Topical <User Schedule>  collagenase Ointment 1 Application(s) Topical two times a day  Dakins Solution - 1/2 Strength 1 Application(s) Topical two times a day  dextrose 5%. 1000 milliLiter(s) IV Continuous <Continuous>  dextrose 5%. 1000 milliLiter(s) IV Continuous <Continuous>  dextrose 50% Injectable 12.5 Gram(s) IV Push once  dextrose 50% Injectable 25 Gram(s) IV Push once  dextrose 50% Injectable 25 Gram(s) IV Push once  diatrizoate meglumine/diatrizoate sodium. 30 milliLiter(s) Oral once  folic acid 1 milliGRAM(s) Oral daily  glucagon  Injectable 1 milliGRAM(s) IntraMuscular once  heparin   Injectable 5000 Unit(s) SubCutaneous every 8 hours  insulin lispro (ADMELOG) corrective regimen sliding scale   SubCutaneous every 6 hours  lactulose Syrup 10 Gram(s) Oral two times a day  levETIRAcetam  IVPB 500 milliGRAM(s) IV Intermittent every 12 hours  multivitamin 1 Tablet(s) Oral daily  pantoprazole    Tablet 40 milliGRAM(s) Oral before breakfast  simvastatin 10 milliGRAM(s) Oral at bedtime  sodium zirconium cyclosilicate 10 Gram(s) Oral once  sucralfate suspension 1 Gram(s) Oral two times a day  zinc sulfate 220 milliGRAM(s) Oral daily    PRN MEDICATIONS  dextrose Oral Gel 15 Gram(s) Oral once PRN  risperiDONE   Tablet 1 milliGRAM(s) Oral two times a day PRN      VITAL SIGNS: Last 24 Hours  T(C): 36.9 (03 Aug 2022 05:28), Max: 37.1 (02 Aug 2022 12:59)  T(F): 98.5 (03 Aug 2022 05:28), Max: 98.8 (02 Aug 2022 12:59)  HR: 94 (03 Aug 2022 05:28) (94 - 105)  BP: 110/62 (03 Aug 2022 05:28) (109/60 - 113/60)  BP(mean): --  RR: 18 (03 Aug 2022 05:28) (18 - 20)  SpO2: 100% (02 Aug 2022 21:39) (100% - 100%)    LABS:                        8.4    15.92 )-----------( 421      ( 03 Aug 2022 09:16 )             25.5     08-03    134<L>  |  104  |  44<H>  ----------------------------<  91  5.5<H>   |  19  |  <0.5<L>    Ca    7.9<L>      03 Aug 2022 09:16    TPro  5.7<L>  /  Alb  2.1<L>  /  TBili  0.6  /  DBili  x   /  AST  15  /  ALT  15  /  AlkPhos  160<H>  08-03              Culture - Other (collected 01 Aug 2022 20:35)  Source: Wound Wound  Preliminary Report (02 Aug 2022 23:11):    Numerous Enterococcus faecalis    Rare Pseudomonas aeruginosa    Culture - Sputum (collected 01 Aug 2022 20:35)  Source: Trach Asp Tracheal Aspirate  Gram Stain (02 Aug 2022 06:45):    Numerous polymorphonuclear leukocytes seen per low power field    Rare Squamous epithelial cells seen per low power field    Moderate Gram Negative Rods seen per oil power field  Preliminary Report (02 Aug 2022 22:54):    Moderate Pseudomonas aeruginosa          RADIOLOGY:      PHYSICAL EXAM:  CONSTITUTIONAL: No acute distress, on trach and peg, does not communicate so exam limited  PULMONARY: decreased air entry bilaterally, scattered rhonchi  CARDIOVASCULAR: Regular rate and rhythm; no murmurs, rubs, or gallops  GASTROINTESTINAL: soft, mildly-distended; bowel sounds present, patient does not grimace upon palpation (abdominal exam stable), no fecalith output from peg seen today  MUSCULOSKELETAL: 2+ peripheral pulses; no clubbing, no cyanosis, no edema  SKIN: dry and scaly skin diffusely (improving on wound care), sacral bed ulcers

## 2022-08-03 NOTE — PROGRESS NOTE ADULT - ASSESSMENT
HPI:  56 yo M w h/o HTN, HLD, DM, CAD, CVA (x2 w residual weakness), s/p trach and peg presents from [facility] for placement at new facility. Patient's sister (HCP) apparently did not like care at [facility] - describing it as unsafe d/t no side rails on bed. Pt vent dependent      PROBLEMS  #Suspected sacral osteomyelitis - likely chronic   - CT with lower back/buttock ulceration with gas. Erosive changes of the underlying sacrum and coccyx suggestive of osteomyelitis.   - bone exposed - no erythema/odor -- full thickness wound   - wound Cx Pseudomonas and Enterococcus faecalis     #VAP  - Sputum Cx 8/1 Pseudomonas     PLAN  This is a preliminary incomplete pended note, all final recommendations to follow after interview and examination of the patient.    Please call or message on [employer] Teams if with any questions.  Spectra [phone]     HPI:  56 yo M w h/o HTN, HLD, DM, CAD, CVA (x2 w residual weakness), s/p trach and peg presents from [facility] for placement at new facility. Patient's sister (HCP) apparently did not like care at [facility] - describing it as unsafe d/t no side rails on bed. Pt vent dependent      PROBLEMS  #Suspected sacral osteomyelitis - likely chronic   - CT with lower back/buttock ulceration with gas. Erosive changes of the underlying sacrum and coccyx suggestive of osteomyelitis.   - bone exposed - no erythema/odor -- full thickness wound   - wound Cx Pseudomonas and Enterococcus faecalis     #VAP  - Sputum Cx 8/1 Pseudomonas     #Colonic Impactio    PLAN  - continue cefepime 2g q 8 hours   - follow-up E faecalis and Pseudomonas susceptibilities   - follow-up GI studies     Please call or message on [employer] Teams if with any questions.  Spectra [phone]

## 2022-08-03 NOTE — CONSULT NOTE ADULT - ATTENDING COMMENTS
Hyponatremia, euvolemic on exam  Very high urine osm and low urine Na c/f hypovolemia, however Na level remains low despite iv hydration, and pt has adequate intake by Gtube, w/o hypotension  high urine osm c/f inappropriately high ADH level d/t meds (low urine Na likely temporary while intake was low)  - repeat urine osm, urine Na  - d/c iv fluids  - limit free water with feeds  - monitor Na level bid  - recent TSH level wnl  - if hyponatremia does not improve can add low dose long acting loop diuretic i.e. torsemide (and lower dose of anti-hypertensives)
Patient is a 57M with CVA hx, non-verbal baseline, DM, HTN, s/p trach/PEG admitted for sacral decubitus ulcer and sacral osteomyelitis. Patient with known chronically dilated rectosigmoid colon.  Having daily BM    Plan:   -Patient with chronically dilated colon and daily BM.  Low risk for perforation.  Patient is very poor surgical candidate and high risk for surgery.   - Recommend continue with current bowel regimen   - f/u Burn for sacral decubitus ulcers
large stool impaction not amenable to medical mgmt. The impaction is so severe causing stool back up to the upper GI tract. Consult surgery for recommendations.

## 2022-08-03 NOTE — PROGRESS NOTE ADULT - ASSESSMENT
IMPRESSION:    Chronic resp failure sp PEG/ trach  HO CVA  HO seizure  hyponatremia/ hypovolemic improved.    functional quadriplegia  Stool impaction / Constipation   LLL opacity, possible aspiration.  Pseudomonas in DTA      PLAN:    CNS: Avoid CNS depressant    HEENT:  Oral care    PULMONARY:  HOB @ 45 degrees, aspiration precaution, keep Sao2 92 to 96%.  Pulmonary toilet.  PS wean     CARDIOVASCULAR: Avoid overload,    GI: GI prophylaxis                          Bowel regimen fleet enema.  SUrgical eval appreciated     RENAL:  F/u  lytes.  Correct as needed.    INFECTIOUS DISEASE: FU nosal MRSA.  Continue Cefepime for now.  FU sensitivities      HEMATOLOGICAL:  DVT prophylaxis.    ENDOCRINE:  Follow up FS.  Insulin protocol if needed.    MUSCULOSKELETAL:  Wound care.      Vent unit     DNR     IMPRESSION:    Chronic resp failure sp PEG/ trach  HO CVA  HO seizure  Functional quadriplegia  Stool impaction / Constipation   LLL opacity, possible aspiration.  Pseudomonas in DTA      PLAN:    CNS: Avoid CNS depressant    HEENT:  Oral care    PULMONARY:  HOB @ 45 degrees, aspiration precaution, keep Sao2 92 to 96%.  Pulmonary toilet.  PS wean     CARDIOVASCULAR: Avoid overload,    GI: GI prophylaxis                          Bowel regimen fleet enema.  SUrgical eval appreciated     RENAL:  F/u  lytes.  Correct as needed.    INFECTIOUS DISEASE: FU nosal MRSA.  Continue Cefepime for now.  FU sensitivities      HEMATOLOGICAL:  DVT prophylaxis.    ENDOCRINE:  Follow up FS.  Insulin protocol if needed.    MUSCULOSKELETAL:  Wound care.      Vent unit     DNR

## 2022-08-03 NOTE — PROGRESS NOTE ADULT - SUBJECTIVE AND OBJECTIVE BOX
Patient is a 57y old  Male who presents with a chief complaint of placement (02 Aug 2022 14:17)        Over Night Events:  On MV.  Off pressors.          ROS:     All ROS are negative except HPI         PHYSICAL EXAM    ICU Vital Signs Last 24 Hrs  T(C): 36.9 (03 Aug 2022 05:28), Max: 37.1 (02 Aug 2022 12:59)  T(F): 98.5 (03 Aug 2022 05:28), Max: 98.8 (02 Aug 2022 12:59)  HR: 94 (03 Aug 2022 05:28) (94 - 105)  BP: 110/62 (03 Aug 2022 05:28) (109/60 - 113/60)  BP(mean): --  ABP: --  ABP(mean): --  RR: 18 (03 Aug 2022 05:28) (18 - 20)  SpO2: 100% (02 Aug 2022 21:39) (100% - 100%)    O2 Parameters below as of 02 Aug 2022 12:59  Patient On (Oxygen Delivery Method): ventilator            CONSTITUTIONAL:  Ill appearing in NAD    ENT:   Airway patent,   Mouth with normal mucosa.   Trach     EYES:   Pupils equal,   Round and reactive to light.    CARDIAC:   Normal rate,   Regular rhythm.        RESPIRATORY:   No wheezing  Bilateral BS  Normal chest expansion  Not tachypneic,  No use of accessory muscles    GASTROINTESTINAL:  Abdomen soft,   Non-tender,   No guarding,   + BS    MUSCULOSKELETAL:   Contracted   No clubbing, cyanosis    NEUROLOGICAL:   Does not follow commands     SKIN:   Skin normal color for race,   No evidence of rash.    PSYCHIATRIC:   No apparent risk to self or others.          08-01-22 @ 07:01  -  08-02-22 @ 07:00  --------------------------------------------------------  IN:  Total IN: 0 mL    OUT:    Incontinent per Condom Catheter (mL): 800 mL  Total OUT: 800 mL    Total NET: -800 mL          LABS:                            8.6    15.80 )-----------( 418      ( 02 Aug 2022 09:16 )             26.1                                               08-02    137  |  104  |  44<H>  ----------------------------<  127<H>  5.0   |  22  |  <0.5<L>    Ca    8.0<L>      02 Aug 2022 09:16    TPro  5.9<L>  /  Alb  2.2<L>  /  TBili  0.3  /  DBili  x   /  AST  18  /  ALT  21  /  AlkPhos  196<H>  08-02                                                                                           LIVER FUNCTIONS - ( 02 Aug 2022 09:16 )  Alb: 2.2 g/dL / Pro: 5.9 g/dL / ALK PHOS: 196 U/L / ALT: 21 U/L / AST: 18 U/L / GGT: x                                                  Culture - Other (collected 01 Aug 2022 20:35)  Source: Wound Wound  Preliminary Report (02 Aug 2022 23:11):    Numerous Enterococcus faecalis    Rare Pseudomonas aeruginosa    Culture - Sputum (collected 01 Aug 2022 20:35)  Source: Trach Asp Tracheal Aspirate  Gram Stain (02 Aug 2022 06:45):    Numerous polymorphonuclear leukocytes seen per low power field    Rare Squamous epithelial cells seen per low power field    Moderate Gram Negative Rods seen per oil power field  Preliminary Report (02 Aug 2022 22:54):    Moderate Pseudomonas aeruginosa                                                   Mode: AC/ CMV (Assist Control/ Continuous Mandatory Ventilation)  RR (machine): 14  TV (machine): 400  FiO2: 40  PEEP: 5  ITime: 1  MAP: 12  PIP: 20                                          MEDICATIONS  (STANDING):  ascorbic acid 500 milliGRAM(s) Oral daily  busPIRone 5 milliGRAM(s) Oral <User Schedule>  carBAMazepine  Oral  Liquid - Peds 100 milliGRAM(s) Oral every 12 hours  cefepime   IVPB 2000 milliGRAM(s) IV Intermittent every 8 hours  cefepime   IVPB      chlorhexidine 2% Cloths 1 Application(s) Topical <User Schedule>  collagenase Ointment 1 Application(s) Topical two times a day  Dakins Solution - 1/2 Strength 1 Application(s) Topical two times a day  dextrose 5%. 1000 milliLiter(s) (100 mL/Hr) IV Continuous <Continuous>  dextrose 5%. 1000 milliLiter(s) (50 mL/Hr) IV Continuous <Continuous>  dextrose 50% Injectable 25 Gram(s) IV Push once  dextrose 50% Injectable 12.5 Gram(s) IV Push once  dextrose 50% Injectable 25 Gram(s) IV Push once  diatrizoate meglumine/diatrizoate sodium. 30 milliLiter(s) Oral once  folic acid 1 milliGRAM(s) Oral daily  glucagon  Injectable 1 milliGRAM(s) IntraMuscular once  heparin   Injectable 5000 Unit(s) SubCutaneous every 8 hours  insulin lispro (ADMELOG) corrective regimen sliding scale   SubCutaneous every 6 hours  iohexol 300 mG (iodine)/mL Oral Solution 50 milliLiter(s) Oral once  iohexol 300 mG (iodine)/mL Oral Solution 30 milliLiter(s) Oral once  lactulose Syrup 10 Gram(s) Oral two times a day  levETIRAcetam  IVPB 500 milliGRAM(s) IV Intermittent every 12 hours  multivitamin 1 Tablet(s) Oral daily  pantoprazole    Tablet 40 milliGRAM(s) Oral before breakfast  simvastatin 10 milliGRAM(s) Oral at bedtime  sucralfate suspension 1 Gram(s) Oral two times a day  zinc sulfate 220 milliGRAM(s) Oral daily    MEDICATIONS  (PRN):  dextrose Oral Gel 15 Gram(s) Oral once PRN Blood Glucose LESS THAN 70 milliGRAM(s)/deciliter  risperiDONE   Tablet 1 milliGRAM(s) Oral two times a day PRN agitation      New X-rays reviewed:                                                                                  ECHO

## 2022-08-04 NOTE — PROGRESS NOTE ADULT - ATTENDING COMMENTS
Patient seen at bedside. Changes made within note as well. Discussed with medical team that includes resident(s), medical student(s), nursing staff, case management.   58 yo M w h/o HTN, HLD, DM, CAD, CVA (x2 w residual weakness), s/p trach and peg presents from [facility] for placement at new facility. Patient's sister (HCP) apparently did not like care at [facility] - describing it as unsafe d/t no side rails on bed.  consulted and patient admitted until adequate facility established.  Patient seen at bedside. GI and surgery following. overnight patient had a vomiting episode and fecal like material coming from peg tube. Patient was made NPO. surgery consulted. follow recommendations.    follow cultures. sick patient with high risk of clinical deterioration. currently on cefepime . follow ID Patient seen at bedside. Changes made within note as well. Discussed with medical team that includes resident(s), medical student(s), nursing staff, case management.   56 yo M w h/o HTN, HLD, DM, CAD, CVA (x2 w residual weakness), s/p trach and peg presents from [facility] for placement at new facility. Patient's sister (HCP) apparently did not like care at [facility] - describing it as unsafe d/t no side rails on bed.  consulted and patient admitted until adequate facility established.  Patient seen at bedside. GI and surgery following. overnight patient had a vomiting episode and fecal like material coming from peg tube. Patient was made NPO. Surgery consulted. follow recommendations.   follow cultures. sick patient with high risk of clinical deterioration. currently on cefepime. follow ID recommendations. follow sensitivities.

## 2022-08-04 NOTE — PROGRESS NOTE ADULT - SUBJECTIVE AND OBJECTIVE BOX
GENERAL SURGERY PROGRESS NOTE    Patient: SILVANO CALIXTO , 57y ([birth year])Male   MRN: [id number]  Location: [place] [address] A  Visit: 07-23-22 Inpatient  Date: 08-04-22 @ 18:01    Patient seen and evaluated at bedside. G tube patent with fecal output in tubing. Continues to have bowel movements. Patient is alert, but non-verbal.     PAST MEDICAL & SURGICAL HISTORY:  CVA (cerebral vascular accident)  1980&#x27;s x2   Residual left sided weakness      CAD (coronary artery disease)      Retinal detachment      Hypertension      High cholesterol      Diabetes      Anxiety      Seizure      History of corneal transplant  right   4/13/17          Vitals:   T(F): 97.2 (08-04-22 @ 12:10), Max: 98.9 (08-04-22 @ 04:52)  HR: 108 (08-04-22 @ 16:47)  BP: 112/63 (08-04-22 @ 12:10)  RR: 18 (08-04-22 @ 12:10)  SpO2: 100% (08-04-22 @ 16:47)  Mode: CPAP with PS, FiO2: 40, PEEP: 5, PS: 5, ITime: 1, MAP: 11, PIP: 21    Diet, NPO      Fluids:     I & O's:    08-03-22 @ 07:01  -  08-04-22 @ 07:00  --------------------------------------------------------  IN:    Enteral Tube Flush: 100 mL    Jevity 1.2: 360 mL  Total IN: 460 mL    OUT:    Incontinent per Condom Catheter (mL): 600 mL  Total OUT: 600 mL    Total NET: -140 mL    Bowel Movement: : [x] YES [] NO  Flatus: : Unable to assess    PHYSICAL EXAM:  General: NAD, malnourished, alert but non-verbal  HEENT: NCAT, trach present   Cardiac: No peripheral cyanosis or pallor, extremities well perfused   Respiratory: Non-labored breathing, equal chest rise bilaterally   Abdomen: Soft, non-distended, non-tender, G-tube to wall suction in place with fecal material present within tube  Musculoskeletal: Strength 5/5 BL UE/LE, ROM intact, compartments soft  Neuro: Sensation grossly intact and equal throughout, no focal deficits  Skin: Warm/dry, normal color, no jaundice    MEDICATIONS  (STANDING):  ascorbic acid 500 milliGRAM(s) Oral daily  busPIRone 5 milliGRAM(s) Oral <User Schedule>  cefepime   IVPB      cefepime   IVPB 2000 milliGRAM(s) IV Intermittent every 8 hours  chlorhexidine 2% Cloths 1 Application(s) Topical <User Schedule>  collagenase Ointment 1 Application(s) Topical two times a day  Dakins Solution - 1/2 Strength 1 Application(s) Topical two times a day  dextrose 5%. 1000 milliLiter(s) (50 mL/Hr) IV Continuous <Continuous>  dextrose 5%. 1000 milliLiter(s) (100 mL/Hr) IV Continuous <Continuous>  dextrose 50% Injectable 25 Gram(s) IV Push once  dextrose 50% Injectable 12.5 Gram(s) IV Push once  dextrose 50% Injectable 25 Gram(s) IV Push once  folic acid 1 milliGRAM(s) Oral daily  glucagon  Injectable 1 milliGRAM(s) IntraMuscular once  heparin   Injectable 5000 Unit(s) SubCutaneous every 8 hours  insulin lispro (ADMELOG) corrective regimen sliding scale   SubCutaneous every 6 hours  lactulose Syrup 10 Gram(s) Oral two times a day  levETIRAcetam  IVPB 500 milliGRAM(s) IV Intermittent every 12 hours  multivitamin 1 Tablet(s) Oral daily  pantoprazole    Tablet 40 milliGRAM(s) Oral before breakfast  simvastatin 10 milliGRAM(s) Oral at bedtime  sucralfate suspension 1 Gram(s) Oral two times a day  zinc sulfate 220 milliGRAM(s) Oral daily    MEDICATIONS  (PRN):  dextrose Oral Gel 15 Gram(s) Oral once PRN Blood Glucose LESS THAN 70 milliGRAM(s)/deciliter  ondansetron Injectable 4 milliGRAM(s) IV Push every 6 hours PRN Nausea and/or Vomiting  risperiDONE   Tablet 1 milliGRAM(s) Oral two times a day PRN agitation      DVT PROPHYLAXIS: heparin   Injectable 5000 Unit(s) SubCutaneous every 8 hours    GI PROPHYLAXIS: pantoprazole    Tablet 40 milliGRAM(s) Oral before breakfast    ANTICOAGULATION:   ANTIBIOTICS:  cefepime   IVPB    cefepime   IVPB 2000 milliGRAM(s)            LAB/STUDIES:  Labs:  CAPILLARY BLOOD GLUCOSE      POCT Blood Glucose.: 133 mg/dL (04 Aug 2022 17:09)  POCT Blood Glucose.: 133 mg/dL (04 Aug 2022 11:36)  POCT Blood Glucose.: 148 mg/dL (04 Aug 2022 07:52)  POCT Blood Glucose.: 105 mg/dL (03 Aug 2022 23:47)  POCT Blood Glucose.: 101 mg/dL (03 Aug 2022 21:47)  POCT Blood Glucose.: 108 mg/dL (03 Aug 2022 18:17)                          8.0    15.65 )-----------( 426      ( 04 Aug 2022 12:16 )             24.7       Auto Neutrophil %: 86.0 % (08-04-22 @ 12:16)  Auto Immature Granulocyte %: 0.7 % (08-04-22 @ 12:16)    08-04    138  |  106  |  54<H>  ----------------------------<  121<H>  4.7   |  21  |  <0.5<L>      Calcium, Total Serum: 7.7 mg/dL (08-04-22 @ 12:16)      LFTs:             5.3  | 0.6  | 44       ------------------[199     ( 04 Aug 2022 12:16 )  2.0  | x    | 35          Lipase:x      Amylase:x             Coags:                Culture - Other (collected 01 Aug 2022 20:35)  Source: Wound Wound  Final Report (03 Aug 2022 17:56):    Numerous Enterococcus faecalis (vancomycin resistant)    Rare Pseudomonas aeruginosa (Carbapenem Resistant)  Organism: Enterococcus faecalis (vancomycin resistant)  Pseudomonas aeruginosa (Carbapenem Resistant) (03 Aug 2022 17:56)  Organism: Pseudomonas aeruginosa (Carbapenem Resistant) (03 Aug 2022 17:56)  Organism: Enterococcus faecalis (vancomycin resistant) (03 Aug 2022 17:56)    Culture - Sputum (collected 01 Aug 2022 20:35)  Source: Trach Asp Tracheal Aspirate  Gram Stain (02 Aug 2022 06:45):    Numerous polymorphonuclear leukocytes seen per low power field    Rare Squamous epithelial cells seen per low power field    Moderate Gram Negative Rods seen per oil power field  Final Report (03 Aug 2022 15:50):    Moderate Pseudomonas aeruginosa (Carbapenem Resistant)    Normal Respiratory Analia absent  Organism: Pseudomonas aeruginosa (Carbapenem Resistant) (03 Aug 2022 15:50)  Organism: Pseudomonas aeruginosa (Carbapenem Resistant) (03 Aug 2022 15:50)

## 2022-08-04 NOTE — PROGRESS NOTE ADULT - SUBJECTIVE AND OBJECTIVE BOX
SILVANO CALIXTO 57y Male  MRN#: [id number]   Hospital Day: 12d    SUBJECTIVE  Patient is a 57y old Male who presents with a chief complaint of placement (04 Aug 2022 10:17)  Currently admitted to medicine with the primary diagnosis of General medical exam      INTERVAL HPI AND OVERNIGHT EVENTS:  Patient was examined and seen at bedside. This morning he is resting comfortably in bed and reports no issues or overnight events.    OBJECTIVE  PAST MEDICAL & SURGICAL HISTORY  CVA (cerebral vascular accident)  1980&#x27;s x2   Residual left sided weakness    CAD (coronary artery disease)    Retinal detachment    Hypertension    High cholesterol    Diabetes    Anxiety    Seizure    History of corneal transplant  right   4/13/17      ALLERGIES:  No Known Allergies    MEDICATIONS:  STANDING MEDICATIONS  ascorbic acid 500 milliGRAM(s) Oral daily  busPIRone 5 milliGRAM(s) Oral <User Schedule>  cefepime   IVPB      cefepime   IVPB 2000 milliGRAM(s) IV Intermittent every 8 hours  chlorhexidine 2% Cloths 1 Application(s) Topical <User Schedule>  collagenase Ointment 1 Application(s) Topical two times a day  Dakins Solution - 1/2 Strength 1 Application(s) Topical two times a day  dextrose 5%. 1000 milliLiter(s) IV Continuous <Continuous>  dextrose 5%. 1000 milliLiter(s) IV Continuous <Continuous>  dextrose 50% Injectable 25 Gram(s) IV Push once  dextrose 50% Injectable 12.5 Gram(s) IV Push once  dextrose 50% Injectable 25 Gram(s) IV Push once  folic acid 1 milliGRAM(s) Oral daily  glucagon  Injectable 1 milliGRAM(s) IntraMuscular once  heparin   Injectable 5000 Unit(s) SubCutaneous every 8 hours  insulin lispro (ADMELOG) corrective regimen sliding scale   SubCutaneous every 6 hours  lactulose Syrup 10 Gram(s) Oral two times a day  levETIRAcetam  IVPB 500 milliGRAM(s) IV Intermittent every 12 hours  multivitamin 1 Tablet(s) Oral daily  pantoprazole    Tablet 40 milliGRAM(s) Oral before breakfast  simvastatin 10 milliGRAM(s) Oral at bedtime  sucralfate suspension 1 Gram(s) Oral two times a day  zinc sulfate 220 milliGRAM(s) Oral daily    PRN MEDICATIONS  dextrose Oral Gel 15 Gram(s) Oral once PRN  ondansetron Injectable 4 milliGRAM(s) IV Push every 6 hours PRN  risperiDONE   Tablet 1 milliGRAM(s) Oral two times a day PRN      VITAL SIGNS: Last 24 Hours  T(C): 36.2 (04 Aug 2022 12:10), Max: 37.2 (04 Aug 2022 04:52)  T(F): 97.2 (04 Aug 2022 12:10), Max: 98.9 (04 Aug 2022 04:52)  HR: 99 (04 Aug 2022 12:10) (99 - 107)  BP: 112/63 (04 Aug 2022 12:10) (98/53 - 112/63)  BP(mean): 75 (04 Aug 2022 04:52) (75 - 75)  RR: 18 (04 Aug 2022 12:10) (14 - 18)  SpO2: 99% (04 Aug 2022 12:10) (98% - 100%)    LABS:                        8.0    15.65 )-----------( 426      ( 04 Aug 2022 12:16 )             24.7     08-04    138  |  106  |  54<H>  ----------------------------<  121<H>  4.7   |  21  |  <0.5<L>    Ca    7.7<L>      04 Aug 2022 12:16  Mg     3.4     08-04    TPro  5.3<L>  /  Alb  2.0<L>  /  TBili  0.6  /  DBili  x   /  AST  44<H>  /  ALT  35  /  AlkPhos  199<H>  08-04              Culture - Other (collected 01 Aug 2022 20:35)  Source: Wound Wound  Final Report (03 Aug 2022 17:56):    Numerous Enterococcus faecalis (vancomycin resistant)    Rare Pseudomonas aeruginosa (Carbapenem Resistant)  Organism: Enterococcus faecalis (vancomycin resistant)  Pseudomonas aeruginosa (Carbapenem Resistant) (03 Aug 2022 17:56)  Organism: Pseudomonas aeruginosa (Carbapenem Resistant) (03 Aug 2022 17:56)  Organism: Enterococcus faecalis (vancomycin resistant) (03 Aug 2022 17:56)    Culture - Sputum (collected 01 Aug 2022 20:35)  Source: Trach Asp Tracheal Aspirate  Gram Stain (02 Aug 2022 06:45):    Numerous polymorphonuclear leukocytes seen per low power field    Rare Squamous epithelial cells seen per low power field    Moderate Gram Negative Rods seen per oil power field  Final Report (03 Aug 2022 15:50):    Moderate Pseudomonas aeruginosa (Carbapenem Resistant)    Normal Respiratory Analia absent  Organism: Pseudomonas aeruginosa (Carbapenem Resistant) (03 Aug 2022 15:50)  Organism: Pseudomonas aeruginosa (Carbapenem Resistant) (03 Aug 2022 15:50)          RADIOLOGY:      PHYSICAL EXAM:  CONSTITUTIONAL: No acute distress, on trach and peg, does not communicate so exam limited  PULMONARY: decreased air entry bilaterally, scattered rhonchi  CARDIOVASCULAR: Regular rate and rhythm; no murmurs, rubs, or gallops  GASTROINTESTINAL: soft, non-distended; bowel sounds present, patient does not grimace upon palpation (abdominal exam stable), fecalith output draining from peg today (on suction)  MUSCULOSKELETAL: 2+ peripheral pulses; no clubbing, no cyanosis, no edema  SKIN: dry and scaly skin diffusely (improving on wound care), sacral bed ulcers     SILVANO CALIXTO 57y Male  MRN#: [id number]   Hospital Day: 12d    SUBJECTIVE  Patient is a 57y old Male who presents with a chief complaint of placement (04 Aug 2022 10:17)  Currently admitted to medicine with the primary diagnosis of General medical exam      INTERVAL HPI AND OVERNIGHT EVENTS:  Patient was examined and seen at bedside. Overnight patient had  a vomiting episode. Patient was made NPO. some fecal like material coming from peg tube     OBJECTIVE  PAST MEDICAL & SURGICAL HISTORY  CVA (cerebral vascular accident)  1980&#x27;s x2   Residual left sided weakness    CAD (coronary artery disease)    Retinal detachment    Hypertension    High cholesterol    Diabetes    Anxiety    Seizure    History of corneal transplant  right   4/13/17      ALLERGIES:  No Known Allergies    MEDICATIONS:  STANDING MEDICATIONS  ascorbic acid 500 milliGRAM(s) Oral daily  busPIRone 5 milliGRAM(s) Oral <User Schedule>  cefepime   IVPB      cefepime   IVPB 2000 milliGRAM(s) IV Intermittent every 8 hours  chlorhexidine 2% Cloths 1 Application(s) Topical <User Schedule>  collagenase Ointment 1 Application(s) Topical two times a day  Dakins Solution - 1/2 Strength 1 Application(s) Topical two times a day  dextrose 5%. 1000 milliLiter(s) IV Continuous <Continuous>  dextrose 5%. 1000 milliLiter(s) IV Continuous <Continuous>  dextrose 50% Injectable 25 Gram(s) IV Push once  dextrose 50% Injectable 12.5 Gram(s) IV Push once  dextrose 50% Injectable 25 Gram(s) IV Push once  folic acid 1 milliGRAM(s) Oral daily  glucagon  Injectable 1 milliGRAM(s) IntraMuscular once  heparin   Injectable 5000 Unit(s) SubCutaneous every 8 hours  insulin lispro (ADMELOG) corrective regimen sliding scale   SubCutaneous every 6 hours  lactulose Syrup 10 Gram(s) Oral two times a day  levETIRAcetam  IVPB 500 milliGRAM(s) IV Intermittent every 12 hours  multivitamin 1 Tablet(s) Oral daily  pantoprazole    Tablet 40 milliGRAM(s) Oral before breakfast  simvastatin 10 milliGRAM(s) Oral at bedtime  sucralfate suspension 1 Gram(s) Oral two times a day  zinc sulfate 220 milliGRAM(s) Oral daily    PRN MEDICATIONS  dextrose Oral Gel 15 Gram(s) Oral once PRN  ondansetron Injectable 4 milliGRAM(s) IV Push every 6 hours PRN  risperiDONE   Tablet 1 milliGRAM(s) Oral two times a day PRN      VITAL SIGNS: Last 24 Hours  T(C): 36.2 (04 Aug 2022 12:10), Max: 37.2 (04 Aug 2022 04:52)  T(F): 97.2 (04 Aug 2022 12:10), Max: 98.9 (04 Aug 2022 04:52)  HR: 99 (04 Aug 2022 12:10) (99 - 107)  BP: 112/63 (04 Aug 2022 12:10) (98/53 - 112/63)  BP(mean): 75 (04 Aug 2022 04:52) (75 - 75)  RR: 18 (04 Aug 2022 12:10) (14 - 18)  SpO2: 99% (04 Aug 2022 12:10) (98% - 100%)    LABS:                        8.0    15.65 )-----------( 426      ( 04 Aug 2022 12:16 )             24.7     08-04    138  |  106  |  54<H>  ----------------------------<  121<H>  4.7   |  21  |  <0.5<L>    Ca    7.7<L>      04 Aug 2022 12:16  Mg     3.4     08-04    TPro  5.3<L>  /  Alb  2.0<L>  /  TBili  0.6  /  DBili  x   /  AST  44<H>  /  ALT  35  /  AlkPhos  199<H>  08-04              Culture - Other (collected 01 Aug 2022 20:35)  Source: Wound Wound  Final Report (03 Aug 2022 17:56):    Numerous Enterococcus faecalis (vancomycin resistant)    Rare Pseudomonas aeruginosa (Carbapenem Resistant)  Organism: Enterococcus faecalis (vancomycin resistant)  Pseudomonas aeruginosa (Carbapenem Resistant) (03 Aug 2022 17:56)  Organism: Pseudomonas aeruginosa (Carbapenem Resistant) (03 Aug 2022 17:56)  Organism: Enterococcus faecalis (vancomycin resistant) (03 Aug 2022 17:56)    Culture - Sputum (collected 01 Aug 2022 20:35)  Source: Trach Asp Tracheal Aspirate  Gram Stain (02 Aug 2022 06:45):    Numerous polymorphonuclear leukocytes seen per low power field    Rare Squamous epithelial cells seen per low power field    Moderate Gram Negative Rods seen per oil power field  Final Report (03 Aug 2022 15:50):    Moderate Pseudomonas aeruginosa (Carbapenem Resistant)    Normal Respiratory Analia absent  Organism: Pseudomonas aeruginosa (Carbapenem Resistant) (03 Aug 2022 15:50)  Organism: Pseudomonas aeruginosa (Carbapenem Resistant) (03 Aug 2022 15:50)          RADIOLOGY:      PHYSICAL EXAM:  CONSTITUTIONAL: No acute distress, on trach and peg, does not communicate so exam limited  PULMONARY: decreased air entry bilaterally, scattered rhonchi  CARDIOVASCULAR: Regular rate and rhythm; no murmurs, rubs, or gallops  GASTROINTESTINAL: soft, non-distended; bowel sounds present, patient does not grimace upon palpation (abdominal exam stable), fecalith output draining from peg today (on suction)  MUSCULOSKELETAL: 2+ peripheral pulses; no clubbing, no cyanosis, no edema  SKIN: dry and scaly skin diffusely (improving on wound care), sacral bed ulcers

## 2022-08-04 NOTE — PROGRESS NOTE ADULT - ASSESSMENT
58 yo M w h/o HTN, HLD, DM, CAD, CVA (x2 w residual weakness), s/p trach and peg presents from [facility] for placement at new facility. Patient's sister (HCP) apparently did not like care at [facility] - describing it as unsafe d/t no side rails on bed.  consulted and patient admitted until adequate facility established.      #Hyponatremia- stable/resolving sodium 134  - Working Dx is SIADH  - stable. c/w FW restriction. off IVFs    #Abdominal distention  - Severe rectosigmoid fecal impaction. The rectum is distended to 12.8 cm => disimpaction on 07/29  Improving. Now  lactulose BID. enema QD. and passing BM  - 08/02: Fecalith material seen out of PEG tube, stopped peg feedings and all meds, surgery consulted, plan is to assess for possible diversion colostomy, sister contacted, wishes to discuss plan in person  - 08/03: No fecalith material seen from peg today, KUB with peg studies showing patet peg tube with no extravasation, surgery plan is no surgery at the moment as the patient is passing BM  - 08/04: Patient vomited stool material overnight, PEG feeding stopped and attached to suction (draining feces), surgery on board pending plan    #CT scan findings:  - Patchy left lower lobe pulmonary opacities, compatible with pneumonia.  - Lower back/buttock ulceration with gas. Erosive changes of the underlying sacrum and coccyx suggestive of osteomyelitis. => ID and Burn consulted  - ID plan: cefepime, cultures from sacral wound showing enterococcus and from DTA showing pseudomonas => pending full plan from ID  - Burn plan: s/p bedside debridement on 07/29, continue with LWC.    #Disposition in trach/peg frail patient  - HCP unhappy w [facility] > f/u SW/CM on adequate dispo. ?LTAC    #Trach inserted in June at [facility] (for aspiration PNA)  NOTE: patient not chronically on Vent. START PS TRIALS from 8/1.    #HTN  - on lisinopril 30mg qd and amlodipine 10mg qd at home  - Meds on hold and BP controlled    #CAD / HLD  - on simvastatin 10mg qhs and asa 81mg qd    #Seziure disorder  - on carbemazepine 100mg/5ml syrup bid  - 08/02: switched to IV keppra since peg tube usage on hold    #Anxiety / Agitation  - on buspirone 5mg qd, risperidone 1mg bid prn    #GERD  - on esomeprazole suspension 40mg bid, sulcrafate 1g bid    #Folate and vitamin deficiency  - on folic acid and mvi    DVT ppx: heparin sub q  GI ppx: ni  Diet: NPO - tube feeds and meds on hold  Activity: bedbound    DNR/DNI - molst in chart 58 yo M w h/o HTN, HLD, DM, CAD, CVA (x2 w residual weakness), s/p trach and peg presents from [facility] for placement at new facility. Patient's sister (HCP) apparently did not like care at [facility] - describing it as unsafe d/t no side rails on bed.  consulted and patient admitted until adequate facility established.      #Hyponatremia- resolved sodium 138  - Working Dx is SIADH  - stable. c/w FW restriction. off IVFs    #Abdominal distention  - Severe rectosigmoid fecal impaction. The rectum is distended to 12.8 cm => disimpaction on 07/29  Improving. Now  lactulose BID. enema QD. and passing BM  - 08/02: Fecalith material seen out of PEG tube, stopped peg feedings and all meds, surgery consulted, plan is to assess for possible diversion colostomy, sister contacted, wishes to discuss plan in person  - 08/03: No fecalith material seen from peg today, KUB with peg studies showing patet peg tube with no extravasation, surgery plan is no surgery at the moment as the patient is passing BM  - 08/04: Patient vomited stool material overnight, PEG feeding stopped and attached to suction (draining feces), surgery on board pending plan    #CT scan findings:  - Patchy left lower lobe pulmonary opacities, compatible with pneumonia.  - Lower back/buttock ulceration with gas. Erosive changes of the underlying sacrum and coccyx suggestive of osteomyelitis. => ID and Burn consulted  - ID plan: cefepime, cultures from sacral wound showing enterococcus and from DTA showing pseudomonas => pending full plan from ID  - Burn plan: s/p bedside debridement on 07/29, continue with LWC.    #Disposition in trach/peg frail patient  - HCP unhappy w [facility] > f/u SW/CM on adequate dispo. ?LTAC    #Trach inserted in June at [facility] (for aspiration PNA)  NOTE: patient not chronically on Vent. START PS TRIALS from 8/1.    #HTN  - on lisinopril 30mg qd and amlodipine 10mg qd at home  - Meds on hold and BP controlled    #CAD / HLD  - on simvastatin 10mg qhs and asa 81mg qd    #Seziure disorder  - on carbemazepine 100mg/5ml syrup bid  - 08/02: switched to IV keppra since peg tube usage on hold    #Anxiety / Agitation  - on buspirone 5mg qd, risperidone 1mg bid prn    #GERD  - on esomeprazole suspension 40mg bid, sulcrafate 1g bid    #Folate and vitamin deficiency  - on folic acid and mvi    DVT ppx: heparin sub q  GI ppx: ni  Diet: NPO - tube feeds and meds on hold  Activity: bedbound    DNR/DNI - molst in chart 58 yo M w h/o HTN, HLD, DM, CAD, CVA (x2 w residual weakness), s/p trach and peg presents from [facility] for placement at new facility. Patient's sister (HCP) apparently did not like care at [facility] - describing it as unsafe d/t no side rails on bed.  consulted and patient admitted until adequate facility established.      #Hyponatremia- resolved sodium 138  - Working Dx is SIADH  - stable. c/w FW restriction. off IVFs    #Abdominal distention  - Severe rectosigmoid fecal impaction. The rectum is distended to 12.8 cm => disimpaction on 07/29  Improving. Now  lactulose BID. enema QD. and passing BM  - 08/02: Fecalith material seen out of PEG tube, stopped peg feedings and all meds, surgery consulted, plan is to assess for possible diversion colostomy, sister contacted, wishes to discuss plan in person  - 08/03: No fecalith material seen from peg today, KUB with peg studies showing patet peg tube with no extravasation, surgery plan is no surgery at the moment as the patient is passing BM  - 08/04: Patient vomited stool material overnight, PEG feeding stopped and attached to suction (draining feces), surgery on board pending plan    #CT scan findings:  - Patchy left lower lobe pulmonary opacities, compatible with pneumonia.  - Lower back/buttock ulceration with gas. Erosive changes of the underlying sacrum and coccyx suggestive of osteomyelitis. => ID and Burn consulted  - ID plan: cefepime, cultures from sacral wound showing enterococcus and from DTA showing pseudomonas => pending full plan from ID  - Burn plan: s/p bedside debridement on 07/29, continue with LWC.    #Disposition in trach/peg frail patient  - HCP unhappy w [facility] > f/u SW/CM on adequate dispo. ?LTAC    #Trach inserted in June at [facility] (for aspiration PNA)  NOTE: patient not chronically on Vent. START PS TRIALS from 8/1.    #HTN  - on lisinopril 30mg qd and amlodipine 10mg qd at home  - Meds on hold and BP controlled    #CAD / HLD  - on simvastatin 10mg qhs and asa 81mg qd    #Seziure disorder  - on carbemazepine 100mg/5ml syrup bid  - 08/02: switched to IV keppra since peg tube usage on hold    #Anxiety / Agitation  - on buspirone 5mg qd, risperidone 1mg bid prn    #GERD  - on esomeprazole suspension 40mg bid, sulcrafate 1g bid    #Folate and vitamin deficiency  - on folic acid and mvi    DVT ppx: heparin sub q  GI ppx: ni  Diet: NPO - tube feeds on hold and meds on hold  Activity: bedbound    DNR/DNI - molst in chart

## 2022-08-04 NOTE — CHART NOTE - NSCHARTNOTEFT_GEN_A_CORE
GI fu note:    Patient seen and examined. overnight vomited stools    recs:  - keep patient NPO  - aggressive bowel regimen  - discuss with surgery possible colectomy

## 2022-08-04 NOTE — PROGRESS NOTE ADULT - SUBJECTIVE AND OBJECTIVE BOX
Patient is a 57y old  Male who presents with a chief complaint of placement (04 Aug 2022 09:54)        Over Night Events:  Tolerating PS wean.  Off pressors.          ROS:     All ROS are negative except HPI         PHYSICAL EXAM    ICU Vital Signs Last 24 Hrs  T(C): 36 (04 Aug 2022 05:41), Max: 37.2 (04 Aug 2022 04:52)  T(F): 96.8 (04 Aug 2022 05:41), Max: 98.9 (04 Aug 2022 04:52)  HR: 102 (04 Aug 2022 09:23) (94 - 107)  BP: 98/53 (04 Aug 2022 05:41) (92/58 - 110/60)  BP(mean): 75 (04 Aug 2022 04:52) (75 - 75)  ABP: --  ABP(mean): --  RR: 14 (04 Aug 2022 05:41) (14 - 18)  SpO2: 100% (04 Aug 2022 09:23) (98% - 100%)        CONSTITUTIONAL:  Ill appearing in  NAD    ENT:   Airway patent,   Mouth with normal mucosa.   Trach     EYES:   Pupils equal,   Round and reactive to light.    CARDIAC:   Normal rate,   Regular rhythm.      RESPIRATORY:   No wheezing  Bilateral BS  Normal chest expansion  Not tachypneic,  No use of accessory muscles    GASTROINTESTINAL:  Abdomen soft,   Non-tender,   No guarding,   + BS    MUSCULOSKELETAL:   Range of motion is not limited,  No clubbing, cyanosis    NEUROLOGICAL:   Opens eyes   No motor  deficits.    SKIN:   Skin normal color for race,   No evidence of rash.    PSYCHIATRIC:   No apparent risk to self or others.        08-03-22 @ 07:01  -  08-04-22 @ 07:00  --------------------------------------------------------  IN:    Enteral Tube Flush: 100 mL    Jevity 1.2: 360 mL  Total IN: 460 mL    OUT:    Incontinent per Condom Catheter (mL): 600 mL  Total OUT: 600 mL    Total NET: -140 mL          LABS:                            8.4    15.92 )-----------( 421      ( 03 Aug 2022 09:16 )             25.5                                               08-03    134<L>  |  104  |  44<H>  ----------------------------<  91  5.5<H>   |  19  |  <0.5<L>    Ca    7.9<L>      03 Aug 2022 09:16    TPro  5.7<L>  /  Alb  2.1<L>  /  TBili  0.6  /  DBili  x   /  AST  15  /  ALT  15  /  AlkPhos  160<H>  08-03                                                                                           LIVER FUNCTIONS - ( 03 Aug 2022 09:16 )  Alb: 2.1 g/dL / Pro: 5.7 g/dL / ALK PHOS: 160 U/L / ALT: 15 U/L / AST: 15 U/L / GGT: x                                                  Culture - Other (collected 01 Aug 2022 20:35)  Source: Wound Wound  Final Report (03 Aug 2022 17:56):    Numerous Enterococcus faecalis (vancomycin resistant)    Rare Pseudomonas aeruginosa (Carbapenem Resistant)  Organism: Enterococcus faecalis (vancomycin resistant)  Pseudomonas aeruginosa (Carbapenem Resistant) (03 Aug 2022 17:56)  Organism: Pseudomonas aeruginosa (Carbapenem Resistant) (03 Aug 2022 17:56)  Organism: Enterococcus faecalis (vancomycin resistant) (03 Aug 2022 17:56)    Culture - Sputum (collected 01 Aug 2022 20:35)  Source: Trach Asp Tracheal Aspirate  Gram Stain (02 Aug 2022 06:45):    Numerous polymorphonuclear leukocytes seen per low power field    Rare Squamous epithelial cells seen per low power field    Moderate Gram Negative Rods seen per oil power field  Final Report (03 Aug 2022 15:50):    Moderate Pseudomonas aeruginosa (Carbapenem Resistant)    Normal Respiratory Analia absent  Organism: Pseudomonas aeruginosa (Carbapenem Resistant) (03 Aug 2022 15:50)  Organism: Pseudomonas aeruginosa (Carbapenem Resistant) (03 Aug 2022 15:50)                                                   Mode: CPAP with PS  FiO2: 40  PEEP: 5  PS: 5  ITime: 1  MAP: 12  PIP: 19                                          MEDICATIONS  (STANDING):  ascorbic acid 500 milliGRAM(s) Oral daily  busPIRone 5 milliGRAM(s) Oral <User Schedule>  cefepime   IVPB      cefepime   IVPB 2000 milliGRAM(s) IV Intermittent every 8 hours  chlorhexidine 2% Cloths 1 Application(s) Topical <User Schedule>  collagenase Ointment 1 Application(s) Topical two times a day  Dakins Solution - 1/2 Strength 1 Application(s) Topical two times a day  dextrose 5%. 1000 milliLiter(s) (50 mL/Hr) IV Continuous <Continuous>  dextrose 5%. 1000 milliLiter(s) (100 mL/Hr) IV Continuous <Continuous>  dextrose 50% Injectable 25 Gram(s) IV Push once  dextrose 50% Injectable 12.5 Gram(s) IV Push once  dextrose 50% Injectable 25 Gram(s) IV Push once  folic acid 1 milliGRAM(s) Oral daily  glucagon  Injectable 1 milliGRAM(s) IntraMuscular once  heparin   Injectable 5000 Unit(s) SubCutaneous every 8 hours  insulin lispro (ADMELOG) corrective regimen sliding scale   SubCutaneous every 6 hours  lactulose Syrup 10 Gram(s) Oral two times a day  levETIRAcetam  IVPB 500 milliGRAM(s) IV Intermittent every 12 hours  multivitamin 1 Tablet(s) Oral daily  pantoprazole    Tablet 40 milliGRAM(s) Oral before breakfast  simvastatin 10 milliGRAM(s) Oral at bedtime  sucralfate suspension 1 Gram(s) Oral two times a day  zinc sulfate 220 milliGRAM(s) Oral daily    MEDICATIONS  (PRN):  dextrose Oral Gel 15 Gram(s) Oral once PRN Blood Glucose LESS THAN 70 milliGRAM(s)/deciliter  ondansetron Injectable 4 milliGRAM(s) IV Push every 6 hours PRN Nausea and/or Vomiting  risperiDONE   Tablet 1 milliGRAM(s) Oral two times a day PRN agitation      New X-rays reviewed:                                                                                  ECHO

## 2022-08-04 NOTE — CHART NOTE - NSCHARTNOTEFT_GEN_A_CORE
Pt is a 57 year old male who presented to the ED for general wellness check and for placement at a new facility. This is hospital day 13. At 4:15am I was called by the pts nurse Kenney and informed the patient was vomiting up fecal material.      On physical exam pt was sitting up in bed with fecal material around his right side and mouth. Pt vitals were stable, he was not tachycardic, tachypnic    Vital Signs Last 24 Hrs  T(C): 36.1 (03 Aug 2022 22:38), Max: 36.9 (03 Aug 2022 05:28)  T(F): 97 (03 Aug 2022 22:38), Max: 98.5 (03 Aug 2022 05:28)  HR: 99 (03 Aug 2022 22:38) (94 - 102)  BP: 110/60 (03 Aug 2022 22:38) (92/58 - 110/62)  BP(mean): --  RR: 18 (03 Aug 2022 22:38) (18 - 18)  SpO2: 100% (03 Aug 2022 22:38) (99% - 100%) Pt is a 57 year old male PMHx of HTN, HLD, DM, CAD, CVA (x2 w residual weakness), s/p trach and peg who presented to the ED for general wellness check and for placement at a new facility. This is hospital day 13.     At 4:15am I was called by the pts nurse Kenney and informed the patient was vomiting up fecal material.      On physical exam pt was sitting up in bed with fecal material around his right side and mouth. Pt vitals were stable,     - Severe rectosigmoid fecal impaction. The rectum is distended to 12.8 cm => disimpaction on 07/29  Improving. Now  lactulose BID. enema QD. and passing BM  - 08/02: Fecalith material seen out of PEG tube, stopped peg feedings and all meds, surgery consulted, plan is to assess for possible diversion colostomy, sister contacted, wishes to discuss plan in person  - 08/02: No fecalith material seen from peg today, KUB with peg studies showing patet peg tube with no extravasation, surgery plan is no surgery at the moment as the patient is passing BM    Vital Signs Last 24 Hrs  T(C): 36.1 (03 Aug 2022 22:38), Max: 36.9 (03 Aug 2022 05:28)  T(F): 97 (03 Aug 2022 22:38), Max: 98.5 (03 Aug 2022 05:28)  HR: 99 (03 Aug 2022 22:38) (94 - 102)  BP: 110/60 (03 Aug 2022 22:38) (92/58 - 110/62)  BP(mean): --  RR: 18 (03 Aug 2022 22:38) (18 - 18)  SpO2: 100% (03 Aug 2022 22:38) (99% - 100%) Pt is a 57 year old male PMHx of HTN, HLD, DM, CAD, CVA (x2 w residual weakness), s/p trach and peg who presented to the ED for general wellness check and for placement at a new facility. This is hospital day 13.     At 4:15am I was called by the pts nurse Kenney and informed the patient was vomiting up fecal material.      On physical exam pt was sitting up in bed with fecal material around his right side and mouth. Pt vitals were stable, HR: 103 BP:102/61, sat 98%, RR: 19, no change to baseline vent settings. Of note pt had severe rectosigmoid fecal impaction. Where rectum was distended to 12.8 cm => disimpaction on 07/29. Which was improving as pt began passing BM with lactulose and enema. On 08/02: Fecalith material seen out of PEG tube, peg feedings and all meds were stopped. Surgery was consulted with a plan is to assess for possible diversion colostomy, sister contacted, wishes to discuss plan in person. Since then, no fecalith material seen from peg today, KUB 08/03 with peg studies showed patent peg tube with no extravasation. Surgery plan is no surgery at the moment as the patient is passing BM regularly.    STAT EKG was ordered and showed sinus tachycardia.  STAT KUB was ordered and is pending  Zofran 4mg IV given STAT with q6h PRN order    Will inform day team of event and follow up with surgery for plan given new development. Pt is a 57 year old male PMHx of HTN, HLD, DM, CAD, CVA (x2 w residual weakness), s/p trach and peg who presented to the ED for general wellness check and for placement at a new facility. This is hospital day 13.     At 4:15am I was called by the pts nurse Kenney and informed the patient was vomiting up fecal material.      On physical exam pt was sitting up in bed with fecal material around his right side and mouth. Pt vitals were stable, HR: 103 BP:102/61, sat 98%, RR: 19, no change to baseline vent settings. Of note pt had severe rectosigmoid fecal impaction. Where rectum was distended to 12.8 cm => disimpaction on 07/29. Which was improving as pt began passing BM with lactulose and enema. On 08/02: Fecalith material seen out of PEG tube, peg feedings and all meds were stopped. Surgery was consulted with a plan is to assess for possible diversion colostomy, sister contacted, wishes to discuss plan in person. Since then, no fecalith material seen from peg today, KUB 08/03 with peg studies showed patent peg tube with no extravasation. Surgery plan is no surgery at the moment as the patient is passing BM regularly.    STAT EKG was ordered and showed sinus tachycardia.  STAT KUB was ordered and is pending  Zofran 4mg IV given STAT with q6h PRN order  RT was called and came to suction trach.    Will inform day team of event and follow up with surgery for plan given new development.    **Follow up KUB**      CONSTITUTIONAL: No acute distress sitting up in bed, on trach and peg, does not communicate (baseline), fecalith material soiled right side clothes, bed, and around patient's mouth/trach  PULMONARY: decreased air entry bilaterally, scattered rhonchi, nonlabored breathing  CARDIOVASCULAR: Regular rate and rhythm; no murmurs, rubs, or gallops  GASTROINTESTINAL: soft, mildly-distended (baseline); bowel sounds present, no tenderness to palpation  MUSCULOSKELETAL: 2+ peripheral pulses; no clubbing, no cyanosis, no edema  SKIN: dry and scaly skin diffusely, sacral bed ulcers    CODE STATUS: DNR

## 2022-08-04 NOTE — PROGRESS NOTE ADULT - ASSESSMENT
57M w/ CVA hx, non-verbal baseline, DM, HTN, s/p trach/PEG at outside hospital, admitted to medicine for social dispo issues, currently being treated for sacral osteomyelitis and followed by ID, Burn team.  Noted to have large stool burden on admission imaging s/p fecal disimpaction with clinical improvement, currently +BM daily.  Previously patient was planned for diverting colostomy, however patient family refused intervention 2/2 no clinical obstruction and +BM.      Plan:  - Surgery re-consulted due to fecal presence in G-tube, likely 2/2 continued obstruction  - Patient continues to have BM  - Poor surgical candidate  - Discussed with sister on 8/3 regarding operative intervention. Patient is not optimized for surgical management, with risks too high for the potential benefits.   - Recommend conservative management   - Aggressive bowel regimen   - Medical care per primary team   - Spectra [phone] for questions or concerns

## 2022-08-04 NOTE — PROGRESS NOTE ADULT - ATTENDING COMMENTS
Patient is a 57M with CVA hx, non-verbal baseline, DM, HTN, s/p trach/PEG admitted for sacral decubitus ulcer and sacral osteomyelitis. Patient with known chronically dilated rectosigmoid colon.  Having daily BM    Plan:   -Patient with chronically dilated colon and daily BM.  Low risk for perforation.  Patient is very poor surgical candidate and high risk for surgery.   - Recommend continue with current bowel regimen   - f/u Burn for sacral decubitus ulcers

## 2022-08-04 NOTE — PROGRESS NOTE ADULT - ASSESSMENT
IMPRESSION:    Chronic resp failure sp PEG/ trach  HO CVA  HO seizure  Functional quadriplegia  Stool impaction / Constipation   LLL opacity, possible aspiration.  CR Pseudomonas in DTA      PLAN:    CNS: Avoid CNS depressant    HEENT:  Oral care    PULMONARY:  HOB @ 45 degrees, aspiration precaution, keep Sao2 92 to 96%.  Pulmonary toilet.  PS wean     CARDIOVASCULAR: Avoid overload,    GI: GI prophylaxis                          Bowel regimen fleet enema.      RENAL:  F/u  lytes.  Correct as needed.    INFECTIOUS DISEASE: FU nosal MRSA.  ABX per ID.  ID follow up appreciated     HEMATOLOGICAL:  DVT prophylaxis.    ENDOCRINE:  Follow up FS.  Insulin protocol if needed.    MUSCULOSKELETAL:  Wound care.      Vent unit     DNR    DC planing

## 2022-08-05 NOTE — CHART NOTE - NSCHARTNOTEFT_GEN_A_CORE
PALLIATIVE MEDICINE INTERDISCIPLINARY TEAM NOTE    Provider:                                         [ X  ] Initial visit        Met with: [ X  ] Patient  [ X  ] Family  [   ] Other:    Primary Language: [ X  ] English [   ] Other*:                      *Interpretation provided by:    SUPPORT DIAGNOSES            (Check all that apply)    [   ] EOL issues  [  X ] Advanced Illness  [   ] Cultural / spiritual concerns  [ X  ] Pain / suffering  [   ] Dementia / AMS  [   ] Other:  [   ] AD issues  [ X  ] Grief / loss / sadness  [   ] Discharge issues  [ X  ] Distress / coping    PSYCHOSOCIAL ASSESSMENT OF PATIENT         (Check all that apply)    [  X ] Initial Assessment            [   ] Reassessment          [   ] Not Applicable this visit    Pain/suffering acuity:  [  X ] None to mild (0-3)           [   ] Moderate (4-6)        [   ] High (7-10)    Mental Status:  [   ] Alert/oriented (x3)          [ X ] Confused/Altered(x0)         [ X  ] Non-resp: trach/peg    Functional status:  [   ] Independent w ADLs      [   ] Needs Assistance             [  X ] Bedbound/Full Care    Coping:  [   ] Coping well                     [ X  ] Coping w/difficulty            [   ] Poor coping    Support system:  [   ] Strong                              [X   ] Adequate                        [   ] Inadequate      Past history and medications for:     [ ] Anxiety       [ ] Depression    [ ] Sleep disorders       SERVICE PROVIDED  [   ]Discharge support / facilitation  [   ]AD / goals of care counseling                                  [   ]EOL / death / bereavement counseling  [ X  ]Counseling / support                                                [   ] Family meeting  [   ]Prayer / sacrament / ritual                                      [   ] Referral   [   ]Other                                                                       NOTE and Plan of Care (PoC):    patient is a 58 y/o M with noted pmhx. visited patient earlier today. patient w/ trach/peg. called and spoke with patient's sister - Nicole. introduced role of palliative care. Nicole described long complicated medical hx of her brother. She has been taking care of him since his stroke in his early 20s. She related patient was doing okay up until may 2022 when he aspirated and ended up at [facility] x2, during second admission got complicated and ended up having to get trach/peg. Discussed at length options including CMO/hospice. She feels strongly that she does not want her brother to suffer. She confirmed putting in place DNR. She wished to see how patient does over the next couple of days. Plan to f/u with Nicole on monday. all questions answered. contact info provided. support rendered. will follow. [phone] PALLIATIVE MEDICINE INTERDISCIPLINARY TEAM NOTE    Provider:                                         [ X  ] Initial visit        Met with: [ X  ] Patient  [ X  ] Family  [   ] Other:    Primary Language: [ X  ] English [   ] Other*:                      *Interpretation provided by:    SUPPORT DIAGNOSES            (Check all that apply)    [   ] EOL issues  [  X ] Advanced Illness  [   ] Cultural / spiritual concerns  [ X  ] Pain / suffering  [   ] Dementia / AMS  [   ] Other:  [   ] AD issues  [ X  ] Grief / loss / sadness  [   ] Discharge issues  [ X  ] Distress / coping    PSYCHOSOCIAL ASSESSMENT OF PATIENT         (Check all that apply)    [  X ] Initial Assessment            [   ] Reassessment          [   ] Not Applicable this visit    Pain/suffering acuity:  [  X ] None to mild (0-3)           [   ] Moderate (4-6)        [   ] High (7-10)    Mental Status:  [   ] Alert/oriented (x3)          [ X ] Confused/Altered(x0)         [ X  ] Non-resp: trach/peg    Functional status:  [   ] Independent w ADLs      [   ] Needs Assistance             [  X ] Bedbound/Full Care    Coping:  [   ] Coping well                     [ X  ] Coping w/difficulty            [   ] Poor coping    Support system:  [   ] Strong                              [X   ] Adequate                        [   ] Inadequate      Past history and medications for:     [ ] Anxiety       [ ] Depression    [ ] Sleep disorders       SERVICE PROVIDED  [   ]Discharge support / facilitation  [   ]AD / goals of care counseling                                  [   ]EOL / death / bereavement counseling  [ X  ]Counseling / support                                                [   ] Family meeting  [   ]Prayer / sacrament / ritual                                      [   ] Referral   [   ]Other                                                                       NOTE and Plan of Care (PoC):    patient is a 56 y/o M with noted pmhx. visited patient earlier today. patient w/ trach/peg. called and spoke with patient's sister - Nicole. introduced role of palliative care. Nicole described long complicated medical hx of her brother. She has been taking care of him since his stroke in his early 20s. She related patient was doing okay up until may 2022 when he aspirated and ended up at [facility] x2, during second admission got complicated and ended up having to get trach/peg. Discussed at length options including CMO/vent withdrawal. She feels strongly that she does not want her brother to suffer, she now feels he does not have a quality of life. She confirmed putting in place DNR. She wished to see how patient does over the next couple of days. She is okay with palliative team following. Plan to f/u with Nicole on monday. all questions answered. contact info provided. support rendered. will follow. [phone]

## 2022-08-05 NOTE — CONSULT NOTE ADULT - PROBLEM SELECTOR RECOMMENDATION 4
s/p trach and PEG in May 22 at [facility] following aspiration  now vent dependent  continue per pulm recommendations  sister considering vent withdrawal

## 2022-08-05 NOTE — CONSULT NOTE ADULT - NS ATTEND AMEND GEN_ALL_CORE FT
High Risk patient on vent with osteomyelitis requiring IV antibiotics and intensive monitoring  Spoke with family regarding GOC and possible comfort measures only  Will follow up next week complains of pain/discomfort

## 2022-08-05 NOTE — CONSULT NOTE ADULT - CONSULT REASON
Fecalith material around peg
cachexia, GT feeds
Evaluation for diverting colostomy
Sacral wound
hyponatremia
Vent management
sacral OM
GOC

## 2022-08-05 NOTE — CONSULT NOTE ADULT - PROBLEM SELECTOR RECOMMENDATION 2
DNR only  Continue current medical management  Sister- Nicole, is considering CMO/vent withdrawal   Will FU with Nicole Monday  Will follow

## 2022-08-05 NOTE — CONSULT NOTE ADULT - SUBJECTIVE AND OBJECTIVE BOX
HPI:  56 yo M w h/o HTN, HLD, DM, CAD, CVA (x2 w residual weakness), s/p trach and peg presents from [facility] for placement at new facility. Patient's sister (HCP) apparently did not like care at [facility] - describing it as unsafe d/t no side rails on bed.    In ED, vitals stable.      consulted and patient admitted until adequate facility established. (23 Jul 2022 02:47)    PERTINENT PM/SXH:   CVA (cerebral vascular accident)    CAD (coronary artery disease)    Retinal detachment    Hypertension    Hypertension    High cholesterol    Diabetes    Eczema    Anxiety    Diarrhea    Weakness    Seizure    Aphasia      History of corneal transplant      FAMILY HISTORY:    ITEMS NOT CHECKED ARE NOT PRESENT    SOCIAL HISTORY:   Significant other/partner[ ]  Children[ ]  Zoroastrianism/Spirituality:  Substance hx:  [ ]   Tobacco hx:  [ ]   Alcohol hx: [ ]   Living Situation: [ ]Home  [ X- RCC ]Long term care  [ ]Rehab [ ]Other  Home Services: [ ] HHA [ ] Visting RN [ ] Hospice  Occupation:  Home Opioid hx:  [ ] Y [ ] N [X ] I-Stop Reference No: This report was requested by: Cheryl Abdalla | Reference #: [id number]    ADVANCE DIRECTIVES:    MOLST  [X ]  Living Will  [ ]   DECISION MAKER(s):  [ X] Health Care Proxy(s)  [ ] Surrogate(s)  [ ] Guardian           Name(s): Phone Number(s): Sister- Nicole     BASELINE (I)ADL(s) (prior to admission):  [place]: [ ]Total  [ ] Moderate [X ]Dependent  Palliative Performance Status Version 2:     10    %    http://npcrc.org/files/news/palliative_performance_scale_ppsv2.pdf    Allergies    No Known Allergies    Intolerances    MEDICATIONS  (STANDING):  ascorbic acid 500 milliGRAM(s) Oral daily  busPIRone 5 milliGRAM(s) Oral <User Schedule>  cefepime   IVPB      cefepime   IVPB 2000 milliGRAM(s) IV Intermittent every 8 hours  chlorhexidine 2% Cloths 1 Application(s) Topical <User Schedule>  collagenase Ointment 1 Application(s) Topical two times a day  Dakins Solution - 1/2 Strength 1 Application(s) Topical two times a day  dextrose 5%. 1000 milliLiter(s) (100 mL/Hr) IV Continuous <Continuous>  dextrose 5%. 1000 milliLiter(s) (50 mL/Hr) IV Continuous <Continuous>  dextrose 50% Injectable 25 Gram(s) IV Push once  dextrose 50% Injectable 12.5 Gram(s) IV Push once  dextrose 50% Injectable 25 Gram(s) IV Push once  folic acid 1 milliGRAM(s) Oral daily  glucagon  Injectable 1 milliGRAM(s) IntraMuscular once  heparin   Injectable 5000 Unit(s) SubCutaneous every 8 hours  insulin lispro (ADMELOG) corrective regimen sliding scale   SubCutaneous every 6 hours  lactulose Syrup 10 Gram(s) Oral two times a day  levETIRAcetam  IVPB 500 milliGRAM(s) IV Intermittent every 12 hours  multivitamin 1 Tablet(s) Oral daily  pantoprazole    Tablet 40 milliGRAM(s) Oral before breakfast  simvastatin 10 milliGRAM(s) Oral at bedtime  sodium chloride 0.9%. 1000 milliLiter(s) (75 mL/Hr) IV Continuous <Continuous>  sucralfate suspension 1 Gram(s) Oral two times a day  zinc sulfate 220 milliGRAM(s) Oral daily    MEDICATIONS  (PRN):  dextrose Oral Gel 15 Gram(s) Oral once PRN Blood Glucose LESS THAN 70 milliGRAM(s)/deciliter  ondansetron Injectable 4 milliGRAM(s) IV Push every 6 hours PRN Nausea and/or Vomiting  risperiDONE   Tablet 1 milliGRAM(s) Oral two times a day PRN agitation    PRESENT SYMPTOMS: [ X]Unable to obtain due to poor mentation   Source if other than patient:  [ ]Family   [ ]Team     Pain: [ ]yes [ ]no  QOL impact -   Location -                    Aggravating factors -  Quality -  Radiation -  Timing-  Severity (0-10 scale):  Minimal acceptable level (0-10 scale):     CPOT:  2  https://www.sccm.org/getattachment/die07c72-3m8b-0n2t-9s8j-6592z7120v8a/Critical-Care-Pain-Observation-Tool-(CPOT)      Dyspnea:                           [ ]Mild [ ]Moderate [ ]Severe  Anxiety:                             [ ]Mild [ ]Moderate [ ]Severe  Fatigue:                             [ ]Mild [ ]Moderate [ ]Severe  Nausea:                             [ ]Mild [ ]Moderate [ ]Severe  Loss of appetite:              [ ]Mild [ ]Moderate [ ]Severe  Constipation:                    [ ]Mild [ ]Moderate [ ]Severe    Other Symptoms:  [ ]All other review of systems negative     Palliative Performance Status Version 2:      10   %    [email or web address]/[email or web address]_performance_[email or web address]_[email or web address].pdf    PHYSICAL EXAM:  Vital Signs Last 24 Hrs  T(C): 36.3 (05 Aug 2022 12:59), Max: 36.3 (05 Aug 2022 12:59)  T(F): 97.3 (05 Aug 2022 12:59), Max: 97.3 (05 Aug 2022 12:59)  HR: 101 (05 Aug 2022 12:59) (91 - 108)  BP: 99/60 (05 Aug 2022 12:59) (99/60 - 129/60)  BP(mean): --  RR: 18 (05 Aug 2022 12:59) (14 - 19)  SpO2: 106% (05 Aug 2022 09:03) (94% - 106%)    GENERAL:  [ ]Alert  [ ]Oriented x   [ ]Lethargic  [X ]Cachexia  [ ]Unarousable  [ ]Verbal  [ X]Non-Verbal  Behavioral:   [ ] Anxiety  [ ] Delirium [ ] Agitation [X ] Calm   HEENT:  [ ]Normal   [X ]Dry mouth   [ X]ET Tube/Trach  [ ]Oral lesions  PULMONARY:   [ ]Clear [X ]Tachypnea  [ ]Audible excessive secretions   Vent via trach   CARDIOVASCULAR:    [X ]Regular [ ]Irregular [ ]Tachy  [ ]Kumar [ ]Murmur [ ]Other  GASTROINTESTINAL:  [ ]Soft  [ ]Distended   [ ]+BS  [ ]Non tender [ ]Tender  [ X]PEG [ ]OGT/ NGT  Last BM:   GENITOURINARY:  [ ]Normal [ ] Incontinent   [ ]Oliguria/Anuria   [ X]Brito  MUSCULOSKELETAL:   [ ]Normal   [ ]Weakness  [ X]Bed/Wheelchair bound [ ]Edema  NEUROLOGIC:   [ ]No focal deficits  [ X]Cognitive impairment  [ ]Dysphagia [ ]Dysarthria [ ]Paresis [ ]Other   SKIN:   [ ]Normal    [ ]Rash  [ X]Pressure ulcer(s)       Present on admission [X ]y [ ]n    CRITICAL CARE:  [ ] Shock Present  [ ]Septic [ ]Cardiogenic [ ]Neurologic [ ]Hypovolemic  [ ]  Vasopressors [ ]  Inotropes   [ ]Respiratory failure present [ X]Mechanical ventilation [ ]Non-invasive ventilatory support [ ]High flow  [ ]Acute  [ ]Chronic [ ]Hypoxic  [ ]Hypercarbic [ ]Other  [ ]Other organ failure     LABS:                        8.5    11.41 )-----------( 414      ( 05 Aug 2022 06:53 )             26.2   08-05    141  |  110  |  44<H>  ----------------------------<  76  4.7   |  23  |  <0.5<L>    Ca    7.9<L>      05 Aug 2022 06:53  Mg     2.9     08-05    TPro  5.5<L>  /  Alb  1.8<L>  /  TBili  0.5  /  DBili  x   /  AST  21  /  ALT  25  /  AlkPhos  180<H>  08-05        RADIOLOGY & ADDITIONAL STUDIES:    < from: CT Abdomen and Pelvis w/ Oral Cont (07.28.22 @ 17:24) >      IMPRESSION:      Significantly limited study due to lack of intravenous contrast - much of   intra-abdominal contents cannot be evaluated.    Severe rectosigmoid fecal impaction. The rectum is distended to 12.8 cm.   Disimpaction is recommended.    Patchy left lower lobe pulmonary opacities, compatible with pneumonia.    Lower back/buttock ulceration with gas. Erosive changes of the underlying   sacrum and coccyx suggestive ofosteomyelitis.    There appears to be generalized mesenteric congestion/ascites.    --- End of Report ---    < end of copied text >      REFERRALS:   [ ]Chaplaincy  [ ]Hospice  [ ]Child Life  [ ]Social Work  [ ]Case management [ ]Holistic Therapy     Goals of Care Document:   - palliative social work had long conversation with sister of the patient today  - sister feels she would consider comfort care if he is going to pass away because she wants him to pass away comfortably and in her presence. she is upset with how things have turned out for him since he had the trach/PEG placed.   - pt is DNR already  - plan to FU Monday re: [facility]    CC: placement    HPI:  56 yo M w h/o HTN, HLD, DM, CAD, CVA (x2 w residual weakness), s/p trach and peg presents from [facility] for placement at new facility. Patient's sister (HCP) apparently did not like care at [facility] - describing it as unsafe d/t no side rails on bed.    In ED, vitals stable.      consulted and patient admitted until adequate facility established. (23 Jul 2022 02:47)    PERTINENT PM/SXH:   CVA (cerebral vascular accident)    CAD (coronary artery disease)    Retinal detachment    Hypertension    Hypertension    High cholesterol    Diabetes    Eczema    Anxiety    Diarrhea    Weakness    Seizure    Aphasia      History of corneal transplant      FAMILY HISTORY:  No known pertinent history in mother or father    ITEMS NOT CHECKED ARE NOT PRESENT    SOCIAL HISTORY:   Significant other/partner[ ]  Children[ ]  Druze/Spirituality:  Substance hx:  [ ]   Tobacco hx:  [ ]   Alcohol hx: [ ]   Living Situation: [ ]Home  [ X- [facility] ]Long term care  [ ]Rehab [ ]Other  Home Services: [ ] HHA [ ] Visting RN [ ] Hospice  Occupation:  Home Opioid hx:  [ ] Y [ ] N [X ] I-Stop Reference No: This report was requested by: Cheryl Abdalla | Reference #: [id number]    ADVANCE DIRECTIVES:    MOLST  [X ]  Living Will  [ ]   DECISION MAKER(s):  [ X] Health Care Proxy(s)  [ ] Surrogate(s)  [ ] Guardian           Name(s): Phone Number(s): SisterMicah Rivera     BASELINE (I)ADL(s) (prior to admission):  [place]: [ ]Total  [ ] Moderate [X ]Dependent  Palliative Performance Status Version 2:     10    %    http://npcrc.org/files/news/palliative_performance_scale_ppsv2.pdf    Allergies  No Known Allergies    MEDICATIONS  (STANDING):  ascorbic acid 500 milliGRAM(s) Oral daily  busPIRone 5 milliGRAM(s) Oral <User Schedule>  cefepime   IVPB      cefepime   IVPB 2000 milliGRAM(s) IV Intermittent every 8 hours  chlorhexidine 2% Cloths 1 Application(s) Topical <User Schedule>  collagenase Ointment 1 Application(s) Topical two times a day  Dakins Solution - 1/2 Strength 1 Application(s) Topical two times a day  dextrose 5%. 1000 milliLiter(s) (100 mL/Hr) IV Continuous <Continuous>  dextrose 5%. 1000 milliLiter(s) (50 mL/Hr) IV Continuous <Continuous>  dextrose 50% Injectable 25 Gram(s) IV Push once  dextrose 50% Injectable 12.5 Gram(s) IV Push once  dextrose 50% Injectable 25 Gram(s) IV Push once  folic acid 1 milliGRAM(s) Oral daily  glucagon  Injectable 1 milliGRAM(s) IntraMuscular once  heparin   Injectable 5000 Unit(s) SubCutaneous every 8 hours  insulin lispro (ADMELOG) corrective regimen sliding scale   SubCutaneous every 6 hours  lactulose Syrup 10 Gram(s) Oral two times a day  levETIRAcetam  IVPB 500 milliGRAM(s) IV Intermittent every 12 hours  multivitamin 1 Tablet(s) Oral daily  pantoprazole    Tablet 40 milliGRAM(s) Oral before breakfast  simvastatin 10 milliGRAM(s) Oral at bedtime  sodium chloride 0.9%. 1000 milliLiter(s) (75 mL/Hr) IV Continuous <Continuous>  sucralfate suspension 1 Gram(s) Oral two times a day  zinc sulfate 220 milliGRAM(s) Oral daily    MEDICATIONS  (PRN):  dextrose Oral Gel 15 Gram(s) Oral once PRN Blood Glucose LESS THAN 70 milliGRAM(s)/deciliter  ondansetron Injectable 4 milliGRAM(s) IV Push every 6 hours PRN Nausea and/or Vomiting  risperiDONE   Tablet 1 milliGRAM(s) Oral two times a day PRN agitation    PRESENT SYMPTOMS: [ X]Unable to obtain due to poor mentation   Source if other than patient:  [ ]Family   [ ]Team     Pain: [ ]yes [ ]no  QOL impact -   Location -                    Aggravating factors -  Quality -  Radiation -  Timing-  Severity (0-10 scale):  Minimal acceptable level (0-10 scale):     CPOT:  2  https://www.sccm.org/getattachment/ogd05b66-6p3t-0i5t-3m6d-0202n5056b9m/Critical-Care-Pain-Observation-Tool-(CPOT)      Dyspnea:                           [ ]Mild [ ]Moderate [ ]Severe  Anxiety:                             [ ]Mild [ ]Moderate [ ]Severe  Fatigue:                             [ ]Mild [ ]Moderate [ ]Severe  Nausea:                             [ ]Mild [ ]Moderate [ ]Severe  Loss of appetite:              [ ]Mild [ ]Moderate [ ]Severe  Constipation:                    [ ]Mild [ ]Moderate [ ]Severe    Other Symptoms:  [ ]All other review of systems negative     Palliative Performance Status Version 2:      10   %    http://[email or web address]/files/news/palliative_performance_scale_[email or web address].pdf    PHYSICAL EXAM:  Vital Signs Last 24 Hrs  T(C): 36.3 (05 Aug 2022 12:59), Max: 36.3 (05 Aug 2022 12:59)  T(F): 97.3 (05 Aug 2022 12:59), Max: 97.3 (05 Aug 2022 12:59)  HR: 101 (05 Aug 2022 12:59) (91 - 108)  BP: 99/60 (05 Aug 2022 12:59) (99/60 - 129/60)  BP(mean): --  RR: 18 (05 Aug 2022 12:59) (14 - 19)  SpO2: 106% (05 Aug 2022 09:03) (94% - 106%)    GENERAL:  [ ]Alert  [ ]Oriented x   [ ]Lethargic  [X ]Cachexia  [ ]Unarousable  [ ]Verbal  [ X]Non-Verbal  Behavioral:   [ ] Anxiety  [ ] Delirium [ ] Agitation [X ] Calm   HEENT:  [ ]Normal   [X ]Dry mouth   [ X]ET Tube/Trach  [ ]Oral lesions  PULMONARY:   [ ]Clear [X ]Tachypnea  [ ]Audible excessive secretions   Vent via trach   CARDIOVASCULAR:    [X ]Regular [ ]Irregular [ ]Tachy  [ ]Kumar [ ]Murmur [ ]Other  GASTROINTESTINAL:  [ ]Soft  [ ]Distended   [ ]+BS  [ ]Non tender [ ]Tender  [ X]PEG [ ]OGT/ NGT  Last BM:   GENITOURINARY:  [ ]Normal [ ] Incontinent   [ ]Oliguria/Anuria   [ X]Brito  MUSCULOSKELETAL:   [ ]Normal   [ ]Weakness  [ X]Bed/Wheelchair bound [ ]Edema  NEUROLOGIC:   [ ]No focal deficits  [ X]Cognitive impairment  [ ]Dysphagia [ ]Dysarthria [ ]Paresis [ ]Other   SKIN:   [ ]Normal    [ ]Rash  [ X]Pressure ulcer(s)       Present on admission [X ]y [ ]n    CRITICAL CARE:  [ ] Shock Present  [ ]Septic [ ]Cardiogenic [ ]Neurologic [ ]Hypovolemic  [ ]  Vasopressors [ ]  Inotropes   [ ]Respiratory failure present [ X]Mechanical ventilation [ ]Non-invasive ventilatory support [ ]High flow  [ ]Acute  [ ]Chronic [ ]Hypoxic  [ ]Hypercarbic [ ]Other  [ ]Other organ failure     LABS: reviewed                        8.5    11.41 )-----------( 414      ( 05 Aug 2022 06:53 )             26.2   08-05    141  |  110  |  44<H>  ----------------------------<  76  4.7   |  23  |  <0.5<L>    Ca    7.9<L>      05 Aug 2022 06:53  Mg     2.9     08-05    TPro  5.5<L>  /  Alb  1.8<L>  /  TBili  0.5  /  DBili  x   /  AST  21  /  ALT  25  /  AlkPhos  180<H>  08-05        RADIOLOGY & ADDITIONAL STUDIES:  reviewed    < from: CT Abdomen and Pelvis w/ Oral Cont (07.28.22 @ 17:24) >      IMPRESSION:      Significantly limited study due to lack of intravenous contrast - much of   intra-abdominal contents cannot be evaluated.    Severe rectosigmoid fecal impaction. The rectum is distended to 12.8 cm.   Disimpaction is recommended.    Patchy left lower lobe pulmonary opacities, compatible with pneumonia.    Lower back/buttock ulceration with gas. Erosive changes of the underlying   sacrum and coccyx suggestive ofosteomyelitis.    There appears to be generalized mesenteric congestion/ascites.    --- End of Report ---    < end of copied text >    < from: 12 Lead ECG (08.04.22 @ 04:51) >  Ventricular Rate 103 BPM    Atrial Rate 103 BPM    P-R Interval 142 ms    QRS Duration 84 ms    Q-T Interval 304 ms    QTC Calculation(Bazett) 398 ms    P Axis 83 degrees    R Axis 31 degrees    T Axis 62 degrees    Diagnosis Line Sinus tachycardia  Nonspecific ST and T wave abnormality  Abnormal ECG    < end of copied text >        REFERRALS:   [ ]Chaplaincy  [ ]Hospice  [ ]Child Life  [ ]Social Work  [ ]Case management [ ]Holistic Therapy     Goals of Care Document:   - palliative social work had long conversation with sister of the patient today  - sister feels she would consider comfort care if he is going to pass away because she wants him to pass away comfortably and in her presence. she is upset with how things have turned out for him since he had the trach/PEG placed.   - pt is DNR already  - plan to FU Monday re: [facility]

## 2022-08-05 NOTE — PROGRESS NOTE ADULT - SUBJECTIVE AND OBJECTIVE BOX
SILVANO CALIXTO 57y Male  MRN#: [id number]   Hospital Day: 13d    SUBJECTIVE  Patient is a 57y old Male who presents with a chief complaint of placement (05 Aug 2022 07:30)  Currently admitted to medicine with the primary diagnosis of General medical exam      INTERVAL HPI AND OVERNIGHT EVENTS:  Patient was examined and seen at bedside. This morning he is resting comfortably in bed and reports no issues or overnight events.    OBJECTIVE  PAST MEDICAL & SURGICAL HISTORY  CVA (cerebral vascular accident)  1980&#x27;s x2   Residual left sided weakness    CAD (coronary artery disease)    Retinal detachment    Hypertension    High cholesterol    Diabetes    Anxiety    Seizure    History of corneal transplant  right   4/13/17      ALLERGIES:  No Known Allergies    MEDICATIONS:  STANDING MEDICATIONS  ascorbic acid 500 milliGRAM(s) Oral daily  busPIRone 5 milliGRAM(s) Oral <User Schedule>  cefepime   IVPB      cefepime   IVPB 2000 milliGRAM(s) IV Intermittent every 8 hours  chlorhexidine 2% Cloths 1 Application(s) Topical <User Schedule>  collagenase Ointment 1 Application(s) Topical two times a day  Dakins Solution - 1/2 Strength 1 Application(s) Topical two times a day  dextrose 5%. 1000 milliLiter(s) IV Continuous <Continuous>  dextrose 5%. 1000 milliLiter(s) IV Continuous <Continuous>  dextrose 50% Injectable 25 Gram(s) IV Push once  dextrose 50% Injectable 12.5 Gram(s) IV Push once  dextrose 50% Injectable 25 Gram(s) IV Push once  folic acid 1 milliGRAM(s) Oral daily  glucagon  Injectable 1 milliGRAM(s) IntraMuscular once  heparin   Injectable 5000 Unit(s) SubCutaneous every 8 hours  insulin lispro (ADMELOG) corrective regimen sliding scale   SubCutaneous every 6 hours  lactulose Syrup 10 Gram(s) Oral two times a day  levETIRAcetam  IVPB 500 milliGRAM(s) IV Intermittent every 12 hours  multivitamin 1 Tablet(s) Oral daily  pantoprazole    Tablet 40 milliGRAM(s) Oral before breakfast  simvastatin 10 milliGRAM(s) Oral at bedtime  sodium chloride 0.9%. 1000 milliLiter(s) IV Continuous <Continuous>  sucralfate suspension 1 Gram(s) Oral two times a day  zinc sulfate 220 milliGRAM(s) Oral daily    PRN MEDICATIONS  dextrose Oral Gel 15 Gram(s) Oral once PRN  ondansetron Injectable 4 milliGRAM(s) IV Push every 6 hours PRN  risperiDONE   Tablet 1 milliGRAM(s) Oral two times a day PRN      VITAL SIGNS: Last 24 Hours  T(C): 36 (05 Aug 2022 05:43), Max: 36.2 (04 Aug 2022 12:10)  T(F): 96.8 (05 Aug 2022 05:43), Max: 97.2 (04 Aug 2022 12:10)  HR: 98 (05 Aug 2022 09:03) (91 - 108)  BP: 129/60 (05 Aug 2022 05:43) (112/63 - 129/60)  BP(mean): --  RR: 14 (05 Aug 2022 05:43) (14 - 19)  SpO2: 106% (05 Aug 2022 09:03) (94% - 106%)    LABS:                        8.5    11.41 )-----------( 414      ( 05 Aug 2022 06:53 )             26.2     08-05    141  |  110  |  44<H>  ----------------------------<  76  4.7   |  23  |  <0.5<L>    Ca    7.9<L>      05 Aug 2022 06:53  Mg     2.9     08-05    TPro  5.5<L>  /  Alb  1.8<L>  /  TBili  0.5  /  DBili  x   /  AST  21  /  ALT  25  /  AlkPhos  180<H>  08-05                  RADIOLOGY:      PHYSICAL EXAM:  CONSTITUTIONAL: No acute distress, on trach and peg, does not communicate so exam limited  PULMONARY: decreased air entry bilaterally, scattered rhonchi  CARDIOVASCULAR: Regular rate and rhythm; no murmurs, rubs, or gallops  GASTROINTESTINAL: soft, non-distended; bowel sounds present, patient does not grimace upon palpation (abdominal exam stable), fecalith output draining from peg seen today as well (on suction)  MUSCULOSKELETAL: 2+ peripheral pulses; no clubbing, no cyanosis, no edema  SKIN: dry and scaly skin diffusely (improving on wound care), sacral bed ulcers

## 2022-08-05 NOTE — PROGRESS NOTE ADULT - ATTENDING COMMENTS
Patient seen at bedside. Changes made within note as well. Discussed with medical team that includes resident(s), medical student(s), nursing staff, case management.   56 yo M w h/o HTN, HLD, DM, CAD, CVA (x2 w residual weakness), s/p trach [facility] Patient's sister (HCP) [facility] not [facility] - describing it as unsafe d/t no side rails on bed.  consulted and patient admitted until adequate facility established.  Patient seen at bedside. GI and surgery following. patient is currently NPO. as per surgery patient is a poor surgical candidate.   follow cultures. currently on cefepime. follow ID recommendations. follow sensitivities.   discussed with ID. They are going to see patient today. tried calling family. unable to reach. Palliative following. Keep NPO. change IV fluids to dextrose and unable to start tube feeding due to possible obstruction. sick patient with high risk of clinical deterioration. family to decide regarding comfort measures. follow palliative and ID recommendations.

## 2022-08-05 NOTE — CONSULT NOTE ADULT - CONSULT REQUESTED DATE/TIME
24-Jul-2022 08:18
02-Aug-2022 10:24
25-Jul-2022 11:34
03-Aug-2022 09:52
27-Jul-2022 14:43
30-Jul-2022 10:25
30-Jul-2022 10:30
05-Aug-2022 14:11

## 2022-08-05 NOTE — PROGRESS NOTE ADULT - SUBJECTIVE AND OBJECTIVE BOX
Patient is a 57y old  Male who presents with a chief complaint of placement (04 Aug 2022 18:00)        Over Night Events:  Tolerating PS.  Of pressors.          ROS:     All ROS are negative except HPI         PHYSICAL EXAM    ICU Vital Signs Last 24 Hrs  T(C): 36 (05 Aug 2022 05:43), Max: 36.2 (04 Aug 2022 12:10)  T(F): 96.8 (05 Aug 2022 05:43), Max: 97.2 (04 Aug 2022 12:10)  HR: 99 (05 Aug 2022 05:43) (91 - 108)  BP: 129/60 (05 Aug 2022 05:43) (112/63 - 129/60)  BP(mean): --  ABP: --  ABP(mean): --  RR: 14 (05 Aug 2022 05:43) (14 - 19)  SpO2: 99% (05 Aug 2022 05:43) (94% - 100%)    O2 Parameters below as of 04 Aug 2022 21:23  Patient On (Oxygen Delivery Method): ventilator            CONSTITUTIONAL:  Ill appearing in NAD    ENT:   Airway patent,   Mouth with normal mucosa.   Trach     EYES:   Pupils equal,   Round and reactive to light.    CARDIAC:   Normal rate,   Regular rhythm.        RESPIRATORY:   No wheezing  Bilateral BS  Normal chest expansion  Not tachypneic,  No use of accessory muscles    GASTROINTESTINAL:  Abdomen soft,   Non-tender,   No guarding,   + BS    MUSCULOSKELETAL:   Contracted   No clubbing, cyanosis    NEUROLOGICAL:   Opens eyes  Does not follow commands       SKIN:   Skin normal color for race,   No evidence of rash.    PSYCHIATRIC:   No apparent risk to self or others.        08-04-22 @ 07:01  -  08-05-22 @ 07:00  --------------------------------------------------------  IN:  Total IN: 0 mL    OUT:    Blood Loss (mL): 2 mL  Total OUT: 2 mL    Total NET: -2 mL          LABS:                            8.0    15.65 )-----------( 426      ( 04 Aug 2022 12:16 )             24.7                                               08-04    138  |  106  |  54<H>  ----------------------------<  121<H>  4.7   |  21  |  <0.5<L>    Ca    7.7<L>      04 Aug 2022 12:16  Mg     3.4     08-04    TPro  5.3<L>  /  Alb  2.0<L>  /  TBili  0.6  /  DBili  x   /  AST  44<H>  /  ALT  35  /  AlkPhos  199<H>  08-04                                                                                           LIVER FUNCTIONS - ( 04 Aug 2022 12:16 )  Alb: 2.0 g/dL / Pro: 5.3 g/dL / ALK PHOS: 199 U/L / ALT: 35 U/L / AST: 44 U/L / GGT: x                                                                                               Mode: AC/ CMV (Assist Control/ Continuous Mandatory Ventilation)  RR (machine): 14  TV (machine): 400  FiO2: 40  PEEP: 5  ITime: 1  MAP: 6  PIP: 20                                          MEDICATIONS  (STANDING):  ascorbic acid 500 milliGRAM(s) Oral daily  busPIRone 5 milliGRAM(s) Oral <User Schedule>  cefepime   IVPB      cefepime   IVPB 2000 milliGRAM(s) IV Intermittent every 8 hours  chlorhexidine 2% Cloths 1 Application(s) Topical <User Schedule>  collagenase Ointment 1 Application(s) Topical two times a day  Dakins Solution - 1/2 Strength 1 Application(s) Topical two times a day  dextrose 5%. 1000 milliLiter(s) (100 mL/Hr) IV Continuous <Continuous>  dextrose 5%. 1000 milliLiter(s) (50 mL/Hr) IV Continuous <Continuous>  dextrose 50% Injectable 25 Gram(s) IV Push once  dextrose 50% Injectable 12.5 Gram(s) IV Push once  dextrose 50% Injectable 25 Gram(s) IV Push once  folic acid 1 milliGRAM(s) Oral daily  glucagon  Injectable 1 milliGRAM(s) IntraMuscular once  heparin   Injectable 5000 Unit(s) SubCutaneous every 8 hours  insulin lispro (ADMELOG) corrective regimen sliding scale   SubCutaneous every 6 hours  lactulose Syrup 10 Gram(s) Oral two times a day  levETIRAcetam  IVPB 500 milliGRAM(s) IV Intermittent every 12 hours  multivitamin 1 Tablet(s) Oral daily  pantoprazole    Tablet 40 milliGRAM(s) Oral before breakfast  simvastatin 10 milliGRAM(s) Oral at bedtime  sucralfate suspension 1 Gram(s) Oral two times a day  zinc sulfate 220 milliGRAM(s) Oral daily    MEDICATIONS  (PRN):  dextrose Oral Gel 15 Gram(s) Oral once PRN Blood Glucose LESS THAN 70 milliGRAM(s)/deciliter  ondansetron Injectable 4 milliGRAM(s) IV Push every 6 hours PRN Nausea and/or Vomiting  risperiDONE   Tablet 1 milliGRAM(s) Oral two times a day PRN agitation      New X-rays reviewed:                                                                                  ECHO

## 2022-08-05 NOTE — PROGRESS NOTE ADULT - ASSESSMENT
IMPRESSION:    Chronic resp failure sp PEG/ trach  HO CVA  HO seizure  Functional quadriplegia  Stool impaction / Constipation   LLL opacity, possible aspiration.  CR Pseudomonas in DTA      PLAN:    CNS: Avoid CNS depressant    HEENT:  Oral care    PULMONARY:  HOB @ 45 degrees, aspiration precaution, keep Sao2 92 to 96%.  Pulmonary toilet.  PS wean as tolerated     CARDIOVASCULAR: Avoid overload.  Gentle hydration while NPO    GI: GI prophylaxis                          Bowel regimen fleet enema.  GI follow up.  COlorectal follow up appreciated     RENAL:  F/u  lytes.  Correct as needed.    INFECTIOUS DISEASE: FU nasal MRSA.  ABX per ID.  ID follow up appreciated.  Consider Cipro and ampicillin     HEMATOLOGICAL:  DVT prophylaxis.    ENDOCRINE:  Follow up FS.  Insulin protocol if needed.    MUSCULOSKELETAL:  Wound care.      Vent unit     DNR    Very poor prognosis overall

## 2022-08-05 NOTE — PROGRESS NOTE ADULT - ASSESSMENT
HPI:  58 yo M w h/o HTN, HLD, DM, CAD, CVA (x2 w residual weakness), s/p trach and peg presents from [facility] for placement at new facility. Patient's sister (HCP) apparently did not like care at [facility] - describing it as unsafe d/t no side rails on bed. Pt vent dependent      PROBLEMS  #Suspected sacral osteomyelitis - likely chronic   - CT with lower back/buttock ulceration with gas. Erosive changes of the underlying sacrum and coccyx suggestive of osteomyelitis.   - bone exposed - no erythema/odor -- full thickness wound   - wound Cx CR Pseudomonas and VR Enterococcus faecalis     #VAP  - Sputum Cx 8/1 CR Pseudomonas     #Colonic Impactiion    PLAN  - noted cultures from sputum and wound Cx -- these may be colonizers for now -- sacral ulcer likely with chronic OM and antibiotics not beneficial  - can stop antibiotics on 8/7  - monitor WBC and fever curve  - if worsening vent settings repeat CXR     Please call or message on Microsoft Teams if with any questions.  Spectra [id number]

## 2022-08-05 NOTE — PROGRESS NOTE ADULT - ASSESSMENT
58 yo M w h/o HTN, HLD, DM, CAD, CVA (x2 w residual weakness), s/p trach and peg presents from [facility] for placement at new facility. Patient's sister (HCP) apparently did not like care at [facility] - describing it as unsafe d/t no side rails on bed.  consulted and patient admitted until adequate facility established.      #Hyponatremia- resolved sodium 141  - Working Dx is SIADH  - stable. c/w FW restriction.  - 08/05: started on IVF since NPO    #Abdominal distention  - Severe rectosigmoid fecal impaction. The rectum is distended to 12.8 cm => disimpaction on 07/29  Improving. Now  lactulose BID. enema QD. and passing BM  - 08/02: Fecalith material seen out of PEG tube, stopped peg feedings and all meds, surgery consulted, plan is to assess for possible diversion colostomy, sister contacted, wishes to discuss plan in person  - 08/03: No fecalith material seen from peg today, KUB with peg studies showing patet peg tube with no extravasation, surgery plan is no surgery at the moment as the patient is passing BM  - 08/04: Patient vomited stool material overnight, PEG feeding stopped and attached to suction (draining feces), surgery on board pending plan  - 08/05: patient NPO, still draining fecalith material from PEG, surgery believe he is poor surgical candidate, ID plan is to keep cefepime (Dr Urrutia believes the cultures could be colonizers), palliative team consulted    #CT scan findings:  - Patchy left lower lobe pulmonary opacities, compatible with pneumonia.  - Lower back/buttock ulceration with gas. Erosive changes of the underlying sacrum and coccyx suggestive of osteomyelitis. => ID and Burn consulted  - ID plan: cefepime, cultures from sacral wound showing enterococcus and from DTA showing pseudomonas => pending full plan from ID  - Burn plan: s/p bedside debridement on 07/29, continue with LWC.    #Disposition in trach/peg frail patient  - HCP unhappy w [facility] > f/u SW/CM on adequate dispo. ?LTAC    #Trach inserted in June at [facility] (for aspiration PNA)  NOTE: patient not chronically on Vent. START PS TRIALS from 8/1.    #HTN  - on lisinopril 30mg qd and amlodipine 10mg qd at home  - Meds on hold and BP controlled    #CAD / HLD  - on simvastatin 10mg qhs and asa 81mg qd    #Seziure disorder  - on carbemazepine 100mg/5ml syrup bid  - 08/02: switched to IV keppra since peg tube usage on hold    #Anxiety / Agitation  - on buspirone 5mg qd, risperidone 1mg bid prn    #GERD  - on esomeprazole suspension 40mg bid, sulcrafate 1g bid    #Folate and vitamin deficiency  - on folic acid and mvi    DVT ppx: heparin sub q  GI ppx: ni  Diet: NPO - tube feeds on hold and meds on hold  Activity: bedbound    DNR/DNI - molst in chart

## 2022-08-05 NOTE — CONSULT NOTE ADULT - ASSESSMENT
57yMale with PMH including HTN, HLD, DM, CAD, CVA (x2 w residual weakness), s/p trach and peg ( in May 2022) , being admitted from [facility] for placement because sister felt the care there was unsafe. Pt was found to have abdominal distention, large stool burden, with stool coming from PEG and vomiting some stool. Pt not a candidate for surgery so currently being medically managed. pt also with OM of the sacrum.     Comfort care and vent withdrawal discussed in detail with sister, she will consider CMO. Plan to see how things go over week and FU  monday re: plan of care. She understands how poorly he is doing.       MEDD (morphine equivalent daily dose): 0      See Recs below.    Please call [phone] with questions or concerns 24/7.   We will continue to follow.     Discussed with primary MD.   57yMale with PMH including HTN, HLD, DM, CAD, CVA (x2 w residual weakness), s/p trach and peg ( in May 2022) , being admitted from [facility] for placement because sister felt the care there was unsafe. Pt was found to have abdominal distention, large stool burden, with stool coming from PEG and vomiting some stool. Pt not a candidate for surgery so currently being medically managed. pt also with OM of the sacrum.     Comfort care and vent withdrawal discussed in detail with sister. Per sister, if his prognosis is very poor and he is likely going to die, she wants him comfortable and would strongly consider CMO.   Plan to see how things go over week and FU Monday and likely will set up family meeting Monday afternoon. Notified primary team- will need internal medicine there to convey prognosis/medical updates.     MEDD (morphine equivalent daily dose): 0      See Recs below.    Please call [phone] with questions or concerns 24/7.   We will continue to follow.     Discussed with primary MD.

## 2022-08-05 NOTE — PROGRESS NOTE ADULT - SUBJECTIVE AND OBJECTIVE BOX
SILVANO CALIXTO  57y, Male  Allergy: No Known Allergies      LOS  13d    CHIEF COMPLAINT: placement (05 Aug 2022 14:10)      INTERVAL EVENTS/HPI  - No acute events overnight  - T(F): , Max: 97.3 (08-05-22 @ 12:59)  - WBC count stable, improved  - vent settings stable   - WBC Count: 11.41 (08-05-22 @ 06:53)  WBC Count: 15.65 (08-04-22 @ 12:16)     - Creatinine, Serum: <0.5 (08-05-22 @ 06:53)  Creatinine, Serum: <0.5 (08-04-22 @ 12:16)       ROS  unable to obtain history secondary to patient's mental status and/or sedation      VITALS:  T(F): 97.3, Max: 97.3 (08-05-22 @ 12:59)  HR: 101  BP: 99/60  RR: 18Vital Signs Last 24 Hrs  T(C): 36.3 (05 Aug 2022 12:59), Max: 36.3 (05 Aug 2022 12:59)  T(F): 97.3 (05 Aug 2022 12:59), Max: 97.3 (05 Aug 2022 12:59)  HR: 101 (05 Aug 2022 12:59) (91 - 108)  BP: 99/60 (05 Aug 2022 12:59) (99/60 - 129/60)  BP(mean): --  RR: 18 (05 Aug 2022 12:59) (14 - 19)  SpO2: 106% (05 Aug 2022 09:03) (94% - 106%)    Parameters below as of 04 Aug 2022 21:23  Patient On (Oxygen Delivery Method): ventilator        PHYSICAL EXAM:  Gen: vent/trach   HEENT: Normocephalic, atraumatic  Neck: supple, no lymphadenopathy  CV: Regular rate & regular rhythm  Lungs: decreased BS at bases, no fremitus  Abdomen: Soft, BS present  Ext: Warm, well perfused  Neuro: non focal, awake  Skin: no rash, no erythema  Lines: no phlebitis    FH: Non-contributory  Social Hx: Non-contributory    TESTS & MEASUREMENTS:                        8.5    11.41 )-----------( 414      ( 05 Aug 2022 06:53 )             26.2     08-05    141  |  110  |  44<H>  ----------------------------<  76  4.7   |  23  |  <0.5<L>    Ca    7.9<L>      05 Aug 2022 06:53  Mg     2.9     08-05    TPro  5.5<L>  /  Alb  1.8<L>  /  TBili  0.5  /  DBili  x   /  AST  21  /  ALT  25  /  AlkPhos  180<H>  08-05      LIVER FUNCTIONS - ( 05 Aug 2022 06:53 )  Alb: 1.8 g/dL / Pro: 5.5 g/dL / ALK PHOS: 180 U/L / ALT: 25 U/L / AST: 21 U/L / GGT: x               Culture - Other (collected 08-01-22 @ 20:35)  Source: Wound Wound  Final Report (08-03-22 @ 17:56):    Numerous Enterococcus faecalis (vancomycin resistant)    Rare Pseudomonas aeruginosa (Carbapenem Resistant)  Organism: Enterococcus faecalis (vancomycin resistant)  Pseudomonas aeruginosa (Carbapenem Resistant) (08-03-22 @ 17:56)  Organism: Pseudomonas aeruginosa (Carbapenem Resistant) (08-03-22 @ 17:56)      -  Amikacin: S <=16      -  Aztreonam: S 8      -  Cefepime: R >16      -  Ceftazidime: I 16      -  Ceftazidime/Avibactam: S <=4      -  Ceftolozane/tazobactam: S <=2      -  Ciprofloxacin: S 0.5      -  Gentamicin: I 8      -  Imipenem: R >8      -  Levofloxacin: I 2      -  Meropenem: R 8      -  Piperacillin/Tazobactam: I 64      -  Tobramycin: S <=2      Method Type: LETTY  Organism: Enterococcus faecalis (vancomycin resistant) (08-03-22 @ 17:56)      -  Ampicillin: S <=2 Predicts results to ampicillin/sulbactam, amoxacillin-clavulanate and  piperacillin-tazobactam.      -  Daptomycin: S 1      -  Levofloxacin: R >4      -  Linezolid: S 1      -  Tetra/Doxy: R >8      -  Vancomycin: R >16      Method Type: LETTY    Culture - Sputum (collected 08-01-22 @ 20:35)  Source: Trach Asp Tracheal Aspirate  Gram Stain (08-02-22 @ 06:45):    Numerous polymorphonuclear leukocytes seen per low power field    Rare Squamous epithelial cells seen per low power field    Moderate Gram Negative Rods seen per oil power field  Final Report (08-03-22 @ 15:50):    Moderate Pseudomonas aeruginosa (Carbapenem Resistant)    Normal Respiratory Analia absent  Organism: Pseudomonas aeruginosa (Carbapenem Resistant) (08-03-22 @ 15:50)  Organism: Pseudomonas aeruginosa (Carbapenem Resistant) (08-03-22 @ 15:50)      -  Amikacin: S <=16      -  Aztreonam: S 8      -  Cefepime: I 16      -  Ceftazidime: I 16      -  Ceftazidime/Avibactam: S <=4      -  Ceftolozane/tazobactam: S <=2      -  Ciprofloxacin: S 0.5      -  Gentamicin: I 8      -  Imipenem: R >8      -  Levofloxacin: I 2      -  Meropenem: R 8      -  Piperacillin/Tazobactam: I 64      -  Tobramycin: S <=2      Method Type: [facility]            INFECTIOUS DISEASES TESTING  COVID-19 PCR: NotDetec (08-04-22 @ 06:30)  Procalcitonin, Serum: 0.06 (07-30-22 @ 11:49)  COVID-19 PCR: NotDetec (07-29-22 @ 07:23)  COVID-19 PCR: NotDetec (07-23-22 @ 02:09)  MRSA PCR Result.: Negative (03-15-22 @ 23:29)  Procalcitonin, Serum: 0.06 (03-15-22 @ 01:00)  COVID-19 PCR: NotDetec (03-14-22 @ 18:03)  COVID-19 PCR: NotDetec (02-03-22 @ 14:40)  Procalcitonin, Serum: 0.16 (01-30-22 @ 08:14)  COVID-19 PCR: NotDetec (01-28-22 @ 11:35)  COVID-19 PCR: NotDetec (08-30-21 @ 11:10)      INFLAMMATORY MARKERS  C-Reactive Protein, Serum: 167.8 mg/L (08-02-22 @ 09:16)  C-Reactive Protein, Serum: 117.5 mg/L (07-30-22 @ 11:49)  C-Reactive Protein, Serum: 98 mg/L (03-15-22 @ 01:00)      RADIOLOGY & ADDITIONAL TESTS:  I have personally reviewed the last available Chest xray  CXR      CT      CARDIOLOGY TESTING  12 Lead ECG:   Ventricular Rate 103 BPM    Atrial Rate 103 BPM    P-R Interval 142 ms    QRS Duration 84 ms    Q-T Interval 304 ms    QTC Calculation(Bazett) 398 ms    P Axis 83 degrees    R Axis 31 degrees    T Axis 62 degrees    Diagnosis Line Sinus tachycardia  Nonspecific ST and T wave abnormality  Abnormal ECG    Confirmed by Juancho Perla ([id number]) on 8/4/2022 7:13:03 AM (08-04-22 @ 04:51)      MEDICATIONS  ascorbic acid 500 Oral daily  busPIRone 5 Oral <User Schedule>  cefepime   IVPB     cefepime   IVPB 2000 IV Intermittent every 8 hours  chlorhexidine 2% Cloths 1 Topical <User Schedule>  collagenase Ointment 1 Topical two times a day  Dakins Solution - 1/2 Strength 1 Topical two times a day  dextrose 5% + sodium chloride 0.45%. 1000 IV Continuous <Continuous>  dextrose 5%. 1000 IV Continuous <Continuous>  dextrose 5%. 1000 IV Continuous <Continuous>  dextrose 50% Injectable 25 IV Push once  dextrose 50% Injectable 12.5 IV Push once  dextrose 50% Injectable 25 IV Push once  folic acid 1 Oral daily  glucagon  Injectable 1 IntraMuscular once  heparin   Injectable 5000 SubCutaneous every 8 hours  insulin lispro (ADMELOG) corrective regimen sliding scale  SubCutaneous every 6 hours  lactulose Syrup 10 Oral two times a day  levETIRAcetam  IVPB 500 IV Intermittent every 12 hours  multivitamin 1 Oral daily  pantoprazole    Tablet 40 Oral before breakfast  simvastatin 10 Oral at bedtime  sucralfate suspension 1 Oral two times a day  zinc sulfate 220 Oral daily      WEIGHT  Weight (kg): 53.7 (03-16-22 @ 19:50)  Creatinine, Serum: <0.5 mg/dL (08-05-22 @ 06:53)      ANTIBIOTICS:  cefepime   IVPB      cefepime   IVPB 2000 milliGRAM(s) IV Intermittent every 8 hours      All available historical records have been reviewed

## 2022-08-05 NOTE — CONSULT NOTE ADULT - PROVIDER SPECIALTY LIST ADULT
Gastroenterology
Pulmonology
Nutrition Support
Colorectal Surgery
Nephrology
Burn
Infectious Disease
Palliative Care

## 2022-08-06 NOTE — PROGRESS NOTE ADULT - SUBJECTIVE AND OBJECTIVE BOX
SILVANO CALIXTO 57y Male  MRN#: [id number]   Hospital Day: 14d    SUBJECTIVE  Patient is a 57y old Male who presents with a chief complaint of placement (06 Aug 2022 10:10)  Currently admitted to medicine with the primary diagnosis of General medical exam      INTERVAL HPI AND OVERNIGHT EVENTS:  Patient was examined and seen at bedside. This morning he is resting comfortably in bed and reports no issues or overnight events.    OBJECTIVE  PAST MEDICAL & SURGICAL HISTORY  CVA (cerebral vascular accident)  1980&#x27;s x2   Residual left sided weakness    CAD (coronary artery disease)    Retinal detachment    Hypertension    High cholesterol    Diabetes    Anxiety    Seizure    History of corneal transplant  right   4/13/17      ALLERGIES:  No Known Allergies    MEDICATIONS:  STANDING MEDICATIONS  ascorbic acid 500 milliGRAM(s) Oral daily  bisacodyl Suppository 10 milliGRAM(s) Rectal once  busPIRone 5 milliGRAM(s) Oral <User Schedule>  cefepime   IVPB      cefepime   IVPB 2000 milliGRAM(s) IV Intermittent every 8 hours  chlorhexidine 2% Cloths 1 Application(s) Topical <User Schedule>  collagenase Ointment 1 Application(s) Topical two times a day  Dakins Solution - 1/2 Strength 1 Application(s) Topical two times a day  dextrose 5% + sodium chloride 0.45%. 1000 milliLiter(s) IV Continuous <Continuous>  dextrose 5%. 1000 milliLiter(s) IV Continuous <Continuous>  dextrose 5%. 1000 milliLiter(s) IV Continuous <Continuous>  dextrose 50% Injectable 25 Gram(s) IV Push once  dextrose 50% Injectable 12.5 Gram(s) IV Push once  dextrose 50% Injectable 25 Gram(s) IV Push once  folic acid 1 milliGRAM(s) Oral daily  glucagon  Injectable 1 milliGRAM(s) IntraMuscular once  heparin   Injectable 5000 Unit(s) SubCutaneous every 8 hours  insulin lispro (ADMELOG) corrective regimen sliding scale   SubCutaneous every 6 hours  lactulose Syrup 10 Gram(s) Oral two times a day  levETIRAcetam  IVPB 500 milliGRAM(s) IV Intermittent every 12 hours  mineral oil enema 133 milliLiter(s) Rectal once  pantoprazole    Tablet 40 milliGRAM(s) Oral before breakfast  saline laxative (FLEET) Rectal Enema 1 Enema Rectal once  simvastatin 10 milliGRAM(s) Oral at bedtime  sucralfate suspension 1 Gram(s) Oral two times a day  zinc sulfate 220 milliGRAM(s) Oral daily    PRN MEDICATIONS  dextrose Oral Gel 15 Gram(s) Oral once PRN  ondansetron Injectable 4 milliGRAM(s) IV Push every 6 hours PRN  risperiDONE   Tablet 1 milliGRAM(s) Oral two times a day PRN      VITAL SIGNS: Last 24 Hours  T(C): 35.6 (06 Aug 2022 05:00), Max: 36.3 (05 Aug 2022 12:59)  T(F): 96 (06 Aug 2022 05:00), Max: 97.3 (05 Aug 2022 12:59)  HR: 95 (06 Aug 2022 08:56) (85 - 101)  BP: 95/58 (06 Aug 2022 05:00) (95/58 - 99/60)  BP(mean): --  RR: 18 (06 Aug 2022 05:00) (18 - 18)  SpO2: 100% (06 Aug 2022 08:56) (100% - 100%)    LABS:                        8.2    17.20 )-----------( 360      ( 06 Aug 2022 09:52 )             25.9     08-06    141  |  111<H>  |  37<H>  ----------------------------<  104<H>  4.3   |  21  |  <0.5<L>    Ca    7.5<L>      06 Aug 2022 09:52  Mg     2.9     08-05    TPro  4.9<L>  /  Alb  1.6<L>  /  TBili  0.3  /  DBili  x   /  AST  18  /  ALT  17  /  AlkPhos  166<H>  08-06                  RADIOLOGY:      PHYSICAL EXAM:  CONSTITUTIONAL: No acute distress, on trach and peg, does not communicate so exam limited  PULMONARY: decreased air entry bilaterally, scattered rhonchi  CARDIOVASCULAR: Regular rate and rhythm; no murmurs, rubs, or gallops  GASTROINTESTINAL: soft, non-distended; bowel sounds present, patient does not grimace upon palpation (abdominal exam stable), fecalith output draining from peg seen today as well (on suction)  MUSCULOSKELETAL: 2+ peripheral pulses; no clubbing, no cyanosis, no edema  SKIN: dry and scaly skin diffusely (improving on wound care), sacral bed ulcers

## 2022-08-06 NOTE — PROGRESS NOTE ADULT - SUBJECTIVE AND OBJECTIVE BOX
Patient is a 57y old  Male who presents with a chief complaint of placement (05 Aug 2022 16:38)        Over Night Events:  On MV>  Tolerating PS         ROS:     All ROS are negative except HPI         PHYSICAL EXAM    ICU Vital Signs Last 24 Hrs  T(C): 35.6 (06 Aug 2022 05:00), Max: 36.3 (05 Aug 2022 12:59)  T(F): 96 (06 Aug 2022 05:00), Max: 97.3 (05 Aug 2022 12:59)  HR: 85 (06 Aug 2022 07:01) (85 - 101)  BP: 95/58 (06 Aug 2022 05:00) (95/58 - 99/60)  BP(mean): --  ABP: --  ABP(mean): --  RR: 18 (06 Aug 2022 05:00) (18 - 18)  SpO2: 100% (06 Aug 2022 07:01) (100% - 106%)        CONSTITUTIONAL:  Ill appearing in  NAD    ENT:   Airway patent,   Mouth with normal mucosa.       EYES:   Pupils equal,   Round and reactive to light.    CARDIAC:   Normal rate,   Regular rhythm.          RESPIRATORY:   No wheezing  Bilateral BS  Normal chest expansion  Not tachypneic,  No use of accessory muscles    GASTROINTESTINAL:  Abdomen soft,   Non-tender,   No guarding,   + BS    MUSCULOSKELETAL:   Contracted   No clubbing, cyanosis    NEUROLOGICAL:   Opens eyes   DOes not follow commands     SKIN:   Skin normal color for race,   No evidence of rash.    PSYCHIATRIC:    No apparent risk to self or others.      08-06-22 @ 07:01  -  08-06-22 @ 07:41  --------------------------------------------------------  IN:  Total IN: 0 mL    OUT:    Incontinent per Condom Catheter (mL): 300 mL  Total OUT: 300 mL    Total NET: -300 mL          LABS:                            8.5    11.41 )-----------( 414      ( 05 Aug 2022 06:53 )             26.2                                               08-05    141  |  110  |  44<H>  ----------------------------<  76  4.7   |  23  |  <0.5<L>    Ca    7.9<L>      05 Aug 2022 06:53  Mg     2.9     08-05    TPro  5.5<L>  /  Alb  1.8<L>  /  TBili  0.5  /  DBili  x   /  AST  21  /  ALT  25  /  AlkPhos  180<H>  08-05                                                                                           LIVER FUNCTIONS - ( 05 Aug 2022 06:53 )  Alb: 1.8 g/dL / Pro: 5.5 g/dL / ALK PHOS: 180 U/L / ALT: 25 U/L / AST: 21 U/L / GGT: x                                                                                               Mode: AC/ CMV (Assist Control/ Continuous Mandatory Ventilation)  RR (machine): 14  TV (machine): 400  FiO2: 40  PS: 5  MAP: 6  PIP: 12                                          MEDICATIONS  (STANDING):  ascorbic acid 500 milliGRAM(s) Oral daily  busPIRone 5 milliGRAM(s) Oral <User Schedule>  cefepime   IVPB      cefepime   IVPB 2000 milliGRAM(s) IV Intermittent every 8 hours  chlorhexidine 2% Cloths 1 Application(s) Topical <User Schedule>  collagenase Ointment 1 Application(s) Topical two times a day  Dakins Solution - 1/2 Strength 1 Application(s) Topical two times a day  dextrose 5% + sodium chloride 0.45%. 1000 milliLiter(s) (75 mL/Hr) IV Continuous <Continuous>  dextrose 5%. 1000 milliLiter(s) (100 mL/Hr) IV Continuous <Continuous>  dextrose 5%. 1000 milliLiter(s) (50 mL/Hr) IV Continuous <Continuous>  dextrose 50% Injectable 25 Gram(s) IV Push once  dextrose 50% Injectable 12.5 Gram(s) IV Push once  dextrose 50% Injectable 25 Gram(s) IV Push once  folic acid 1 milliGRAM(s) Oral daily  glucagon  Injectable 1 milliGRAM(s) IntraMuscular once  heparin   Injectable 5000 Unit(s) SubCutaneous every 8 hours  insulin lispro (ADMELOG) corrective regimen sliding scale   SubCutaneous every 6 hours  lactulose Syrup 10 Gram(s) Oral two times a day  levETIRAcetam  IVPB 500 milliGRAM(s) IV Intermittent every 12 hours  multivitamin 1 Tablet(s) Oral daily  pantoprazole    Tablet 40 milliGRAM(s) Oral before breakfast  simvastatin 10 milliGRAM(s) Oral at bedtime  sucralfate suspension 1 Gram(s) Oral two times a day  zinc sulfate 220 milliGRAM(s) Oral daily    MEDICATIONS  (PRN):  dextrose Oral Gel 15 Gram(s) Oral once PRN Blood Glucose LESS THAN 70 milliGRAM(s)/deciliter  ondansetron Injectable 4 milliGRAM(s) IV Push every 6 hours PRN Nausea and/or Vomiting  risperiDONE   Tablet 1 milliGRAM(s) Oral two times a day PRN agitation      New X-rays reviewed:                                                                                  ECHO    CXR interpreted by me:

## 2022-08-06 NOTE — PROGRESS NOTE ADULT - ASSESSMENT
58 yo M w h/o HTN, HLD, DM, CAD, CVA (x2 w residual weakness), s/p trach and peg presents from [facility] for placement at new facility. Patient's sister (HCP) apparently did not like care at [facility] - describing it as unsafe d/t no side rails on bed.  consulted and patient admitted until adequate facility established.      #Hyponatremia- resolved sodium 141  - Working Dx is SIADH  - stable. c/w FW restriction.  - 08/05: started on IVF since NPO    #Abdominal distention  - Severe rectosigmoid fecal impaction. The rectum is distended to 12.8 cm => disimpaction on 07/29  Improving. Now  lactulose BID. enema QD. and passing BM  - 08/02: Fecalith material seen out of PEG tube, stopped peg feedings and all meds, surgery consulted, plan is to assess for possible diversion colostomy, sister contacted, wishes to discuss plan in person  - 08/03: No fecalith material seen from peg today, KUB with peg studies showing patet peg tube with no extravasation, surgery plan is no surgery at the moment as the patient is passing BM  - 08/04: Patient vomited stool material overnight, PEG feeding stopped and attached to suction (draining feces), surgery on board pending plan  - 08/05: patient NPO, still draining fecalith material from PEG, surgery believe he is poor surgical candidate, ID plan is to keep cefepime (Dr Urrutia believes the cultures could be colonizers), palliative team consulted  08/06: - 08/05: patient NPO, still draining fecalith material from PEG, will c/w lactulose and increase the fleet enemas as patient still has stool impaction seen on KUB, surgery believe he is poor surgical candidate, ID plan is to dc cefepime tomorrow (08/09) , palliative team consulted and following recs    #CT scan findings:  - Patchy left lower lobe pulmonary opacities, compatible with pneumonia.  - Lower back/buttock ulceration with gas. Erosive changes of the underlying sacrum and coccyx suggestive of osteomyelitis. => ID and Burn consulted  - Burn plan: s/p bedside debridement on 07/29, continue with LWC.    #Disposition in trach/peg frail patient  - HCP unhappy w [facility] > f/u SW/CM on adequate dispo. ?LTAC    #Trach inserted in June at [facility] (for aspiration PNA)  NOTE: patient not chronically on Vent. START PS TRIALS once stable    #HTN  - on lisinopril 30mg qd and amlodipine 10mg qd at home  - Meds on hold and BP controlled    #CAD / HLD  - on simvastatin 10mg qhs and asa 81mg qd    #Seziure disorder  - on carbemazepine 100mg/5ml syrup bid  - 08/02: switched to IV keppra since peg tube usage on hold    #Anxiety / Agitation  - on buspirone 5mg qd, risperidone 1mg bid prn    #GERD  - on esomeprazole suspension 40mg bid, sulcrafate 1g bid    #Folate and vitamin deficiency  - on folic acid and mvi    DVT ppx: heparin sub q  GI ppx: ni  Diet: NPO - tube feeds on hold and meds on hold  Activity: bedbound    DNR/DNI - molst in chart   56 yo M w h/o HTN, HLD, DM, CAD, CVA (x2 w residual weakness), s/p trach and peg presents from [facility] for placement at new facility. Patient's sister (HCP) apparently did not like care at [facility] - describing it as unsafe d/t no side rails on bed.  consulted and patient admitted until adequate facility established.      #Hyponatremia- resolved sodium 141  - Working Dx is SIADH  - stable. c/w FW restriction.  - 08/05: started on IVF since NPO    #Abdominal distention  - Severe rectosigmoid fecal impaction. The rectum is distended to 12.8 cm => disimpaction on 07/29  Improving. Now  lactulose BID. enema QD. and passing BM  - 08/02: Fecalith material seen out of PEG tube, stopped peg feedings and all meds, surgery consulted, plan is to assess for possible diversion colostomy, sister contacted, wishes to discuss plan in person  - 08/03: No fecalith material seen from peg today, KUB with peg studies showing patet peg tube with no extravasation, surgery plan is no surgery at the moment as the patient is passing BM  - 08/04: Patient vomited stool material overnight, PEG feeding stopped and attached to suction (draining feces), surgery on board pending plan  - 08/05: patient NPO, still draining fecalith material from PEG, surgery believe he is poor surgical candidate, ID plan is to keep cefepime (Dr Ordoñez believes the cultures could be colonizers), palliative team consulted  08/06: patient NPO (no feeds nor meds), still draining fecalith material from PEG, will c/w lactulose and increase the fleet enemas as patient still has stool impaction seen on KUB, surgery believe he is poor surgical candidate, ID plan is to dc cefepime tomorrow (08/09) , palliative team consulted and following recs, repeat KUB tomorrow    #CT scan findings:  - Patchy left lower lobe pulmonary opacities, compatible with pneumonia.  - Lower back/buttock ulceration with gas. Erosive changes of the underlying sacrum and coccyx suggestive of osteomyelitis. => ID and Burn consulted  - Burn plan: s/p bedside debridement on 07/29, continue with LWC.    #Disposition in trach/peg frail patient  - HCP unhappy w [facility] > f/u SW/CM on adequate dispo. ?LTAC    #Trach inserted in June at [facility] (for aspiration PNA)  NOTE: patient not chronically on Vent. START PS TRIALS once stable    #HTN  - on lisinopril 30mg qd and amlodipine 10mg qd at home  - Meds on hold and BP controlled    #CAD / HLD  - on simvastatin 10mg qhs and asa 81mg qd    #Seziure disorder  - on carbemazepine 100mg/5ml syrup bid  - Switched to IV keppra since peg tube usage on hold    #Anxiety / Agitation  - on buspirone 5mg qd, risperidone 1mg bid prn    #GERD  - on esomeprazole suspension 40mg bid, sulcrafate 1g bid    #Folate and vitamin deficiency  - on folic acid and mvi    DVT ppx: heparin sub q  GI ppx: ni  Diet: NPO - tube feeds on hold and meds on hold  Activity: bedbound    DNR/DNI - molst in chart

## 2022-08-06 NOTE — PROGRESS NOTE ADULT - ASSESSMENT
IMPRESSION:    Chronic resp failure sp PEG/ trach  HO CVA  HO seizure  Functional quadriplegia  Stool impaction / Constipation / Ileus   LLL opacity, possible aspiration.  CR Pseudomonas in DTA      PLAN:    CNS: Avoid CNS depressant    HEENT:  Oral care    PULMONARY:  HOB @ 45 degrees, aspiration precaution, keep Sao2 92 to 96%.  Pulmonary toilet.  Continue PS wean as tolerated     CARDIOVASCULAR: Avoid overload.  Gentle hydration while NPO    GI: GI prophylaxis                          Bowel regimen fleet enema.  GI follow up.      RENAL:  F/u  lytes.  Correct as needed.    INFECTIOUS DISEASE: ABX per ID.  ID follow up appreciated.  Agree with DC ABX in am      HEMATOLOGICAL:  DVT prophylaxis.    ENDOCRINE:  Follow up FS.  Insulin protocol if needed.    MUSCULOSKELETAL:  Wound care.      Vent unit     DNR    Very poor prognosis overall

## 2022-08-06 NOTE — PROGRESS NOTE ADULT - ATTENDING COMMENTS
Patient seen at bedside.  Discussed with medical team that includes resident(s), nursing staff, case management.   58 yo M w h/o HTN, HLD, DM, CAD, CVA (x2 w residual weakness), s/p trach and peg presents from [facility] for placement at new facility. Patient's sister (HCP) apparently did not like care at [facility] - describing it as unsafe d/t no side rails on bed.  consulted and patient admitted until adequate facility established.  Patient seen at bedside. GI and surgery following. patient is currently NPO. as per surgery patient is a poor surgical candidate.   follow cultures. currently on cefepime.    Palliative following. Keep NPO. change IV fluids to dextrose and unable to start tube feeding due to possible obstruction. sick patient with high risk of clinical deterioration. family to decide regarding comfort measures.   - Constipation- will give 2 enemas and dulcolax suppository today.    Total time spent to complete patient's bedside assessment, review medical chart, discuss medical plan of care with covering medical team was more than 35 minutes

## 2022-08-07 NOTE — PROGRESS NOTE ADULT - SUBJECTIVE AND OBJECTIVE BOX
Patient is a 57y old  Male who presents with a chief complaint of placement (06 Aug 2022 10:10)        Over Night Events:        ROS:     All ROS are negative except HPI         PHYSICAL EXAM    ICU Vital Signs Last 24 Hrs  T(C): 35.8 (07 Aug 2022 04:50), Max: 36.7 (06 Aug 2022 21:01)  T(F): 96.4 (07 Aug 2022 04:50), Max: 98 (06 Aug 2022 21:01)  HR: 88 (07 Aug 2022 05:03) (82 - 95)  BP: 118/64 (07 Aug 2022 04:50) (103/67 - 139/65)  BP(mean): --  ABP: --  ABP(mean): --  RR: 18 (07 Aug 2022 04:50) (18 - 18)  SpO2: 100% (07 Aug 2022 05:03) (100% - 100%)        CONSTITUTIONAL:  Ill appearing in NAD    ENT:   Airway patent,   Mouth with normal mucosa.   Trach     EYES:   Pupils equal,   Round and reactive to light.    CARDIAC:   Normal rate,   Regular rhythm.      RESPIRATORY:   No wheezing  Bilateral BS  Normal chest expansion  Not tachypneic,  No use of accessory muscles    GASTROINTESTINAL:  Abdomen distended     MUSCULOSKELETAL:   COntracted   No clubbing, cyanosis    NEUROLOGICAL:   Opens eyes   Does no follow commands     SKIN:   Skin normal color for race,   No evidence of rash.    PSYCHIATRIC:   No apparent risk to self or others.        08-06-22 @ 07:01  -  08-07-22 @ 07:00  --------------------------------------------------------  IN:  Total IN: 0 mL    OUT:    Incontinent per Condom Catheter (mL): 300 mL  Total OUT: 300 mL    Total NET: -300 mL          LABS:                            8.2    17.20 )-----------( 360      ( 06 Aug 2022 09:52 )             25.9                                               08-06    141  |  111<H>  |  37<H>  ----------------------------<  104<H>  4.3   |  21  |  <0.5<L>    Ca    7.5<L>      06 Aug 2022 09:52    TPro  4.9<L>  /  Alb  1.6<L>  /  TBili  0.3  /  DBili  x   /  AST  18  /  ALT  17  /  AlkPhos  166<H>  08-06                                                                                           LIVER FUNCTIONS - ( 06 Aug 2022 09:52 )  Alb: 1.6 g/dL / Pro: 4.9 g/dL / ALK PHOS: 166 U/L / ALT: 17 U/L / AST: 18 U/L / GGT: x                                                                                               Mode: AC/ CMV (Assist Control/ Continuous Mandatory Ventilation)  RR (machine): 14  TV (machine): 400  FiO2: 40  PEEP: 5  ITime: 1  MAP: 8  PIP: 14                                          MEDICATIONS  (STANDING):  ascorbic acid 500 milliGRAM(s) Oral daily  busPIRone 5 milliGRAM(s) Oral <User Schedule>  cefepime   IVPB      cefepime   IVPB 2000 milliGRAM(s) IV Intermittent every 8 hours  chlorhexidine 2% Cloths 1 Application(s) Topical <User Schedule>  collagenase Ointment 1 Application(s) Topical two times a day  Dakins Solution - 1/2 Strength 1 Application(s) Topical two times a day  dextrose 5% + sodium chloride 0.45%. 1000 milliLiter(s) (75 mL/Hr) IV Continuous <Continuous>  dextrose 5%. 1000 milliLiter(s) (100 mL/Hr) IV Continuous <Continuous>  dextrose 5%. 1000 milliLiter(s) (50 mL/Hr) IV Continuous <Continuous>  dextrose 50% Injectable 25 Gram(s) IV Push once  dextrose 50% Injectable 12.5 Gram(s) IV Push once  dextrose 50% Injectable 25 Gram(s) IV Push once  folic acid 1 milliGRAM(s) Oral daily  glucagon  Injectable 1 milliGRAM(s) IntraMuscular once  heparin   Injectable 5000 Unit(s) SubCutaneous every 8 hours  insulin lispro (ADMELOG) corrective regimen sliding scale   SubCutaneous every 6 hours  lactulose Syrup 10 Gram(s) Oral two times a day  levETIRAcetam  IVPB 500 milliGRAM(s) IV Intermittent every 12 hours  pantoprazole    Tablet 40 milliGRAM(s) Oral before breakfast  simvastatin 10 milliGRAM(s) Oral at bedtime  sucralfate suspension 1 Gram(s) Oral two times a day  zinc sulfate 220 milliGRAM(s) Oral daily    MEDICATIONS  (PRN):  dextrose Oral Gel 15 Gram(s) Oral once PRN Blood Glucose LESS THAN 70 milliGRAM(s)/deciliter  ondansetron Injectable 4 milliGRAM(s) IV Push every 6 hours PRN Nausea and/or Vomiting  risperiDONE   Tablet 1 milliGRAM(s) Oral two times a day PRN agitation      New X-rays reviewed:                                                                                  ECHO         Patient is a 57y old  Male who presents with a chief complaint of placement (06 Aug 2022 10:10)        Over Night Events:  On MV.  Off pressors.          ROS:     All ROS are negative except HPI         PHYSICAL EXAM    ICU Vital Signs Last 24 Hrs  T(C): 35.8 (07 Aug 2022 04:50), Max: 36.7 (06 Aug 2022 21:01)  T(F): 96.4 (07 Aug 2022 04:50), Max: 98 (06 Aug 2022 21:01)  HR: 88 (07 Aug 2022 05:03) (82 - 95)  BP: 118/64 (07 Aug 2022 04:50) (103/67 - 139/65)  BP(mean): --  ABP: --  ABP(mean): --  RR: 18 (07 Aug 2022 04:50) (18 - 18)  SpO2: 100% (07 Aug 2022 05:03) (100% - 100%)        CONSTITUTIONAL:  Ill appearing in NAD    ENT:   Airway patent,   Mouth with normal mucosa.   Trach     EYES:   Pupils equal,   Round and reactive to light.    CARDIAC:   Normal rate,   Regular rhythm.      RESPIRATORY:   No wheezing  Bilateral BS  Normal chest expansion  Not tachypneic,  No use of accessory muscles    GASTROINTESTINAL:  Abdomen soft  non tended     MUSCULOSKELETAL:   COntracted   No clubbing, cyanosis    NEUROLOGICAL:   Opens eyes   Does no follow commands     SKIN:   Skin normal color for race,   No evidence of rash.    PSYCHIATRIC:   No apparent risk to self or others.        08-06-22 @ 07:01  -  08-07-22 @ 07:00  --------------------------------------------------------  IN:  Total IN: 0 mL    OUT:    Incontinent per Condom Catheter (mL): 300 mL  Total OUT: 300 mL    Total NET: -300 mL          LABS:                            8.2    17.20 )-----------( 360      ( 06 Aug 2022 09:52 )             25.9                                               08-06    141  |  111<H>  |  37<H>  ----------------------------<  104<H>  4.3   |  21  |  <0.5<L>    Ca    7.5<L>      06 Aug 2022 09:52    TPro  4.9<L>  /  Alb  1.6<L>  /  TBili  0.3  /  DBili  x   /  AST  18  /  ALT  17  /  AlkPhos  166<H>  08-06                                                                                           LIVER FUNCTIONS - ( 06 Aug 2022 09:52 )  Alb: 1.6 g/dL / Pro: 4.9 g/dL / ALK PHOS: 166 U/L / ALT: 17 U/L / AST: 18 U/L / GGT: x                                                                                               Mode: AC/ CMV (Assist Control/ Continuous Mandatory Ventilation)  RR (machine): 14  TV (machine): 400  FiO2: 40  PEEP: 5  ITime: 1  MAP: 8  PIP: 14                                          MEDICATIONS  (STANDING):  ascorbic acid 500 milliGRAM(s) Oral daily  busPIRone 5 milliGRAM(s) Oral <User Schedule>  cefepime   IVPB      cefepime   IVPB 2000 milliGRAM(s) IV Intermittent every 8 hours  chlorhexidine 2% Cloths 1 Application(s) Topical <User Schedule>  collagenase Ointment 1 Application(s) Topical two times a day  Dakins Solution - 1/2 Strength 1 Application(s) Topical two times a day  dextrose 5% + sodium chloride 0.45%. 1000 milliLiter(s) (75 mL/Hr) IV Continuous <Continuous>  dextrose 5%. 1000 milliLiter(s) (100 mL/Hr) IV Continuous <Continuous>  dextrose 5%. 1000 milliLiter(s) (50 mL/Hr) IV Continuous <Continuous>  dextrose 50% Injectable 25 Gram(s) IV Push once  dextrose 50% Injectable 12.5 Gram(s) IV Push once  dextrose 50% Injectable 25 Gram(s) IV Push once  folic acid 1 milliGRAM(s) Oral daily  glucagon  Injectable 1 milliGRAM(s) IntraMuscular once  heparin   Injectable 5000 Unit(s) SubCutaneous every 8 hours  insulin lispro (ADMELOG) corrective regimen sliding scale   SubCutaneous every 6 hours  lactulose Syrup 10 Gram(s) Oral two times a day  levETIRAcetam  IVPB 500 milliGRAM(s) IV Intermittent every 12 hours  pantoprazole    Tablet 40 milliGRAM(s) Oral before breakfast  simvastatin 10 milliGRAM(s) Oral at bedtime  sucralfate suspension 1 Gram(s) Oral two times a day  zinc sulfate 220 milliGRAM(s) Oral daily    MEDICATIONS  (PRN):  dextrose Oral Gel 15 Gram(s) Oral once PRN Blood Glucose LESS THAN 70 milliGRAM(s)/deciliter  ondansetron Injectable 4 milliGRAM(s) IV Push every 6 hours PRN Nausea and/or Vomiting  risperiDONE   Tablet 1 milliGRAM(s) Oral two times a day PRN agitation      New X-rays reviewed:                                                                                  ECHO

## 2022-08-07 NOTE — PROGRESS NOTE ADULT - SUBJECTIVE AND OBJECTIVE BOX
CALIXTOSILVANO VENTURA  [facility] T2-3A [id number] A ([facility] T2-3A)      Patient was evaluated and examined  by bedside, no active events as per covering nurse report, had BM, flor draining dark brown urine      REVIEW OF SYSTEMS: unable to obtain due to patient's chronic non-verbal status        T(C): , Max: 36.7 (08-06-22 @ 21:01)  HR: 86 (08-07-22 @ 09:17)  BP: 118/64 (08-07-22 @ 04:50)  RR: 18 (08-07-22 @ 04:50)  SpO2: 100% (08-07-22 @ 09:17)  CAPILLARY BLOOD GLUCOSE      POCT Blood Glucose.: 103 mg/dL (07 Aug 2022 07:38)  POCT Blood Glucose.: 104 mg/dL (07 Aug 2022 06:50)  POCT Blood Glucose.: 94 mg/dL (06 Aug 2022 23:44)  POCT Blood Glucose.: 90 mg/dL (06 Aug 2022 21:39)  POCT Blood Glucose.: 115 mg/dL (06 Aug 2022 17:31)      PHYSICAL EXAM:  General: NAD, AAOX3, patient is laying comfortably in bed, cachectic  HEENT: AT, NC, Supple, NO JVD, NO CB, ET present, temporal muscle wasting  Lungs: good breath sounds B/L, no wheezing, no rhonchi  CVS: normal S1, S2, RRR, NO M/G/R  Abdomen: soft, bowel sounds present, non-tender, non-distended, peg present  Extremities: no edema, no clubbing, no cyanosis, positive peripheral pulses b/l  Neuro: non-verbal, unresponsive, diffuse body contractures and spasticity   Skin: no rash, no ecchymosis, stage 4 sacral decub      LAB  CBC  Date: 08-07-22 @ 08:49  Mean cell Mkhxqhjsof64.2  Mean cell Hemoglobin Conc32.4  Mean cell Volum 93.2  Platelet count-Automate 385  RBC Count 2.35  Red Cell Distrib Width15.6  WBC Count14.91  % Albumin, Urine--  Hematocrit 21.9  Hemoglobin 7.1  CBC  Date: 08-06-22 @ 09:52  Mean cell Xlwljxahpp35.4  Mean cell Hemoglobin Conc31.7  Mean cell Volum 95.9  Platelet count-Automate 360  RBC Count 2.70  Red Cell Distrib Width15.8  WBC Count17.20  % Albumin, Urine--  Hematocrit 25.9  Hemoglobin 8.2  CBC  Date: 08-05-22 @ 06:53  Mean cell Bawivjiwxg05.6  Mean cell Hemoglobin Conc32.4  Mean cell Volum 94.2  Platelet count-Automate 414  RBC Count 2.78  Red Cell Distrib Width15.5  WBC Count11.41  % Albumin, Urine--  Hematocrit 26.2  Hemoglobin 8.5  CBC  Date: 08-04-22 @ 12:16  Mean cell Qoixmqazth39.4  Mean cell Hemoglobin Conc32.4  Mean cell Volum 90.8  Platelet count-Automate 426  RBC Count 2.72  Red Cell Distrib Width15.5  WBC Count15.65  % Albumin, Urine--  Hematocrit 24.7  Hemoglobin 8.0  CBC  Date: 08-03-22 @ 09:16  Mean cell Vsmebqrcwn03.9  Mean cell Hemoglobin Conc32.9  Mean cell Volum 90.7  Platelet count-Automate 421  RBC Count 2.81  Red Cell Distrib Width15.5  WBC Count15.92  % Albumin, Urine--  Hematocrit 25.5  Hemoglobin 8.4  CBC  Date: 08-02-22 @ 09:16  Mean cell Xslejnpkki99.3  Mean cell Hemoglobin Conc33.0  Mean cell Volum 91.9  Platelet count-Automate 418  RBC Count 2.84  Red Cell Distrib Width15.8  WBC Count15.80  % Albumin, Urine--  Hematocrit 26.1  Hemoglobin 8.6  CBC  Date: 08-01-22 @ 08:00  Mean cell Vdlwvotffy26.4  Mean cell Hemoglobin Conc32.8  Mean cell Volum 89.7  Platelet count-Automate 382  RBC Count 2.82  Red Cell Distrib Width15.7  WBC Count14.70  % Albumin, Urine--  Hematocrit 25.3  Hemoglobin 8.3    BMP  08-07-22 @ 08:49  Blood Gas Arterial-Calcium,Ionized--  Blood Urea Nitrogen, Serum 31 mg/dL<H> [10 - 20]  Carbon Dioxide, Serum21 mmol/L [17 - 32]  Chloride, Spfgy687 mmol/L<H> [98 - 110]  Creatinie, Serum<0.5 mg/dL<L> [0.7 - 1.5]  Glucose, Serum93 mg/dL [70 - 99]  Potassium, Serum3.3 mmol/L<L> [3.5 - 5.0]  Sodium, Serum 140 mmol/L [135 - 146]            Microbiology:    Culture - Other (collected 08-01-22 @ 20:35)  Source: Wound Wound  Final Report (08-03-22 @ 17:56):    Numerous Enterococcus faecalis (vancomycin resistant)    Rare Pseudomonas aeruginosa (Carbapenem Resistant)  Organism: Enterococcus faecalis (vancomycin resistant)  Pseudomonas aeruginosa (Carbapenem Resistant) (08-03-22 @ 17:56)  Organism: Pseudomonas aeruginosa (Carbapenem Resistant) (08-03-22 @ 17:56)      -  Amikacin: S <=16      -  Aztreonam: S 8      -  Cefepime: R >16      -  Ceftazidime: I 16      -  Ceftazidime/Avibactam: S <=4      -  Ceftolozane/tazobactam: S <=2      -  Ciprofloxacin: S 0.5      -  Gentamicin: I 8      -  Imipenem: R >8      -  Levofloxacin: I 2      -  Meropenem: R 8      -  Piperacillin/Tazobactam: I 64      -  Tobramycin: S <=2      Method Type: LETTY  Organism: Enterococcus faecalis (vancomycin resistant) (08-03-22 @ 17:56)      -  Ampicillin: S <=2 Predicts results to ampicillin/sulbactam, amoxacillin-clavulanate and  piperacillin-tazobactam.      -  Daptomycin: S 1      -  Levofloxacin: R >4      -  Linezolid: S 1      -  Tetra/Doxy: R >8      -  Vancomycin: R >16      Method Type: LETTY    Culture - Sputum (collected 08-01-22 @ 20:35)  Source: Trach Asp Tracheal Aspirate  Gram Stain (08-02-22 @ 06:45):    Numerous polymorphonuclear leukocytes seen per low power field    Rare Squamous epithelial cells seen per low power field    Moderate Gram Negative Rods seen per oil power field  Final Report (08-03-22 @ 15:50):    Moderate Pseudomonas aeruginosa (Carbapenem Resistant)    Normal Respiratory Analia absent  Organism: Pseudomonas aeruginosa (Carbapenem Resistant) (08-03-22 @ 15:50)  Organism: Pseudomonas aeruginosa (Carbapenem Resistant) (08-03-22 @ 15:50)      -  Amikacin: S <=16      -  Aztreonam: S 8      -  Cefepime: I 16      -  Ceftazidime: I 16      -  Ceftazidime/Avibactam: S <=4      -  Ceftolozane/tazobactam: S <=2      -  Ciprofloxacin: S 0.5      -  Gentamicin: I 8      -  Imipenem: R >8      -  Levofloxacin: I 2      -  Meropenem: R 8      -  Piperacillin/Tazobactam: I 64      -  Tobramycin: S <=2      Method Type: LETTY      Medications:  busPIRone 5 milliGRAM(s) Oral <User Schedule>  cefepime   IVPB 2000 milliGRAM(s) IV Intermittent every 8 hours  cefepime   IVPB      chlorhexidine 2% Cloths 1 Application(s) Topical <User Schedule>  collagenase Ointment 1 Application(s) Topical two times a day  Dakins Solution - 1/2 Strength 1 Application(s) Topical two times a day  dextrose 5% + sodium chloride 0.45%. 1000 milliLiter(s) IV Continuous <Continuous>  dextrose 5%. 1000 milliLiter(s) IV Continuous <Continuous>  dextrose 5%. 1000 milliLiter(s) IV Continuous <Continuous>  dextrose 50% Injectable 25 Gram(s) IV Push once  dextrose 50% Injectable 12.5 Gram(s) IV Push once  dextrose 50% Injectable 25 Gram(s) IV Push once  dextrose Oral Gel 15 Gram(s) Oral once PRN  folic acid 1 milliGRAM(s) Oral daily  glucagon  Injectable 1 milliGRAM(s) IntraMuscular once  heparin   Injectable 5000 Unit(s) SubCutaneous every 8 hours  insulin lispro (ADMELOG) corrective regimen sliding scale   SubCutaneous every 6 hours  lactulose Syrup 10 Gram(s) Oral two times a day  levETIRAcetam  IVPB 500 milliGRAM(s) IV Intermittent every 12 hours  ondansetron Injectable 4 milliGRAM(s) IV Push every 6 hours PRN  pantoprazole    Tablet 40 milliGRAM(s) Oral before breakfast  potassium chloride  20 mEq/100 mL IVPB 20 milliEquivalent(s) IV Intermittent every 4 hours  risperiDONE   Tablet 1 milliGRAM(s) Oral two times a day PRN  simvastatin 10 milliGRAM(s) Oral at bedtime  sodium chloride 0.9% Bolus 100 milliLiter(s) IV Bolus once  sucralfate suspension 1 Gram(s) Oral two times a day        Assessment and Plan:  Patient is a 58 y/o  Male with PMH of HTN, HLD, DM, CAD, CVA (x2 w residual weakness), s/p trach and peg presents from [facility] for placement at new facility. Patient's sister (HCP) apparently did not like care at [facility] - describing it as unsafe d/t no side rails on bed.  consulted and patient admitted until adequate facility established.      #Hyponatremia- resolved   - IVF meanwhile NPO    # Anemia of chronic disease - no gross bleeding events  - f/up am CBC if drops below 7 , will transfuse 1 unit     # Pseudomonas in trach cxs- as per ID continue  IV Cefepime tx.     #Abdominal distention- resolving  - Severe rectosigmoid fecal impaction. The rectum is distended to 12.8 cm => disimpaction on 07/29  Improving. Now  lactulose BID. enema QD. and passing BM  - 08/02: Fecalith material seen out of PEG tube  -8/7- had BM , post enemas, on IVF    CT scan findings:  - Patchy left lower lobe pulmonary opacities, compatible with pneumonia.  - Lower back/buttock ulceration with gas. Erosive changes of the underlying sacrum and coccyx suggestive of osteomyelitis. => ID and Burn consulted  - Burn plan: s/p bedside debridement on 07/29, continue with LWC.    #Tracheostomy status- continue local care , freq. suctioning       #CAD / HLD  - on simvastatin 10mg qhs and asa 81mg qd    #Seziure disorder  - on carbemazepine 100mg/5ml syrup bid  - Switched to IV keppra since peg tube usage on hold    #Anxiety / Agitation  - on buspirone 5mg qd, risperidone 1mg bid prn    #GERD  - on esomeprazole suspension 40mg bid, sulcrafate 1g bid    #Folate and vitamin deficiency  - on folic acid and mvi    DVT ppx: heparin sub q  Code status- DNR/DNI, overall patient's prognosis - poor     #Progress Note Handoff: Pending  resumption of peg feedings once able to tolerating it, continue IVF, supplement electrolytes   Family discussion: yes, medical team Disposition: SNF once medically stable    Total time spent to complete patient's bedside assessment, review medical chart, discuss medical plan of care with covering medical team was more than 35 minutes

## 2022-08-08 NOTE — PROGRESS NOTE ADULT - ASSESSMENT
IMPRESSION:    Chronic resp failure sp PEG/ trach  HO CVA  HO seizure  Functional quadriplegia  Stool impaction / Constipation / Ileus   LLL opacity, possible aspiration.  CR Pseudomonas in DTA  SP ABX     PLAN:    CNS: Avoid CNS depressant    HEENT:  Oral care    PULMONARY:  HOB @ 45 degrees, aspiration precaution, keep Sao2 92 to 96%.  Pulmonary toilet.  Continue weaning as tolerated     CARDIOVASCULAR: Avoid overload.  Gentle hydration while NPO    GI: GI prophylaxis                          Bowel regimen fleet enema.  Feeding per GI and [facility]     RENAL:  F/u  lytes.  Correct as needed.    INFECTIOUS DISEASE: DC ABX       HEMATOLOGICAL:  DVT prophylaxis.    ENDOCRINE:  Follow up FS.  Insulin protocol if needed.    MUSCULOSKELETAL:  Wound care.      Vent unit     DNR    Very poor prognosis overall

## 2022-08-08 NOTE — PROGRESS NOTE ADULT - PROBLEM SELECTOR PLAN 4
s/p trach and PEG in May 22 at [facility] following aspiration  now vent dependent  continue per pulm recommendations  sister considering vent withdrawal

## 2022-08-08 NOTE — PROGRESS NOTE ADULT - SUBJECTIVE AND OBJECTIVE BOX
Patient is a 57y old  Male who presents with a chief complaint of placement (08 Aug 2022 07:23)      INTERVAL HPI/OVERNIGHT EVENTS:  None    T(C): 36.4 (08-08-22 @ 05:22), Max: 36.7 (08-07-22 @ 21:13)  HR: 91 (08-08-22 @ 05:22) (74 - 94)  BP: 114/67 (08-08-22 @ 05:22) (114/67 - 121/70)  RR: 18 (08-08-22 @ 05:22) (18 - 18)  SpO2: 100% (08-07-22 @ 21:13) (100% - 100%)  Wt(kg): --Vital Signs Last 24 Hrs  T(C): 36.4 (08 Aug 2022 05:22), Max: 36.7 (07 Aug 2022 21:13)  T(F): 97.6 (08 Aug 2022 05:22), Max: 98 (07 Aug 2022 21:13)  HR: 91 (08 Aug 2022 05:22) (74 - 94)  BP: 114/67 (08 Aug 2022 05:22) (114/67 - 121/70)  BP(mean): --  RR: 18 (08 Aug 2022 05:22) (18 - 18)  SpO2: 100% (07 Aug 2022 21:13) (100% - 100%)        PHYSICAL EXAM:  GENERAL: NAD, well-groomed, well-developed  HEAD:  Atraumatic, Normocephalic  EYES: EOMI, PERRLA, conjunctiva and sclera clear  ENMT: No tonsillar erythema, exudates, or enlargement; Moist mucous membranes, Good dentition, No lesions  NECK: Supple, No JVD, Normal thyroid  NERVOUS SYSTEM:  Alert & Oriented X3, Good concentration; Motor Strength 5/5 B/L upper and lower extremities; DTRs 2+ intact and symmetric  CHEST/LUNG: Clear to percussion bilaterally; No rales, rhonchi, wheezing, or rubs  HEART: Regular rate and rhythm; No murmurs, rubs, or gallops  ABDOMEN: Soft, Nontender, Nondistended; Bowel sounds present  EXTREMITIES:  2+ Peripheral Pulses, No clubbing, cyanosis, or edema  LYMPH: No lymphadenopathy noted  SKIN: No rashes or lesions      Imaging Personally Reviewed:  [ ] YES  [ ] NO  busPIRone 5 milliGRAM(s) Oral <User Schedule>  chlorhexidine 2% Cloths 1 Application(s) Topical <User Schedule>  collagenase Ointment 1 Application(s) Topical two times a day  Dakins Solution - 1/2 Strength 1 Application(s) Topical two times a day  dextrose 5% + sodium chloride 0.45%. 1000 milliLiter(s) IV Continuous <Continuous>  dextrose 5%. 1000 milliLiter(s) IV Continuous <Continuous>  dextrose 5%. 1000 milliLiter(s) IV Continuous <Continuous>  dextrose 50% Injectable 25 Gram(s) IV Push once  dextrose 50% Injectable 12.5 Gram(s) IV Push once  dextrose 50% Injectable 25 Gram(s) IV Push once  dextrose Oral Gel 15 Gram(s) Oral once PRN  folic acid 1 milliGRAM(s) Oral daily  glucagon  Injectable 1 milliGRAM(s) IntraMuscular once  heparin   Injectable 5000 Unit(s) SubCutaneous every 8 hours  insulin lispro (ADMELOG) corrective regimen sliding scale   SubCutaneous every 6 hours  lactulose Syrup 10 Gram(s) Oral two times a day  levETIRAcetam  IVPB 500 milliGRAM(s) IV Intermittent every 12 hours  ondansetron Injectable 4 milliGRAM(s) IV Push every 6 hours PRN  pantoprazole    Tablet 40 milliGRAM(s) Oral before breakfast  risperiDONE   Tablet 1 milliGRAM(s) Oral two times a day PRN  simvastatin 10 milliGRAM(s) Oral at bedtime  sucralfate suspension 1 Gram(s) Oral two times a day        Acute respiratory failure    Palliative care by specialist    Abdominal distension    Chronic respiratory failure    Chronic osteomyelitis           Patient is a 57y old  Male who presents with a chief complaint of placement (08 Aug 2022 07:23)      INTERVAL HPI/OVERNIGHT EVENTS:  None    T(C): 36.4 (08-08-22 @ 05:22), Max: 36.7 (08-07-22 @ 21:13)  HR: 91 (08-08-22 @ 05:22) (74 - 94)  BP: 114/67 (08-08-22 @ 05:22) (114/67 - 121/70)  RR: 18 (08-08-22 @ 05:22) (18 - 18)  SpO2: 100% (08-07-22 @ 21:13) (100% - 100%)  Wt(kg): --Vital Signs Last 24 Hrs  T(C): 36.4 (08 Aug 2022 05:22), Max: 36.7 (07 Aug 2022 21:13)  T(F): 97.6 (08 Aug 2022 05:22), Max: 98 (07 Aug 2022 21:13)  HR: 91 (08 Aug 2022 05:22) (74 - 94)  BP: 114/67 (08 Aug 2022 05:22) (114/67 - 121/70)  BP(mean): --  RR: 18 (08 Aug 2022 05:22) (18 - 18)  SpO2: 100% (07 Aug 2022 21:13) (100% - 100%)        PHYSICAL EXAM:  GENERAL: Obtunded  HEAD:  Atraumatic, Normocephalic  EYES: EOMI, PERRLA, conjunctiva and sclera clear  ENMT: No tonsillar erythema, exudates, or enlargement; Moist mucous membranes, Good dentition, No lesions  NECK: Supple, No JVD, Normal thyroid  NERVOUS SYSTEM:  Obtunded  CHEST/LUNG: Clear to percussion bilaterally; No rales, rhonchi, wheezing, or rubs  HEART: Regular rate and rhythm; No murmurs, rubs, or gallops  ABDOMEN: Soft, Nontender, Nondistended; Bowel sounds present  EXTREMITIES:  2+ Peripheral Pulses, No clubbing, cyanosis, or edema  LYMPH: No lymphadenopathy noted  SKIN: No rashes or lesions      busPIRone 5 milliGRAM(s) Oral <User Schedule>  chlorhexidine 2% Cloths 1 Application(s) Topical <User Schedule>  collagenase Ointment 1 Application(s) Topical two times a day  Dakins Solution - 1/2 Strength 1 Application(s) Topical two times a day  dextrose 5% + sodium chloride 0.45%. 1000 milliLiter(s) IV Continuous <Continuous>  dextrose 5%. 1000 milliLiter(s) IV Continuous <Continuous>  dextrose 5%. 1000 milliLiter(s) IV Continuous <Continuous>  dextrose 50% Injectable 25 Gram(s) IV Push once  dextrose 50% Injectable 12.5 Gram(s) IV Push once  dextrose 50% Injectable 25 Gram(s) IV Push once  dextrose Oral Gel 15 Gram(s) Oral once PRN  folic acid 1 milliGRAM(s) Oral daily  glucagon  Injectable 1 milliGRAM(s) IntraMuscular once  heparin   Injectable 5000 Unit(s) SubCutaneous every 8 hours  insulin lispro (ADMELOG) corrective regimen sliding scale   SubCutaneous every 6 hours  lactulose Syrup 10 Gram(s) Oral two times a day  levETIRAcetam  IVPB 500 milliGRAM(s) IV Intermittent every 12 hours  ondansetron Injectable 4 milliGRAM(s) IV Push every 6 hours PRN  pantoprazole    Tablet 40 milliGRAM(s) Oral before breakfast  risperiDONE   Tablet 1 milliGRAM(s) Oral two times a day PRN  simvastatin 10 milliGRAM(s) Oral at bedtime  sucralfate suspension 1 Gram(s) Oral two times a day        Acute respiratory failure    Palliative care by specialist    Abdominal distension    Chronic respiratory failure    Chronic osteomyelitis

## 2022-08-08 NOTE — PROGRESS NOTE ADULT - ASSESSMENT
57yMale with PMH including HTN, HLD, DM, CAD, CVA (x2 w residual weakness), s/p trach and peg ( in May 2022) , being admitted from [facility] for placement because sister felt the care there was unsafe. Pt was found to have abdominal distention, large stool burden, with stool coming from PEG and vomiting some stool. Pt not a candidate for surgery so currently being medically managed. pt also with OM of the sacrum.     Planning to set up meeting with sister of patient re: GOC.   Awaiting Call back.   Patient has poor prognosis     MEDD (morphine equivalent daily dose): 0       See Recs below.    Please call [phone] with questions or concerns 24/7.   We will continue to follow.     Discussed with primary MD.

## 2022-08-08 NOTE — PROGRESS NOTE ADULT - ASSESSMENT
Assessment	  56 yo M w h/o HTN, HLD, DM, CAD, CVA (x2 w residual weakness), s/p trach and peg presents from [facility] for placement at new facility. Patient's sister (HCP) apparently did not like care at [facility] - describing it as unsafe d/t no side rails on bed.  consulted and patient admitted until adequate facility established.      #Hyponatremia- resolved   - Working Dx is SIADH  - stable. c/w FW restriction.  - 08/05: started on IVF since NPO    #Abdominal distention  - Severe rectosigmoid fecal impaction. The rectum is distended to 12.8 cm => disimpaction on 07/29  Improving. Now  lactulose BID. enema QD. and passing BM  - 08/02: Fecalith material seen out of PEG tube, stopped peg feedings and all meds, surgery consulted, plan is to assess for possible diversion colostomy, sister contacted, wishes to discuss plan in person  - 08/03: No fecalith material seen from peg today, KUB with peg studies showing patet peg tube with no extravasation, surgery plan is no surgery at the moment as the patient is passing BM  - 08/04: Patient vomited stool material overnight, PEG feeding stopped and attached to suction (draining feces), surgery on board pending plan  - 08/05: patient NPO, still draining fecalith material from PEG, surgery believe he is poor surgical candidate, ID plan is to keep cefepime (Dr Ordoñez believes the cultures could be colonizers), palliative team consulted  08/06: patient NPO (no feeds nor meds), still draining fecalith material from PEG, will c/w lactulose and increase the fleet enemas as patient still has stool impaction seen on KUB, surgery believe he is poor surgical candidate, palliative team consulted and following recs, repeat KUB tomorrow  08/08: If no fecal matter in tube will restart tube feeds at lower rate for first hour.    #CT scan findings:  - Patchy left lower lobe pulmonary opacities, compatible with pneumonia.  - Lower back/buttock ulceration with gas. Erosive changes of the underlying sacrum and coccyx suggestive of osteomyelitis. => ID and Burn consulted  - Burn plan: s/p bedside debridement on 07/29, continue with LWC.  - Finished course of Cefepime     #Disposition in trach/peg frail patient  - HCP unhappy w [facility] > f/u SW/CM on adequate dispo. ?LTAC    #Trach inserted in June at [facility] (for aspiration PNA)  NOTE: patient not chronically on Vent. START PS TRIALS once stable    #HTN  - on lisinopril 30mg qd and amlodipine 10mg qd at home  - Meds on hold and BP controlled    #CAD / HLD  - on simvastatin 10mg qhs and asa 81mg qd    #Seziure disorder  - on carbemazepine 100mg/5ml syrup bid  - Switched to IV keppra since peg tube usage on hold    #Anxiety / Agitation  - on buspirone 5mg qd, risperidone 1mg bid prn    #GERD  - on esomeprazole suspension 40mg bid, sulcrafate 1g bid    #Folate and vitamin deficiency  - on folic acid and mvi    DVT ppx: heparin sub q  GI ppx: ni  Diet: NPO - tube feeds on hold and meds on hold  Activity: bedbound    DNR/DNI - molst in chart

## 2022-08-08 NOTE — PROGRESS NOTE ADULT - PROBLEM SELECTOR PLAN 2
DNR only  Continue current medical management  Sister- Nicole, is considering CMO/vent withdrawal   Will FU with Nicole Monday  Will follow.

## 2022-08-08 NOTE — PROGRESS NOTE ADULT - SUBJECTIVE AND OBJECTIVE BOX
Patient is a 57y old  Male who presents with a chief complaint of placement (07 Aug 2022 14:37)        Over Night Events:  Off pressors.          ROS:     All ROS are negative except HPI         PHYSICAL EXAM    ICU Vital Signs Last 24 Hrs  T(C): 36.4 (08 Aug 2022 05:22), Max: 36.7 (07 Aug 2022 21:13)  T(F): 97.6 (08 Aug 2022 05:22), Max: 98 (07 Aug 2022 21:13)  HR: 91 (08 Aug 2022 05:22) (74 - 94)  BP: 114/67 (08 Aug 2022 05:22) (114/67 - 121/70)  BP(mean): --  ABP: --  ABP(mean): --  RR: 18 (08 Aug 2022 05:22) (18 - 18)  SpO2: 100% (07 Aug 2022 21:13) (100% - 100%)        CONSTITUTIONAL:  Ill appearing in   NAD    ENT:   Airway patent,   Mouth with normal mucosa.   Trach     EYES:   Pupils equal,   Round and reactive to light.    CARDIAC:   Normal rate,   Regular rhythm.        RESPIRATORY:   No wheezing  Bilateral BS  Normal chest expansion  Not tachypneic,  No use of accessory muscles    GASTROINTESTINAL:  Abdomen soft,   Non-tender,   No guarding,       MUSCULOSKELETAL:   Contracted   No clubbing, cyanosis    NEUROLOGICAL:   Opens eyes  Does not follow commands    SKIN:   Skin normal color for race,   No evidence of rash.    PSYCHIATRIC:   No apparent risk to self or others.            LABS:                            7.1    14.91 )-----------( 385      ( 07 Aug 2022 08:49 )             21.9                                               08-07    140  |  112<H>  |  31<H>  ----------------------------<  93  3.3<L>   |  21  |  <0.5<L>    Ca    7.1<L>      07 Aug 2022 08:49    TPro  4.6<L>  /  Alb  1.4<L>  /  TBili  0.3  /  DBili  x   /  AST  13  /  ALT  12  /  AlkPhos  152<H>  08-07                                                                                           LIVER FUNCTIONS - ( 07 Aug 2022 08:49 )  Alb: 1.4 g/dL / Pro: 4.6 g/dL / ALK PHOS: 152 U/L / ALT: 12 U/L / AST: 13 U/L / GGT: x                                                                                               Mode: AC/ CMV (Assist Control/ Continuous Mandatory Ventilation)  RR (machine): 14  TV (machine): 400  FiO2: 40  PEEP: 5  ITime: 0.8  MAP: 6  PIP: 18                                          MEDICATIONS  (STANDING):  busPIRone 5 milliGRAM(s) Oral <User Schedule>  cefepime   IVPB      cefepime   IVPB 2000 milliGRAM(s) IV Intermittent every 8 hours  chlorhexidine 2% Cloths 1 Application(s) Topical <User Schedule>  collagenase Ointment 1 Application(s) Topical two times a day  Dakins Solution - 1/2 Strength 1 Application(s) Topical two times a day  dextrose 5% + sodium chloride 0.45%. 1000 milliLiter(s) (75 mL/Hr) IV Continuous <Continuous>  dextrose 5%. 1000 milliLiter(s) (100 mL/Hr) IV Continuous <Continuous>  dextrose 5%. 1000 milliLiter(s) (50 mL/Hr) IV Continuous <Continuous>  dextrose 50% Injectable 25 Gram(s) IV Push once  dextrose 50% Injectable 12.5 Gram(s) IV Push once  dextrose 50% Injectable 25 Gram(s) IV Push once  folic acid 1 milliGRAM(s) Oral daily  glucagon  Injectable 1 milliGRAM(s) IntraMuscular once  heparin   Injectable 5000 Unit(s) SubCutaneous every 8 hours  insulin lispro (ADMELOG) corrective regimen sliding scale   SubCutaneous every 6 hours  lactulose Syrup 10 Gram(s) Oral two times a day  levETIRAcetam  IVPB 500 milliGRAM(s) IV Intermittent every 12 hours  pantoprazole    Tablet 40 milliGRAM(s) Oral before breakfast  simvastatin 10 milliGRAM(s) Oral at bedtime  sucralfate suspension 1 Gram(s) Oral two times a day    MEDICATIONS  (PRN):  dextrose Oral Gel 15 Gram(s) Oral once PRN Blood Glucose LESS THAN 70 milliGRAM(s)/deciliter  ondansetron Injectable 4 milliGRAM(s) IV Push every 6 hours PRN Nausea and/or Vomiting  risperiDONE   Tablet 1 milliGRAM(s) Oral two times a day PRN agitation      New X-rays reviewed:                                                                                  ECHO

## 2022-08-08 NOTE — PROGRESS NOTE ADULT - ATTENDING COMMENTS
Patient is a 58 y/o  Male with PMH of HTN, HLD, DM, CAD, CVA (x2 w residual weakness), s/p trach and peg presents from [facility] for placement at new facility. Patient's sister (HCP) apparently did not like care at [facility] - describing it as unsafe d/t no side rails on bed.  consulted and patient admitted until adequate facility established.      #Hyponatremia- resolved       # Anemia of chronic disease - no gross bleeding events  - improved h/h    # Pseudomonas in trach cxs- as per ID completed   IV Cefepime tx. course    #Abdominal distention- resolving  - Severe rectosigmoid fecal impaction. The rectum is distended to 12.8 cm => disimpaction on 07/29  Improving. Now  lactulose BID. enema QD. and passing BM  - 08/02: Fecalith material seen out of PEG tube  -8/8- started on peg feedings as tolerated     CT scan findings:  - Patchy left lower lobe pulmonary opacities, compatible with pneumonia.  - Lower back/buttock ulceration with gas. Erosive changes of the underlying sacrum and coccyx suggestive of osteomyelitis. => ID and Burn consulted  - Burn plan: s/p bedside debridement on 07/29, continue with LWC.    #Tracheostomy status- continue local care , freq. suctioning       #CAD / HLD  - on simvastatin 10mg qhs and asa 81mg qd    #Seziure disorder  - on carbemazepine 100mg/5ml syrup bid  - Switched to IV keppra since peg tube usage on hold    #Anxiety / Agitation  - on buspirone 5mg qd, risperidone 1mg bid prn    #GERD  - on esomeprazole suspension 40mg bid, sulcrafate 1g bid    #Folate and vitamin deficiency  - on folic acid and mvi    DVT ppx: heparin sub q  Code status- DNR/DNI, overall patient's prognosis - poor     #Progress Note Handoff: f/up for tolerance of peg feedings today  Family discussion: yes, medical team Disposition: SNF possibly in 24 hours     Total time spent to complete patient's bedside assessment, review medical chart, discuss medical plan of care with covering medical team was more than 35 minutes

## 2022-08-08 NOTE — PROGRESS NOTE ADULT - SUBJECTIVE AND OBJECTIVE BOX
HPI:  58 yo M w h/o HTN, HLD, DM, CAD, CVA (x2 w residual weakness), s/p trach and peg presents from [facility] for placement at new facility. Patient's sister (HCP) apparently did not like care at [facility] - describing it as unsafe d/t no side rails on bed.    In ED, vitals stable.      consulted and patient admitted until adequate facility established. (23 Jul 2022 02:47)     INTERVAL EVENTS:    ADVANCE DIRECTIVES:     MOLST  [X]  Living Will  [ ]   DECISION MAKER(s):  [ ] Health Care Proxy(s)  [X ] Surrogate(s)  [ ] Guardian           Name(s): Phone Number(s): Sister- Nicole    BASELINE (I)ADL(s) (prior to admission):  [place]: [ ]Total  [ ] Moderate [ X]Dependent  Palliative Performance Status Version 2:        30 %    [email or web address]    Allergies    No Known Allergies    Intolerances    MEDICATIONS  (STANDING):  busPIRone 5 milliGRAM(s) Oral <User Schedule>  chlorhexidine 2% Cloths 1 Application(s) Topical <User Schedule>  collagenase Ointment 1 Application(s) Topical two times a day  Dakins Solution - 1/2 Strength 1 Application(s) Topical two times a day  dextrose 5% + sodium chloride 0.45%. 1000 milliLiter(s) (75 mL/Hr) IV Continuous <Continuous>  dextrose 5%. 1000 milliLiter(s) (50 mL/Hr) IV Continuous <Continuous>  dextrose 5%. 1000 milliLiter(s) (100 mL/Hr) IV Continuous <Continuous>  dextrose 50% Injectable 25 Gram(s) IV Push once  dextrose 50% Injectable 12.5 Gram(s) IV Push once  dextrose 50% Injectable 25 Gram(s) IV Push once  folic acid 1 milliGRAM(s) Oral daily  glucagon  Injectable 1 milliGRAM(s) IntraMuscular once  heparin   Injectable 5000 Unit(s) SubCutaneous every 8 hours  insulin lispro (ADMELOG) corrective regimen sliding scale   SubCutaneous every 6 hours  lactulose Syrup 10 Gram(s) Oral two times a day  levETIRAcetam  IVPB 500 milliGRAM(s) IV Intermittent every 12 hours  pantoprazole    Tablet 40 milliGRAM(s) Oral before breakfast  simvastatin 10 milliGRAM(s) Oral at bedtime  sucralfate suspension 1 Gram(s) Oral two times a day    MEDICATIONS  (PRN):  dextrose Oral Gel 15 Gram(s) Oral once PRN Blood Glucose LESS THAN 70 milliGRAM(s)/deciliter  ondansetron Injectable 4 milliGRAM(s) IV Push every 6 hours PRN Nausea and/or Vomiting  risperiDONE   Tablet 1 milliGRAM(s) Oral two times a day PRN agitation    PRESENT SYMPTOMS: [X ]Unable to obtain due to poor mentation   Source if other than patient:  [ ]Family   [ ]Team     Pain: [ ]yes [ ]no  QOL impact -   Location -                    Aggravating factors -  Quality -  Radiation -  Timing-  Severity (0-10 scale):  Minimal acceptable level (0-10 scale):     CPOT:  0  https://[email or web address]/getattachment/[email or web address]/Critical-Care-Pain-Observation-Tool-(CPOT)        Dyspnea:                           [ ]Mild [ ]Moderate [ ]Severe  Anxiety:                             [ ]Mild [ ]Moderate [ ]Severe  Fatigue:                             [ ]Mild [ ]Moderate [ ]Severe  Nausea:                             [ ]Mild [ ]Moderate [ ]Severe  Loss of appetite:              [ ]Mild [ ]Moderate [ ]Severe  Constipation:                    [ ]Mild [ ]Moderate [ ]Severe    Other Symptoms:  [ X]All other review of systems negative     Palliative Performance Status Version 2:      10   %    [email or web address]_[email or web address]_[email or web address]    PHYSICAL EXAM:  Vital Signs Last 24 Hrs  T(C): 35.9 (08 Aug 2022 12:02), Max: 36.7 (07 Aug 2022 21:13)  T(F): 96.7 (08 Aug 2022 12:02), Max: 98 (07 Aug 2022 21:13)  HR: 85 (08 Aug 2022 12:02) (74 - 94)  BP: 112/56 (08 Aug 2022 12:02) (112/56 - 117/56)  BP(mean): --  RR: 18 (08 Aug 2022 12:02) (18 - 18)  SpO2: 100% (07 Aug 2022 21:13) (100% - 100%)    GENERAL:  [ ]Alert  [ ]Oriented x   [X ]Lethargic  [X ]Cachexia  [ ]Unarousable  [ ]Verbal  [ X]Non-Verbal  Behavioral:   [ ] Anxiety  [ ] Delirium [ ] Agitation [ ] Other  HEENT:  [ ]Normal   [ ]Dry mouth   [ X]ET Tube/Trach  [ ]Oral lesions  PULMONARY:   [ ]Clear [ ]Tachypnea  [ ]Audible excessive secretions   + vented on 40% FiO2   CARDIOVASCULAR:    [X ]Regular [ ]Irregular [ ]Tachy  [ ]Kumar [ ]Murmur [ ]Other  GASTROINTESTINAL:  [ X]Soft  [ ]Distended   [ ]+BS  [ ]Non tender [ ]Tender  [X ]PEG [ ]OGT/ NGT  Last BM:   GENITOURINARY:  [ ]Normal [ ] Incontinent   [ ]Oliguria/Anuria   [X ]Brito- dark brown urine  MUSCULOSKELETAL:   [ ]Normal   [ ]Weakness  [X ]Bed/Wheelchair bound [ ]Edema  NEUROLOGIC:   [ ]No focal deficits  [ X]Cognitive impairment  [ ]Dysphagia [ ]Dysarthria [ ]Paresis [ ]Other   SKIN:   [ ]Normal    [ ]Rash  [X ]Pressure ulcer(s)       Present on admission [X ]y [ ]n    CRITICAL CARE:  [ ] Shock Present  [ ]Septic [ ]Cardiogenic [ ]Neurologic [ ]Hypovolemic  [ ]  Vasopressors [ ]  Inotropes   [ ]Respiratory failure present [X ]Mechanical ventilation [ ]Non-invasive ventilatory support [ ]High flow  [ ]Acute  [ ]Chronic [ ]Hypoxic  [ ]Hypercarbic [ ]Other  [ ]Other organ failure     LABS:                        7.9    12.55 )-----------( 364      ( 08 Aug 2022 06:35 )             24.2   08-08    140  |  114<H>  |  36<H>  ----------------------------<  85  3.8   |  19  |  <0.5<L>    Ca    6.9<L>      08 Aug 2022 06:35    TPro  4.6<L>  /  Alb  1.4<L>  /  TBili  0.2  /  DBili  x   /  AST  13  /  ALT  9   /  AlkPhos  150<H>  08-08        RADIOLOGY & ADDITIONAL STUDIES:    < from: CT Abdomen and Pelvis w/ Oral Cont (07.28.22 @ 17:24) >    IMPRESSION:      Significantly limited study due to lack of intravenous contrast - much of   intra-abdominal contents cannot be evaluated.    Severe rectosigmoid fecal impaction. The rectum is distended to 12.8 cm.   Disimpaction is recommended.    Patchy left lower lobe pulmonary opacities, compatible with pneumonia.    Lower back/buttock ulceration with gas. Erosive changes of the underlying   sacrum and coccyx suggestive ofosteomyelitis.    There appears to be generalized mesenteric congestion/ascites.    --- End of Report ---      < end of copied text >      REFERRALS:   [ ]Chaplaincy  [ ]Hospice  [ ]Child Life  [ ]Social Work  [ ]Case management [ ]Holistic Therapy     Goals of Care Document:   - LM for sister- planning to try and set up in person meeting to discuss options  - pending call back from sister who is decision maker

## 2022-08-09 NOTE — PROGRESS NOTE ADULT - ASSESSMENT
IMPRESSION:    Chronic resp failure sp PEG/ trach  HO CVA  HO seizure  Functional quadriplegia  Stool impaction / Constipation / Ileus   LLL opacity, possible aspiration.  CR Pseudomonas in DTA  SP ABX     PLAN:    CNS: Avoid CNS depressant    HEENT:  Oral care    PULMONARY:  HOB @ 45 degrees, aspiration precaution, keep Sao2 92 to 96%.  Pulmonary toilet.  Continue weaning as tolerated     CARDIOVASCULAR: Avoid overload.  Gentle hydration while NPO    GI: GI prophylaxis                          Bowel regimen fleet enema.  Feeding per GI and Surgery     RENAL:  F/u  lytes.  Correct as needed.    INFECTIOUS DISEASE:  Monitor off ABX for now     HEMATOLOGICAL:  DVT prophylaxis.    ENDOCRINE:  Follow up FS.  Insulin protocol if needed.    MUSCULOSKELETAL:  Wound care.      Vent unit     DNR    Very poor prognosis overall

## 2022-08-09 NOTE — PROGRESS NOTE ADULT - ASSESSMENT
Assessment	  58 yo M w h/o HTN, HLD, DM, CAD, CVA (x2 w residual weakness), s/p trach and peg presents from [facility] for placement at new facility. Patient's sister (HCP) apparently did not like care at [facility] - describing it as unsafe d/t no side rails on bed.  consulted and patient admitted until adequate facility established.      #Hyponatremia- resolved   - Working Dx is SIADH  - stable. c/w FW restriction.  - 08/05: started on IVF since NPO    #Abdominal distention  - Severe rectosigmoid fecal impaction. The rectum is distended to 12.8 cm => disimpaction on 07/29  Improving. Now  lactulose BID. enema QD. and passing BM  - 08/02: Fecalith material seen out of PEG tube, stopped peg feedings and all meds, surgery consulted, plan is to assess for possible diversion colostomy, sister contacted, wishes to discuss plan in person  - 08/03: No fecalith material seen from peg today, KUB with peg studies showing patet peg tube with no extravasation, surgery plan is no surgery at the moment as the patient is passing BM  - 08/04: Patient vomited stool material overnight, PEG feeding stopped and attached to suction (draining feces), surgery on board pending plan  - 08/05: patient NPO, still draining fecalith material from PEG, surgery believe he is poor surgical candidate, ID plan is to keep cefepime (Dr Ordoñez believes the cultures could be colonizers), palliative team consulted  08/06: patient NPO (no feeds nor meds), still draining fecalith material from PEG, will c/w lactulose and increase the fleet enemas as patient still has stool impaction seen on KUB, surgery believe he is poor surgical candidate, palliative team consulted and following recs, repeat KUB tomorrow  08/08: Restarted tube feeds. Tolerated well. Had multiple BMs and Dced lactulose,    #CT scan findings:  - Patchy left lower lobe pulmonary opacities, compatible with pneumonia.  - Lower back/buttock ulceration with gas. Erosive changes of the underlying sacrum and coccyx suggestive of osteomyelitis. => ID and Burn consulted  - Burn plan: s/p bedside debridement on 07/29, continue with LWC.  - Finished course of Cefepime    #Trach inserted in June at [facility] (for aspiration PNA)  NOTE: patient not chronically on Vent. START PS TRIALS once stable    #HTN  - Well controlled off BP medication.    #CAD / HLD  - on simvastatin 10mg qhs and asa 81mg qd    #Seziure disorder  - on carbemazepine 100mg/5ml syrup bid  - Switched to IV keppra since peg tube usage on hold    #Anxiety / Agitation  - on buspirone 5mg qd    #GERD  - Pantoprazole 40 mg once a day, sulcrafate 1g bid    #Folate and vitamin deficiency  - on folic acid and mvi    DVT ppx: heparin sub q  GI ppx: ni  Diet: NPO - tube feeds on hold and meds on hold  Activity: bedbound    DNR/DNI - molst in chart

## 2022-08-09 NOTE — CHART NOTE - NSCHARTNOTEFT_GEN_A_CORE
Spoke with sister (HCP) of patient.   She is unable to come in today, wants to have meeting re: GOC tomorrow 11:30 AM.   Patient is pending discharge soon.   Will speak with primary medical team.   Would be beneficial for sister to discuss GOC prior to d.c so she is aware of all of her options.

## 2022-08-09 NOTE — PROGRESS NOTE ADULT - ATTENDING COMMENTS
Patient is a 58 y/o  Male with PMH of HTN, HLD, DM, CAD, CVA (x2 w residual weakness), s/p trach and peg presents from [facility] for placement at new facility. Patient's sister (HCP) apparently did not like care at [facility] - describing it as unsafe d/t no side rails on bed.  consulted and patient admitted until adequate facility established.      #Hyponatremia- resolved       # Anemia of chronic disease - no gross bleeding events  - improved h/h    # Pseudomonas in trach cxs- as per ID completed   IV Cefepime tx. course    #Abdominal distention- resolved   - Severe rectosigmoid fecal impaction. The rectum is distended to 12.8 cm => disimpaction on 07/29  Improved .   - 08/02: Fecalith material seen out of PEG tube  -8/8- started on peg feedings tolerated w/o vomiting    -8/9- large diarrhea- due to laxatives, d/c lactulose, monitor bm's, continue peg feedings as tolerated     CT scan findings:  - Patchy left lower lobe pulmonary opacities, compatible with pneumonia.  - Lower back/buttock ulceration with gas. Erosive changes of the underlying sacrum and coccyx suggestive of osteomyelitis. => ID and Burn consulted  - Burn plan: s/p bedside debridement on 07/29, continue with LWC.    #Tracheostomy status- continue local care , freq. suctioning       #CAD / HLD  - on simvastatin 10mg qhs and asa 81mg qd    #Seziure disorder  - on carbemazepine 100mg/5ml syrup bid  - Switched to IV keppra since peg tube usage on hold    #Anxiety / Agitation  - on buspirone 5mg qd, risperidone 1mg bid prn    #GERD  - on esomeprazole suspension 40mg bid, sulcrafate 1g bid    #Folate and vitamin deficiency  - on folic acid and mvi    DVT ppx: heparin sub q  Code status- DNR/DNI, overall patient's prognosis - poor     #Progress Note Handoff: monitor bm's, hold laxatives, peg feedings as tolerated   Family discussion: yes, medical team Disposition: SNF possibly in 24 hours     Total time spent to complete patient's bedside assessment, review medical chart, discuss medical plan of care with covering medical team was more than 35 minutes.

## 2022-08-09 NOTE — PROGRESS NOTE ADULT - SUBJECTIVE AND OBJECTIVE BOX
Patient is a 57y old  Male who presents with a chief complaint of placement (08 Aug 2022 15:52)      INTERVAL HPI/OVERNIGHT EVENTS:  Multiple BMs        T(C): 36.6 (08-09-22 @ 05:06), Max: 36.6 (08-09-22 @ 05:06)  HR: 91 (08-09-22 @ 05:06) (85 - 91)  BP: 104/57 (08-09-22 @ 05:06) (104/57 - 117/71)  RR: 18 (08-09-22 @ 05:06) (18 - 19)  SpO2: 100% (08-09-22 @ 08:52) (99% - 100%)  Wt(kg): --Vital Signs Last 24 Hrs  T(C): 36.6 (09 Aug 2022 05:06), Max: 36.6 (09 Aug 2022 05:06)  T(F): 97.8 (09 Aug 2022 05:06), Max: 97.8 (09 Aug 2022 05:06)  HR: 91 (09 Aug 2022 05:06) (85 - 91)  BP: 104/57 (09 Aug 2022 05:06) (104/57 - 117/71)  BP(mean): --  RR: 18 (09 Aug 2022 05:06) (18 - 19)  SpO2: 100% (09 Aug 2022 08:52) (99% - 100%)        PHYSICAL EXAM:  GENERAL: Obtunded  HEAD:  Atraumatic, Normocephalic  EYES: Sclera clear  ENMT: Moist mucous membranes  NECK: Supple, No JVD, Normal thyroid  NERVOUS SYSTEM: Obtunded  CHEST/LUNG: Clear to percussion bilaterally; No rales, rhonchi, wheezing, or rubs  HEART: Regular rate and rhythm; No murmurs, rubs, or gallops  ABDOMEN: Soft, Nontender, Nondistended; Bowel sounds present  EXTREMITIES:  2+ Peripheral Pulses, No clubbing, cyanosis, or edema  LYMPH: No lymphadenopathy noted  SKIN: No rashes or lesions        busPIRone 5 milliGRAM(s) Oral <User Schedule>  chlorhexidine 2% Cloths 1 Application(s) Topical <User Schedule>  collagenase Ointment 1 Application(s) Topical two times a day  Dakins Solution - 1/2 Strength 1 Application(s) Topical two times a day  dextrose 5% + sodium chloride 0.45%. 1000 milliLiter(s) IV Continuous <Continuous>  dextrose 5%. 1000 milliLiter(s) IV Continuous <Continuous>  dextrose 5%. 1000 milliLiter(s) IV Continuous <Continuous>  dextrose 50% Injectable 25 Gram(s) IV Push once  dextrose 50% Injectable 12.5 Gram(s) IV Push once  dextrose 50% Injectable 25 Gram(s) IV Push once  dextrose Oral Gel 15 Gram(s) Oral once PRN  folic acid 1 milliGRAM(s) Oral daily  glucagon  Injectable 1 milliGRAM(s) IntraMuscular once  heparin   Injectable 5000 Unit(s) SubCutaneous every 8 hours  insulin lispro (ADMELOG) corrective regimen sliding scale   SubCutaneous every 6 hours  lactulose Syrup 10 Gram(s) Oral two times a day PRN  levETIRAcetam  IVPB 500 milliGRAM(s) IV Intermittent every 12 hours  ondansetron Injectable 4 milliGRAM(s) IV Push every 6 hours PRN  pantoprazole    Tablet 40 milliGRAM(s) Oral before breakfast  risperiDONE   Tablet 1 milliGRAM(s) Oral two times a day PRN  simvastatin 10 milliGRAM(s) Oral at bedtime  sucralfate suspension 1 Gram(s) Oral two times a day    Mode: CPAP with PS, FiO2: 40, PEEP: 5, PS: 5, MAP: 6, PIP: 11  HEALTH ISSUES - PROBLEM Dx:  Functional quadriplegia    Acute respiratory failure    Palliative care by specialist    Abdominal distension    Chronic respiratory failure    Chronic osteomyelitis

## 2022-08-09 NOTE — PROGRESS NOTE ADULT - SUBJECTIVE AND OBJECTIVE BOX
Patient is a 57y old  Male who presents with a chief complaint of placement (09 Aug 2022 10:31)        Over Night Events:  Tolerating PS weaning.  Off pressors.         ROS:     All ROS are negative except HPI         PHYSICAL EXAM    ICU Vital Signs Last 24 Hrs  T(C): 36.6 (09 Aug 2022 05:06), Max: 36.6 (09 Aug 2022 05:06)  T(F): 97.8 (09 Aug 2022 05:06), Max: 97.8 (09 Aug 2022 05:06)  HR: 92 (09 Aug 2022 13:45) (85 - 92)  BP: 119/68 (09 Aug 2022 13:45) (104/57 - 119/68)  BP(mean): --  ABP: --  ABP(mean): --  RR: 18 (09 Aug 2022 13:45) (18 - 19)  SpO2: 100% (09 Aug 2022 13:45) (99% - 100%)        CONSTITUTIONAL:  Ill appearing in  NAD    ENT:   Airway patent,   Mouth with normal mucosa.   No thrush    EYES:   Pupils equal,   Round and reactive to light.    CARDIAC:   Normal rate,   Regular rhythm.      RESPIRATORY:   No wheezing  Bilateral BS  Normal chest expansion  Not tachypneic,  No use of accessory muscles    GASTROINTESTINAL:  Abdomen soft,   Non-tender,   No guarding,   + BS    MUSCULOSKELETAL:   Contracted   No clubbing, cyanosis    NEUROLOGICAL:   Opens eyes .    SKIN:   Skin normal color for race,   No evidence of rash.    PSYCHIATRIC:   No apparent risk to self or others.        08-08-22 @ 07:01  -  08-09-22 @ 07:00  --------------------------------------------------------  IN:  Total IN: 0 mL    OUT:    Voided (mL): 300 mL  Total OUT: 300 mL    Total NET: -300 mL      08-09-22 @ 07:01  -  08-09-22 @ 17:13  --------------------------------------------------------  IN:    dextrose 5% + sodium chloride 0.45%: 450 mL    Enteral Tube Flush: 40 mL    Jevity 1.2: 120 mL  Total IN: 610 mL    OUT:  Total OUT: 0 mL    Total NET: 610 mL          LABS:                            8.0    14.81 )-----------( 418      ( 09 Aug 2022 06:33 )             25.9                                               08-09    143  |  118<H>  |  47<H>  ----------------------------<  75  3.8   |  18  |  0.6<L>    Ca    7.2<L>      09 Aug 2022 06:33  Mg     2.4     08-09    TPro  4.6<L>  /  Alb  1.4<L>  /  TBili  <0.2  /  DBili  x   /  AST  16  /  ALT  8   /  AlkPhos  148<H>  08-09                                                                                           LIVER FUNCTIONS - ( 09 Aug 2022 06:33 )  Alb: 1.4 g/dL / Pro: 4.6 g/dL / ALK PHOS: 148 U/L / ALT: 8 U/L / AST: 16 U/L / GGT: x                                                                                               Mode: standby                                          MEDICATIONS  (STANDING):  busPIRone 5 milliGRAM(s) Oral <User Schedule>  chlorhexidine 2% Cloths 1 Application(s) Topical <User Schedule>  collagenase Ointment 1 Application(s) Topical two times a day  Dakins Solution - 1/2 Strength 1 Application(s) Topical two times a day  dextrose 5%. 1000 milliLiter(s) (100 mL/Hr) IV Continuous <Continuous>  dextrose 5%. 1000 milliLiter(s) (50 mL/Hr) IV Continuous <Continuous>  dextrose 50% Injectable 25 Gram(s) IV Push once  dextrose 50% Injectable 12.5 Gram(s) IV Push once  dextrose 50% Injectable 25 Gram(s) IV Push once  folic acid 1 milliGRAM(s) Oral daily  glucagon  Injectable 1 milliGRAM(s) IntraMuscular once  heparin   Injectable 5000 Unit(s) SubCutaneous every 8 hours  insulin lispro (ADMELOG) corrective regimen sliding scale   SubCutaneous every 6 hours  levETIRAcetam  IVPB 500 milliGRAM(s) IV Intermittent every 12 hours  pantoprazole    Tablet 40 milliGRAM(s) Oral before breakfast  simvastatin 10 milliGRAM(s) Oral at bedtime  sucralfate suspension 1 Gram(s) Oral two times a day    MEDICATIONS  (PRN):  dextrose Oral Gel 15 Gram(s) Oral once PRN Blood Glucose LESS THAN 70 milliGRAM(s)/deciliter  lactulose Syrup 10 Gram(s) Oral two times a day PRN constipation  ondansetron Injectable 4 milliGRAM(s) IV Push every 6 hours PRN Nausea and/or Vomiting  risperiDONE   Tablet 1 milliGRAM(s) Oral two times a day PRN agitation      New X-rays reviewed:                                                                                  ECHO

## 2022-08-10 NOTE — PROGRESS NOTE ADULT - PROBLEM SELECTOR PLAN 1
CVA decades ago  now with trach and peg recently in May  bedbound and completely dependent  declining functional status per sister

## 2022-08-10 NOTE — PROGRESS NOTE ADULT - ASSESSMENT
58 yo M w h/o HTN, HLD, DM, CAD, CVA (x2 w residual weakness), s/p trach and peg presents from [facility] for placement at new facility. Patient's sister (HCP) apparently did not like care at [facility] - describing it as unsafe d/t no side rails on bed.  consulted and patient admitted until adequate facility established.      #Hyponatremia- resolved   - Working Dx is SIADH  - stable. c/w FW restriction.  - 08/05: started on IVF since NPO    #Abdominal distention  - Severe rectosigmoid fecal impaction. The rectum is distended to 12.8 cm => disimpaction on 07/29  Improving. Now  lactulose BID. enema QD. and passing BM  - 08/02: Fecalith material seen out of PEG tube, stopped peg feedings and all meds, surgery consulted, plan is to assess for possible diversion colostomy, sister contacted, wishes to discuss plan in person  - 08/03: No fecalith material seen from peg today, KUB with peg studies showing patet peg tube with no extravasation, surgery plan is no surgery at the moment as the patient is passing BM  - 08/04: Patient vomited stool material overnight, PEG feeding stopped and attached to suction (draining feces), surgery on board pending plan  - 08/05: patient NPO, still draining fecalith material from PEG, surgery believe he is poor surgical candidate, ID plan is to keep cefepime (Dr Ordoñez believes the cultures could be colonizers), palliative team consulted  08/06: patient NPO (no feeds nor meds), still draining fecalith material from PEG, will c/w lactulose and increase the fleet enemas as patient still has stool impaction seen on KUB, surgery believe he is poor surgical candidate, palliative team consulted   08/08: Restarted tube feeds. Tolerated well. Had multiple BMs and Dced lactulose,    #CT scan findings:  - Patchy left lower lobe pulmonary opacities, compatible with pneumonia.  - Lower back/buttock ulceration with gas. Erosive changes of the underlying sacrum and coccyx suggestive of osteomyelitis. => ID and Burn consulted  - Burn plan: s/p bedside debridement on 07/29, continue with LWC.  - Finished course of Cefepime    #Trach inserted in June at [facility] (for aspiration PNA)  NOTE: patient not chronically on Vent. START PS TRIALS once stable    #HTN  - Well controlled off BP medication.    #CAD / HLD  - on simvastatin 10mg qhs and asa 81mg qd    #Seziure disorder  - on carbemazepine 100mg/5ml syrup bid  - Switched to IV keppra since peg tube usage on hold    #Anxiety / Agitation  - on buspirone 5mg qd    #GERD  - Pantoprazole 40 mg once a day, sulcrafate 1g bid    #Folate and vitamin deficiency  - on folic acid and mvi    DVT ppx: heparin sub q  GI ppx: ni  Diet: NPO - tube feeds on hold and meds on hold  Activity: bedbound    DNR/DNI - molst in chart - Will have palliative meeting today with family

## 2022-08-10 NOTE — PROGRESS NOTE ADULT - ATTENDING COMMENTS
Palliative recs noted.  PAtient appropriate for hospice.   CMO now per palliative  Hospice c/s on 8/11.

## 2022-08-10 NOTE — CHART NOTE - NSCHARTNOTEFT_GEN_A_CORE
NUTRITION SUPPORT TEAM  -  PROGRESS NOTE     Interval Events:    Palliative noted and appreciated. Pt's sister decided on CMO today  Possible hospice tomorrow    REVIEW OF SYSTEMS:  Negative except as noted above.     SpO2: 98% (08-10 @ 11:41) (98% - 98%)    HEIGHT/WEIGHT/BMI:   Height (cm): 188 (07-22), 188 (03-14), 188 (01-29), 172.7 (08-30)  Weight (kg): 50.8 (08-09), 53.7 (03-16), 56.2 (01-28), 68 (08-30)  BMI (kg/m2): 14.4 (08-09), 15.2 (07-22), 15.2 (03-16), 15.9 (03-14)    I/Os:     08-09-22 @ 07:01  -  08-10-22 @ 07:00  --------------------------------------------------------  IN:    dextrose 5% + sodium chloride 0.45%: 450 mL    Enteral Tube Flush: 120 mL    Jevity 1.2: 360 mL  Total IN: 930 mL    OUT:  Total OUT: 0 mL    Total NET: 930 mL    MEDICATIONS:   busPIRone 5 milliGRAM(s) Oral <User Schedule>  chlorhexidine 2% Cloths 1 Application(s) Topical <User Schedule>  collagenase Ointment 1 Application(s) Topical two times a day  Dakins Solution - 1/2 Strength 1 Application(s) Topical two times a day  glycopyrrolate Injectable 0.2 milliGRAM(s) IV Push every 6 hours PRN  lactulose Syrup 10 Gram(s) Oral two times a day PRN  levETIRAcetam  IVPB 500 milliGRAM(s) IV Intermittent every 12 hours  midazolam Injectable 1 milliGRAM(s) IV Push every 1 hour PRN  morphine  - Injectable 2 milliGRAM(s) IV Push every 1 hour PRN  ondansetron Injectable 4 milliGRAM(s) IV Push every 6 hours PRN  risperiDONE   Tablet 1 milliGRAM(s) Oral two times a day PRN  sucralfate suspension 1 Gram(s) Oral two times a day    LABS:                         8.0    14.81 )-----------( 418      ( 09 Aug 2022 06:33 )             25.9     143  |  118<H>  |  47<H>  ----------------------------<  75          (08-09-22 @ 06:33)  3.8   |  18  |  0.6<L>    Ca    7.2<L>          (08-09-22 @ 06:33)  Mg     2.4         (08-09-22 @ 06:33)    TPro  4.6<L>  /  Alb  1.4<L>  /  TBili  <0.2  /  DBili  x   /  AST  16  /  ALT  8   /  AlkPhos  148<H>       08-09-22 @ 06:33    Folate: 16.3 ng/mL (07-26 @ 10:23)    Blood Glucose (Past 24 hours):  65 mg/dL (08-10 @ 11:19)  70 mg/dL (08-10 @ 07:38)  70 mg/dL (08-10 @ 04:53)  78 mg/dL (08-09 @ 18:55)    ASSESSMENT/PLAN:   - severe protein calorie malnutrition  - multiple areas of stage 3 and 4 skin breakdown present on admission  - chronic trach dependence, functional quad. -- ? since 5/2022  - chronic dysphagia, +GT  - h/o DM, CVA, constipation, seizures, HTN      Pt now CMO. No further indication for aggressive nutrition support intervention. Please recall service prn NUTRITION SUPPORT TEAM  -  PROGRESS NOTE     Interval Events:    Palliative noted and appreciated. Pt's sister decided on CMO today  Possible hospice tomorrow    REVIEW OF SYSTEMS:  Negative except as noted above.     SpO2: 98% (08-10 @ 11:41) (98% - 98%)    HEIGHT/WEIGHT/BMI:   Height (cm): 188 (07-22), 188 (03-14), 188 (01-29), 172.7 (08-30)  Weight (kg): 50.8 (08-09), 53.7 (03-16), 56.2 (01-28), 68 (08-30)  BMI (kg/m2): 14.4 (08-09), 15.2 (07-22), 15.2 (03-16), 15.9 (03-14)    I/Os:     08-09-22 @ 07:01  -  08-10-22 @ 07:00  --------------------------------------------------------  IN:    dextrose 5% + sodium chloride 0.45%: 450 mL    Enteral Tube Flush: 120 mL    Jevity 1.2: 360 mL  Total IN: 930 mL    OUT:  Total OUT: 0 mL- ?    Total NET: 930 mL    MEDICATIONS:   busPIRone 5 milliGRAM(s) Oral <User Schedule>  chlorhexidine 2% Cloths 1 Application(s) Topical <User Schedule>  collagenase Ointment 1 Application(s) Topical two times a day  Dakins Solution - 1/2 Strength 1 Application(s) Topical two times a day  glycopyrrolate Injectable 0.2 milliGRAM(s) IV Push every 6 hours PRN  lactulose Syrup 10 Gram(s) Oral two times a day PRN  levETIRAcetam  IVPB 500 milliGRAM(s) IV Intermittent every 12 hours  midazolam Injectable 1 milliGRAM(s) IV Push every 1 hour PRN  morphine  - Injectable 2 milliGRAM(s) IV Push every 1 hour PRN  ondansetron Injectable 4 milliGRAM(s) IV Push every 6 hours PRN  risperiDONE   Tablet 1 milliGRAM(s) Oral two times a day PRN  sucralfate suspension 1 Gram(s) Oral two times a day    LABS:                         8.0    14.81 )-----------( 418      ( 09 Aug 2022 06:33 )             25.9     143  |  118<H>  |  47<H>  ----------------------------<  75          (08-09-22 @ 06:33)  3.8   |  18  |  0.6<L>    Ca    7.2<L>          (08-09-22 @ 06:33)  Mg     2.4         (08-09-22 @ 06:33)    TPro  4.6<L>  /  Alb  1.4<L>  /  TBili  <0.2  /  DBili  x   /  AST  16  /  ALT  8   /  AlkPhos  148<H>       08-09-22 @ 06:33    Folate: 16.3 ng/mL (07-26 @ 10:23)    Blood Glucose (Past 24 hours):  65 mg/dL (08-10 @ 11:19)  70 mg/dL (08-10 @ 07:38)  70 mg/dL (08-10 @ 04:53)  78 mg/dL (08-09 @ 18:55)    ASSESSMENT/PLAN:   - severe protein calorie malnutrition  - multiple areas of stage 3 and 4 skin breakdown present on admission  - chronic trach dependence, functional quad. -- ? since 5/2022  - chronic dysphagia, +GT  - h/o DM, CVA, constipation, seizures, HTN      Pt now CMO. No further indication for aggressive nutrition support intervention. Please recall service prn.

## 2022-08-10 NOTE — PROGRESS NOTE ADULT - SUBJECTIVE AND OBJECTIVE BOX
Patient is a 57y old  Male who presents with a chief complaint of placement (09 Aug 2022 17:12)      INTERVAL HPI/OVERNIGHT EVENTS:  None    T(C): 36.7 (08-10-22 @ 05:00), Max: 36.7 (08-10-22 @ 05:00)  HR: 94 (08-10-22 @ 05:00) (86 - 95)  BP: 140/74 (08-10-22 @ 05:00) (119/68 - 140/74)  RR: 18 (08-10-22 @ 05:00) (18 - 18)  SpO2: 97% (08-10-22 @ 04:28) (96% - 100%)  Wt(kg): --Vital Signs Last 24 Hrs  T(C): 36.7 (10 Aug 2022 05:00), Max: 36.7 (10 Aug 2022 05:00)  T(F): 98 (10 Aug 2022 05:00), Max: 98 (10 Aug 2022 05:00)  HR: 94 (10 Aug 2022 05:00) (86 - 95)  BP: 140/74 (10 Aug 2022 05:00) (119/68 - 140/74)  BP(mean): --  RR: 18 (10 Aug 2022 05:00) (18 - 18)  SpO2: 97% (10 Aug 2022 04:28) (96% - 100%)        PHYSICAL EXAM:  GENERAL: Obtunded  HEAD:  Atraumatic, Normocephalic  EYES: EOMI, PERRLA, conjunctiva and sclera clear  ENMT: No tonsillar erythema, exudates, or enlargement; Moist mucous membranes, Good dentition, No lesions  NECK: Supple, No JVD, Normal thyroid  NERVOUS SYSTEM:  Obtunded  CHEST/LUNG: Clear to percussion bilaterally; No rales, rhonchi, wheezing, or rubs  HEART: Regular rate and rhythm; No murmurs, rubs, or gallops  ABDOMEN: Soft, Nontender, Nondistended; Bowel sounds present  EXTREMITIES:  2+ Peripheral Pulses, No clubbing, cyanosis, or edema  LYMPH: No lymphadenopathy noted  SKIN: No rashes or lesions      busPIRone 5 milliGRAM(s) Oral <User Schedule>  chlorhexidine 2% Cloths 1 Application(s) Topical <User Schedule>  collagenase Ointment 1 Application(s) Topical two times a day  Dakins Solution - 1/2 Strength 1 Application(s) Topical two times a day  dextrose 5%. 1000 milliLiter(s) IV Continuous <Continuous>  dextrose 5%. 1000 milliLiter(s) IV Continuous <Continuous>  dextrose 50% Injectable 25 Gram(s) IV Push once  dextrose 50% Injectable 12.5 Gram(s) IV Push once  dextrose 50% Injectable 25 Gram(s) IV Push once  dextrose Oral Gel 15 Gram(s) Oral once PRN  folic acid 1 milliGRAM(s) Oral daily  glucagon  Injectable 1 milliGRAM(s) IntraMuscular once  heparin   Injectable 5000 Unit(s) SubCutaneous every 8 hours  lactulose Syrup 10 Gram(s) Oral two times a day PRN  levETIRAcetam  IVPB 500 milliGRAM(s) IV Intermittent every 12 hours  ondansetron Injectable 4 milliGRAM(s) IV Push every 6 hours PRN  pantoprazole    Tablet 40 milliGRAM(s) Oral before breakfast  risperiDONE   Tablet 1 milliGRAM(s) Oral two times a day PRN  simvastatin 10 milliGRAM(s) Oral at bedtime  sucralfate suspension 1 Gram(s) Oral two times a day    Mode: CPAP with PS, FiO2: 40, PEEP: 5, PS: 5  HEALTH ISSUES - PROBLEM Dx:  Functional quadriplegia    Acute respiratory failure    Palliative care by specialist    Abdominal distension    Chronic respiratory failure    Chronic osteomyelitis

## 2022-08-10 NOTE — PROGRESS NOTE ADULT - SUBJECTIVE AND OBJECTIVE BOX
HPI:  56 yo M w h/o HTN, HLD, DM, CAD, CVA (x2 w residual weakness), s/p trach and peg presents from [facility] for placement at new facility. Patient's sister (HCP) apparently did not like care at [facility] - describing it as unsafe d/t no side rails on bed.    In ED, vitals stable.      consulted and patient admitted until adequate facility established. (23 Jul 2022 02:47)     INTERVAL EVENTS:  - pt stable for d/c   - sister here for meeting today     ADVANCE DIRECTIVES:     MOLST  [X]  Living Will  [ ]   DECISION MAKER(s):  [ ] Health Care Proxy(s)  [X ] Surrogate(s)  [ ] Guardian           Name(s): Phone Number(s): Sister- Nicole    BASELINE (I)ADL(s) (prior to admission):  [place]: [ ]Total  [ ] Moderate [ X]Dependent  Palliative Performance Status Version 2:        30 %    [email or web address]    Allergies    No Known Allergies    Intolerances    MEDICATIONS  (STANDING):  busPIRone 5 milliGRAM(s) Oral <User Schedule>  chlorhexidine 2% Cloths 1 Application(s) Topical <User Schedule>  collagenase Ointment 1 Application(s) Topical two times a day  Dakins Solution - 1/2 Strength 1 Application(s) Topical two times a day  dextrose 5% + sodium chloride 0.45%. 1000 milliLiter(s) (75 mL/Hr) IV Continuous <Continuous>  dextrose 5%. 1000 milliLiter(s) (50 mL/Hr) IV Continuous <Continuous>  dextrose 5%. 1000 milliLiter(s) (100 mL/Hr) IV Continuous <Continuous>  dextrose 50% Injectable 25 Gram(s) IV Push once  dextrose 50% Injectable 12.5 Gram(s) IV Push once  dextrose 50% Injectable 25 Gram(s) IV Push once  folic acid 1 milliGRAM(s) Oral daily  glucagon  Injectable 1 milliGRAM(s) IntraMuscular once  heparin   Injectable 5000 Unit(s) SubCutaneous every 8 hours  insulin lispro (ADMELOG) corrective regimen sliding scale   SubCutaneous every 6 hours  lactulose Syrup 10 Gram(s) Oral two times a day  levETIRAcetam  IVPB 500 milliGRAM(s) IV Intermittent every 12 hours  pantoprazole    Tablet 40 milliGRAM(s) Oral before breakfast  simvastatin 10 milliGRAM(s) Oral at bedtime  sucralfate suspension 1 Gram(s) Oral two times a day    MEDICATIONS  (PRN):  dextrose Oral Gel 15 Gram(s) Oral once PRN Blood Glucose LESS THAN 70 milliGRAM(s)/deciliter  ondansetron Injectable 4 milliGRAM(s) IV Push every 6 hours PRN Nausea and/or Vomiting  risperiDONE   Tablet 1 milliGRAM(s) Oral two times a day PRN agitation    PRESENT SYMPTOMS: [X ]Unable to obtain due to poor mentation   Source if other than patient:  [ ]Family   [ ]Team     Pain: [ ]yes [ ]no  QOL impact -   Location -                    Aggravating factors -  Quality -  Radiation -  Timing-  Severity (0-10 scale):  Minimal acceptable level (0-10 scale):     CPOT:  0  https://www.[facility].org/getattachment/[email or web address]/Critical-Care-Pain-Observation-Tool-(CPOT)        Dyspnea:                           [ ]Mild [ ]Moderate [ ]Severe  Anxiety:                             [ ]Mild [ ]Moderate [ ]Severe  Fatigue:                             [ ]Mild [ ]Moderate [ ]Severe  Nausea:                             [ ]Mild [ ]Moderate [ ]Severe  Loss of appetite:              [ ]Mild [ ]Moderate [ ]Severe  Constipation:                    [ ]Mild [ ]Moderate [ ]Severe    Other Symptoms:  [ X]All other review of systems negative     Palliative Performance Status Version 2:      10   %    [email or web address]    PHYSICAL EXAM:  ICU Vital Signs Last 24 Hrs  T(C): 36.7 (10 Aug 2022 05:00), Max: 36.7 (10 Aug 2022 05:00)  T(F): 98 (10 Aug 2022 05:00), Max: 98 (10 Aug 2022 05:00)  HR: 94 (10 Aug 2022 05:00) (86 - 95)  BP: 140/74 (10 Aug 2022 05:00) (137/71 - 140/74)  RR: 18 (10 Aug 2022 05:00) (18 - 18)  SpO2: 98% (10 Aug 2022 11:41) (96% - 98%)    GENERAL:  [ ]Alert  [ ]Oriented x   [X ]Lethargic  [X ]Cachexia  [ ]Unarousable  [ ]Verbal  [ X]Non-Verbal  Behavioral:   [ ] Anxiety  [ ] Delirium [ ] Agitation [ ] Other  HEENT:  [ ]Normal   [ ]Dry mouth   [ X]ET Tube/Trach  [ ]Oral lesions  PULMONARY:   [ ]Clear [ ]Tachypnea  [ ]Audible excessive secretions   + vented on 40% FiO2   CARDIOVASCULAR:    [X ]Regular [ ]Irregular [ ]Tachy  [ ]Kumar [ ]Murmur [ ]Other  GASTROINTESTINAL:  [ X]Soft  [ ]Distended   [ ]+BS  [ ]Non tender [ ]Tender  [X ]PEG [ ]OGT/ NGT  Last BM:   GENITOURINARY:  [ ]Normal [ ] Incontinent   [ ]Oliguria/Anuria   [X ]Brito- dark brown urine  MUSCULOSKELETAL:   [ ]Normal   [ ]Weakness  [X ]Bed/Wheelchair bound [ ]Edema  NEUROLOGIC:   [ ]No focal deficits  [ X]Cognitive impairment  [ ]Dysphagia [ ]Dysarthria [ ]Paresis [ ]Other   SKIN:   [ ]Normal    [ ]Rash  [X ]Pressure ulcer(s)       Present on admission [X ]y [ ]n      LABS:    ICU Vital Signs Last 24 Hrs  T(C): 36.7 (10 Aug 2022 05:00), Max: 36.7 (10 Aug 2022 05:00)  T(F): 98 (10 Aug 2022 05:00), Max: 98 (10 Aug 2022 05:00)  HR: 94 (10 Aug 2022 05:00) (86 - 95)  BP: 140/74 (10 Aug 2022 05:00) (137/71 - 140/74)  RR: 18 (10 Aug 2022 05:00) (18 - 18)  SpO2: 98% (10 Aug 2022 11:41) (96% - 98%)    RADIOLOGY & ADDITIONAL STUDIES:    < from: CT Abdomen and Pelvis w/ Oral Cont (07.28.22 @ 17:24) >    IMPRESSION:      Significantly limited study due to lack of intravenous contrast - much of   intra-abdominal contents cannot be evaluated.    Severe rectosigmoid fecal impaction. The rectum is distended to 12.8 cm.   Disimpaction is recommended.    Patchy left lower lobe pulmonary opacities, compatible with pneumonia.    Lower back/buttock ulceration with gas. Erosive changes of the underlying   sacrum and coccyx suggestive ofosteomyelitis.    There appears to be generalized mesenteric congestion/ascites.    --- End of Report ---      < end of copied text >      REFERRALS:   [X=  ]Chaplaincy  [ ]Hospice  [ ]Child Life  [ ]Social Work  [ ]Case management [ ]Holistic Therapy      HPI:  58 yo M w h/o HTN, HLD, DM, CAD, CVA (x2 w residual weakness), s/p trach and peg presents from [facility] for placement at new facility. Patient's sister (HCP) apparently did not like care at [facility] - describing it as unsafe d/t no side rails on bed.    In ED, vitals stable.      consulted and patient admitted until adequate facility established. (23 Jul 2022 02:47)     INTERVAL EVENTS:  - pt stable for d/c   - sister here for meeting today     ADVANCE DIRECTIVES:     MOLST  [X]  Living Will  [ ]   DECISION MAKER(s):  [ ] Health Care Proxy(s)  [X ] Surrogate(s)  [ ] Guardian           Name(s): Phone Number(s): Sister- Nicole    BASELINE (I)ADL(s) (prior to admission):  [place]ureau: [ ]Total  [ ] Moderate [ X]Dependent  Palliative Performance Status Version 2:        30 %    [email or web address]    Allergies  No Known Allergies    MEDICATIONS  (STANDING):  busPIRone 5 milliGRAM(s) Oral <User Schedule>  chlorhexidine 2% Cloths 1 Application(s) Topical <User Schedule>  collagenase Ointment 1 Application(s) Topical two times a day  Dakins Solution - 1/2 Strength 1 Application(s) Topical two times a day  dextrose 5% + sodium chloride 0.45%. 1000 milliLiter(s) (75 mL/Hr) IV Continuous <Continuous>  dextrose 5%. 1000 milliLiter(s) (50 mL/Hr) IV Continuous <Continuous>  dextrose 5%. 1000 milliLiter(s) (100 mL/Hr) IV Continuous <Continuous>  dextrose 50% Injectable 25 Gram(s) IV Push once  dextrose 50% Injectable 12.5 Gram(s) IV Push once  dextrose 50% Injectable 25 Gram(s) IV Push once  folic acid 1 milliGRAM(s) Oral daily  glucagon  Injectable 1 milliGRAM(s) IntraMuscular once  heparin   Injectable 5000 Unit(s) SubCutaneous every 8 hours  insulin lispro (ADMELOG) corrective regimen sliding scale   SubCutaneous every 6 hours  lactulose Syrup 10 Gram(s) Oral two times a day  levETIRAcetam  IVPB 500 milliGRAM(s) IV Intermittent every 12 hours  pantoprazole    Tablet 40 milliGRAM(s) Oral before breakfast  simvastatin 10 milliGRAM(s) Oral at bedtime  sucralfate suspension 1 Gram(s) Oral two times a day    MEDICATIONS  (PRN):  dextrose Oral Gel 15 Gram(s) Oral once PRN Blood Glucose LESS THAN 70 milliGRAM(s)/deciliter  ondansetron Injectable 4 milliGRAM(s) IV Push every 6 hours PRN Nausea and/or Vomiting  risperiDONE   Tablet 1 milliGRAM(s) Oral two times a day PRN agitation    PRESENT SYMPTOMS: [X ]Unable to obtain due to poor mentation   Source if other than patient:  [ ]Family   [ ]Team     Pain: [ ]yes [ ]no  QOL impact -   Location -                    Aggravating factors -  Quality -  Radiation -  Timing-  Severity (0-10 scale):  Minimal acceptable level (0-10 scale):     CPOT:  0  https://www.[email or web address]/getattachment/[email or web address]/Critical-Care-Pain-Observation-Tool-(CPOT)        Dyspnea:                           [ ]Mild [ ]Moderate [ ]Severe  Anxiety:                             [ ]Mild [ ]Moderate [ ]Severe  Fatigue:                             [ ]Mild [ ]Moderate [ ]Severe  Nausea:                             [ ]Mild [ ]Moderate [ ]Severe  Loss of appetite:              [ ]Mild [ ]Moderate [ ]Severe  Constipation:                    [ ]Mild [ ]Moderate [ ]Severe    Other Symptoms:  [ X]All other review of systems negative     Palliative Performance Status Version 2:      10   %    [email or web address]    PHYSICAL EXAM:  ICU Vital Signs Last 24 Hrs  T(C): 36.7 (10 Aug 2022 05:00), Max: 36.7 (10 Aug 2022 05:00)  T(F): 98 (10 Aug 2022 05:00), Max: 98 (10 Aug 2022 05:00)  HR: 94 (10 Aug 2022 05:00) (86 - 95)  BP: 140/74 (10 Aug 2022 05:00) (137/71 - 140/74)  RR: 18 (10 Aug 2022 05:00) (18 - 18)  SpO2: 98% (10 Aug 2022 11:41) (96% - 98%)    GENERAL:  [ ]Alert  [ ]Oriented x   [X ]Lethargic  [X ]Cachexia  [ ]Unarousable  [ ]Verbal  [ X]Non-Verbal  Behavioral:   [ ] Anxiety  [ ] Delirium [ ] Agitation [ ] Other  HEENT:  [ ]Normal   [ ]Dry mouth   [ X]ET Tube/Trach  [ ]Oral lesions  PULMONARY:   [ ]Clear [ ]Tachypnea  [ ]Audible excessive secretions   + vented on 40% FiO2   CARDIOVASCULAR:    [X ]Regular [ ]Irregular [ ]Tachy  [ ]Kumar [ ]Murmur [ ]Other  GASTROINTESTINAL:  [ X]Soft  [ ]Distended   [ ]+BS  [ ]Non tender [ ]Tender  [X ]PEG [ ]OGT/ NGT  Last BM:   GENITOURINARY:  [ ]Normal [ ] Incontinent   [ ]Oliguria/Anuria   [X ]Brito- dark brown urine  MUSCULOSKELETAL:   [ ]Normal   [ ]Weakness  [X ]Bed/Wheelchair bound [ ]Edema  NEUROLOGIC:   [ ]No focal deficits  [ X]Cognitive impairment  [ ]Dysphagia [ ]Dysarthria [ ]Paresis [ ]Other   SKIN:   [ ]Normal    [ ]Rash  [X ]Pressure ulcer(s)       Present on admission [X ]y [ ]n      LABS:  reviewed                          8.0    14.81 )-----------( 418      ( 09 Aug 2022 06:33 )             25.9       08-09    143  |  118<H>  |  47<H>  ----------------------------<  75  3.8   |  18  |  0.6<L>    Ca    7.2<L>      09 Aug 2022 06:33  Mg     2.4     08-09    TPro  4.6<L>  /  Alb  1.4<L>  /  TBili  <0.2  /  DBili  x   /  AST  16  /  ALT  8   /  AlkPhos  148<H>  08-09                            CAPILLARY BLOOD GLUCOSE      POCT Blood Glucose.: 65 mg/dL (10 Aug 2022 11:19)              RADIOLOGY & ADDITIONAL STUDIES:  reviewed    < from: CT Abdomen and Pelvis w/ Oral Cont (07.28.22 @ 17:24) >    IMPRESSION:      Significantly limited study due to lack of intravenous contrast - much of   intra-abdominal contents cannot be evaluated.    Severe rectosigmoid fecal impaction. The rectum is distended to 12.8 cm.   Disimpaction is recommended.    Patchy left lower lobe pulmonary opacities, compatible with pneumonia.    Lower back/buttock ulceration with gas. Erosive changes of the underlying   sacrum and coccyx suggestive ofosteomyelitis.    There appears to be generalized mesenteric congestion/ascites.    --- End of Report ---      < end of copied text >      REFERRALS:   [X=  ]Chaplaincy  [ ]Hospice  [ ]Child Life  [ ]Social Work  [ ]Case management [ ]Holistic Therapy

## 2022-08-10 NOTE — PROGRESS NOTE ADULT - TIME BILLING
I have personally seen and examined this patient.    I have reviewed all pertinent clinical information and reviewed all relevant imaging and diagnostic studies personally.   I counseled the patient about diagnostic testing and treatment plan. All questions were answered.   I discussed recommendations with the primary team.
I have personally seen and examined this patient.    I have reviewed all pertinent clinical information and reviewed all relevant imaging and diagnostic studies personally.   I counseled the patient about diagnostic testing and treatment plan. All questions were answered.   I discussed recommendations with the primary team.
as above

## 2022-08-10 NOTE — CHART NOTE - NSCHARTNOTEFT_GEN_A_CORE
palliative team, and primary team MD met with patient's sister Nicole. She received medical update. We discussed patient's condition, and options moving forward. Nicole feels like her brother has been declining for months now, and does not have a quality of life. We discussed CMO at length including DNI and stopping vent support. She wished to move forward with making him CMO. She requested  for prayer/last rites. all questions answered. support rendered. will follow. [phone] with Father Vadim regarding request.

## 2022-08-10 NOTE — PROGRESS NOTE ADULT - CONVERSATION DETAILS
Spoke with sister at bedside. She was emotional describing his function and clinical decline over the past year, with numerous hospitalizations. She feels his quality of life is very poor. Explained options, including returning to vent facility and continued ongoing medical management. Discussed option for CMO/hospice, including stopping iVF, feeds, labs, ventilator, vitals, etc. She would like to proceed with CMO/DNI today. She is open to placing him in hospice if he survives and appears stable tomorrow.

## 2022-08-10 NOTE — PROGRESS NOTE ADULT - PROBLEM SELECTOR PLAN 4
s/p trach and PEG in May 22 at [facility] following aspiration  now vent dependent --> no further vent. may continue humidified air   morphine 2 mg IVP Q 1 H PRN for dyspnea  no pulse ox monitoring or escalation of O2 s/p trach and PEG in May 22 at [facility] following aspiration  now vent dependent --> no further vent. may continue humidified air   morphine 2 mg IVP Q 1 H PRN for dyspnea/pain  no pulse ox monitoring or escalation of O2

## 2022-08-10 NOTE — PROGRESS NOTE ADULT - ASSESSMENT
57yMale with PMH including HTN, HLD, DM, CAD, CVA (x2 w residual weakness), s/p trach and peg ( in May 2022) , being admitted from [facility] for placement because sister felt the care there was unsafe. Pt was found to have abdominal distention, large stool burden, with stool coming from PEG and vomiting some stool. Pt not a candidate for surgery so currently being medically managed. pt also with OM of the sacrum.     Sister decided on CMO today. Will place hospice consult if patient is stable tomorrow.   No further ventilation.     MEDD (morphine equivalent daily dose): 0       See Recs below.    Please call [phone] with questions or concerns 24/7.   We will continue to follow.     Discussed with primary MD, RN.

## 2022-08-11 NOTE — PROGRESS NOTE ADULT - ATTENDING COMMENTS
Palliative recs noted.  PAtient appropriate for hospice.   CMO now per palliative. Only feeds restarted on 8/11 per sister's request  Hospice consulted on 8/11.   awaiting placement

## 2022-08-11 NOTE — PROGRESS NOTE ADULT - SUBJECTIVE AND OBJECTIVE BOX
Patient is a 57y old  Male who presents with a chief complaint of placement (10 Aug 2022 13:59)      INTERVAL HPI/OVERNIGHT EVENTS:  None    T(C): --  HR: --  BP: --  RR: --  SpO2: 97% (08-10-22 @ 21:00) (97% - 98%)  Wt(kg): --Vital Signs Last 24 Hrs  T(C): --  T(F): --  HR: --  BP: --  BP(mean): --  RR: --  SpO2: 97% (10 Aug 2022 21:00) (97% - 98%)    Parameters below as of 10 Aug 2022 21:00  Patient On (Oxygen Delivery Method): T-piece    O2 Concentration (%): 40    PHYSICAL EXAM:  GENERAL: Obtunded  HEAD:  Atraumatic, Normocephalic  EYES: EOMI, PERRLA, conjunctiva and sclera clear  ENMT: No tonsillar erythema, exudates, or enlargement; Moist mucous membranes, Good dentition, No lesions  NECK: Supple, No JVD, Normal thyroid  NERVOUS SYSTEM: Obtunded  CHEST/LUNG: Clear to percussion bilaterally; No rales, rhonchi, wheezing, or rubs  HEART: Regular rate and rhythm; No murmurs, rubs, or gallops  ABDOMEN: Soft, Nontender, Nondistended; Bowel sounds present  EXTREMITIES:  2+ Peripheral Pulses, No clubbing, cyanosis, or edema  LYMPH: No lymphadenopathy noted  SKIN: No rashes or lesions      busPIRone 5 milliGRAM(s) Oral <User Schedule>  chlorhexidine 2% Cloths 1 Application(s) Topical <User Schedule>  collagenase Ointment 1 Application(s) Topical two times a day  Dakins Solution - 1/2 Strength 1 Application(s) Topical two times a day  glycopyrrolate Injectable 0.2 milliGRAM(s) IV Push every 6 hours PRN  lactulose Syrup 10 Gram(s) Oral two times a day PRN  levETIRAcetam  IVPB 500 milliGRAM(s) IV Intermittent every 12 hours  midazolam Injectable 1 milliGRAM(s) IV Push every 1 hour PRN  morphine  - Injectable 2 milliGRAM(s) IV Push every 1 hour PRN  ondansetron Injectable 4 milliGRAM(s) IV Push every 6 hours PRN  risperiDONE   Tablet 1 milliGRAM(s) Oral two times a day PRN  sucralfate suspension 1 Gram(s) Oral two times a day      HEALTH ISSUES - PROBLEM Dx:  Functional quadriplegia    Acute respiratory failure    Palliative care by specialist    Abdominal distension    Chronic respiratory failure    Chronic osteomyelitis    Comfort measures only status

## 2022-08-11 NOTE — CHART NOTE - NSCHARTNOTESELECT_GEN_ALL_CORE
Event Note
Nutrition Support/Nutrition Services
Palliative Care - Social Work/Event Note
Palliative/Event Note

## 2022-08-11 NOTE — PROGRESS NOTE ADULT - PROBLEM SELECTOR PLAN 4
s/p trach and PEG in May 22 at [facility] following aspiration  now vent dependent --> no further vent. may continue humidified air   morphine 2 mg IVP Q 1 H PRN for dyspnea/pain  no pulse ox monitoring or escalation of O2

## 2022-08-11 NOTE — CHART NOTE - NSCHARTNOTEFT_GEN_A_CORE
visited patient earlier today. patient was awake. no non verbal signs of pain or distress. his sister raheem at bedside. support rendered.     emailed raheem info on HRA /burial financial assistance as requested.     [phone]

## 2022-08-11 NOTE — HOSPICE CARE NOTE - CONVESATION DETAILS
Call placed to patient's sister Nicole who verbalizes she does not want her brother to return to [facility]. Nicole open to hospice services. Patient requires SNF placement. Hospice available once patient is transferred to a new facility. Message left for DARIO Sandoval.

## 2022-08-11 NOTE — PROGRESS NOTE ADULT - SUBJECTIVE AND OBJECTIVE BOX
HPI:  56 yo M w h/o HTN, HLD, DM, CAD, CVA (x2 w residual weakness), s/p trach and peg presents from [facility] for placement at new facility. Patient's sister (HCP) apparently did not like care at [facility] - describing it as unsafe d/t no side rails on bed.    In ED, vitals stable.      consulted and patient admitted until adequate facility established. (23 Jul 2022 02:47)     INTERVAL EVENTS:  - pt stable for d/c   - pt transitioned to CMO yesterday  - hospice in discussions with sister about discharge planning     ADVANCE DIRECTIVES:     MOLST  [X]  Living Will  [ ]   DECISION MAKER(s):  [ ] Health Care Proxy(s)  [X ] Surrogate(s)  [ ] Guardian           Name(s): Phone Number(s): SisterMicah Rivera    BASELINE (I)ADL(s) (prior to admission):  [place]: [ ]Total  [ ] Moderate [ X]Dependent  Palliative Performance Status Version 2:        30 %    http://npcrc.org/files/news/palliative_performance_scale_ppsv2.pdf    Allergies  No Known Allergies    MEDICATIONS  (STANDING):  busPIRone 5 milliGRAM(s) Oral <User Schedule>  chlorhexidine 2% Cloths 1 Application(s) Topical <User Schedule>  collagenase Ointment 1 Application(s) Topical two times a day  Dakins Solution - 1/2 Strength 1 Application(s) Topical two times a day  dextrose 5% + sodium chloride 0.45%. 1000 milliLiter(s) (75 mL/Hr) IV Continuous <Continuous>  dextrose 5%. 1000 milliLiter(s) (50 mL/Hr) IV Continuous <Continuous>  dextrose 5%. 1000 milliLiter(s) (100 mL/Hr) IV Continuous <Continuous>  dextrose 50% Injectable 25 Gram(s) IV Push once  dextrose 50% Injectable 12.5 Gram(s) IV Push once  dextrose 50% Injectable 25 Gram(s) IV Push once  folic acid 1 milliGRAM(s) Oral daily  glucagon  Injectable 1 milliGRAM(s) IntraMuscular once  heparin   Injectable 5000 Unit(s) SubCutaneous every 8 hours  insulin lispro (ADMELOG) corrective regimen sliding scale   SubCutaneous every 6 hours  lactulose Syrup 10 Gram(s) Oral two times a day  levETIRAcetam  IVPB 500 milliGRAM(s) IV Intermittent every 12 hours  pantoprazole    Tablet 40 milliGRAM(s) Oral before breakfast  simvastatin 10 milliGRAM(s) Oral at bedtime  sucralfate suspension 1 Gram(s) Oral two times a day    MEDICATIONS  (PRN):  dextrose Oral Gel 15 Gram(s) Oral once PRN Blood Glucose LESS THAN 70 milliGRAM(s)/deciliter  ondansetron Injectable 4 milliGRAM(s) IV Push every 6 hours PRN Nausea and/or Vomiting  risperiDONE   Tablet 1 milliGRAM(s) Oral two times a day PRN agitation    PRESENT SYMPTOMS: [X ]Unable to obtain due to poor mentation   Source if other than patient:  [ ]Family   [ ]Team     Pain: [ ]yes [ ]no  QOL impact -   Location -                    Aggravating factors -  Quality -  Radiation -  Timing-  Severity (0-10 scale):  Minimal acceptable level (0-10 scale):     CPOT:  0  https://www.[facility].org/getattachment/[email or web address]/Critical-Care-Pain-Observation-Tool-(CPOT)        Dyspnea:                           [ ]Mild [ ]Moderate [ ]Severe  Anxiety:                             [ ]Mild [ ]Moderate [ ]Severe  Fatigue:                             [ ]Mild [ ]Moderate [ ]Severe  Nausea:                             [ ]Mild [ ]Moderate [ ]Severe  Loss of appetite:              [ ]Mild [ ]Moderate [ ]Severe  Constipation:                    [ ]Mild [ ]Moderate [ ]Severe    Other Symptoms:  [ X]All other review of systems negative     Palliative Performance Status Version 2:      10   %    http://[facility].org/files/news/palliative_performance_scale_ppsv2.pdf    PHYSICAL EXAM:  GENERAL:  [ ]Alert  [ ]Oriented x   [X ]Lethargic  [X ]Cachexia  [ ]Unarousable  [ ]Verbal  [ X]Non-Verbal  Behavioral:   [ ] Anxiety  [ ] Delirium [ ] Agitation [ ] Other  HEENT:  [ ]Normal   [ ]Dry mouth   [ X]ET Tube/Trach  [ ]Oral lesions  PULMONARY:   [ ]Clear [ ]Tachypnea  [ ]Audible excessive secretions   + vented on 40% FiO2   CARDIOVASCULAR:    [X ]Regular [ ]Irregular [ ]Tachy  [ ]Kumar [ ]Murmur [ ]Other  GASTROINTESTINAL:  [ X]Soft  [ ]Distended   [ ]+BS  [ ]Non tender [ ]Tender  [X ]PEG [ ]OGT/ NGT  Last BM:   GENITOURINARY:  [ ]Normal [ ] Incontinent   [ ]Oliguria/Anuria   [X ]Brito- dark brown urine  MUSCULOSKELETAL:   [ ]Normal   [ ]Weakness  [X ]Bed/Wheelchair bound [ ]Edema  NEUROLOGIC:   [ ]No focal deficits  [ X]Cognitive impairment  [ ]Dysphagia [ ]Dysarthria [ ]Paresis [ ]Other   SKIN:   [ ]Normal    [ ]Rash  [X ]Pressure ulcer(s)       Present on admission [X ]y [ ]n      LABS:  reviewed               No new- CMO      RADIOLOGY & ADDITIONAL STUDIES:  reviewed    No new imaging- CMO    REFERRALS:   [X=  ] Chaplaincy  [ X ]Hospice  [ ]Child Life  [ ]Social Work  [ ]Case management [ ]Holistic Therapy      HPI:  56 yo M w h/o HTN, HLD, DM, CAD, CVA (x2 w residual weakness), s/p trach and peg presents from [facility] for placement at new facility. Patient's sister (HCP) apparently did not like care at [facility] - describing it as unsafe d/t no side rails on bed.    In ED, vitals stable.      consulted and patient admitted until adequate facility established. (23 Jul 2022 02:47)     INTERVAL EVENTS:  - pt stable for d/c   - pt transitioned to CMO yesterday  - hospice in discussions with sister about discharge planning     ADVANCE DIRECTIVES:     MOLST  [X]  Living Will  [ ]   DECISION MAKER(s):  [ ] Health Care Proxy(s)  [X ] Surrogate(s)  [ ] Guardian           Name(s): Phone Number(s): SisterMicah Rivera    BASELINE (I)ADL(s) (prior to admission):  [place]: [ ]Total  [ ] Moderate [ X]Dependent  Palliative Performance Status Version 2:        30 %    http://npcrc.org/files/news/palliative_performance_scale_ppsv2.pdf    Allergies  No Known Allergies    MEDICATIONS  (STANDING):  busPIRone 5 milliGRAM(s) Oral <User Schedule>  chlorhexidine 2% Cloths 1 Application(s) Topical <User Schedule>  collagenase Ointment 1 Application(s) Topical two times a day  Dakins Solution - 1/2 Strength 1 Application(s) Topical two times a day  dextrose 5% + sodium chloride 0.45%. 1000 milliLiter(s) (75 mL/Hr) IV Continuous <Continuous>  dextrose 5%. 1000 milliLiter(s) (50 mL/Hr) IV Continuous <Continuous>  dextrose 5%. 1000 milliLiter(s) (100 mL/Hr) IV Continuous <Continuous>  dextrose 50% Injectable 25 Gram(s) IV Push once  dextrose 50% Injectable 12.5 Gram(s) IV Push once  dextrose 50% Injectable 25 Gram(s) IV Push once  folic acid 1 milliGRAM(s) Oral daily  glucagon  Injectable 1 milliGRAM(s) IntraMuscular once  heparin   Injectable 5000 Unit(s) SubCutaneous every 8 hours  insulin lispro (ADMELOG) corrective regimen sliding scale   SubCutaneous every 6 hours  lactulose Syrup 10 Gram(s) Oral two times a day  levETIRAcetam  IVPB 500 milliGRAM(s) IV Intermittent every 12 hours  pantoprazole    Tablet 40 milliGRAM(s) Oral before breakfast  simvastatin 10 milliGRAM(s) Oral at bedtime  sucralfate suspension 1 Gram(s) Oral two times a day    MEDICATIONS  (PRN):  dextrose Oral Gel 15 Gram(s) Oral once PRN Blood Glucose LESS THAN 70 milliGRAM(s)/deciliter  ondansetron Injectable 4 milliGRAM(s) IV Push every 6 hours PRN Nausea and/or Vomiting  risperiDONE   Tablet 1 milliGRAM(s) Oral two times a day PRN agitation    PRESENT SYMPTOMS: [X ]Unable to obtain due to poor mentation   Source if other than patient:  [ ]Family   [ ]Team     Pain: [ ]yes [ ]no  QOL impact -   Location -                    Aggravating factors -  Quality -  Radiation -  Timing-  Severity (0-10 scale):  Minimal acceptable level (0-10 scale):     CPOT:  0  https://www.[facility].org/getattachment/[email or web address]/Critical-Care-Pain-Observation-Tool-(CPOT)        Dyspnea:                           [ ]Mild [ ]Moderate [ ]Severe  Anxiety:                             [ ]Mild [ ]Moderate [ ]Severe  Fatigue:                             [ ]Mild [ ]Moderate [ ]Severe  Nausea:                             [ ]Mild [ ]Moderate [ ]Severe  Loss of appetite:              [ ]Mild [ ]Moderate [ ]Severe  Constipation:                    [ ]Mild [ ]Moderate [ ]Severe    Other Symptoms:  [ X]All other review of systems negative     Palliative Performance Status Version 2:      10   %    http://[facility].org/files/news/palliative_performance_scale_ppsv2.pdf    PHYSICAL EXAM:  GENERAL:  [ ]Alert  [ ]Oriented x   [X ]Lethargic  [X ]Cachexia  [ ]Unarousable  [ ]Verbal  [ X]Non-Verbal  Behavioral:   [ ] Anxiety  [ ] Delirium [ ] Agitation [ ] Other  HEENT:  [ ]Normal   [ ]Dry mouth   [ X]ET Tube/Trach  [ ]Oral lesions  PULMONARY:   [ ]Clear [ ]Tachypnea  [ ]Audible excessive secretions   + vented on 40% FiO2   CARDIOVASCULAR:    [X ]Regular [ ]Irregular [ ]Tachy  [ ]Kumar [ ]Murmur [ ]Other  GASTROINTESTINAL:  [ X]Soft  [ ]Distended   [ ]+BS  [ ]Non tender [ ]Tender  [X ]PEG [ ]OGT/ NGT  Last BM:   GENITOURINARY:  [ ]Normal [ ] Incontinent   [ ]Oliguria/Anuria   [X ]Brito- dark brown urine  MUSCULOSKELETAL:   [ ]Normal   [ ]Weakness  [X ]Bed/Wheelchair bound [ ]Edema  NEUROLOGIC:   [ ]No focal deficits  [ X]Cognitive impairment  [ ]Dysphagia [ ]Dysarthria [ ]Paresis [ ]Other   SKIN:   [ ]Normal    [ ]Rash  [X ]Pressure ulcer(s)       Present on admission [X ]y [ ]n      LABS:  reviewed               No new- CMO      RADIOLOGY & ADDITIONAL STUDIES:  reviewed    No new imaging- CMO  Previous EKG reviewed  REFERRALS:   [X=  ] Chaplaincy  [ X ]Hospice  [ ]Child Life  [ ]Social Work  [ ]Case management [ ]Holistic Therapy

## 2022-08-11 NOTE — PROGRESS NOTE ADULT - PROBLEM SELECTOR PLAN 2
DNR/DNI  CMO   Hospice consult tomorrow if stable   referral for last rites   Will follow DNR/DNI  CMO   Hospice consult in place - awaiting disposition   referral for last rites   Will follow

## 2022-08-11 NOTE — PROGRESS NOTE ADULT - ASSESSMENT
58 yo M w h/o HTN, HLD, DM, CAD, CVA (x2 w residual weakness), s/p trach and peg presents from [facility] for placement at new facility. Patient's sister (HCP) apparently did not like care at [facility] - describing it as unsafe d/t no side rails on bed.  consulted and patient admitted until adequate facility established.      #Hyponatremia- resolved   - Working Dx is SIADH  - stable. c/w FW restriction.  - 08/05: started on IVF since NPO    #Abdominal distention  - Severe rectosigmoid fecal impaction. The rectum is distended to 12.8 cm => disimpaction on 07/29  Improving. Now  lactulose BID. enema QD. and passing BM  - 08/02: Fecalith material seen out of PEG tube, stopped peg feedings and all meds, surgery consulted, plan is to assess for possible diversion colostomy, sister contacted, wishes to discuss plan in person  - 08/03: No fecalith material seen from peg today, KUB with peg studies showing patet peg tube with no extravasation, surgery plan is no surgery at the moment as the patient is passing BM  - 08/04: Patient vomited stool material overnight, PEG feeding stopped and attached to suction (draining feces), surgery on board pending plan  - 08/05: patient NPO, still draining fecalith material from PEG, surgery believe he is poor surgical candidate, ID plan is to keep cefepime (Dr Ordoñez believes the cultures could be colonizers), palliative team consulted  08/06: patient NPO (no feeds nor meds), still draining fecalith material from PEG, will c/w lactulose and increase the fleet enemas as patient still has stool impaction seen on KUB, surgery believe he is poor surgical candidate, palliative team consulted   08/08: Restarted tube feeds. Tolerated well. Had multiple BMs and Dced lactulose,    #CT scan findings:  - Patchy left lower lobe pulmonary opacities, compatible with pneumonia.  - Lower back/buttock ulceration with gas. Erosive changes of the underlying sacrum and coccyx suggestive of osteomyelitis. => ID and Burn consulted  - Burn plan: s/p bedside debridement on 07/29, continue with LWC.  - Finished course of Cefepime    #Trach inserted in June at [facility] (for aspiration PNA)  NOTE: patient not chronically on Vent. START PS TRIALS once stable    #HTN  - Well controlled off BP medication.    #CAD / HLD  - on simvastatin 10mg qhs and asa 81mg qd    #Seziure disorder  - on carbemazepine 100mg/5ml syrup bid  - Switched to IV keppra since peg tube usage on hold    #Anxiety / Agitation  - on buspirone 5mg qd    #GERD  - Pantoprazole 40 mg once a day, sulcrafate 1g bid    #Folate and vitamin deficiency  - on folic acid and mvi    DVT ppx: heparin sub q  GI ppx: ni  Diet: NPO - tube feeds on hold and meds on hold  Activity: bedbound    DNR/DNI - molst in chart - Now CMO

## 2022-08-11 NOTE — PROGRESS NOTE ADULT - ASSESSMENT
57yMale with PMH including HTN, HLD, DM, CAD, CVA (x2 w residual weakness), s/p trach and peg ( in May 2022) , being admitted from [facility] for placement because sister felt the care there was unsafe. Pt was found to have abdominal distention, large stool burden, with stool coming from PEG and vomiting some stool. Pt not a candidate for surgery so currently being medically managed. pt also with OM of the sacrum.     Sister decided on CMO yesterday, patient appears comfortable.   No further ventilation.   Of note- sister of patient spoke with hospice team, and agreed to continue PEG feeds for now, then slowly wean from them as appropriate. Spoke with RN and covering resident and changed MOLST to include trial of feeds.    MEDD (morphine equivalent daily dose): 0       See Recs below.    Please call [phone] with questions or concerns 24/7.   We will continue to follow.     Discussed with primary MD, RN.

## 2022-08-12 NOTE — PROGRESS NOTE ADULT - PROBLEM SELECTOR PLAN 3
2/2 stool burden  no surgery being offered  continue conservative management.
2/2 stool burden  no surgery being offered  continue conservative management--> resolved   may continue constipation meds PRN   no further PEG feeds- CMO
2/2 stool burden  no surgery being offered  continue conservative management--> resolved   may continue constipation meds PRN   spoke with hospice- plan to re-start feeds as tolerated for now
2/2 stool burden  no surgery being offered  continue conservative management--> resolved   may continue constipation meds PRN   spoke with hospice- plan to re-start feeds as tolerated for now

## 2022-08-12 NOTE — PROGRESS NOTE ADULT - PROBLEM SELECTOR PLAN 4
s/p trach and PEG in May 22 at [facility] following aspiration  now vent dependent --> no further vent. may continue humidified air   Roxanol 5 mg SL Q 1 H PRN for dyspnea/pain  no pulse ox monitoring or escalation of O2 s/p trach and PEG in May 22 at [facility] following aspiration  now vent dependent --> no further vent as comfort measures only. may continue humidified air   Roxanol 5 mg SL Q 1 H PRN for dyspnea/pain  no pulse ox monitoring or escalation of O2

## 2022-08-12 NOTE — PROGRESS NOTE ADULT - REASON FOR ADMISSION
placement

## 2022-08-12 NOTE — DISCHARGE NOTE NURSING/CASE MANAGEMENT/SOCIAL WORK - PATIENT PORTAL LINK FT
You can access the FollowMyHealth Patient Portal offered by [facility] by registering at the following website: [email or web address]. By joining [facility]’s FollowMyHealth portal, you will also be able to view your health information using other applications (apps) compatible with our system.

## 2022-08-12 NOTE — PROGRESS NOTE ADULT - SUBJECTIVE AND OBJECTIVE BOX
HPI:  58 yo M w h/o HTN, HLD, DM, CAD, CVA (x2 w residual weakness), s/p trach and peg presents from [facility] for placement at new facility. Patient's sister (HCP) apparently did not like care at [facility] - describing it as unsafe d/t no side rails on bed.    In ED, vitals stable.      consulted and patient admitted until adequate facility established. (23 Jul 2022 02:47)     INTERVAL EVENTS:  - pt stable for d/c   - pt transitioned to CMO 8/10  - hospice in discussions with sister about discharge planning   - pt lost IV access this am     ADVANCE DIRECTIVES:     MOLST  [X]  Living Will  [ ]   DECISION MAKER(s):  [ ] Health Care Proxy(s)  [X ] Surrogate(s)  [ ] Guardian           Name(s): Phone Number(s): SisterMicah Rivera    BASELINE (I)ADL(s) (prior to admission):  [place]: [ ]Total  [ ] Moderate [ X]Dependent  Palliative Performance Status Version 2:        30 %    http://npcrc.org/files/news/palliative_performance_scale_ppsv2.pdf    Allergies  No Known Allergies    MEDICATIONS  (STANDING):  busPIRone 5 milliGRAM(s) Oral <User Schedule>  chlorhexidine 2% Cloths 1 Application(s) Topical <User Schedule>  collagenase Ointment 1 Application(s) Topical two times a day  Dakins Solution - 1/2 Strength 1 Application(s) Topical two times a day  dextrose 5% + sodium chloride 0.45%. 1000 milliLiter(s) (75 mL/Hr) IV Continuous <Continuous>  dextrose 5%. 1000 milliLiter(s) (50 mL/Hr) IV Continuous <Continuous>  dextrose 5%. 1000 milliLiter(s) (100 mL/Hr) IV Continuous <Continuous>  dextrose 50% Injectable 25 Gram(s) IV Push once  dextrose 50% Injectable 12.5 Gram(s) IV Push once  dextrose 50% Injectable 25 Gram(s) IV Push once  folic acid 1 milliGRAM(s) Oral daily  glucagon  Injectable 1 milliGRAM(s) IntraMuscular once  heparin   Injectable 5000 Unit(s) SubCutaneous every 8 hours  insulin lispro (ADMELOG) corrective regimen sliding scale   SubCutaneous every 6 hours  lactulose Syrup 10 Gram(s) Oral two times a day  levETIRAcetam  IVPB 500 milliGRAM(s) IV Intermittent every 12 hours  pantoprazole    Tablet 40 milliGRAM(s) Oral before breakfast  simvastatin 10 milliGRAM(s) Oral at bedtime  sucralfate suspension 1 Gram(s) Oral two times a day    MEDICATIONS  (PRN):  dextrose Oral Gel 15 Gram(s) Oral once PRN Blood Glucose LESS THAN 70 milliGRAM(s)/deciliter  ondansetron Injectable 4 milliGRAM(s) IV Push every 6 hours PRN Nausea and/or Vomiting  risperiDONE   Tablet 1 milliGRAM(s) Oral two times a day PRN agitation    PRESENT SYMPTOMS: [X ]Unable to obtain due to poor mentation   Source if other than patient:  [ ]Family   [ ]Team     Pain: [ ]yes [ ]no  QOL impact -   Location -                    Aggravating factors -  Quality -  Radiation -  Timing-  Severity (0-10 scale):  Minimal acceptable level (0-10 scale):     CPOT:  0  https://www.[facility].org/getattachment/[email or web address]/Critical-Care-Pain-Observation-Tool-(CPOT)        Dyspnea:                           [ ]Mild [ ]Moderate [ ]Severe  Anxiety:                             [ ]Mild [ ]Moderate [ ]Severe  Fatigue:                             [ ]Mild [ ]Moderate [ ]Severe  Nausea:                             [ ]Mild [ ]Moderate [ ]Severe  Loss of appetite:              [ ]Mild [ ]Moderate [ ]Severe  Constipation:                    [ ]Mild [ ]Moderate [ ]Severe    Other Symptoms:  [ X]All other review of systems negative     Palliative Performance Status Version 2:      10   %    http://[facility].org/files/news/palliative_performance_scale_ppsv2.pdf    PHYSICAL EXAM:  GENERAL:  [ ]Alert  [ ]Oriented x   [X ]Lethargic  [X ]Cachexia  [ ]Unarousable  [ ]Verbal  [ X]Non-Verbal  Behavioral:   [ ] Anxiety  [ ] Delirium [ ] Agitation [ ] Other  HEENT:  [ ]Normal   [ ]Dry mouth   [ X]ET Tube/Trach  [ ]Oral lesions  PULMONARY:   [ ]Clear [ ]Tachypnea  [ ]Audible excessive secretions   + vented on 40% FiO2   CARDIOVASCULAR:    [X ]Regular [ ]Irregular [ ]Tachy  [ ]Kumar [ ]Murmur [ ]Other  GASTROINTESTINAL:  [ X]Soft  [ ]Distended   [ ]+BS  [ ]Non tender [ ]Tender  [X ]PEG [ ]OGT/ NGT  Last BM:   GENITOURINARY:  [ ]Normal [ ] Incontinent   [ ]Oliguria/Anuria   [X ]Brito- dark brown urine  MUSCULOSKELETAL:   [ ]Normal   [ ]Weakness  [X ]Bed/Wheelchair bound [ ]Edema  NEUROLOGIC:   [ ]No focal deficits  [ X]Cognitive impairment  [ ]Dysphagia [ ]Dysarthria [ ]Paresis [ ]Other   SKIN:   [ ]Normal    [ ]Rash  [X ]Pressure ulcer(s)       Present on admission [X ]y [ ]n      LABS:  reviewed               No new- CMO      RADIOLOGY & ADDITIONAL STUDIES:  reviewed    No new imaging- CMO    Previous EKG reviewed    REFERRALS:   [X=  ] Chaplaincy  [ X ]Hospice  [ ]Child Life  [ ]Social Work  [ ]Case management [ ]Holistic Therapy

## 2022-08-12 NOTE — PROGRESS NOTE ADULT - ASSESSMENT
57yMale with PMH including HTN, HLD, DM, CAD, CVA (x2 w residual weakness), s/p trach and peg ( in May 2022) , being admitted from [facility] for placement because sister felt the care there was unsafe. Pt was found to have abdominal distention, large stool burden, with stool coming from PEG and vomiting some stool. Pt not a candidate for surgery so currently being medically managed. pt also with OM of the sacrum.     Sister decided on CMO yesterday, patient appears comfortable.   No further ventilation.   Of note- sister of patient spoke with hospice team, and agreed to continue PEG feeds for now, then slowly wean from them as appropriate- back on feeds.   Patient lost IV access today- will change orders to SL or through PEG.       MEDD (morphine equivalent daily dose): 0       See Recs below.    Please call [phone] with questions or concerns 24/7.   We will continue to follow.     Discussed with primary MD, RN.    57yMale with PMH including HTN, HLD, DM, CAD, CVA (x2 w residual weakness), s/p trach and peg ( in May 2022) , being admitted from [facility] for placement because sister felt the care there was unsafe. Pt was found to have abdominal distention, large stool burden, with stool coming from PEG and vomiting some stool. Pt not a candidate for surgery so currently being medically managed. pt also with OM of the sacrum.     Sister decided on CMO 8/10  No further ventilation.   Of note- sister of patient spoke with hospice team, and agreed to continue PEG feeds for now, then slowly wean from them as appropriate- back on feeds.   Patient lost IV access today- will change orders to SL or through PEG.       MEDD (morphine equivalent daily dose): 0       See Recs below.    Please call [phone] with questions or concerns 24/7.   We will continue to follow.     Discussed with primary MD, RN.

## 2022-08-12 NOTE — PROGRESS NOTE ADULT - PROBLEM SELECTOR PLAN 2
DNR/DNI  CMO   Hospice consult in place - awaiting disposition   referral for last rites   Will follow DNR/DNI  CMO   Hospice consult in place - awaiting disposition  Will follow

## 2022-08-12 NOTE — PROGRESS NOTE ADULT - SUBJECTIVE AND OBJECTIVE BOX
Patient is a 57y old  Male who presents with a chief complaint of placement (11 Aug 2022 12:43)      INTERVAL HPI/OVERNIGHT EVENTS:  None      T(C): --  HR: --  BP: --  RR: --  SpO2: 98% (08-11-22 @ 21:00) (97% - 98%)  Wt(kg): --Vital Signs Last 24 Hrs  T(C): --  T(F): --  HR: --  BP: --  BP(mean): --  RR: --  SpO2: 98% (11 Aug 2022 21:00) (97% - 98%)    Parameters below as of 11 Aug 2022 21:00  Patient On (Oxygen Delivery Method): T-piece    O2 Concentration (%): 40    PHYSICAL EXAM:  GENERAL: Obtunded  HEAD:  Atraumatic, Normocephalic  EYES: Normal sclera  ENMT: No tonsillar erythema, exudates, or enlargement; Moist mucous membranes, Good dentition, No lesions  NECK: Supple, No JVD, Normal thyroid  NERVOUS SYSTEM:  Obtunded  CHEST/LUNG: Clear to percussion bilaterally; No rales, rhonchi, wheezing, or rubs  HEART: Regular rate and rhythm; No murmurs, rubs, or gallops  ABDOMEN: Soft, Nontender, Nondistended; Bowel sounds present  EXTREMITIES:  2+ Peripheral Pulses, No clubbing, cyanosis, or edema  LYMPH: No lymphadenopathy noted  SKIN: No rashes or lesions      busPIRone 5 milliGRAM(s) Oral <User Schedule>  chlorhexidine 2% Cloths 1 Application(s) Topical <User Schedule>  collagenase Ointment 1 Application(s) Topical two times a day  Dakins Solution - 1/2 Strength 1 Application(s) Topical two times a day  glycopyrrolate Injectable 0.2 milliGRAM(s) IV Push every 6 hours PRN  lactulose Syrup 10 Gram(s) Oral two times a day PRN  levETIRAcetam  IVPB 500 milliGRAM(s) IV Intermittent every 12 hours  midazolam Injectable 1 milliGRAM(s) IV Push every 1 hour PRN  morphine  - Injectable 2 milliGRAM(s) IV Push every 1 hour PRN  ondansetron Injectable 4 milliGRAM(s) IV Push every 6 hours PRN  risperiDONE   Tablet 1 milliGRAM(s) Oral two times a day PRN  sucralfate suspension 1 Gram(s) Oral two times a day      HEALTH ISSUES - PROBLEM Dx:  Functional quadriplegia    Acute respiratory failure    Palliative care by specialist    Abdominal distension    Chronic respiratory failure    Chronic osteomyelitis    Comfort measures only status

## 2022-08-12 NOTE — DISCHARGE NOTE NURSING/CASE MANAGEMENT/SOCIAL WORK - NSDCVIVACCINE_GEN_ALL_CORE_FT
COVID-19, mRNA, LNP-S, PF, 30 mcg/0.3 mL dose, rosina-sucrose (Pfizer); 20-Mar-2022 18:33; Felipe Yang (VALENTINO); [employer]; [id number] (Exp. Date: 30-Jun-2022); IntraMuscular; Deltoid Left.; 0.3 milliLiter(s);

## 2022-08-12 NOTE — PROGRESS NOTE ADULT - PROBLEM SELECTOR PROBLEM 4
Chronic respiratory failure

## 2022-08-12 NOTE — PROGRESS NOTE ADULT - PROBLEM SELECTOR PLAN 5
- no labs  - no IVF  - no vitals  - no pulse ox  - no 02 supplementation  - no injections  - no FS  - continue antiseizure meds, psychiatric meds through PEG tube     Midazolam 1 mg IVP Q 1 H PRN for agitation or anxiety   Morphine 2 mg IV Q1h PRN for pain and dyspnea  Glycopyrrolate 0.2 mg IV Q6h PRN for secretions
- no labs  - no IVF  - no artificial feeding  - no vitals  - no pulse ox  - no 02 supplementation  - no injections  - no FS  - comfort feedings OK following procedure  - continue antiseizure meds, psychiatric meds through PEG tube     Midazolam 1 mg IVP Q 1 H PRN for agitation or anxiety   Morphine 2 mg IV Q1h PRN for pain and dyspnea  Glycopyrrolate 0.2 mg IV Q6h PRN for secretions
- no labs  - no IVF  - no vitals  - no pulse ox  - no 02 supplementation  - no injections  - no FS  - continue antiseizure meds, psychiatric meds through PEG tube     Haldol solution 0.5 mg through PEG Q 6 H PRN for agitation or anxiety

## 2022-08-12 NOTE — DISCHARGE NOTE NURSING/CASE MANAGEMENT/SOCIAL WORK - NSDCPEFALRISK_GEN_ALL_CORE
For information on Fall & Injury Prevention, visit: [email or web address] OR  [email or web address] OR  https://www.cdc.gov/steadi/patient.html

## 2022-08-12 NOTE — PROGRESS NOTE ADULT - NS ATTEND AMEND GEN_ALL_CORE FT
High Risk patient requiring IV pain medications  Will continue PEG tube feeding now with plan to titrate off in hospice  Will follow
Lost IV access today  Transition meds to sublingual and via PEG today  continue comfort measures only as above  Add atropine drops under tongue as above given loss of IV access  Will follow for symptom management
Patient now comfort measures only  Symptom management as above  Plan for hospice consult tomorrow if patient stable for discharge

## 2022-08-12 NOTE — PROGRESS NOTE ADULT - PROVIDER SPECIALTY LIST ADULT
Hospitalist
Hospitalist
Infectious Disease
Internal Medicine
Internal Medicine
Nephrology
Hospitalist
Internal Medicine
Pulmonology
Pulmonology
Hospitalist
Internal Medicine
Nephrology
Pulmonology
Colorectal Surgery
Critical Care
Critical Care
Internal Medicine
Pulmonology
Infectious Disease
Palliative Care

## 2022-08-12 NOTE — PROGRESS NOTE ADULT - ASSESSMENT
56 yo M w h/o HTN, HLD, DM, CAD, CVA (x2 w residual weakness), s/p trach and peg presents from [facility] for placement at new facility. Patient's sister (HCP) apparently did not like care at [facility] - describing it as unsafe d/t no side rails on bed.  consulted and patient admitted until adequate facility established.      #Hyponatremia- resolved   - Working Dx is SIADH  - stable. c/w FW restriction.  - 08/05: started on IVF since NPO    #Abdominal distention  - Severe rectosigmoid fecal impaction. The rectum is distended to 12.8 cm => disimpaction on 07/29  Improving. Now  lactulose BID. enema QD. and passing BM  - 08/02: Fecalith material seen out of PEG tube, stopped peg feedings and all meds, surgery consulted, plan is to assess for possible diversion colostomy, sister contacted, wishes to discuss plan in person  - 08/03: No fecalith material seen from peg today, KUB with peg studies showing patet peg tube with no extravasation, surgery plan is no surgery at the moment as the patient is passing BM  - 08/04: Patient vomited stool material overnight, PEG feeding stopped and attached to suction (draining feces), surgery on board pending plan  - 08/05: patient NPO, still draining fecalith material from PEG, surgery believe he is poor surgical candidate, ID plan is to keep cefepime (Dr Ordoñez believes the cultures could be colonizers), palliative team consulted  08/06: patient NPO (no feeds nor meds), still draining fecalith material from PEG, will c/w lactulose and increase the fleet enemas as patient still has stool impaction seen on KUB, surgery believe he is poor surgical candidate, palliative team consulted   08/08: Restarted tube feeds. Tolerated well. Had multiple BMs and Dced lactulose,    #CT scan findings:  - Patchy left lower lobe pulmonary opacities, compatible with pneumonia.  - Lower back/buttock ulceration with gas. Erosive changes of the underlying sacrum and coccyx suggestive of osteomyelitis. => ID and Burn consulted  - Burn plan: s/p bedside debridement on 07/29, continue with LWC.  - Finished course of Cefepime    #Trach inserted in June at [facility] (for aspiration PNA)  NOTE: patient not chronically on Vent. START PS TRIALS once stable    #HTN  - Well controlled off BP medication.    #CAD / HLD  - simvastatin 10mg qhs and asa 81mg qd    #Seziure disorder  - carbemazepine 100mg/5ml syrup bid  - Switched to IV keppra since peg tube usage on hold    #Anxiety / Agitation  -  buspirone 5mg qd    #GERD  - Pantoprazole 40 mg once a day, sulcrafate 1g bid    #Folate and vitamin deficiency  - folic acid and mvi    DVT ppx: heparin sub q  GI ppx: ni  Diet: NPO - tube feeds on hold and meds on hold  Activity: bedbound    DNR/DNI - molst in chart - Now CMO

## 2022-08-12 NOTE — PROGRESS NOTE ADULT - PROBLEM SELECTOR PLAN 1
CVA decades ago  now with trach and peg recently in May  bedbound and completely dependent  declining functional status per sister CVA decades ago  now with trach and peg recently in May  bedbound and completely dependent  declining functional status per sister  now comfort measures only

## 2022-08-12 NOTE — PROGRESS NOTE ADULT - PROBLEM SELECTOR PROBLEM 1
Functional quadriplegia
30-Nov-2021 18:59
Functional quadriplegia

## 2022-08-18 NOTE — CONSULT NOTE ADULT - PROBLEM SELECTOR RECOMMENDATION 9
on chronic vent  last admission was taken off vent and made CMO  continue vent mgmt   continue recs per pulm   will address code status with sister Now comfort measures only per primary team discussion with Nicole  -Palliative vent removal  -recommend dilaudid 0.5mg IV once 5-10 minutes prior to removal of vent  -recommend dilaudid 0.5mg IV q15min PRN for pain/dyspnea following vent removal

## 2022-08-18 NOTE — CONSULT NOTE ADULT - PROBLEM SELECTOR RECOMMENDATION 5
-DNR/DNI and comfort measures only per primary team discussions with Nicole  -Plan for palliative removal of vent  -No additional IVF, artificial nutrition, blood draws, antibiotics, pressors, escalation of oxygen, escalation of care  -Symptom management as above

## 2022-08-18 NOTE — CONSULT NOTE ADULT - PROBLEM SELECTOR RECOMMENDATION 3
Previously DNR- need to speak with sister to re-confirm  continue current medical management  awaiting call back from Nicole - surrogate decision maker  will follow -recommend dilaudid 0.5mg IV q15min PRN for pain/dyspnea following vent removal  -glycopyrrolate 0.4mg IV q6h PRN for secretions

## 2022-08-18 NOTE — CHART NOTE - NSCHARTNOTEFT_GEN_A_CORE
I spoke to JOSE, the sister at [phone] and clearly explained the poor prognosis of her brother's condition and she explicitly explained that she would not want an invasive procedure including a central line. She only wants fluids, antibiotics and pressors through a peripheral IV line.
PALLIATIVE MEDICINE INTERDISCIPLINARY TEAM NOTE    Provider:                                         [  X ] Initial visit        Met with: [ X  ] Patient  [  X ] Family  [   ] Other:    Primary Language: [ X  ] English [   ] Other*:                      *Interpretation provided by:    SUPPORT DIAGNOSES            (Check all that apply)    [X   ] EOL issues  [  X ] Advanced Illness  [   ] Cultural / spiritual concerns  [   ] Pain / suffering  [   ] Dementia / AMS  [   ] Other:  [ X  ] AD issues  [ X  ] Grief / loss / sadness  [   ] Discharge issues  [  X ] Distress / coping    PSYCHOSOCIAL ASSESSMENT OF PATIENT         (Check all that apply)    [ X  ] Initial Assessment            [   ] Reassessment          [   ] Not Applicable this visit    Pain/suffering acuity:  [ X ] None to mild (0-3)           [   ] Moderate (4-6)        [   ] High (7-10)    Mental Status:  [   ] Alert/oriented (x3)          [   ] Confused/Altered(x2/x1)         [ X  ] Non-resp: trach/peg    Functional status:  [   ] Independent w ADLs      [   ] Needs Assistance             [ X  ] Bedbound/Full Care    Coping:  [   ] Coping well                     [   ] Coping w/difficulty            [  X ] Poor coping    Support system:  [   ] Strong                              [  X ] Adequate                        [   ] Inadequate      Past history and medications for:     [ ] Anxiety       [ ] Depression    [ ] Sleep disorders       SERVICE PROVIDED  [   ]Discharge support / facilitation  [   ]AD / goals of care counseling                                  [  X ]EOL / death / bereavement counseling  [  X ]Counseling / support                                                [   ] Family meeting  [   ]Prayer / sacrament / ritual                                      [   ] Referral   [   ]Other                                                                       NOTE and Plan of Care (PoC):    patient is a 58 y/o M with noted pmhx, presenting from NH with c/o sepsis. Patient known to palliative team from prior admission when sister - raheem decided on making patient CMO. met with raheem at bedside today. re-introduced palliative care. She made decision to re-instate CMO with primary team. She is struggling with her brother's medical condition, with poor coping. assisted raheem in processing her thoughts and feelings around her brothers eventual death, as she has been his caregiver since his early 20s. she verbalized just wanting for her brother to pass quick and not suffer. SW reinforced that the goal is for comfort and for patient to not suffer in whatever time he has left. all questions answered. support rendered. will follow. [phone]
Spoke with pt HCP (Nicole Freitas, sister) at bedside. Spoke extensively about GOC, expectations, and treatment options. She expressed her concerns over his agony while passing in hospice and wishes we could give him something to pass on quickly. After explaining his congitive status and medications to pacify his agitation and agony, she re-confirmed her wants to return pt to CMO status. Nursing, hospitalist, palliative made aware.

## 2022-08-18 NOTE — PATIENT PROFILE ADULT - FALL HARM RISK - HARM RISK INTERVENTIONS

## 2022-08-18 NOTE — CONSULT NOTE ADULT - SUBJECTIVE AND OBJECTIVE BOX
HPI:    57 year old man with PMHx:  - HTN  - HLD  - CAD  - CVA with residual weakness, trach and PEG  - Hx of seizures    Patient was discharge recently for hospice with a code status of comfort measures. In nursing home, the patient had blood work done that showed leukocytosis. Patient is non-verbal at baseline, has a tracheostomy and PEG. The patient has bilateral hip and LE ulcerations deep and look infected. The patient is hypothermic, blood pressure is stable for now and currently on MV through the tracheostomy.   The patient's code status was revoked from Comfort measures to DNR/DNI only as per ED. Tried calling his sister JOSE but she didn't .  (18 Aug 2022 04:45)    PERTINENT PM/SXH:   CVA (cerebral vascular accident)    CAD (coronary artery disease)    Retinal detachment    Hypertension    Hypertension    High cholesterol    Diabetes    Eczema    Anxiety    Diarrhea    Weakness    Seizure    Aphasia      History of corneal transplant      FAMILY HISTORY:    ITEMS NOT CHECKED ARE NOT PRESENT    SOCIAL HISTORY:   Significant other/partner[ ]  Children[ ]  Mormon/Spirituality:  Substance hx:  [ ]   Tobacco hx:  [ ]   Alcohol hx: [ ]   Living Situation: [ ]Home  [ ]Long term care  [ ]Rehab [ ]Other  Home Services: [ ] HHA [ ] Visting RN [ ] Hospice  Occupation:  Home Opioid hx:  [ ] Y [ ] N [ ] I-Stop Reference No:    ADVANCE DIRECTIVES:    DNR  MOLST  [ ]  Living Will  [ ]   DECISION MAKER(s):  [ ] Health Care Proxy(s)  [ ] Surrogate(s)  [ ] Guardian           Name(s): Phone Number(s):    BASELINE (I)ADL(s) (prior to admission):  Mobile: [ ]Total  [ ] Moderate [ ]Dependent  Palliative Performance Status Version 2:         %    http://npcrc.org/files/news/palliative_performance_scale_ppsv2.pdf    Allergies    No Known Allergies    Intolerances    MEDICATIONS  (STANDING):  ampicillin/sulbactam  IVPB      ampicillin/sulbactam  IVPB 3 Gram(s) IV Intermittent every 6 hours  aspirin  chewable 81 milliGRAM(s) Oral daily  atorvastatin 80 milliGRAM(s) Oral at bedtime  busPIRone 5 milliGRAM(s) Oral <User Schedule>  ciprofloxacin   IVPB      ciprofloxacin   IVPB 400 milliGRAM(s) IV Intermittent every 8 hours  dextrose 5% 1000 milliLiter(s) (100 mL/Hr) IV Continuous <Continuous>  enoxaparin Injectable 40 milliGRAM(s) SubCutaneous every 24 hours  levETIRAcetam  Solution 500 milliGRAM(s) Oral two times a day  norepinephrine Infusion 0.05 MICROgram(s)/kG/Min (7.69 mL/Hr) IV Continuous <Continuous>  vancomycin  IVPB 1000 milliGRAM(s) IV Intermittent every 12 hours    MEDICATIONS  (PRN):  acetaminophen     Tablet .. 650 milliGRAM(s) Oral every 6 hours PRN Temp greater or equal to 38C (100.4F), Mild Pain (1 - 3)  melatonin 3 milliGRAM(s) Oral at bedtime PRN Insomnia    PRESENT SYMPTOMS: [ ]Unable to obtain due to poor mentation   Source if other than patient:  [ ]Family   [ ]Team     Pain: [ ]yes [ ]no  QOL impact -   Location -                    Aggravating factors -  Quality -  Radiation -  Timing-  Severity (0-10 scale):  Minimal acceptable level (0-10 scale):     CPOT:    https://www.[facility].org/getattachment/[email or web address]/Critical-Care-Pain-Observation-Tool-(CPOT)      PAIN AD Score:     http://geriatrictoolkit.[place].[facility]/cog/painad.pdf (press ctrl +  left click to view)    Dyspnea:                           [ ]Mild [ ]Moderate [ ]Severe  Anxiety:                             [ ]Mild [ ]Moderate [ ]Severe  Fatigue:                             [ ]Mild [ ]Moderate [ ]Severe  Nausea:                             [ ]Mild [ ]Moderate [ ]Severe  Loss of appetite:              [ ]Mild [ ]Moderate [ ]Severe  Constipation:                    [ ]Mild [ ]Moderate [ ]Severe    Other Symptoms:  [ ]All other review of systems negative     Palliative Performance Status Version 2:         %    http://npcrc.org/files/news/palliative_performance_scale_ppsv2.pdf  PHYSICAL EXAM:  Vital Signs Last 24 Hrs  T(C): 34.6 (18 Aug 2022 07:22), Max: 34.6 (18 Aug 2022 07:22)  T(F): 94.3 (18 Aug 2022 07:22), Max: 94.3 (18 Aug 2022 07:22)  HR: 92 (18 Aug 2022 08:16) (66 - 92)  BP: 92/50 (18 Aug 2022 07:22) (66/41 - 143/67)  BP(mean): 49 (18 Aug 2022 06:52) (49 - 52)  RR: 16 (18 Aug 2022 07:22) (14 - 22)  SpO2: 98% (18 Aug 2022 08:16) (94% - 100%)    Parameters below as of 18 Aug 2022 07:22  Patient On (Oxygen Delivery Method): ventilator    O2 Concentration (%): 60 I&O's Summary    GENERAL:  [ ]Alert  [ ]Oriented x   [ ]Lethargic  [ ]Cachexia  [ ]Unarousable  [ ]Verbal  [ ]Non-Verbal  Behavioral:   [ ] Anxiety  [ ] Delirium [ ] Agitation [ ] Other  HEENT:  [ ]Normal   [ ]Dry mouth   [ ]ET Tube/Trach  [ ]Oral lesions  PULMONARY:   [ ]Clear [ ]Tachypnea  [ ]Audible excessive secretions   [ ]Rhonchi        [ ]Right [ ]Left [ ]Bilateral  [ ]Crackles        [ ]Right [ ]Left [ ]Bilateral  [ ]Wheezing     [ ]Right [ ]Left [ ]Bilateral  [ ]Diminished breath sounds [ ]right [ ]left [ ]bilateral  CARDIOVASCULAR:    [ ]Regular [ ]Irregular [ ]Tachy  [ ]Kumar [ ]Murmur [ ]Other  GASTROINTESTINAL:  [ ]Soft  [ ]Distended   [ ]+BS  [ ]Non tender [ ]Tender  [ ]PEG [ ]OGT/ NGT  Last BM:   GENITOURINARY:  [ ]Normal [ ] Incontinent   [ ]Oliguria/Anuria   [ ]Brito  MUSCULOSKELETAL:   [ ]Normal   [ ]Weakness  [ ]Bed/Wheelchair bound [ ]Edema  NEUROLOGIC:   [ ]No focal deficits  [ ]Cognitive impairment  [ ]Dysphagia [ ]Dysarthria [ ]Paresis [ ]Other   SKIN:   [ ]Normal    [ ]Rash  [ ]Pressure ulcer(s)       Present on admission [ ]y [ ]n    CRITICAL CARE:  [ ] Shock Present  [ ]Septic [ ]Cardiogenic [ ]Neurologic [ ]Hypovolemic  [ ]  Vasopressors [ ]  Inotropes   [ ]Respiratory failure present [ ]Mechanical ventilation [ ]Non-invasive ventilatory support [ ]High flow  [ ]Acute  [ ]Chronic [ ]Hypoxic  [ ]Hypercarbic [ ]Other  [ ]Other organ failure     LABS:                        8.0    30.98 )-----------( 159      ( 17 Aug 2022 22:55 )             24.7   08-17    130<L>  |  103  |  102<HH>  ----------------------------<  149<H>  4.5   |  12<L>  |  1.3    Ca    7.0<L>      17 Aug 2022 22:55  Mg     2.7         TPro  4.9<L>  /  Alb  1.1<L>  /  TBili  0.4  /  DBili  x   /  AST  43<H>  /  ALT  12  /  AlkPhos  142<H>    PT/INR - ( 17 Aug 2022 22:55 )   PT: 14.40 sec;   INR: 1.25 ratio         PTT - ( 17 Aug 2022 22:55 )  PTT:39.7 sec    Urinalysis Basic - ( 18 Aug 2022 00:10 )    Color: Yellow / Appearance: Slightly Turbid / S.030 / pH: x  Gluc: x / Ketone: Trace  / Bili: Small / Urobili: <2 mg/dL   Blood: x / Protein: >600 mg/dL / Nitrite: Negative   Leuk Esterase: Small / RBC: 9 /HPF / WBC 36 /HPF   Sq Epi: x / Non Sq Epi: 15 /HPF / Bacteria: Negative      RADIOLOGY & ADDITIONAL STUDIES:    PROTEIN CALORIE MALNUTRITION PRESENT: [ ]mild [ ]moderate [ ]severe [ ]underweight [ ]morbid obesity  https://www.andeal.org/vault/2440/web/files/ONC/Table_Clinical%20Characteristics%20to%20Document%20Malnutrition-White%20JV%20et%20al%771021.pdf    Height (cm): 188 (22 @ 22:38), 188 (22 @ 22:09), 188 (22 @ 13:00)  Weight (kg): 82 (22 @ 07:01), 50.8 (22 @ 16:43), 53.7 (22 @ 19:50)  BMI (kg/m2): 23.2 (22 @ 07:01), 14.4 (22 @ 22:38), 14.4 (22 @ 16:43)    [ ]PPSV2 < or = to 30% [ ]significant weight loss  [ ]poor nutritional intake  [ ]anasarca      [ ]Artificial Nutrition      REFERRALS:   [ ]Chaplaincy  [ ]Hospice  [ ]Child Life  [ ]Social Work  [ ]Case management [ ]Holistic Therapy     Goals of Care Document:     ______________  Cheryl Abdalla NP  Palliative Medicine  [facility]  [phone]     HPI:    57 year old man with PMHx:  - HTN  - HLD  - CAD  - CVA with residual weakness, trach and PEG  - Hx of seizures    Patient was discharge recently for hospice with a code status of comfort measures. In nursing home, the patient had blood work done that showed leukocytosis. Patient is non-verbal at baseline, has a tracheostomy and PEG. The patient has bilateral hip and LE ulcerations deep and look infected. The patient is hypothermic, blood pressure is stable for now and currently on MV through the tracheostomy.   The patient's code status was revoked from Comfort measures to DNR/DNI only as per ED. Tried calling his sister NICOLE but she didn't .  (18 Aug 2022 04:45)      PERTINENT PM/SXH:   CVA (cerebral vascular accident)    CAD (coronary artery disease)    Retinal detachment    Hypertension    Hypertension    High cholesterol    Diabetes    Eczema    Anxiety    Diarrhea    Weakness    Seizure    Aphasia      History of corneal transplant      FAMILY HISTORY:    ITEMS NOT CHECKED ARE NOT PRESENT    SOCIAL HISTORY:   Significant other/partner[ ]  Children[ ]  Muslim/Spirituality:  Substance hx:  [ ]   Tobacco hx:  [ ]   Alcohol hx: [ ]   Living Situation: [ ]Home  [ X ]Long term care  [ ]Rehab [ ]Other  Home Services: [ ] HHA [ ] Ti RN [ X ] Hospice  Occupation:  Home Opioid hx:  [ ] Y [ ] N [ X ] I-Stop Reference No: This report was requested by: Cheryl Abdalla | Reference #: [id number]    ADVANCE DIRECTIVES:    MOLST  [Prior ]  Living Will  [ ]   DECISION MAKER(s):  [ ] Health Care Proxy(s)  [X ] Surrogate(s)  [ ] Guardian           Name(s): Phone Number(s): Sister- Nicole     BASELINE (I)ADL(s) (prior to admission):  [place]: [ ]Total  [ ] Moderate [ X]Dependent  Palliative Performance Status Version 2:     10    %    http://npcrc.org/files/news/palliative_performance_scale_ppsv2.pdf    Allergies    No Known Allergies    Intolerances    MEDICATIONS  (STANDING):  ampicillin/sulbactam  IVPB      ampicillin/sulbactam  IVPB 3 Gram(s) IV Intermittent every 6 hours  aspirin  chewable 81 milliGRAM(s) Oral daily  atorvastatin 80 milliGRAM(s) Oral at bedtime  busPIRone 5 milliGRAM(s) Oral <User Schedule>  ciprofloxacin   IVPB      ciprofloxacin   IVPB 400 milliGRAM(s) IV Intermittent every 8 hours  dextrose 5% 1000 milliLiter(s) (100 mL/Hr) IV Continuous <Continuous>  enoxaparin Injectable 40 milliGRAM(s) SubCutaneous every 24 hours  levETIRAcetam  Solution 500 milliGRAM(s) Oral two times a day  norepinephrine Infusion 0.05 MICROgram(s)/kG/Min (7.69 mL/Hr) IV Continuous <Continuous>  vancomycin  IVPB 1000 milliGRAM(s) IV Intermittent every 12 hours    MEDICATIONS  (PRN):  acetaminophen     Tablet .. 650 milliGRAM(s) Oral every 6 hours PRN Temp greater or equal to 38C (100.4F), Mild Pain (1 - 3)  melatonin 3 milliGRAM(s) Oral at bedtime PRN Insomnia    PRESENT SYMPTOMS: [ X ]Unable to obtain due to poor mentation     Pain: [ ]yes [ ]no  QOL impact -   Location -                    Aggravating factors -  Quality -  Radiation -  Timing-  Severity (0-10 scale):  Minimal acceptable level (0-10 scale):     CPOT:  0  https://www.[facility].org/getattachment/[email or web address]/Critical-Care-Pain-Observation-Tool-(CPOT)    Dyspnea:                           [ ]Mild [ ]Moderate [ ]Severe  Anxiety:                             [ ]Mild [ ]Moderate [ ]Severe  Fatigue:                             [ ]Mild [ ]Moderate [ ]Severe  Nausea:                             [ ]Mild [ ]Moderate [ ]Severe  Loss of appetite:              [ ]Mild [ ]Moderate [ ]Severe  Constipation:                    [ ]Mild [ ]Moderate [ ]Severe    Other Symptoms:  [X ]All other review of systems negative     Palliative Performance Status Version 2:         10 %    http://npcrc.org/files/news/palliative_performance_scale_ppsv2.pdf    PHYSICAL EXAM:  Vital Signs Last 24 Hrs  T(C): 34.6 (18 Aug 2022 07:22), Max: 34.6 (18 Aug 2022 07:22)  T(F): 94.3 (18 Aug 2022 07:22), Max: 94.3 (18 Aug 2022 07:22)  HR: 92 (18 Aug 2022 08:16) (66 - 92)  BP: 92/50 (18 Aug 2022 07:22) (66/41 - 143/67)  BP(mean): 49 (18 Aug 2022 06:52) (49 - 52)  RR: 16 (18 Aug 2022 07:22) (14 - 22)  SpO2: 98% (18 Aug 2022 08:16) (94% - 100%)    GENERAL:  [ ]Alert  [ ]Oriented x   [ ]Lethargic  [ ]Cachexia  [ ]Unarousable  [ ]Verbal  [ ]Non-Verbal  Behavioral:   [ ] Anxiety  [ ] Delirium [ ] Agitation [ ] Other  HEENT:  [ ]Normal   [ ]Dry mouth   [ ]ET Tube/Trach  [ ]Oral lesions  PULMONARY:   [ ]Clear [ ]Tachypnea  [ ]Audible excessive secretions   [ ]Rhonchi        [ ]Right [ ]Left [ ]Bilateral  [ ]Crackles        [ ]Right [ ]Left [ ]Bilateral  [ ]Wheezing     [ ]Right [ ]Left [ ]Bilateral  [ ]Diminished breath sounds [ ]right [ ]left [ ]bilateral  CARDIOVASCULAR:    [ ]Regular [ ]Irregular [ ]Tachy  [ ]Kumar [ ]Murmur [ ]Other  GASTROINTESTINAL:  [ ]Soft  [ ]Distended   [ ]+BS  [ ]Non tender [ ]Tender  [ ]PEG [ ]OGT/ NGT  Last BM:   GENITOURINARY:  [ ]Normal [ ] Incontinent   [ ]Oliguria/Anuria   [ ]Brito  MUSCULOSKELETAL:   [ ]Normal   [ ]Weakness  [ ]Bed/Wheelchair bound [ ]Edema  NEUROLOGIC:   [ ]No focal deficits  [ ]Cognitive impairment  [ ]Dysphagia [ ]Dysarthria [ ]Paresis [ ]Other   SKIN:   [ ]Normal    [ ]Rash  [ ]Pressure ulcer(s)       Present on admission [ ]y [ ]n    CRITICAL CARE:  [ ] Shock Present  [ ]Septic [ ]Cardiogenic [ ]Neurologic [ ]Hypovolemic  [ ]  Vasopressors [ ]  Inotropes   [ ]Respiratory failure present [ X]Mechanical ventilation [ ]Non-invasive ventilatory support [ ]High flow  [ ]Acute  [ ]Chronic [ ]Hypoxic  [ ]Hypercarbic [ ]Other  [ ]Other organ failure     LABS:                        8.0    30.98 )-----------( 159      ( 17 Aug 2022 22:55 )             24.7   08-17    130<L>  |  103  |  102<HH>  ----------------------------<  149<H>  4.5   |  12<L>  |  1.3    Ca    7.0<L>      17 Aug 2022 22:55  Mg     2.7         TPro  4.9<L>  /  Alb  1.1<L>  /  TBili  0.4  /  DBili  x   /  AST  43<H>  /  ALT  12  /  AlkPhos  142<H>    PT/INR - ( 17 Aug 2022 22:55 )   PT: 14.40 sec;   INR: 1.25 ratio         PTT - ( 17 Aug 2022 22:55 )  PTT:39.7 sec    Urinalysis Basic - ( 18 Aug 2022 00:10 )    Color: Yellow / Appearance: Slightly Turbid / S.030 / pH: x  Gluc: x / Ketone: Trace  / Bili: Small / Urobili: <2 mg/dL   Blood: x / Protein: >600 mg/dL / Nitrite: Negative   Leuk Esterase: Small / RBC: 9 /HPF / WBC 36 /HPF   Sq Epi: x / Non Sq Epi: 15 /HPF / Bacteria: Negative      RADIOLOGY & ADDITIONAL STUDIES:    < from: CT Abdomen and Pelvis w/ Oral Cont (22 @ 17:24) >    IMPRESSION:      Significantly limited study due to lack of intravenous contrast - much of   intra-abdominal contents cannot be evaluated.    Severe rectosigmoid fecal impaction. The rectum is distended to 12.8 cm.   Disimpaction is recommended.    Patchy left lower lobe pulmonary opacities, compatible with pneumonia.    Lower back/buttock ulceration with gas. Erosive changes of the underlying   sacrum and coccyx suggestive ofosteomyelitis.    There appears to be generalized mesenteric congestion/ascites.    --- End of Report ---    < end of copied text >      REFERRALS:   [ ]Chaplaincy  [ ]Hospice  [ ]Child Life  [ ]Social Work  [ ]Case management [ ]Holistic Therapy      Goals of Care Document:     HPI:    57 year old man with PMHx:  - HTN  - HLD  - CAD  - CVA with residual weakness, trach and PEG  - Hx of seizures    Patient was discharge recently for hospice with a code status of comfort measures. In nursing home, the patient had blood work done that showed leukocytosis. Patient is non-verbal at baseline, has a tracheostomy and PEG. The patient has bilateral hip and LE ulcerations deep and look infected. The patient is hypothermic, blood pressure is stable for now and currently on MV through the tracheostomy.   The patient's code status was revoked from Comfort measures to DNR/DNI only as per ED. Tried calling his sister NICOLE but she didn't .  (18 Aug 2022 04:45)      PERTINENT PM/SXH:   CVA (cerebral vascular accident)    CAD (coronary artery disease)    Retinal detachment    Hypertension    Hypertension    High cholesterol    Diabetes    Eczema    Anxiety    Diarrhea    Weakness    Seizure    Aphasia      History of corneal transplant      FAMILY HISTORY:    ITEMS NOT CHECKED ARE NOT PRESENT    SOCIAL HISTORY:   Significant other/partner[ ]  Children[ ]  Shinto/Spirituality:  Substance hx:  [ ]   Tobacco hx:  [ ]   Alcohol hx: [ ]   Living Situation: [ ]Home  [ X ]Long term care  [ ]Rehab [ ]Other  Home Services: [ ] HHA [ ] Ti RN [ X ] Hospice  Occupation:  Home Opioid hx:  [ ] Y [ ] N [ X ] I-Stop Reference No: This report was requested by: Cheryl Abdalla | Reference #: [id number]    ADVANCE DIRECTIVES:    MOLST  [Prior ]  Living Will  [ ]   DECISION MAKER(s):  [ ] Health Care Proxy(s)  [X ] Surrogate(s)  [ ] Guardian           Name(s): Phone Number(s): Sister- Nicole     BASELINE (I)ADL(s) (prior to admission):  [place]: [ ]Total  [ ] Moderate [ X]Dependent  Palliative Performance Status Version 2:     10    %    http://npcrc.org/files/news/palliative_performance_scale_ppsv2.pdf    Allergies    No Known Allergies    Intolerances    MEDICATIONS  (STANDING):  ampicillin/sulbactam  IVPB      ampicillin/sulbactam  IVPB 3 Gram(s) IV Intermittent every 6 hours  aspirin  chewable 81 milliGRAM(s) Oral daily  atorvastatin 80 milliGRAM(s) Oral at bedtime  busPIRone 5 milliGRAM(s) Oral <User Schedule>  ciprofloxacin   IVPB      ciprofloxacin   IVPB 400 milliGRAM(s) IV Intermittent every 8 hours  dextrose 5% 1000 milliLiter(s) (100 mL/Hr) IV Continuous <Continuous>  enoxaparin Injectable 40 milliGRAM(s) SubCutaneous every 24 hours  levETIRAcetam  Solution 500 milliGRAM(s) Oral two times a day  norepinephrine Infusion 0.05 MICROgram(s)/kG/Min (7.69 mL/Hr) IV Continuous <Continuous>  vancomycin  IVPB 1000 milliGRAM(s) IV Intermittent every 12 hours    MEDICATIONS  (PRN):  acetaminophen     Tablet .. 650 milliGRAM(s) Oral every 6 hours PRN Temp greater or equal to 38C (100.4F), Mild Pain (1 - 3)  melatonin 3 milliGRAM(s) Oral at bedtime PRN Insomnia    PRESENT SYMPTOMS: [ X ]Unable to obtain due to poor mentation     Pain: [ ]yes [ ]no  QOL impact -   Location -                    Aggravating factors -  Quality -  Radiation -  Timing-  Severity (0-10 scale):  Minimal acceptable level (0-10 scale):     CPOT:  0  https://www.[facility].org/getattachment/[email or web address]/Critical-Care-Pain-Observation-Tool-(CPOT)    Dyspnea:                           [ ]Mild [ ]Moderate [ ]Severe  Anxiety:                             [ ]Mild [ ]Moderate [ ]Severe  Fatigue:                             [ ]Mild [ ]Moderate [ ]Severe  Nausea:                             [ ]Mild [ ]Moderate [ ]Severe  Loss of appetite:              [ ]Mild [ ]Moderate [ ]Severe  Constipation:                    [ ]Mild [ ]Moderate [ ]Severe    Other Symptoms:  [X ]All other review of systems negative     Palliative Performance Status Version 2:         10 %    http://npcrc.org/files/news/palliative_performance_scale_ppsv2.pdf    PHYSICAL EXAM:  Vital Signs Last 24 Hrs  T(C): 34.6 (18 Aug 2022 07:22), Max: 34.6 (18 Aug 2022 07:22)  T(F): 94.3 (18 Aug 2022 07:22), Max: 94.3 (18 Aug 2022 07:22)  HR: 92 (18 Aug 2022 08:16) (66 - 92)  BP: 92/50 (18 Aug 2022 07:22) (66/41 - 143/67)  BP(mean): 49 (18 Aug 2022 06:52) (49 - 52)  RR: 16 (18 Aug 2022 07:22) (14 - 22)  SpO2: 98% (18 Aug 2022 08:16) (94% - 100%)    GENERAL:  [ ]Alert  [ ]Oriented x   [ ]Lethargic  [ X]Cachexia  [ X]Unarousable  [ ]Verbal  [ X]Non-Verbal  Behavioral:   [ ] Anxiety  [ ] Delirium [ ] Agitation [ ] Other  HEENT:  [ ]Normal   [ ]Dry mouth   [ X]ET Tube/Trach  [ ]Oral lesions  PULMONARY:   [ ]Clear [ ]Tachypnea  [X ]Audible excessive secretions   Vent to trach 40%  CARDIOVASCULAR:    [ X]Regular [ ]Irregular [ ]Tachy  [ ]Kumar [ ]Murmur [ ]Other  GASTROINTESTINAL:  [X ]Soft  [ ]Distended   [ ]+BS  [ ]Non tender [ ]Tender  [ X]PEG [ ]OGT/ NGT  Last BM:   GENITOURINARY:  [ ]Normal [ ] Incontinent   [ ]Oliguria/Anuria   [X ]Brito  MUSCULOSKELETAL:   [ ]Normal   [ ]Weakness  [X ]Bed/Wheelchair bound [ ]Edema  NEUROLOGIC:   [ ]No focal deficits  [X ]Cognitive impairment  [ ]Dysphagia [ ]Dysarthria [ ]Paresis [ ]Other   SKIN:   [ ]Normal    [ ]Rash  [ X]Pressure ulcer(s)       Present on admission [ X]y [ ]n    CRITICAL CARE:  [ ] Shock Present  [ ]Septic [ ]Cardiogenic [ ]Neurologic [ ]Hypovolemic  [ ]  Vasopressors [ ]  Inotropes   [ ]Respiratory failure present [ X]Mechanical ventilation [ ]Non-invasive ventilatory support [ ]High flow  [ ]Acute  [ ]Chronic [ ]Hypoxic  [ ]Hypercarbic [ ]Other  [ ]Other organ failure     LABS:                        8.0    30.98 )-----------( 159      ( 17 Aug 2022 22:55 )             24.7   08-17    130<L>  |  103  |  102<HH>  ----------------------------<  149<H>  4.5   |  12<L>  |  1.3    Ca    7.0<L>      17 Aug 2022 22:55  Mg     2.7     08    TPro  4.9<L>  /  Alb  1.1<L>  /  TBili  0.4  /  DBili  x   /  AST  43<H>  /  ALT  12  /  AlkPhos  142<H>  -  PT/INR - ( 17 Aug 2022 22:55 )   PT: 14.40 sec;   INR: 1.25 ratio         PTT - ( 17 Aug 2022 22:55 )  PTT:39.7 sec    Urinalysis Basic - ( 18 Aug 2022 00:10 )    Color: Yellow / Appearance: Slightly Turbid / S.030 / pH: x  Gluc: x / Ketone: Trace  / Bili: Small / Urobili: <2 mg/dL   Blood: x / Protein: >600 mg/dL / Nitrite: Negative   Leuk Esterase: Small / RBC: 9 /HPF / WBC 36 /HPF   Sq Epi: x / Non Sq Epi: 15 /HPF / Bacteria: Negative      RADIOLOGY & ADDITIONAL STUDIES:    < from: CT Abdomen and Pelvis w/ Oral Cont (22 @ 17:24) >    IMPRESSION:      Significantly limited study due to lack of intravenous contrast - much of   intra-abdominal contents cannot be evaluated.    Severe rectosigmoid fecal impaction. The rectum is distended to 12.8 cm.   Disimpaction is recommended.    Patchy left lower lobe pulmonary opacities, compatible with pneumonia.    Lower back/buttock ulceration with gas. Erosive changes of the underlying   sacrum and coccyx suggestive ofosteomyelitis.    There appears to be generalized mesenteric congestion/ascites.    --- End of Report ---    < end of copied text >      Goals of Care Document:    - previously patient was made CMO, plan for hospice at NH  - sister, Nicole, then revoked CMo and he was placed back on vent with medical management  - called Nicole today and left voicemail, awaiting call back. need to confirm previous DNR which was placed by Nicole prior to the initiation of comfort care  CC: leukocytosis    HPI:    57 year old man with PMHx:  - HTN  - HLD  - CAD  - CVA with residual weakness, trach and PEG  - Hx of seizures    Patient was discharge recently for hospice with a code status of comfort measures. In nursing home, the patient had blood work done that showed leukocytosis. Patient is non-verbal at baseline, has a tracheostomy and PEG. The patient has bilateral hip and LE ulcerations deep and look infected. The patient is hypothermic, blood pressure is stable for now and currently on MV through the tracheostomy.   The patient's code status was revoked from Comfort measures to DNR/DNI only as per ED. Tried calling his sister NICOLE but she didn't .  (18 Aug 2022 04:45)      PERTINENT PM/SXH:   CVA (cerebral vascular accident)    CAD (coronary artery disease)    Retinal detachment    Hypertension    Hypertension    High cholesterol    Diabetes    Eczema    Anxiety    Diarrhea    Weakness    Seizure    Aphasia      History of corneal transplant    FAMILY HISTORY:  No pertinent history in mother or father    ITEMS NOT CHECKED ARE NOT PRESENT    SOCIAL HISTORY:   Significant other/partner[ ]  Children[ ]  Islam/Spirituality:  Substance hx:  [ ]   Tobacco hx:  [ ]   Alcohol hx: [ ]   Living Situation: [ ]Home  [ X ]Long term care  [ ]Rehab [ ]Other  Home Services: [ ] HHA [ ] Ti RN [ X ] Hospice  Occupation:  Home Opioid hx:  [ ] Y [ ] N [ X ] I-Stop Reference No: This report was requested by: Cheryl Abdalla | Reference #: [id number]    ADVANCE DIRECTIVES:    MOLST  [Prior ]  Living Will  [ ]   DECISION MAKER(s):  [ ] Health Care Proxy(s)  [X ] Surrogate(s)  [ ] Guardian           Name(s): Phone Number(s): Sister- Nicole     BASELINE (I)ADL(s) (prior to admission):  [place]: [ ]Total  [ ] Moderate [ X]Dependent  Palliative Performance Status Version 2:     10    %    http://npcrc.org/files/news/palliative_performance_scale_ppsv2.pdf    Allergies    No Known Allergies    Intolerances    MEDICATIONS  (STANDING):  ampicillin/sulbactam  IVPB      ampicillin/sulbactam  IVPB 3 Gram(s) IV Intermittent every 6 hours  aspirin  chewable 81 milliGRAM(s) Oral daily  atorvastatin 80 milliGRAM(s) Oral at bedtime  busPIRone 5 milliGRAM(s) Oral <User Schedule>  ciprofloxacin   IVPB      ciprofloxacin   IVPB 400 milliGRAM(s) IV Intermittent every 8 hours  dextrose 5% 1000 milliLiter(s) (100 mL/Hr) IV Continuous <Continuous>  enoxaparin Injectable 40 milliGRAM(s) SubCutaneous every 24 hours  levETIRAcetam  Solution 500 milliGRAM(s) Oral two times a day  norepinephrine Infusion 0.05 MICROgram(s)/kG/Min (7.69 mL/Hr) IV Continuous <Continuous>  vancomycin  IVPB 1000 milliGRAM(s) IV Intermittent every 12 hours    MEDICATIONS  (PRN):  acetaminophen     Tablet .. 650 milliGRAM(s) Oral every 6 hours PRN Temp greater or equal to 38C (100.4F), Mild Pain (1 - 3)  melatonin 3 milliGRAM(s) Oral at bedtime PRN Insomnia    PRESENT SYMPTOMS: [ X ]Unable to obtain due to poor mentation     Pain: [ ]yes [ ]no  QOL impact -   Location -                    Aggravating factors -  Quality -  Radiation -  Timing-  Severity (0-10 scale):  Minimal acceptable level (0-10 scale):     CPOT:  0  https://[email or web address]/[email or web address]/Critical-Care-Pain-Observation-Tool-(CPOT)    Dyspnea:                           [ ]Mild [ ]Moderate [ ]Severe  Anxiety:                             [ ]Mild [ ]Moderate [ ]Severe  Fatigue:                             [ ]Mild [ ]Moderate [ ]Severe  Nausea:                             [ ]Mild [ ]Moderate [ ]Severe  Loss of appetite:              [ ]Mild [ ]Moderate [ ]Severe  Constipation:                    [ ]Mild [ ]Moderate [ ]Severe    Other Symptoms:  [X ]All other review of systems negative     Palliative Performance Status Version 2:         10 %    [email or web address]/files/[email or web address]_[email or web address]    PHYSICAL EXAM:  Vital Signs Last 24 Hrs  T(C): 34.6 (18 Aug 2022 07:22), Max: 34.6 (18 Aug 2022 07:22)  T(F): 94.3 (18 Aug 2022 07:22), Max: 94.3 (18 Aug 2022 07:22)  HR: 92 (18 Aug 2022 08:16) (66 - 92)  BP: 92/50 (18 Aug 2022 07:22) (66/41 - 143/67)  BP(mean): 49 (18 Aug 2022 06:52) (49 - 52)  RR: 16 (18 Aug 2022 07:22) (14 - 22)  SpO2: 98% (18 Aug 2022 08:16) (94% - 100%)    GENERAL:  [ ]Alert  [ ]Oriented x   [ ]Lethargic  [ X]Cachexia  [ X]Unarousable  [ ]Verbal  [ X]Non-Verbal  Behavioral:   [ ] Anxiety  [ ] Delirium [ ] Agitation [ ] Other  Eyes: no scleral icterus  HEENT:  [ ]Normal   [ ]Dry mouth   [ X]ET Tube/Trach  [ x]No Oral lesions  PULMONARY:   [ ]Clear [ ]Tachypnea  [X ]Audible excessive secretions   Vent to trach 40%  CARDIOVASCULAR:    [ X]Regular [ ]Irregular [ ]Tachy  [ ]Kumar [ ]Murmur [ ]Other  GASTROINTESTINAL:  [X ]Soft  [ ]Distended   [ ]+BS  [ ]Non tender [ ]Tender  [ X]PEG [ ]OGT/ NGT  Last BM:   GENITOURINARY:  [ ]Normal [ ] Incontinent   [ ]Oliguria/Anuria   [X ]Brito  MUSCULOSKELETAL:   [ ]Normal   [ ]Weakness  [X ]Bed/Wheelchair bound [ ]Edema  NEUROLOGIC:   [ ]No focal deficits  [X ]Cognitive impairment  [ ]Dysphagia [ ]Dysarthria [ ]Paresis [ ]Other   SKIN:   [ ]Normal    [ ]Rash  [ X]Pressure ulcer(s)       Present on admission [ X]y [ ]n    CRITICAL CARE:  [ ] Shock Present  [ ]Septic [ ]Cardiogenic [ ]Neurologic [ ]Hypovolemic  [ ]  Vasopressors [ ]  Inotropes   [ ]Respiratory failure present [ X]Mechanical ventilation [ ]Non-invasive ventilatory support [ ]High flow  [ ]Acute  [ ]Chronic [ ]Hypoxic  [ ]Hypercarbic [ ]Other  [ ]Other organ failure     LABS: reviewed                        8.0    30.98 )-----------( 159      ( 17 Aug 2022 22:55 )             24.7   08-17    130<L>  |  103  |  102<HH>  ----------------------------<  149<H>  4.5   |  12<L>  |  1.3    Ca    7.0<L>      17 Aug 2022 22:55  Mg     2.7         TPro  4.9<L>  /  Alb  1.1<L>  /  TBili  0.4  /  DBili  x   /  AST  43<H>  /  ALT  12  /  AlkPhos  142<H>    PT/INR - ( 17 Aug 2022 22:55 )   PT: 14.40 sec;   INR: 1.25 ratio         PTT - ( 17 Aug 2022 22:55 )  PTT:39.7 sec    Urinalysis Basic - ( 18 Aug 2022 00:10 )    Color: Yellow / Appearance: Slightly Turbid / S.030 / pH: x  Gluc: x / Ketone: Trace  / Bili: Small / Urobili: <2 mg/dL   Blood: x / Protein: >600 mg/dL / Nitrite: Negative   Leuk Esterase: Small / RBC: 9 /HPF / WBC 36 /HPF   Sq Epi: x / Non Sq Epi: 15 /HPF / Bacteria: Negative      RADIOLOGY & ADDITIONAL STUDIES:  reviewed    < from: CT Abdomen and Pelvis w/ Oral Cont (22 @ 17:24) >    IMPRESSION:      Significantly limited study due to lack of intravenous contrast - much of   intra-abdominal contents cannot be evaluated.    Severe rectosigmoid fecal impaction. The rectum is distended to 12.8 cm.   Disimpaction is recommended.    Patchy left lower lobe pulmonary opacities, compatible with pneumonia.    Lower back/buttock ulceration with gas. Erosive changes of the underlying   sacrum and coccyx suggestive ofosteomyelitis.    There appears to be generalized mesenteric congestion/ascites.    --- End of Report ---    < end of copied text >    < from: 12 Lead ECG (22 @ 01:21) > EKG reviewed  Ventricular Rate 69 BPM    Atrial Rate 69 BPM    P-R Interval 138 ms    QRS Duration 88 ms    Q-T Interval 426 ms    QTC Calculation(Bazett) 456 ms    P Axis 76 degrees    R Axis 55 degrees    T Axis 38 degrees    Diagnosis Line Normal sinus rhythmwith sinus arrhythmia  Low voltage QRS  Nonspecific T wave abnormality  Abnormal ECG    < end of copied text >

## 2022-08-18 NOTE — CONSULT NOTE ADULT - SUBJECTIVE AND OBJECTIVE BOX
Patient is a 57y old  Male who presents with a chief complaint of Elevated WBC (18 Aug 2022 04:45)      57 year old man with PMHx: HTN, HLD, CAD, CVA with residual weakness, trach and PEG, Hx of seizures  Patient was discharge recently for hospice with a code status of comfort measures. In nursing home, the patient had blood work done that showed leukocytosis. Patient is non-verbal at baseline, has a tracheostomy and PEG. The patient has bilateral hip and LE ulcerations deep and look infected. The patient is hypothermic, blood pressure is stable for now and currently on MV through the tracheostomy.   The patient's code status was revoked from Comfort measures to DNR/DNI only as per ED. Tried calling his sister JOSE but she didn't .  (18 Aug 2022 04:45), admitted to SDU, well known to us from prior admission      PAST MEDICAL & SURGICAL HISTORY:  CVA (cerebral vascular accident)  &#x27;s x2   Residual left sided weakness      CAD (coronary artery disease)      Retinal detachment      Hypertension      High cholesterol      Diabetes      Anxiety      Seizure      History of corneal transplant  right   17          SOCIAL HX:   Smoking   uto    FAMILY HISTORY: uto      REVIEW OF SYSTEMS uto    Allergies    No Known Allergies    Intolerances        acetaminophen     Tablet .. 650 milliGRAM(s) Oral every 6 hours PRN  ampicillin/sulbactam  IVPB      ampicillin/sulbactam  IVPB 3 Gram(s) IV Intermittent every 6 hours  aspirin  chewable 81 milliGRAM(s) Oral daily  atorvastatin 80 milliGRAM(s) Oral at bedtime  busPIRone 5 milliGRAM(s) Oral <User Schedule>  ciprofloxacin   IVPB      ciprofloxacin   IVPB 400 milliGRAM(s) IV Intermittent every 8 hours  dextrose 5% + lactated ringers. 1000 milliLiter(s) IV Continuous <Continuous>  enoxaparin Injectable 40 milliGRAM(s) SubCutaneous every 24 hours  levETIRAcetam  Solution 500 milliGRAM(s) Oral two times a day  melatonin 3 milliGRAM(s) Oral at bedtime PRN  vancomycin  IVPB 1000 milliGRAM(s) IV Intermittent every 12 hours  : Home Meds:      PHYSICAL EXAM    ICU Vital Signs Last 24 Hrs  T(C): 33.2 (18 Aug 2022 04:00), Max: 33.2 (18 Aug 2022 04:00)  T(F): 91.8 (18 Aug 2022 04:00), Max: 91.8 (18 Aug 2022 04:00)  HR: 75 (18 Aug 2022 04:00) (66 - 76)  BP: 118/58 (18 Aug 2022 04:00) (100/67 - 143/67)  RR: 14 (18 Aug 2022 04:00) (14 - 22)  SpO2: 99% (18 Aug 2022 04:00) (94% - 99%)    O2 Parameters below as of 18 Aug 2022 04:00  Patient On (Oxygen Delivery Method): ventilator    O2 Concentration (%): 60        General: ill looking. cachectic/ wasted  HEENT: trach           Lungs: dec bs r base  Cardiovascular: JAMES 2.  Abdomen: Soft, Positive BS  Mulitple skin ulcers        LABS:                          8.0    30.98 )-----------( 159      ( 17 Aug 2022 22:55 )             24.7                                               08-17    130<L>  |  103  |  102<HH>  ----------------------------<  149<H>  4.5   |  12<L>  |  1.3    Ca    7.0<L>      17 Aug 2022 22:55  Mg     2.7     08-17    TPro  4.9<L>  /  Alb  1.1<L>  /  TBili  0.4  /  DBili  x   /  AST  43<H>  /  ALT  12  /  AlkPhos  142<H>  08-17      PT/INR - ( 17 Aug 2022 22:55 )   PT: 14.40 sec;   INR: 1.25 ratio         PTT - ( 17 Aug 2022 22:55 )  PTT:39.7 sec                                       Urinalysis Basic - ( 18 Aug 2022 00:10 )    Color: Yellow / Appearance: Slightly Turbid / S.030 / pH: x  Gluc: x / Ketone: Trace  / Bili: Small / Urobili: <2 mg/dL   Blood: x / Protein: >600 mg/dL / Nitrite: Negative   Leuk Esterase: Small / RBC: 9 /HPF / WBC 36 /HPF   Sq Epi: x / Non Sq Epi: 15 /HPF / Bacteria: Negative                                                  LIVER FUNCTIONS - ( 17 Aug 2022 22:55 )  Alb: 1.1 g/dL / Pro: 4.9 g/dL / ALK PHOS: 142 U/L / ALT: 12 U/L / AST: 43 U/L / GGT: x                                                                                               Mode: AC/ CMV (Assist Control/ Continuous Mandatory Ventilation)  RR (machine): 14  TV (machine): 400  FiO2: 40  PEEP: 5  MAP: 10  PIP: 18                                            MEDICATIONS  (STANDING):  ampicillin/sulbactam  IVPB      ampicillin/sulbactam  IVPB 3 Gram(s) IV Intermittent every 6 hours  aspirin  chewable 81 milliGRAM(s) Oral daily  atorvastatin 80 milliGRAM(s) Oral at bedtime  busPIRone 5 milliGRAM(s) Oral <User Schedule>  ciprofloxacin   IVPB      ciprofloxacin   IVPB 400 milliGRAM(s) IV Intermittent every 8 hours  dextrose 5% + lactated ringers. 1000 milliLiter(s) (75 mL/Hr) IV Continuous <Continuous>  enoxaparin Injectable 40 milliGRAM(s) SubCutaneous every 24 hours  levETIRAcetam  Solution 500 milliGRAM(s) Oral two times a day  vancomycin  IVPB 1000 milliGRAM(s) IV Intermittent every 12 hours    MEDICATIONS  (PRN):  acetaminophen     Tablet .. 650 milliGRAM(s) Oral every 6 hours PRN Temp greater or equal to 38C (100.4F), Mild Pain (1 - 3)  melatonin 3 milliGRAM(s) Oral at bedtime PRN Insomnia

## 2022-08-18 NOTE — ED PROVIDER NOTE - OBJECTIVE STATEMENT
57 year old male with pmh of htn, hld, cad, cva (x2 with residual weakness), seizures, s/p trach and peg presenting to the ED for elevated wbc count, JAVY on blood work from nursing facility. Patient with multiple open wounds/ulceration along b/l hips and lower extremities.

## 2022-08-18 NOTE — H&P ADULT - NSHPLABSRESULTS_GEN_ALL_CORE
Complete Blood Count + Automated Diff (08.17.22 @ 22:55)   WBC Count: 30.98 K/uL   RBC Count: 2.67 M/uL   Hemoglobin: 8.0 g/dL   Hematocrit: 24.7 %   Mean Cell Volume: 92.5 fL   Mean Cell Hemoglobin: 30.0 pg   Mean Cell Hemoglobin Conc: 32.4 g/dL   Red Cell Distrib Width: 18.0 %   Platelet Count - Automated: 159 K/uL   Auto Neutrophil #: 29.75 K/uL   Auto Lymphocyte #: 0.47 K/uL   Auto Monocyte #: 0.29 K/uL   Auto Eosinophil #: 0.11 K/uL   Auto Basophil #: 0.06 K/uL   Auto Neutrophil %: 96.0: Differential percentages must be correlated with absolute numbers for   clinical significance. %   Auto Lymphocyte %: 1.5 %   Auto Monocyte %: 0.9 %   Auto Eosinophil %: 0.4 %   Auto Basophil %: 0.2 %   Auto Immature Granulocyte %: 1.0: (Includes meta, myelo and promyelocytes) %   Nucleated RBC: 0 /100 WBCs Comprehensive Metabolic Panel (08.17.22 @ 22:55)   Sodium, Serum: 130 mmol/L   Potassium, Serum: 4.5: Hemolyzed. Interpret with caution mmol/L   Chloride, Serum: 103 mmol/L   Carbon Dioxide, Serum: 12 mmol/L   Anion Gap, Serum: 15 mmol/L   Blood Urea Nitrogen, Serum: 102: Critical value: mg/dL   Creatinine, Serum: 1.3 mg/dL   Glucose, Serum: 149 mg/dL   Calcium, Total Serum: 7.0 mg/dL   Protein Total, Serum: 4.9 g/dL   Albumin, Serum: 1.1 g/dL   Bilirubin Total, Serum: 0.4 mg/dL   Alkaline Phosphatase, Serum: 142: Hemolyzed. Interpret with caution U/L   Aspartate Aminotransferase (AST/SGOT): 43: Hemolyzed. Interpret with caution U/L   Alanine Aminotransferase (ALT/SGPT): 12: Hemolyzed. Interpret with caution U/L   eGFR: 64: The estimated glomerular filtration rate (eGFR) is calculated using the   2021 CKD-EPI creatinine equation, which does not have a coefficient for   race and is validated in individuals 18 years of age and older (N Engl J   Med 2021; 385:6525-3104). Creatinine-based eGFR may be inaccurate in   various situations including but not limited to extremes of muscle mass,   altered dietary protein intake, or medications that affect renal tubular   creatinine secretion. mL/min/1.73m2     CXR: right pleural effusion

## 2022-08-18 NOTE — CONSULT NOTE ADULT - ASSESSMENT
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     CXR no real change 7/25 Right basilar opacity/effusion    8/1   Numerous Enterococcus faecalis (vancomycin resistant) Ampicillin: S <=2    Rare Pseudomonas aeruginosa (Carbapenem Resistant) Aztreonam: S 8 Ciprofloxacin: S 0.5 Levofloxacin: I 2    8/1 sputum  Pseudomonas aeruginosa (Carbapenem Resistant)    UA without significant pyuria Blood: x / Protein: >600 mg/dL / Nitrite: Negative   Leuk Esterase: Small / RBC: 9 /HPF / WBC 36 /HPF   Sq Epi: x / Non Sq Epi: 15 /HPF / Bacteria: Negative    #Lactic acidosis Blood Gas Venous - Lactate: 5.90 mmol/L (08-17-22 @ 22:52)  #Hyponatremia   #Trach/PEG  Creatinine, Serum: 1.3 (08-17-22 @ 22:55)    Weight (kg): 82 (08-18-22 @ 07:01)    RECOMMENDATIONS  This is an incomplete consult note. All final recommendations to follow after interview and examination of the patient. Please follow recommendations noted below.    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     CXR no real change 7/25 Right basilar opacity/effusion    8/1   Numerous Enterococcus faecalis (vancomycin resistant) Ampicillin: S <=2    Rare Pseudomonas aeruginosa (Carbapenem Resistant) Aztreonam: S 8 Ciprofloxacin: S 0.5 Levofloxacin: I 2    8/1 sputum  Pseudomonas aeruginosa (Carbapenem Resistant)    UA without significant pyuria & poor sample as +epith-- Blood: x / Protein: >600 mg/dL / Nitrite: Negative   Leuk Esterase: Small / RBC: 9 /HPF / WBC 36 /HPF   Sq Epi: x / Non Sq Epi: 15 /HPF / Bacteria: Negative    #Lactic acidosis Blood Gas Venous - Lactate: 5.90 mmol/L (08-17-22 @ 22:52)  #Hyponatremia   #Trach/PEG  Creatinine, Serum: 1.3 (08-17-22 @ 22:55)    Weight (kg): 82 (08-18-22 @ 07:01)    RECOMMENDATIONS  This is an incomplete consult note. All final recommendations to follow after interview and examination of the patient. Please follow recommendations noted below.  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     CXR no real change 7/25 Right basilar opacity/effusion    8/1   Numerous Enterococcus faecalis (vancomycin resistant) Ampicillin: S <=2    Rare Pseudomonas aeruginosa (Carbapenem Resistant) Aztreonam: S 8 Ciprofloxacin: S 0.5 Levofloxacin: I 2    8/1 sputum  Pseudomonas aeruginosa (Carbapenem Resistant)    UA without significant pyuria & poor sample as +epith-- Blood: x / Protein: >600 mg/dL / Nitrite: Negative   Leuk Esterase: Small / RBC: 9 /HPF / WBC 36 /HPF   Sq Epi: x / Non Sq Epi: 15 /HPF / Bacteria: Negative    #Lactic acidosis Blood Gas Venous - Lactate: 5.90 mmol/L (08-17-22 @ 22:52)  #Hyponatremia   #Trach/PEG  Creatinine, Serum: 1.3 (08-17-22 @ 22:55)    Weight (kg): 82 (08-18-22 @ 07:01)    RECOMMENDATIONS  - f/u BCX  - Burn consult, WCX  - D/C cipro  - D/C Vanc- no hx MRSA  - Dose amikacin 5mg/kg x1  - ADD Aztreonam 2g q8h IV (LETTY 8)  - Continue Unasyn 3g q6h IV   - Grave prognosis, GOC    If any questions, please call or send a message on [employer] Teams  Please continue to update ID with any pertinent new laboratory or radiographic findings  Spectra [phone]

## 2022-08-18 NOTE — ED PROVIDER NOTE - CARE PLAN
1 Principal Discharge DX:	Sepsis  Secondary Diagnosis:	Open wound of skin  Secondary Diagnosis:	JAVY (acute kidney injury)

## 2022-08-18 NOTE — ED PROVIDER NOTE - PROGRESS NOTE DETAILS
jk: spoke w/ health care proxy (Nicole Beaver); stating that she would like IV fluids and antibiotics for patient but no aggressive measures.

## 2022-08-18 NOTE — CONSULT NOTE ADULT - ASSESSMENT
57yMale being evaluated for      MEDD (morphine equivalent daily dose):      See Recs below.    Please call [phone] with questions or concerns 24/7.   We will continue to follow.     Discussed with primary MD.   57yMale with PMH including HTN, HLD, CAD, CVA with residual weakness, trach and PEG, Hx of seizures being evaluated for GOC. Patient is known to palliative team from last admission during which he was made CMO and d/c'd to SNF on hospice. Currently patient is admitted with sepsis, back on vent.       MEDD (morphine equivalent daily dose):      See Recs below.    Please call [phone] with questions or concerns 24/7.   We will continue to follow.     Discussed with primary MD.   57yMale with PMH including HTN, HLD, CAD, CVA with residual weakness, trach and PEG, Hx of seizures being evaluated for GOC. Patient is known to palliative team from last admission during which he was made CMO and d/c'd to SNF for plan for hospice. Nicole (sister) revoked CMO at NH. Currently patient is admitted with sepsis, back on vent.     Reached out to Nicole- awaiting call back.   Need to address his code status as the patient had previously been DNR.   Will try to address GOC with Nicole and offer support.     MEDD (morphine equivalent daily dose): 0      See Recs below.    Please call [phone] with questions or concerns 24/7.   We will continue to follow.     Discussed with primary MD.   57yMale with PMH including HTN, HLD, CAD, CVA with residual weakness, trach and PEG, Hx of seizures being evaluated for GOC. Patient is known to palliative team from last admission during which he was made CMO and d/c'd to SNF for plan for hospice. Nicole (sister) revoked CMO at NH. Currently patient is admitted with sepsis, back on vent.     Primary team spoke with Nicole this afternoon and decided to make the patient comfort measures only.  Symptom recommendations as below.    MEDD (morphine equivalent daily dose): 0      See Recs below.    Please call [phone] with questions or concerns 24/7.   We will continue to follow.     Discussed with primary MD.

## 2022-08-18 NOTE — H&P ADULT - ASSESSMENT
57 year old man with PMHx of CAD, CVA with residual weakness, DL, HTN, history of seizures presenting for elevated WBC    #Leukocytosis, Sepsis  #Hypothermic patient  #Multiple infectious sources including ulcers  - Given 2.1 L in ED   - Given one dose of vanco and cefepime, last time had wound Pseudominas resistant to cefepime and leonides, but sensitive to cipro, and VRE sensitive ti ampicillin  - Will start cipro 400 every 8 hours to cover pseudomonas, vanco and unasyn (to cover VRE)  - Will start LR at 75 cc/hr  - F/u procal, CRP  - C/S infectious disease  - Bear hugger for hypothermia    #JAVY  - Patient cr up to 1.3 from 0.6  - urine studies ordered  - C/S Nephrology    #AHRF  - Patient in respiratory failure needing AC/VC MV  - Has right pleural effusion (present since last admission)  - C/S pulm for thoracocentesis (patient is DNR DNI but not CMO)    #CAD  #CVA  - Will restart aspirin 81 mg  - Restart atorvastatin 80 mg    # HTN  - will keep off HTN meds as patient has normal SBP    # Hx of Seizures:  - Resume keppra BID    Activity: Bed rest  Diet: NPO for now, can resume feeding from PEG gradually to prevent refeeding syndrome  DVT prophyalxis: Lovenox  GI prophyalxis: none  Dispo: SDU   57 year old man with PMHx of CAD, CVA with residual weakness, DL, HTN, history of seizures presenting for elevated WBC    #Leukocytosis, Sepsis  #Hypothermic patient  #Multiple infectious sources including ulcers  - Given 2.1 L in ED   - Given one dose of vanco and cefepime, last time had wound Pseudominas resistant to cefepime and leonides, but sensitive to cipro, and VRE sensitive ti ampicillin  - Will start cipro 400 every 8 hours to cover pseudomonas, vanco and unasyn (to cover VRE)  - Will start LR at 75 cc/hr  - F/u procal, CRP  - C/S infectious disease  - Bear hugger for hypothermia    #JAVY  - Patient cr up to 1.3 from 0.6  - urine studies ordered  - C/S Nephrology    #AHRF  - Patient in respiratory failure needing AC/VC MV  - Has right pleural effusion (present since last admission)  - Consider thoracocentesis (patient is DNR DNI but not CMO)    #CAD  #CVA  - Will restart aspirin 81 mg  - Restart atorvastatin 80 mg    # HTN  - will keep off HTN meds as patient has normal SBP    # Hx of Seizures:  - Resume keppra BID    Activity: Bed rest  Diet: NPO for now, can resume feeding from PEG gradually to prevent refeeding syndrome  DVT prophyalxis: Lovenox  GI prophyalxis: none  Dispo: SDU   57 year old man with PMHx of CAD, CVA with residual weakness, DL, HTN, history of seizures presenting for elevated WBC    #Leukocytosis, Sepsis  #Hypothermic patient  #Multiple infectious sources including ulcers  #Lactic Acidosis  - Given 2.1 L in ED   - Given one dose of vanco and cefepime, last time had wound Pseudominas resistant to cefepime and leonides, but sensitive to cipro, and VRE sensitive ti ampicillin  - Will start cipro 400 every 8 hours to cover pseudomonas, vanco and unasyn (to cover VRE)  - Will start LR at 75 cc/hr  - LA 5.9, repeat level after hydration  - F/u procal, CRP  - C/S infectious disease  - Bear hugger for hypothermia    #JAVY  - Patient cr up to 1.3 from 0.6  - urine studies ordered  - C/S Nephrology    #AHRF  - Patient in respiratory failure needing AC/VC MV  - Has right pleural effusion (present since last admission)  - Consider thoracocentesis (patient is DNR DNI but not CMO)    #CAD  #CVA  - Will restart aspirin 81 mg  - Restart atorvastatin 80 mg    # HTN  - will keep off HTN meds as patient has normal SBP    # Hx of Seizures:  - Resume keppra BID    Activity: Bed rest  Diet: NPO for now, can resume feeding from PEG gradually to prevent refeeding syndrome  DVT prophyalxis: Lovenox  GI prophyalxis: none  Dispo: SDU

## 2022-08-18 NOTE — PATIENT PROFILE ADULT - NSTOBACCOUNKNOWNSMK_GEN_A_CORE_RD
Procedure(s):  RIGHT THIRD , FOURTH TOE HARDWARE REMOVAL WITH  AMPUTATION OF THIRD TOE AND FOURTH FLEXOR DIGITORUM LONGUS TENOTOMY.    total IV anesthesia, general - backup    Anesthesia Post Evaluation      Multimodal analgesia: multimodal analgesia used between 6 hours prior to anesthesia start to PACU discharge  Patient location during evaluation: bedside  Patient participation: complete - patient participated  Level of consciousness: responsive to verbal stimuli  Pain management: adequate  Airway patency: patent  Anesthetic complications: no  Cardiovascular status: hemodynamically stable  Respiratory status: spontaneous ventilation  Hydration status: stable    Final Post Anesthesia Temperature Assessment:  Normothermia (36.0-37.5 degrees C)      INITIAL Post-op Vital signs:   Vitals Value Taken Time   /84 04/01/21 1346   Temp 36.8 °C (98.3 °F) 04/01/21 1334   Pulse 75 04/01/21 1348   Resp 13 04/01/21 1348   SpO2 93 % 04/01/21 1348   Vitals shown include unvalidated device data.
Cognitively Impaired

## 2022-08-18 NOTE — HOSPICE CARE NOTE - CONVESATION DETAILS
Patient was not admitted to hospice services upon transfer to [facility] last week. Hospice admission visit was canceled on Sunday by patient's sister Nicole who reported that the patient was placed back on a vent at the facility and is tolerating feeds. Nicole wanted to wait and see how the patient did at the new facility prior to deciding about hospice services. Hospice is available if Nicole decides to move forward with our services, once patient is transferred back to [facility].

## 2022-08-18 NOTE — DISCHARGE NOTE FOR THE EXPIRED PATIENT - HOSPITAL COURSE
57 year old man with PMHx of HTN, HLD, CAD, CVA with residual weakness, trach and PEG, Hx of seizures  Patient was discharge recently for hospice with a code status of comfort measures. In nursing home, the patient had blood work done that showed leukocytosis. Patient is non-verbal at baseline, has a tracheostomy and PEG. The patient has bilateral hip and LE ulcerations deep and look infected. The patient was hypothermic with stable BP on presentation on MV through the tracheostomy.     #Severe Sepsis, POA  #Suspected Infected sacral decubiti ulcers  ID evaluated by ID  - Patient took cipro, vanco, amikacin, azteronam. and unasyn   - IVF  -  procal, CRP, Blood cultures drawn  - Burn consult ordered for debridement  - Bear hugger for hypothermia  AFTER patient became CMO, all abx were stoppped. comfort measures continued only..    #Acute on chronic anemia  Unclear if dilutional. No signs of GIB  - NO blood transfusions since patient CMO    #JAVY, likely pre-renal  - Patient cr up to 1.3 from 0.6  - urine studies ordered  - C/S Nephrology: JAVY prerenal/ATN secondary to shock and hypoperfusion. IVF       #Chronic Respiratory failure on vent  #Hx of CVA s/p trach/PEG  Patient in respiratory failure needing AC/VC MV  Has right pleural effusion (present since last admission)  - Vent management per crit    The patient's code status was revoked from Comfort measures to DNR/DNI only as per ED. However after calling the sister, and after a prolonged GOC conversation the patient was decided to be CMO.  After CMO decision only comfort care measures were taken.   The nurse called saying the patient passed. 2 doctors went to patient's bedside and confirmed absence of pulses or reflexes. No heart sounds no breath sounds.  The patient was pronounced dead at 22:00 on august 28th 2022.   The family was called over the phone. Nicole the sister was contacted and updated. She said she will come back tomorrow.

## 2022-08-18 NOTE — ED PROVIDER NOTE - PHYSICAL EXAMINATION
CONSTITUTIONAL: +cachectic, ill-appearing, somnolent.  SKIN: warm, dry.  HEAD: Normocephalic; atraumatic.  EYES: PERRL, EOMI, no conjunctival erythema  ENT: No nasal discharge; airway clear.  NECK: Supple; non tender.  CARD: S1, S2 normal; no murmurs, gallops, or rubs. Regular rate and rhythm.   RESP: No wheezes, rales or rhonchi.  ABD: soft ntnd; (+) PEG tube in place.  EXT: No clubbing or cyanosis. (+) ulcerations of different stages on lower extremities.  NEURO: Somnolent, A&Ox0. Not responsive to questions.

## 2022-08-18 NOTE — H&P ADULT - ATTENDING COMMENTS
57 year old man with PMHx of CAD, CVA with residual weakness s/p trach/PEG, DL, HTN, history of seizures presenting for elevated WBC. Was previously on hospice care, but rescinded by family.    #Severe Sepsis, POA  #Suspected Infected sacral decubiti ulcers  ID eval appreciated  - Aztreonam 2g q8h and unasyn 3g q6h, fu final ID recds  - IVF  - F/u procal, CRP  - Blood cultures  - Burn eval for debridement  - Bear hugger for hypothermia    #Acute on chronic anemia  Unclear if dilutional. No signs of GIB  - Repeat CBC with type and screen  - Tranfsue to keep >7    #JAVY, likely pre-renal  - Patient cr up to 1.3 from 0.6  - urine studies ordered  - C/S Nephrology    #Chronic Respiratory failure on vent  #Hx of CVA s/p trach/PEG  Patient in respiratory failure needing AC/VC MV  Has right pleural effusion (present since last admission)  - Vent management per crit    #CAD  #HTN/HLD  - Hold off on BP Meds  - Cont atorvastatin and ASA    # Hx of Seizures:  - Continue keppra BID    DVT PPX, Lovenox  DNR/DNI    #Progress Note Handoff  Pending (specify): Cont IV abx, f/u BCx, Burn Eval  Family discussion: Sister unable to be reached despite multiple attempts  Disposition: NH

## 2022-08-18 NOTE — CONSULT NOTE ADULT - SUBJECTIVE AND OBJECTIVE BOX
NEPHROLOGY CONSULTATION NOTE    57 year old man with PMHx of CAD, CVA, functional quadriplegia, respiratory failure s/p PEG and trach, non verbal at baseline, DL, HTN, history of seizures presenting for elevated WBC from NH. he is in sepsis secondary to infected ulcer receiving abx.  VS low BP, on bicarb drip.  awake, not alert, oriented, not able to follow commands.    PAST MEDICAL & SURGICAL HISTORY:  CVA (cerebral vascular accident)  &#x27;s x2   Residual left sided weakness      CAD (coronary artery disease)      Retinal detachment      Hypertension      High cholesterol      Diabetes      Anxiety      Seizure      History of corneal transplant  right   17        Allergies:  No Known Allergies    Home Medications Reviewed  Hospital Medications:   MEDICATIONS  (STANDING):  ampicillin/sulbactam  IVPB      ampicillin/sulbactam  IVPB 3 Gram(s) IV Intermittent every 6 hours  aspirin  chewable 81 milliGRAM(s) Oral daily  atorvastatin 80 milliGRAM(s) Oral at bedtime  busPIRone 5 milliGRAM(s) Oral <User Schedule>  ciprofloxacin   IVPB      ciprofloxacin   IVPB 400 milliGRAM(s) IV Intermittent every 8 hours  dextrose 5% 1000 milliLiter(s) (100 mL/Hr) IV Continuous <Continuous>  enoxaparin Injectable 40 milliGRAM(s) SubCutaneous every 24 hours  levETIRAcetam  Solution 500 milliGRAM(s) Oral two times a day  norepinephrine Infusion 0.05 MICROgram(s)/kG/Min (7.69 mL/Hr) IV Continuous <Continuous>  vancomycin  IVPB 1000 milliGRAM(s) IV Intermittent every 12 hours    SOCIAL HISTORY:  Denies ETOH,Smoking,   FAMILY HISTORY:      REVIEW OF SYSTEMS:  CONSTITUTIONAL: trach, peg, not responsive to verbal stimuli, unable to assess for ROS.    Vital Signs Last 24 Hrs  T(C): 34.6 (18 Aug 2022 07:22), Max: 34.6 (18 Aug 2022 07:22)  T(F): 94.3 (18 Aug 2022 07:22), Max: 94.3 (18 Aug 2022 07:22)  HR: 92 (18 Aug 2022 08:16) (66 - 92)  BP: 92/50 (18 Aug 2022 07:22) (66/41 - 143/67)  BP(mean): 49 (18 Aug 2022 06:52) (49 - 52)  RR: 16 (18 Aug 2022 07:22) (14 - 22)  SpO2: 98% (18 Aug 2022 08:16) (94% - 100%)    Parameters below as of 18 Aug 2022 07:22  Patient On (Oxygen Delivery Method): ventilator    O2 Concentration (%): 60    Height (cm): 188 ( @ 22:38)  Weight (kg): 82 ( @ 07:01)  BMI (kg/m2): 23.2 ( @ 07:01)  BSA (m2): 2.08 ( @ 07:01)    PHYSICAL EXAM:  Constitutional: shallow breathing   HEENT: anicteric sclera, oropharynx clear, MMM  Neck: No JVD  Respiratory: CTAB, b/l rales, trach   Cardiovascular: S1, S2, RRR  Gastrointestinal: BS+, soft, NT/ND, peg  Extremities: No cyanosis or clubbing. No peripheral edema  Neurological: awake, not alert, does not respond to verbal stimuli or follow commands  : No CVA tenderness. flor in place   Skin: No rashes    LABS:      130<L>  |  103  |  102<HH>  ----------------------------<  149<H>  4.5   |  12<L>  |  1.3    Ca    7.0<L>      17 Aug 2022 22:55  Mg     2.7         TPro  4.9<L>  /  Alb  1.1<L>  /  TBili  0.4  /  DBili      /  AST  43<H>  /  ALT  12  /  AlkPhos  142<H>      Creatinine Trend: 1.3 <--, 0.6 <--, <0.5 <--, <0.5 <--, <0.5 <--, <0.5 <--, <0.5 <--, <0.5 <--, <0.5 <--, <0.5 <--, <0.5 <--, <0.5 <--, <0.5 <--, <0.5 <--, <0.5 <--, <0.5 <--, <0.5 <--, <0.5 <--, <0.5 <--, <0.5 <--                        8.0    30.98 )-----------( 159      ( 17 Aug 2022 22:55 )             24.7     Urine Studies:  Urinalysis Basic - ( 18 Aug 2022 00:10 )    Color: Yellow / Appearance: Slightly Turbid / S.030 / pH:   Gluc:  / Ketone: Trace  / Bili: Small / Urobili: <2 mg/dL   Blood:  / Protein: >600 mg/dL / Nitrite: Negative   Leuk Esterase: Small / RBC: 9 /HPF / WBC 36 /HPF   Sq Epi:  / Non Sq Epi: 15 /HPF / Bacteria: Negative

## 2022-08-18 NOTE — CONSULT NOTE ADULT - PROBLEM SELECTOR RECOMMENDATION 2
sacral ulcer with exposed bone- BURN consult  recommendations per ID   continue medical management  Nicole declined a central line  will address GOC sacral ulcer with exposed bone- BURN consult  now comfort measures only  No additional antibiotics

## 2022-08-18 NOTE — CONSULT NOTE ADULT - SUBJECTIVE AND OBJECTIVE BOX
SILVANO CALIXTO  57y, Male  Allergy: No Known Allergies      CHIEF COMPLAINT:   Elevated WBC (18 Aug 2022 06:42)      LOS      HPI  HPI:  57 year old man with PMHx:  - HTN  - HLD  - CAD  - CVA with residual weakness, trach and PEG  - Hx of seizures    Patient was discharge recently for hospice with a code status of comfort measures. In nursing home, the patient had blood work done that showed leukocytosis. Patient is non-verbal at baseline, has a tracheostomy and PEG. The patient has bilateral hip and LE ulcerations deep and look infected. The patient is hypothermic, blood pressure is stable for now and currently on MV through the tracheostomy.   The patient's code status was revoked from Comfort measures to DNR/DNI only as per ED. Tried calling his sister JOSE but she didn't .  (18 Aug 2022 04:45)      INFECTIOUS DISEASE HISTORY:  ID consulted for ulcers  Hypothermic in the ER    Known chronic sacral osteomyelitis with exposed bone     CXR no real change  Right basilar opacity/effusion       Numerous Enterococcus faecalis (vancomycin resistant) Ampicillin: S <=2    Rare Pseudomonas aeruginosa (Carbapenem Resistant) Aztreonam: S 8 Ciprofloxacin: S 0.5 Levofloxacin: I 2     sputum  Pseudomonas aeruginosa (Carbapenem Resistant)    UA without significant pyuria Blood: x / Protein: >600 mg/dL / Nitrite: Negative   Leuk Esterase: Small / RBC: 9 /HPF / WBC 36 /HPF   Sq Epi: x / Non Sq Epi: 15 /HPF / Bacteria: Negative      PMH  PAST MEDICAL & SURGICAL HISTORY:  CVA (cerebral vascular accident)  &#x27;s x2   Residual left sided weakness      CAD (coronary artery disease)      Retinal detachment      Hypertension      High cholesterol      Diabetes      Anxiety      Seizure      History of corneal transplant  right   17          FAMILY HISTORY  non-contributory     SOCIAL HISTORY  Social History:  No history taken as patient is not verbal (18 Aug 2022 04:45)        ROS  ***    VITALS:  T(F): 94.3, Max: 94.3 (22 @ 07:22)  HR: 92  BP: 92/50  RR: 16Vital Signs Last 24 Hrs  T(C): 34.6 (18 Aug 2022 07:22), Max: 34.6 (18 Aug 2022 07:22)  T(F): 94.3 (18 Aug 2022 07:22), Max: 94.3 (18 Aug 2022 07:22)  HR: 92 (18 Aug 2022 08:16) (66 - 92)  BP: 92/50 (18 Aug 2022 07:22) (66/41 - 143/67)  BP(mean): 49 (18 Aug 2022 06:52) (49 - 52)  RR: 16 (18 Aug 2022 07:22) (14 - 22)  SpO2: 98% (18 Aug 2022 08:16) (94% - 100%)    Parameters below as of 18 Aug 2022 07:22  Patient On (Oxygen Delivery Method): ventilator    O2 Concentration (%): 60    PHYSICAL EXAM:  ***    TESTS & MEASUREMENTS:                        8.0    30.98 )-----------( 159      ( 17 Aug 2022 22:55 )             24.7         130<L>  |  103  |  102<HH>  ----------------------------<  149<H>  4.5   |  12<L>  |  1.3    Ca    7.0<L>      17 Aug 2022 22:55  Mg     2.7         TPro  4.9<L>  /  Alb  1.1<L>  /  TBili  0.4  /  DBili  x   /  AST  43<H>  /  ALT  12  /  AlkPhos  142<H>        LIVER FUNCTIONS - ( 17 Aug 2022 22:55 )  Alb: 1.1 g/dL / Pro: 4.9 g/dL / ALK PHOS: 142 U/L / ALT: 12 U/L / AST: 43 U/L / GGT: x           Urinalysis Basic - ( 18 Aug 2022 00:10 )    Color: Yellow / Appearance: Slightly Turbid / S.030 / pH: x  Gluc: x / Ketone: Trace  / Bili: Small / Urobili: <2 mg/dL   Blood: x / Protein: >600 mg/dL / Nitrite: Negative   Leuk Esterase: Small / RBC: 9 /HPF / WBC 36 /HPF   Sq Epi: x / Non Sq Epi: 15 /HPF / Bacteria: Negative        Culture - Other (collected 22 @ 20:35)  Source: Wound Wound  Final Report (22 @ 17:56):    Numerous Enterococcus faecalis (vancomycin resistant)    Rare Pseudomonas aeruginosa (Carbapenem Resistant)  Organism: Enterococcus faecalis (vancomycin resistant)  Pseudomonas aeruginosa (Carbapenem Resistant) (22 @ 17:56)  Organism: Pseudomonas aeruginosa (Carbapenem Resistant) (22 @ 17:56)      -  Amikacin: S <=16      -  Aztreonam: S 8      -  Cefepime: R >16      -  Ceftazidime: I 16      -  Ceftazidime/Avibactam: S <=4      -  Ceftolozane/tazobactam: S <=2      -  Ciprofloxacin: S 0.5      -  Gentamicin: I 8      -  Imipenem: R >8      -  Levofloxacin: I 2      -  Meropenem: R 8      -  Piperacillin/Tazobactam: I 64      -  Tobramycin: S <=2      Method Type: LETTY  Organism: Enterococcus faecalis (vancomycin resistant) (22 @ 17:56)      -  Ampicillin: S <=2 Predicts results to ampicillin/sulbactam, amoxacillin-clavulanate and  piperacillin-tazobactam.      -  Daptomycin: S 1      -  Levofloxacin: R >4      -  Linezolid: S 1      -  Tetra/Doxy: R >8      -  Vancomycin: R >16      Method Type: LETTY    Culture - Sputum (collected 22 @ 20:35)  Source: Trach Asp Tracheal Aspirate  Gram Stain (22 @ 06:45):    Numerous polymorphonuclear leukocytes seen per low power field    Rare Squamous epithelial cells seen per low power field    Moderate Gram Negative Rods seen per oil power field  Final Report (22 @ 15:50):    Moderate Pseudomonas aeruginosa (Carbapenem Resistant)    Normal Respiratory Analia absent  Organism: Pseudomonas aeruginosa (Carbapenem Resistant) (22 @ 15:50)  Organism: Pseudomonas aeruginosa (Carbapenem Resistant) (22 @ 15:50)      -  Amikacin: S <=16      -  Aztreonam: S 8      -  Cefepime: I 16      -  Ceftazidime: I 16      -  Ceftazidime/Avibactam: S <=4      -  Ceftolozane/tazobactam: S <=2      -  Ciprofloxacin: S 0.5      -  Gentamicin: I 8      -  Imipenem: R >8      -  Levofloxacin: I 2      -  Meropenem: R 8      -  Piperacillin/Tazobactam: I 64      -  Tobramycin: S <=2      Method Type: LETTY    Culture - Blood (collected 22 @ 13:30)  Source: .Blood Blood  Final Report (22 @ 23:00):    No Growth Final    Culture - Blood (collected 22 @ 13:30)  Source: .Blood Blood  Final Report (22 @ 23:00):    No Growth Final        Blood Gas Venous - Lactate: 5.90 mmol/L (22 @ 22:52)      INFECTIOUS DISEASES TESTING  COVID-19 PCR: NotDetec (08-10-22 @ 06:00)  COVID-19 PCR: NotDetec (22 @ 06:30)  Procalcitonin, Serum: 0.06 ng/mL (22 @ 11:49)  COVID-19 PCR: NotDetec (22 @ 07:23)  COVID-19 PCR: NotDetec (22 @ 02:09)  MRSA PCR Result.: Negative (03-15-22 @ 23:29)  Procalcitonin, Serum: 0.06 ng/mL (03-15-22 @ 01:00)  COVID-19 PCR: NotDetec (22 @ 18:03)  COVID-19 PCR: NotDetec (22 @ 14:40)  Procalcitonin, Serum: 0.16 ng/mL (22 @ 08:14)  COVID-19 PCR: NotDetec (22 @ 11:35)  COVID-19 PCR: NotDetec (21 @ 11:10)      INFLAMMATORY MARKERS      RADIOLOGY & ADDITIONAL TESTS:  I have personally reviewed the last Chest xray  CXR      CT      CARDIOLOGY TESTING      MEDICATIONS  ampicillin/sulbactam  IVPB     ampicillin/sulbactam  IVPB 3 IV Intermittent every 6 hours  aspirin  chewable 81 Oral daily  atorvastatin 80 Oral at bedtime  busPIRone 5 Oral <User Schedule>  ciprofloxacin   IVPB     ciprofloxacin   IVPB 400 IV Intermittent every 8 hours  dextrose 5% 1000 IV Continuous <Continuous>  enoxaparin Injectable 40 SubCutaneous every 24 hours  levETIRAcetam  Solution 500 Oral two times a day  norepinephrine Infusion 0.05 IV Continuous <Continuous>  vancomycin  IVPB 1000 IV Intermittent every 12 hours        ANTIBIOTICS:  ampicillin/sulbactam  IVPB      ampicillin/sulbactam  IVPB 3 Gram(s) IV Intermittent every 6 hours  ciprofloxacin   IVPB      ciprofloxacin   IVPB 400 milliGRAM(s) IV Intermittent every 8 hours  vancomycin  IVPB 1000 milliGRAM(s) IV Intermittent every 12 hours      ALLERGIES:  No Known Allergies         SILVANO CALIXTO  57y, Male  Allergy: No Known Allergies      CHIEF COMPLAINT:   Elevated WBC (18 Aug 2022 06:42)      LOS      HPI  HPI:  57 year old man with PMHx:  - HTN  - HLD  - CAD  - CVA with residual weakness, trach and PEG  - Hx of seizures    Patient was discharge recently for hospice with a code status of comfort measures. In nursing home, the patient had blood work done that showed leukocytosis. Patient is non-verbal at baseline, has a tracheostomy and PEG. The patient has bilateral hip and LE ulcerations deep and look infected. The patient is hypothermic, blood pressure is stable for now and currently on MV through the tracheostomy.   The patient's code status was revoked from Comfort measures to DNR/DNI only as per ED. Tried calling his sister JOSE but she didn't .  (18 Aug 2022 04:45)      INFECTIOUS DISEASE HISTORY:  ID consulted for ulcers  Hypothermic in the ER    Known chronic sacral osteomyelitis with exposed bone     CXR no real change  Right basilar opacity/effusion       Numerous Enterococcus faecalis (vancomycin resistant) Ampicillin: S <=2    Rare Pseudomonas aeruginosa (Carbapenem Resistant) Aztreonam: S 8 Ciprofloxacin: S 0.5 Levofloxacin: I 2     sputum  Pseudomonas aeruginosa (Carbapenem Resistant)    UA without significant pyuria Blood: x / Protein: >600 mg/dL / Nitrite: Negative   Leuk Esterase: Small / RBC: 9 /HPF / WBC 36 /HPF   Sq Epi: x / Non Sq Epi: 15 /HPF / Bacteria: Negative      PMH  PAST MEDICAL & SURGICAL HISTORY:  CVA (cerebral vascular accident)  &#x27;s x2   Residual left sided weakness      CAD (coronary artery disease)      Retinal detachment      Hypertension      High cholesterol      Diabetes      Anxiety      Seizure      History of corneal transplant  right   17          FAMILY HISTORY  non-contributory     SOCIAL HISTORY  Social History:  No history taken as patient is not verbal (18 Aug 2022 04:45)        ROS  unable to obtain history secondary to patient's mental status and/or sedation     VITALS:  T(F): 94.3, Max: 94.3 (22 @ 07:22)  HR: 92  BP: 92/50  RR: 16Vital Signs Last 24 Hrs  T(C): 34.6 (18 Aug 2022 07:22), Max: 34.6 (18 Aug 2022 07:22)  T(F): 94.3 (18 Aug 2022 07:22), Max: 94.3 (18 Aug 2022 07:22)  HR: 92 (18 Aug 2022 08:16) (66 - 92)  BP: 92/50 (18 Aug 2022 07:22) (66/41 - 143/67)  BP(mean): 49 (18 Aug 2022 06:52) (49 - 52)  RR: 16 (18 Aug 2022 07:22) (14 - 22)  SpO2: 98% (18 Aug 2022 08:16) (94% - 100%)    Parameters below as of 18 Aug 2022 07:22  Patient On (Oxygen Delivery Method): ventilator    O2 Concentration (%): 60    PHYSICAL EXAM:  Gen: trach/ vent  CV: RRR  Lungs: Decreased BS at bases  Abd: Soft  Neuro: does not follow commands  sacral- unable to turn patient- no nurse available  Skin: no rash   Lines clean, no phlebitis     TESTS & MEASUREMENTS:                        8.0    30.98 )-----------( 159      ( 17 Aug 2022 22:55 )             24.7     08-    130<L>  |  103  |  102<HH>  ----------------------------<  149<H>  4.5   |  12<L>  |  1.3    Ca    7.0<L>      17 Aug 2022 22:55  Mg     2.7     -    TPro  4.9<L>  /  Alb  1.1<L>  /  TBili  0.4  /  DBili  x   /  AST  43<H>  /  ALT  12  /  AlkPhos  142<H>  08-17      LIVER FUNCTIONS - ( 17 Aug 2022 22:55 )  Alb: 1.1 g/dL / Pro: 4.9 g/dL / ALK PHOS: 142 U/L / ALT: 12 U/L / AST: 43 U/L / GGT: x           Urinalysis Basic - ( 18 Aug 2022 00:10 )    Color: Yellow / Appearance: Slightly Turbid / S.030 / pH: x  Gluc: x / Ketone: Trace  / Bili: Small / Urobili: <2 mg/dL   Blood: x / Protein: >600 mg/dL / Nitrite: Negative   Leuk Esterase: Small / RBC: 9 /HPF / WBC 36 /HPF   Sq Epi: x / Non Sq Epi: 15 /HPF / Bacteria: Negative        Culture - Other (collected 22 @ 20:35)  Source: Wound Wound  Final Report (22 @ 17:56):    Numerous Enterococcus faecalis (vancomycin resistant)    Rare Pseudomonas aeruginosa (Carbapenem Resistant)  Organism: Enterococcus faecalis (vancomycin resistant)  Pseudomonas aeruginosa (Carbapenem Resistant) (22 @ 17:56)  Organism: Pseudomonas aeruginosa (Carbapenem Resistant) (22 @ 17:56)      -  Amikacin: S <=16      -  Aztreonam: S 8      -  Cefepime: R >16      -  Ceftazidime: I 16      -  Ceftazidime/Avibactam: S <=4      -  Ceftolozane/tazobactam: S <=2      -  Ciprofloxacin: S 0.5      -  Gentamicin: I 8      -  Imipenem: R >8      -  Levofloxacin: I 2      -  Meropenem: R 8      -  Piperacillin/Tazobactam: I 64      -  Tobramycin: S <=2      Method Type: LETTY  Organism: Enterococcus faecalis (vancomycin resistant) (22 @ 17:56)      -  Ampicillin: S <=2 Predicts results to ampicillin/sulbactam, amoxacillin-clavulanate and  piperacillin-tazobactam.      -  Daptomycin: S 1      -  Levofloxacin: R >4      -  Linezolid: S 1      -  Tetra/Doxy: R >8      -  Vancomycin: R >16      Method Type: LETTY    Culture - Sputum (collected 22 @ 20:35)  Source: Trach Asp Tracheal Aspirate  Gram Stain (22 @ 06:45):    Numerous polymorphonuclear leukocytes seen per low power field    Rare Squamous epithelial cells seen per low power field    Moderate Gram Negative Rods seen per oil power field  Final Report (22 @ 15:50):    Moderate Pseudomonas aeruginosa (Carbapenem Resistant)    Normal Respiratory Analia absent  Organism: Pseudomonas aeruginosa (Carbapenem Resistant) (22 @ 15:50)  Organism: Pseudomonas aeruginosa (Carbapenem Resistant) (22 @ 15:50)      -  Amikacin: S <=16      -  Aztreonam: S 8      -  Cefepime: I 16      -  Ceftazidime: I 16      -  Ceftazidime/Avibactam: S <=4      -  Ceftolozane/tazobactam: S <=2      -  Ciprofloxacin: S 0.5      -  Gentamicin: I 8      -  Imipenem: R >8      -  Levofloxacin: I 2      -  Meropenem: R 8      -  Piperacillin/Tazobactam: I 64      -  Tobramycin: S <=2      Method Type: LETTY    Culture - Blood (collected 22 @ 13:30)  Source: .Blood Blood  Final Report (22 @ 23:00):    No Growth Final    Culture - Blood (collected 22 @ 13:30)  Source: .Blood Blood  Final Report (22 @ 23:00):    No Growth Final        Blood Gas Venous - Lactate: 5.90 mmol/L (22 @ 22:52)      INFECTIOUS DISEASES TESTING  COVID-19 PCR: NotDetec (08-10-22 @ 06:00)  COVID-19 PCR: NotDetec (22 @ 06:30)  Procalcitonin, Serum: 0.06 ng/mL (22 @ 11:49)  COVID-19 PCR: NotDetec (22 @ 07:23)  COVID-19 PCR: NotDetec (22 @ 02:09)  MRSA PCR Result.: Negative (03-15-22 @ 23:29)  Procalcitonin, Serum: 0.06 ng/mL (03-15-22 @ 01:00)  COVID-19 PCR: NotDetec (22 @ 18:03)  COVID-19 PCR: NotDetec (22 @ 14:40)  Procalcitonin, Serum: 0.16 ng/mL (22 @ 08:14)  COVID-19 PCR: NotDetec (22 @ 11:35)  COVID-19 PCR: NotDetec (21 @ 11:10)      INFLAMMATORY MARKERS      RADIOLOGY & ADDITIONAL TESTS:  I have personally reviewed the last Chest xray  CXR      CT      CARDIOLOGY TESTING      MEDICATIONS  ampicillin/sulbactam  IVPB     ampicillin/sulbactam  IVPB 3 IV Intermittent every 6 hours  aspirin  chewable 81 Oral daily  atorvastatin 80 Oral at bedtime  busPIRone 5 Oral <User Schedule>  ciprofloxacin   IVPB     ciprofloxacin   IVPB 400 IV Intermittent every 8 hours  dextrose 5% 1000 IV Continuous <Continuous>  enoxaparin Injectable 40 SubCutaneous every 24 hours  levETIRAcetam  Solution 500 Oral two times a day  norepinephrine Infusion 0.05 IV Continuous <Continuous>  vancomycin  IVPB 1000 IV Intermittent every 12 hours        ANTIBIOTICS:  ampicillin/sulbactam  IVPB      ampicillin/sulbactam  IVPB 3 Gram(s) IV Intermittent every 6 hours  ciprofloxacin   IVPB      ciprofloxacin   IVPB 400 milliGRAM(s) IV Intermittent every 8 hours  vancomycin  IVPB 1000 milliGRAM(s) IV Intermittent every 12 hours      ALLERGIES:  No Known Allergies

## 2022-08-18 NOTE — CONSULT NOTE ADULT - ASSESSMENT
57 year old man with PMHx of CAD, CVA, functional quadriplegia, respiratory failure s/p PEG and trach, non verbal at baseline, DL, HTN, history of seizures presenting for elevated WBC from NH. he is in sepsis secondary to infected ulcer receiving abx. nephro consult for JAVY.    Assessment and plan:  JAVY/ septic shock/ infected ulcers/ functional quadriplegia/ Respiratory failure s/p trach and peg/ non verbal  JAVY prerenal/ATN secondary to shock and hypoperfusion  HAGMA secondary to lactic acidosis  oliguric   s/p IVF, BP remains low  on bicarb drip on 100 cc/hr, continue  If BP remains low start levophed  abx per ID  keep folley  strict i/os  As per the chart family wants non invasive treatment, also RRT will not improve his overall outcome  very poor prognosis  will follow

## 2022-08-18 NOTE — H&P ADULT - HISTORY OF PRESENT ILLNESS
57 year old man with PMHx:  - HTN  - HLD  - CAD  - CVA with residual weakness, trach and PEG  - Hx of seizures    Patient was discharge recently for hospice with a code status of comfort measures. In nursing home, the patient had blood work done that showed leukocytosis. Patient is non-verbal at baseline, has a tracheostomy and PEG. The patient has bilateral hip and LE ulcerations deep and look infected. The patient is hypothermic, blood pressure is stable for now and currently on MV through the tracheostomy.   The patient's code status was revoked from Comfort measures to DNR/DNI only as per ED. Tried calling his sister JOSE but she didn't .

## 2022-08-18 NOTE — H&P ADULT - NSHPPHYSICALEXAM_GEN_ALL_CORE
GA: somnolent, lethargic, orientedx0  Heart: normal s1s2  Lungs: decreased air entry on right side  Abdomen: soft, PEG  LE: ulcerations with varying stages, likely infected

## 2022-08-18 NOTE — DISCHARGE NOTE FOR THE EXPIRED PATIENT - OTHER SIGNIFICANT FINDINGS
Attending attestation:   -Agree w/ above.     Pt admitted for eval and treatment of sepsis, treated appropriately. However, family decided on CMO status and all life-sustaining treatment options were discontinued. TOD 8/18/22 at 2200

## 2022-08-18 NOTE — CONSULT NOTE ADULT - ASSESSMENT
IMPRESSION:    sepsis present on admission  infected ulcer  JAVY  HAGMA  Chronic resp failure sp PEG/ trach  CVA  seizure  functional quadriplegia    PLAN:    CNS: Avoid CNS depressant    HEENT:  Oral care    PULMONARY:  HOB @ 45 degrees, aspiration precaution, keep Sao2 92 to 96% not weanable    CARDIOVASCULAR: bicarb drip, 3 am in  1 l D5W 100 cc/h    GI: GI prophylaxis                                          Feeding PEG    RENAL:  F/u  lytes.  Correct as needed. accurate I/O, u lytes/ osm    INFECTIOUS DISEASE: leonides/ vanco/ ID eval, pancx    HEMATOLOGICAL:  DVT prophylaxis.    ENDOCRINE:  Follow up FS.  Insulin protocol if needed.    vent unit  palliative care eval

## 2022-08-18 NOTE — CONSULT NOTE ADULT - ATTENDING COMMENTS
Cahrt reviwed  Pt seen  discussed w/ Fellow and house staff    Change on GOC noted  Plan to focus on Comfort measure  NO renal interventions  will sign off  recall if can be of assistance

## 2022-08-19 LAB
CULTURE RESULTS: NO GROWTH — SIGNIFICANT CHANGE UP
E FAECIUM DNA BLD POS QL NAA+NON-PROBE: SIGNIFICANT CHANGE UP
GRAM STN FLD: SIGNIFICANT CHANGE UP
METHOD TYPE: SIGNIFICANT CHANGE UP
PROCALCITONIN SERPL-MCNC: 0.49 NG/ML — HIGH (ref 0.02–0.1)
SPECIMEN SOURCE: SIGNIFICANT CHANGE UP
SPECIMEN SOURCE: SIGNIFICANT CHANGE UP

## 2022-08-21 LAB
-  AMPICILLIN: SIGNIFICANT CHANGE UP
-  DAPTOMYCIN: SIGNIFICANT CHANGE UP
-  GENTAMICIN SYNERGY: SIGNIFICANT CHANGE UP
-  LINEZOLID: SIGNIFICANT CHANGE UP
-  STREPTOMYCIN SYNERGY: SIGNIFICANT CHANGE UP
-  VANCOMYCIN: SIGNIFICANT CHANGE UP
CULTURE RESULTS: SIGNIFICANT CHANGE UP
METHOD TYPE: SIGNIFICANT CHANGE UP
ORGANISM # SPEC MICROSCOPIC CNT: SIGNIFICANT CHANGE UP
SPECIMEN SOURCE: SIGNIFICANT CHANGE UP

## 2022-08-22 DIAGNOSIS — D63.8 ANEMIA IN OTHER CHRONIC DISEASES CLASSIFIED ELSEWHERE: ICD-10-CM

## 2022-08-22 DIAGNOSIS — Z75.1 PERSON AWAITING ADMISSION TO ADEQUATE FACILITY ELSEWHERE: ICD-10-CM

## 2022-08-22 DIAGNOSIS — K21.9 GASTRO-ESOPHAGEAL REFLUX DISEASE WITHOUT ESOPHAGITIS: ICD-10-CM

## 2022-08-22 DIAGNOSIS — F41.9 ANXIETY DISORDER, UNSPECIFIED: ICD-10-CM

## 2022-08-22 DIAGNOSIS — K59.00 CONSTIPATION, UNSPECIFIED: ICD-10-CM

## 2022-08-22 DIAGNOSIS — L89.626 PRESSURE-INDUCED DEEP TISSUE DAMAGE OF LEFT HEEL: ICD-10-CM

## 2022-08-22 DIAGNOSIS — I69.354 HEMIPLEGIA AND HEMIPARESIS FOLLOWING CEREBRAL INFARCTION AFFECTING LEFT NON-DOMINANT SIDE: ICD-10-CM

## 2022-08-22 DIAGNOSIS — M86.68 OTHER CHRONIC OSTEOMYELITIS, OTHER SITE: ICD-10-CM

## 2022-08-22 DIAGNOSIS — G40.909 EPILEPSY, UNSPECIFIED, NOT INTRACTABLE, WITHOUT STATUS EPILEPTICUS: ICD-10-CM

## 2022-08-22 DIAGNOSIS — E43 UNSPECIFIED SEVERE PROTEIN-CALORIE MALNUTRITION: ICD-10-CM

## 2022-08-22 DIAGNOSIS — L89.893 PRESSURE ULCER OF OTHER SITE, STAGE 3: ICD-10-CM

## 2022-08-22 DIAGNOSIS — J95.851 VENTILATOR ASSOCIATED PNEUMONIA: ICD-10-CM

## 2022-08-22 DIAGNOSIS — R64 CACHEXIA: ICD-10-CM

## 2022-08-22 DIAGNOSIS — L89.613 PRESSURE ULCER OF RIGHT HEEL, STAGE 3: ICD-10-CM

## 2022-08-22 DIAGNOSIS — E22.2 SYNDROME OF INAPPROPRIATE SECRETION OF ANTIDIURETIC HORMONE: ICD-10-CM

## 2022-08-22 DIAGNOSIS — R53.2 FUNCTIONAL QUADRIPLEGIA: ICD-10-CM

## 2022-08-22 DIAGNOSIS — K56.7 ILEUS, UNSPECIFIED: ICD-10-CM

## 2022-08-22 DIAGNOSIS — E11.9 TYPE 2 DIABETES MELLITUS WITHOUT COMPLICATIONS: ICD-10-CM

## 2022-08-22 DIAGNOSIS — Z93.1 GASTROSTOMY STATUS: ICD-10-CM

## 2022-08-22 DIAGNOSIS — E53.8 DEFICIENCY OF OTHER SPECIFIED B GROUP VITAMINS: ICD-10-CM

## 2022-08-22 DIAGNOSIS — E78.00 PURE HYPERCHOLESTEROLEMIA, UNSPECIFIED: ICD-10-CM

## 2022-08-22 DIAGNOSIS — J96.10 CHRONIC RESPIRATORY FAILURE, UNSPECIFIED WHETHER WITH HYPOXIA OR HYPERCAPNIA: ICD-10-CM

## 2022-08-22 DIAGNOSIS — Z99.11 DEPENDENCE ON RESPIRATOR [VENTILATOR] STATUS: ICD-10-CM

## 2022-08-22 DIAGNOSIS — Z93.0 TRACHEOSTOMY STATUS: ICD-10-CM

## 2022-08-22 DIAGNOSIS — I25.10 ATHEROSCLEROTIC HEART DISEASE OF NATIVE CORONARY ARTERY WITHOUT ANGINA PECTORIS: ICD-10-CM

## 2022-08-22 DIAGNOSIS — E86.1 HYPOVOLEMIA: ICD-10-CM

## 2022-08-22 DIAGNOSIS — I10 ESSENTIAL (PRIMARY) HYPERTENSION: ICD-10-CM

## 2022-08-22 DIAGNOSIS — K59.39 OTHER MEGACOLON: ICD-10-CM

## 2022-08-22 DIAGNOSIS — L89.154 PRESSURE ULCER OF SACRAL REGION, STAGE 4: ICD-10-CM

## 2022-08-22 NOTE — SWALLOW BEDSIDE ASSESSMENT ADULT - SWALLOW EVAL: DIAGNOSIS
See dental attachment  
+toleration for nectar thick liquids, honey thick liquids, puree and soft solids w/o overt s/s penetration/aspiration.

## 2022-08-23 LAB
CULTURE RESULTS: SIGNIFICANT CHANGE UP
SPECIMEN SOURCE: SIGNIFICANT CHANGE UP

## 2022-08-31 DIAGNOSIS — R56.9 UNSPECIFIED CONVULSIONS: ICD-10-CM

## 2022-08-31 DIAGNOSIS — L89.229 PRESSURE ULCER OF LEFT HIP, UNSPECIFIED STAGE: ICD-10-CM

## 2022-08-31 DIAGNOSIS — A41.9 SEPSIS, UNSPECIFIED ORGANISM: ICD-10-CM

## 2022-08-31 DIAGNOSIS — I10 ESSENTIAL (PRIMARY) HYPERTENSION: ICD-10-CM

## 2022-08-31 DIAGNOSIS — R34 ANURIA AND OLIGURIA: ICD-10-CM

## 2022-08-31 DIAGNOSIS — J90 PLEURAL EFFUSION, NOT ELSEWHERE CLASSIFIED: ICD-10-CM

## 2022-08-31 DIAGNOSIS — R45.1 RESTLESSNESS AND AGITATION: ICD-10-CM

## 2022-08-31 DIAGNOSIS — I69.354 HEMIPLEGIA AND HEMIPARESIS FOLLOWING CEREBRAL INFARCTION AFFECTING LEFT NON-DOMINANT SIDE: ICD-10-CM

## 2022-08-31 DIAGNOSIS — Z66 DO NOT RESUSCITATE: ICD-10-CM

## 2022-08-31 DIAGNOSIS — E87.1 HYPO-OSMOLALITY AND HYPONATREMIA: ICD-10-CM

## 2022-08-31 DIAGNOSIS — I25.10 ATHEROSCLEROTIC HEART DISEASE OF NATIVE CORONARY ARTERY WITHOUT ANGINA PECTORIS: ICD-10-CM

## 2022-08-31 DIAGNOSIS — R68.0 HYPOTHERMIA, NOT ASSOCIATED WITH LOW ENVIRONMENTAL TEMPERATURE: ICD-10-CM

## 2022-08-31 DIAGNOSIS — J96.21 ACUTE AND CHRONIC RESPIRATORY FAILURE WITH HYPOXIA: ICD-10-CM

## 2022-08-31 DIAGNOSIS — R65.21 SEVERE SEPSIS WITH SEPTIC SHOCK: ICD-10-CM

## 2022-08-31 DIAGNOSIS — F41.9 ANXIETY DISORDER, UNSPECIFIED: ICD-10-CM

## 2022-08-31 DIAGNOSIS — Z74.1 NEED FOR ASSISTANCE WITH PERSONAL CARE: ICD-10-CM

## 2022-08-31 DIAGNOSIS — L89.219 PRESSURE ULCER OF RIGHT HIP, UNSPECIFIED STAGE: ICD-10-CM

## 2022-08-31 DIAGNOSIS — L89.899 PRESSURE ULCER OF OTHER SITE, UNSPECIFIED STAGE: ICD-10-CM

## 2022-08-31 DIAGNOSIS — E11.9 TYPE 2 DIABETES MELLITUS WITHOUT COMPLICATIONS: ICD-10-CM

## 2022-08-31 DIAGNOSIS — D64.9 ANEMIA, UNSPECIFIED: ICD-10-CM

## 2022-08-31 DIAGNOSIS — E87.2 ACIDOSIS: ICD-10-CM

## 2022-08-31 DIAGNOSIS — Z93.0 TRACHEOSTOMY STATUS: ICD-10-CM

## 2022-08-31 DIAGNOSIS — R64 CACHEXIA: ICD-10-CM

## 2022-08-31 DIAGNOSIS — Z51.5 ENCOUNTER FOR PALLIATIVE CARE: ICD-10-CM

## 2022-08-31 DIAGNOSIS — L89.159 PRESSURE ULCER OF SACRAL REGION, UNSPECIFIED STAGE: ICD-10-CM

## 2022-08-31 DIAGNOSIS — Z93.1 GASTROSTOMY STATUS: ICD-10-CM

## 2022-08-31 DIAGNOSIS — M86.68 OTHER CHRONIC OSTEOMYELITIS, OTHER SITE: ICD-10-CM

## 2022-08-31 DIAGNOSIS — N17.0 ACUTE KIDNEY FAILURE WITH TUBULAR NECROSIS: ICD-10-CM

## 2022-08-31 DIAGNOSIS — R53.2 FUNCTIONAL QUADRIPLEGIA: ICD-10-CM

## 2022-08-31 DIAGNOSIS — E78.5 HYPERLIPIDEMIA, UNSPECIFIED: ICD-10-CM

## 2022-11-02 NOTE — H&P ADULT - ATTENDING COMMENTS
54M PMH: CVA (1980s with residual left sided weakness and aphasia), hx of Seizure Disorder (no seizure in last 20 yrs), CAD, Right Retinal Detachment s/p Corneal Transplant, Tardive Diskinesia, HTN, HLD, DM type II Diet controlled, Sacral Stage 1 Pressure Ulcer sent from the [facility] for an episode of unresponsiveness associated with SBP 60. Overnight in F9 pt has a witnessed episode of blank stare unresponsiveness for 6 minutes witnessed by Nurse and Sister. At that time pt was Hemodynamically Stable and telemetry no events.     Vital Signs Last 24 Hrs  T(C): 36.1 (23 Jan 2020 13:18), Max: 36.6 (22 Jan 2020 19:32)  T(F): 96.9 (23 Jan 2020 13:18), Max: 97.9 (22 Jan 2020 19:32)  HR: 87 (23 Jan 2020 13:18) (82 - 88)  BP: 132/66 (23 Jan 2020 13:18) (103/55 - 140/69)  RR: 18 (23 Jan 2020 13:18) (18 - 18)  SpO2: 99% (22 Jan 2020 20:10) (99% - 99%)    GEN: No acute distress pt answering simple questions and denies pain  CV: NL S1, S2  Resp: CTA Bilateral   GI: +BS, Soft, NT, ND  Ext: No e/c/c/ Stage 1 Sacral Pressure Ulcer   Neuro: Left Donis and Bedbound.                           10.0   8.88  )-----------( 204      ( 23 Jan 2020 04:30 )             29.8       01-23    141  |  106  |  18  ----------------------------<  95  4.2   |  24  |  0.7    Ca    8.7      23 Jan 2020 04:30  Phos  3.3     01-23  Mg     2.3     01-23    TPro  6.1  /  Alb  3.6  /  TBili  0.3  /  DBili  x   /  AST  12  /  ALT  15  /  AlkPhos  102  01-23    CT-Head:   1. No CT evidence for acute intracranial pathology.  2. Stable chronic findings as above.    CXR:  IMPRESSION:   No radiographic evidence of acute cardiopulmonary disease.    MEDICATIONS  (STANDING):  aspirin enteric coated 81 milliGRAM(s) Oral daily  busPIRone 5 milliGRAM(s) Oral <User Schedule>  carBAMazepine Suspension 100 milliGRAM(s) Oral every 6 hours  chlorhexidine 4% Liquid 1 Application(s) Topical <User Schedule>  enoxaparin Injectable 40 milliGRAM(s) SubCutaneous daily  risperiDONE   Tablet 1 milliGRAM(s) Oral two times a day  simvastatin 10 milliGRAM(s) Oral at bedtime    Assessment / Plan   Hypotension Likely Neurocardiogenic Syncope vs Autonomic Dysfunction   Suspecting New Seizures   - Pt is bedbound unable to check orthostatics  - Telemetry no events / EKG benign  - Carbamezapine levels are therapeutic   - f/u TTE  - f/u UA  - Needs VEEG and Transfer to Epilepsy Unit at [facility]  - Neurology / Epilepsy follow up   - Signed out to on call hospitalist     For all other issues c/w current care     GI/DVT Proph     Dispo: Transfer to [facility] for VEEG / Signed out to on call hospitalist at [facility]. 54M PMH: CVA (1980s with residual left sided weakness and aphasia), hx of Seizure Disorder (no seizure in last 20 yrs), CAD, Right Retinal Detachment s/p Corneal Transplant, Tardive Diskinesia, HTN, HLD, DM type II Diet controlled, Sacral Stage 1 Pressure Ulcer sent from the [facility] for an episode of unresponsiveness associated with SBP 60. Overnight in F9 pt has a witnessed episode of blank stare unresponsiveness for 6 minutes witnessed by Nurse and Sister. At that time pt was Hemodynamically Stable and telemetry no events.     Vital Signs Last 24 Hrs  T(C): 36.1 (23 Jan 2020 13:18), Max: 36.6 (22 Jan 2020 19:32)  T(F): 96.9 (23 Jan 2020 13:18), Max: 97.9 (22 Jan 2020 19:32)  HR: 87 (23 Jan 2020 13:18) (82 - 88)  BP: 132/66 (23 Jan 2020 13:18) (103/55 - 140/69)  RR: 18 (23 Jan 2020 13:18) (18 - 18)  SpO2: 99% (22 Jan 2020 20:10) (99% - 99%)    GEN: No acute distress pt answering simple questions and denies pain  CV: NL S1, S2  Resp: CTA Bilateral   GI: +BS, Soft, NT, ND  Ext: No e/c/c/ Stage 1 Sacral Pressure Ulcer   Neuro: Left Donis and Bedbound.                           10.0   8.88  )-----------( 204      ( 23 Jan 2020 04:30 )             29.8       01-23    141  |  106  |  18  ----------------------------<  95  4.2   |  24  |  0.7    Ca    8.7      23 Jan 2020 04:30  Phos  3.3     01-23  Mg     2.3     01-23    TPro  6.1  /  Alb  3.6  /  TBili  0.3  /  DBili  x   /  AST  12  /  ALT  15  /  AlkPhos  102  01-23    CT-Head:   1. No CT evidence for acute intracranial pathology.  2. Stable chronic findings as above.    CXR:  IMPRESSION:   No radiographic evidence of acute cardiopulmonary disease.    MEDICATIONS  (STANDING):  aspirin enteric coated 81 milliGRAM(s) Oral daily  busPIRone 5 milliGRAM(s) Oral <User Schedule>  carBAMazepine Suspension 100 milliGRAM(s) Oral every 6 hours  chlorhexidine 4% Liquid 1 Application(s) Topical <User Schedule>  enoxaparin Injectable 40 milliGRAM(s) SubCutaneous daily  risperiDONE   Tablet 1 milliGRAM(s) Oral two times a day  simvastatin 10 milliGRAM(s) Oral at bedtime    Assessment / Plan   Hypotension Likely Neurocardiogenic Syncope vs Autonomic Dysfunction   Suspecting New Seizures   - Pt is bedbound unable to check orthostatics  - Telemetry no events / EKG benign  - Carbamezapine levels are therapeutic   - Trops serial negative  - f/u TTE  - f/u UA  - Needs VEEG and Transfer to Epilepsy Unit at [facility]  - Get Epilepsy Consultation for VEEG at [facility]   - Neurologist following is Dr. Ty  - Signed out to on call hospitalist Dr. Rhodes    For all other issues c/w current care     GI/DVT Proph     Dispo: Transfer to [facility] VEEG / Signed out to on call Dr. Rhodes. 2

## 2022-11-16 NOTE — ED ADULT NURSE NOTE - AS SC BRADEN SENSORY
Practice meticulous handwashing and cover cough to prevent spread of infection. Influenza and COVID-19 - will call with results. Strep culture - will call results. Do not return to work until symptoms have improved and no fever for 24 hrs without fever reducing medication. Encouraged to increase fluids and rest.  Encouraged to take Vitamin C 500 to 1000 mg daily, Zinc 50 mg daily, Vitamin D3 2,000 IU daily and Vitamin B complex daily or a multivitamin daily. Tylenol OTC PRN for pain, discomfort or fever as directed on package  Cool mist humidifier  Hot tea with honey and lemon for cough PRN  Patient instructions given for Flu symptoms and Covid 19 infection. .  To ER or call 911 if any increasing difficulty breathing, shortness of breath, inability to swallow, hives, rash, facial/tongue swelling or temp greater than 103 degrees. Follow up with PCP or Walk in Care as needed if symptoms worsen or do not improve.
(2) very limited

## 2023-04-20 NOTE — SWALLOW BEDSIDE ASSESSMENT ADULT - PHARYNGEAL PHASE
Metabolic Panel  -     Lipase      No follow-ups on file.           Елена Cintron, DO
cough w/thins/Within functional limits
Within functional limits
with thin liquids./Cough post oral intake

## 2023-07-06 NOTE — PATIENT PROFILE ADULT - NSFALLSECTIONLABEL_GEN_A_CORE
Detail Level: Detailed
Post-Care Instructions: I reviewed with the patient in detail post-care instructions. Patient is to wear sunprotection, and avoid picking at any of the treated lesions. Pt may apply Vaseline to crusted or scabbing areas.
Show Spray Paint Technique Variable?: Yes
Include Z78.9 (Other Specified Conditions Influencing Health Status) As An Associated Diagnosis?: No
Spray Paint Text: The liquid nitrogen was applied to the skin utilizing a spray paint frosting technique.
Medical Necessity Clause: This procedure was medically necessary because the lesions that were treated were:
Medical Necessity Information: It is in your best interest to select a reason for this procedure from the list below. All of these items fulfill various CMS LCD requirements except the new and changing color options.
Consent: The patient's consent was obtained including but not limited to risks of crusting, scabbing, blistering, scarring, darker or lighter pigmentary change, recurrence, incomplete removal and infection.
.

## 2023-09-08 NOTE — ED ADULT NURSE NOTE - NS ED NURSE TRANSPORT WITH
SEBORRHEIC KERATOSES    Seborrheic keratoses are very common harmless, often pigmented, growths on the skin.  They are due to a build up of ordinary skin cells.  They can itch, become inflamed, and catch on clothing.  They are not infectious; they can never become a cancer.      Treatment can occur by either freezing them with liquid nitrogen (cryotherapy), or scraping them off (curettage).  This is not a procedure that is typically covered by insurance but can be done cosmetically if desired.      Cardiac Monitor/Defib/ACLS/Rescue Kit/O2/BVM

## 2023-09-22 NOTE — ED ADULT NURSE NOTE - NS ED NURSE LEVEL OF CONSCIOUSNESS SPEECH
Initial Evaluation        Patient:   Zulma Gagnon  YOB: 1939  Age:  80 y.o. Room:  [address] [place] [address]  MRN:  [id number]   Acct: [de-identified]    Date of Admission:  9/19/2023  6:52 PM  Date of Service:  9/22/2023  Completed By:  Kendal Sims RN                 Reason for Palliative Care Evaluation:-               [x] Code Status Discussion              [x] Goals of Care              [] Pain/Symptom Management               [] Emotional Support              [] Other:                    Current Issues:-   []  Pain  []  Fatigue  []  Nausea  []  Anxiety  []  Depression  []  Shortness of Breath  []  Constipation  []  Appetite  [x]  Other:dementia, frequent falls, dysphagia              Advance Directives:-    [] [place] DNR Form  [x] Living Will  [x] Medical POA              Current Code Status:-     [] Full Resuscitation  [x] DNR-Comfort Care-Arrest  [] DNR-Comfort Care       [] Limited Resuscitation             [] No CPR            [] No shock            [] No ET intubation/reintubation            [] No resuscitative medications            [] Other limitation:               Palliative Performance Status:-      [] 100%  Full ambulation; normal activity and work; no evidence of disease; able to do own self care; normal intake; fully conscious     [] 90%   Full ambulation; normal activity and work; some evidence of disease; able to do own self care; normal intake; fully conscious    [] 80%   Full ambulation; normal activity with effort; some evidence of disease; able to do own self care; normal or reduced intake; fully conscious    [] 70%  Ambulation reduced; unable to perform normal job/work; significant  disease; able to do own self care; normal or reduced intake; fully conscious    [] 60%  Ambulation reduced; Significant disease;Can't do hobbies/housework; intake normal or reduced; occasional assist; LOC full/confusion    [x] 50%  Mainly sit/lie;  Extensive disease; Can't do any work; Considerable Unable to speak

## 2023-11-06 NOTE — PROGRESS NOTE ADULT - PROBLEM/PLAN-1
DISPLAY PLAN FREE TEXT
Secondary Intention Text (Leave Blank If You Do Not Want): The defect will heal with secondary intention.

## 2024-07-03 NOTE — ED ADULT NURSE NOTE - PAIN RATING/NUMBER SCALE (0-10): ACTIVITY
0 Pt called for urine results. Results are not posted yet. Advised to go to urgent care if in urinary tract pain to get medication quickly. Pt verbalized understanding.